# Patient Record
Sex: MALE | Race: WHITE | Employment: OTHER | URBAN - METROPOLITAN AREA
[De-identification: names, ages, dates, MRNs, and addresses within clinical notes are randomized per-mention and may not be internally consistent; named-entity substitution may affect disease eponyms.]

---

## 2017-07-14 ENCOUNTER — OP HISTORICAL/CONVERTED ENCOUNTER (OUTPATIENT)
Dept: OTHER | Age: 62
End: 2017-07-14

## 2017-09-22 ENCOUNTER — IP HISTORICAL/CONVERTED ENCOUNTER (OUTPATIENT)
Dept: OTHER | Age: 62
End: 2017-09-22

## 2018-01-10 ENCOUNTER — OP HISTORICAL/CONVERTED ENCOUNTER (OUTPATIENT)
Dept: OTHER | Age: 63
End: 2018-01-10

## 2018-04-10 ENCOUNTER — ED HISTORICAL/CONVERTED ENCOUNTER (OUTPATIENT)
Dept: OTHER | Age: 63
End: 2018-04-10

## 2018-04-20 ENCOUNTER — OP HISTORICAL/CONVERTED ENCOUNTER (OUTPATIENT)
Dept: OTHER | Age: 63
End: 2018-04-20

## 2018-10-05 ENCOUNTER — OP HISTORICAL/CONVERTED ENCOUNTER (OUTPATIENT)
Dept: OTHER | Age: 63
End: 2018-10-05

## 2018-12-13 ENCOUNTER — OP HISTORICAL/CONVERTED ENCOUNTER (OUTPATIENT)
Dept: OTHER | Age: 63
End: 2018-12-13

## 2019-06-22 ENCOUNTER — ED HISTORICAL/CONVERTED ENCOUNTER (OUTPATIENT)
Dept: OTHER | Age: 64
End: 2019-06-22

## 2019-07-05 ENCOUNTER — OP HISTORICAL/CONVERTED ENCOUNTER (OUTPATIENT)
Dept: OTHER | Age: 64
End: 2019-07-05

## 2019-07-06 ENCOUNTER — ED HISTORICAL/CONVERTED ENCOUNTER (OUTPATIENT)
Dept: OTHER | Age: 64
End: 2019-07-06

## 2019-07-18 ENCOUNTER — OP HISTORICAL/CONVERTED ENCOUNTER (OUTPATIENT)
Dept: OTHER | Age: 64
End: 2019-07-18

## 2019-09-10 ENCOUNTER — OP HISTORICAL/CONVERTED ENCOUNTER (OUTPATIENT)
Dept: OTHER | Age: 64
End: 2019-09-10

## 2019-10-06 ENCOUNTER — IP HISTORICAL/CONVERTED ENCOUNTER (OUTPATIENT)
Dept: OTHER | Age: 64
End: 2019-10-06

## 2019-10-22 ENCOUNTER — OP HISTORICAL/CONVERTED ENCOUNTER (OUTPATIENT)
Dept: OTHER | Age: 64
End: 2019-10-22

## 2020-01-27 ENCOUNTER — OP HISTORICAL/CONVERTED ENCOUNTER (OUTPATIENT)
Dept: OTHER | Age: 65
End: 2020-01-27

## 2020-03-31 ENCOUNTER — IP HISTORICAL/CONVERTED ENCOUNTER (OUTPATIENT)
Dept: OTHER | Age: 65
End: 2020-03-31

## 2020-10-30 ENCOUNTER — APPOINTMENT (OUTPATIENT)
Dept: CT IMAGING | Age: 65
End: 2020-10-30
Attending: EMERGENCY MEDICINE
Payer: COMMERCIAL

## 2020-10-30 ENCOUNTER — APPOINTMENT (OUTPATIENT)
Dept: GENERAL RADIOLOGY | Age: 65
End: 2020-10-30
Attending: EMERGENCY MEDICINE
Payer: COMMERCIAL

## 2020-10-30 ENCOUNTER — HOSPITAL ENCOUNTER (EMERGENCY)
Age: 65
Discharge: HOME | End: 2020-10-30
Attending: EMERGENCY MEDICINE
Payer: COMMERCIAL

## 2020-10-30 VITALS
RESPIRATION RATE: 16 BRPM | DIASTOLIC BLOOD PRESSURE: 82 MMHG | OXYGEN SATURATION: 98 % | WEIGHT: 225 LBS | HEIGHT: 71 IN | SYSTOLIC BLOOD PRESSURE: 146 MMHG | TEMPERATURE: 98.6 F | BODY MASS INDEX: 31.5 KG/M2 | HEART RATE: 70 BPM

## 2020-10-30 DIAGNOSIS — S61.210A LACERATION OF RIGHT INDEX FINGER WITHOUT FOREIGN BODY WITHOUT DAMAGE TO NAIL, INITIAL ENCOUNTER: Primary | ICD-10-CM

## 2020-10-30 DIAGNOSIS — S42.152A GLENOID FRACTURE OF SHOULDER, LEFT, CLOSED, INITIAL ENCOUNTER: ICD-10-CM

## 2020-10-30 DIAGNOSIS — S00.93XA CONTUSION OF HEAD, UNSPECIFIED PART OF HEAD, INITIAL ENCOUNTER: ICD-10-CM

## 2020-10-30 DIAGNOSIS — S42.142A GLENOID FRACTURE OF SHOULDER, LEFT, CLOSED, INITIAL ENCOUNTER: ICD-10-CM

## 2020-10-30 PROCEDURE — 73030 X-RAY EXAM OF SHOULDER: CPT

## 2020-10-30 PROCEDURE — 75810000293 HC SIMP/SUPERF WND  RPR

## 2020-10-30 PROCEDURE — 70450 CT HEAD/BRAIN W/O DYE: CPT

## 2020-10-30 PROCEDURE — 72125 CT NECK SPINE W/O DYE: CPT

## 2020-10-30 PROCEDURE — 99283 EMERGENCY DEPT VISIT LOW MDM: CPT

## 2020-10-30 RX ORDER — CEPHALEXIN 500 MG/1
500 CAPSULE ORAL 3 TIMES DAILY
Qty: 21 CAP | Refills: 0 | Status: SHIPPED | OUTPATIENT
Start: 2020-10-30 | End: 2020-11-06

## 2020-10-30 RX ORDER — LOSARTAN POTASSIUM 25 MG/1
25 TABLET ORAL DAILY
Status: ON HOLD | COMMUNITY
End: 2022-05-03

## 2020-10-30 RX ORDER — TIZANIDINE 2 MG/1
2 TABLET ORAL
COMMUNITY
End: 2021-05-16

## 2020-10-30 RX ORDER — METOPROLOL SUCCINATE 50 MG/1
100 TABLET, EXTENDED RELEASE ORAL DAILY
COMMUNITY
End: 2021-08-16 | Stop reason: ALTCHOICE

## 2020-10-30 RX ORDER — TRAMADOL HYDROCHLORIDE 50 MG/1
50 TABLET ORAL
Qty: 12 TAB | Refills: 0 | Status: SHIPPED | OUTPATIENT
Start: 2020-10-30 | End: 2020-11-02

## 2020-10-30 RX ORDER — GABAPENTIN 100 MG/1
800 CAPSULE ORAL 3 TIMES DAILY
COMMUNITY
End: 2021-08-16 | Stop reason: ALTCHOICE

## 2020-10-30 RX ORDER — TAMSULOSIN HYDROCHLORIDE 0.4 MG/1
0.4 CAPSULE ORAL DAILY
COMMUNITY

## 2020-10-30 RX ORDER — GLIPIZIDE 10 MG/1
10 TABLET, FILM COATED, EXTENDED RELEASE ORAL DAILY
COMMUNITY

## 2020-10-30 RX ORDER — INSULIN GLARGINE 100 [IU]/ML
60 INJECTION, SOLUTION SUBCUTANEOUS DAILY
COMMUNITY
End: 2021-08-16 | Stop reason: ALTCHOICE

## 2020-10-30 NOTE — ED TRIAGE NOTES
Patient reports went to sit on stool and it gave out cut right hand and hit head on blood thinners.  C/o ha almost syncope neck pain and left shoulder pain

## 2020-10-30 NOTE — DISCHARGE INSTRUCTIONS
Take medicines as prescribed and follow-up with your PCP in 2 days for wound recheck. Follow-up with orthopedic surgeon for suspected glenoid fracture. Maintain your left shoulder in a sling for now until further evaluation by the orthopedic surgeon. Return to the emergency room for any new or worsening symptoms.

## 2020-10-30 NOTE — ED PROVIDER NOTES
EMERGENCY DEPARTMENT HISTORY AND PHYSICAL EXAM      Date: 10/30/2020  Patient Name: Carlie Smiley. History of Presenting Illness     Chief Complaint   Patient presents with    Fall       History Provided By: Patient    HPI: Carlie Smiley., 72 y.o. male with a past medical history significant of BPH, diabetes, hypercholesterolemia, hypertension, restless leg syndrome, thromboembolus unknown site, and peripheral neuropathy who presents to the ED with cc of head injury and neck pain secondary to a fall while standing on a 3 step stool falling backwards. No loss of consciousness he did sustain minor lacerations to his right third and fourth fingers on the palmar aspect. No other injuries; he had some moderate bleeding at the site of injury at home prior to ED arrival.  Bleeding at this time is controlled  There are no other complaints, changes, or physical findings at this time. PCP: Gabrielle Sibley MD    No current facility-administered medications on file prior to encounter. Current Outpatient Medications on File Prior to Encounter   Medication Sig Dispense Refill    apixaban (ELIQUIS) 5 mg tablet Take 5 mg by mouth two (2) times a day. Indications: blood clot in a deep vein of the extremities      losartan (COZAAR) 25 mg tablet Take 25 mg by mouth daily.  glipiZIDE SR (GLUCOTROL XL) 10 mg CR tablet Take 10 mg by mouth daily.  gabapentin (NEURONTIN) 100 mg capsule Take 800 mg by mouth three (3) times daily.  metoprolol succinate (TOPROL-XL) 50 mg XL tablet Take 100 mg by mouth daily.  tamsulosin (FLOMAX) 0.4 mg capsule Take 0.4 mg by mouth daily.  insulin glargine (Lantus Solostar U-100 Insulin) 100 unit/mL (3 mL) inpn 60 Units by SubCUTAneous route daily.  tiZANidine (ZANAFLEX) 2 mg tablet Take 2 mg by mouth nightly.          Past History     Past Medical History:  Past Medical History:   Diagnosis Date    BPH (benign prostatic hyperplasia)     Diabetes ([facility] [address]     Hypercholesterolemia     Hypertension     Neuropathy     Restless leg syndrome     Thromboembolus (HCC)        Past Surgical History:  Past Surgical History:   Procedure Laterality Date    HX HIP REPLACEMENT Right     HX HIP REPLACEMENT Left     HX KNEE REPLACEMENT Left     HX SHOULDER ARTHROSCOPY Right     HX SHOULDER ARTHROSCOPY Left        Family History:  History reviewed. No pertinent family history. Social History:  Social History     Tobacco Use    Smoking status: Never Smoker    Smokeless tobacco: Never Used   Substance Use Topics    Alcohol use: Yes     Comment: rarely     Drug use: Never     Comment: medical marijuana        Allergies:  No Known Allergies      Review of Systems     Review of Systems   Constitutional: Negative. HENT:        As in HPI   Eyes: Negative. Respiratory: Negative. Cardiovascular: Negative. Gastrointestinal: Negative. Endocrine: Negative. Genitourinary: Negative. Musculoskeletal:        Left shoulder injury   Skin: Negative. Allergic/Immunologic: Negative. Neurological: Negative. Hematological: Negative. Psychiatric/Behavioral: Negative. All other systems reviewed and are negative. Physical Exam     Physical Exam  Vitals signs and nursing note reviewed. Constitutional:       General: He is not in acute distress. Appearance: He is well-developed. He is not toxic-appearing or diaphoretic. HENT:      Head: Normocephalic. Comments: Mild scalp contusion with no significant laceration     Nose: Nose normal.      Mouth/Throat:      Mouth: Mucous membranes are moist.      Pharynx: Oropharynx is clear. Eyes:      Extraocular Movements: Extraocular movements intact. Pupils: Pupils are equal, round, and reactive to light. Neck:      Musculoskeletal: Normal range of motion and neck supple. Cardiovascular:      Rate and Rhythm: Normal rate and regular rhythm. Heart sounds: Normal heart sounds.    Pulmonary: Effort: Pulmonary effort is normal. No respiratory distress. Breath sounds: Normal breath sounds. Chest:      Chest wall: No mass or tenderness. Abdominal:      General: Bowel sounds are normal. There is no abdominal bruit. Palpations: Abdomen is soft. There is no hepatomegaly. Tenderness: There is no abdominal tenderness. There is no rebound. Musculoskeletal: Normal range of motion. Right lower leg: He exhibits no tenderness. No edema. Left lower leg: He exhibits no tenderness. No edema. Comments: Focal tenderness left shoulder. Full range of motion   Skin:     General: Skin is warm and dry. Capillary Refill: Capillary refill takes less than 2 seconds. Comments: Superficial lacerations measuring 1.5 cm each to palmar aspects of right third and fourth fingers, bleeding controlled. Neurological:      General: No focal deficit present. Mental Status: He is alert and oriented to person, place, and time. Psychiatric:         Mood and Affect: Mood normal.         Behavior: Behavior normal.         Lab and Diagnostic Study Results     Labs -   No results found for this or any previous visit (from the past 12 hour(s)). Radiologic Studies -     CT Results  (Last 48 hours)               10/30/20 1615  CT HEAD WO CONT Final result    Impression:  Impression: Age-appropriate atrophy. No acute findings. Narrative:  CT dose reduction was achieved through use of a standardized protocol tailored   for this examination and automatic exposure control for dose modulation. CT Head       History:        The sulci are prominent but symmetric. Periventricular and deep white matter   hypodensity is not unexpected for age. No acute abnormality seen gray or white   matter. Ventricles are symmetric and appropriate for atrophy. There is no   midline shift or mass effect, or evidence of hemorrhage. Bone windows show no   fracture.            10/30/20 [place] [address] CONT Final result    Narrative:  CT dose reduction was achieved through use of a standardized protocol tailored   for this examination and automatic exposure control for dose modulation. Protocol study shows no fracture or prevertebral edema. 2 mm chronic   retrolisthesis of C3 on C4       At C3-4, there is advanced DDD. Moderate DDD at C5-6 and mild DDD at every other   level. Osteophytes and accompanying disc herniation causes moderate spinal stenosis at   C3-4. There is borderline spinal stenosis at C6-7. Mild multilevel facet DJD, and moderate left-sided facet DJD at C3-4, without   foraminal stenosis. Craniocervical junction is maintained           CXR Results  (Last 48 hours)    None        Left shoulder x-ray study Result     Three-view left shoulder     Moderate glenohumeral DJD and questionable subtle nondisplaced fracture of the  inferior glenoid. More likely, this represents coalescent degenerative cysts. No  dislocation         Medical Decision Making   I am the first provider for this patient. I reviewed the vital signs, available nursing notes, past medical history, past surgical history, family history and social history. Vital Signs-Reviewed the patient's vital signs. No data found. Records Reviewed: Nursing Notes    ED Course:   Initial assessment performed. The patients presenting problems have been discussed, and they are in agreement with the care plan formulated and outlined with them. I have encouraged them to ask questions as they arise throughout their visit. Provider Notes (Medical Decision Making): Uneventful ED course, clinical improvement with therapy, patient will be discharged to followup with PCP as directed    Procedures   ical Decision Makingedical Decision Making  Procedures   Right third and fourth finger superficial lacerations on the palmar aspect of the fingers. Dermabond used for wound closure without incident. No complications.   Patient tolerated procedure well. Disposition   Disposition: Condition stable and improved  Home     DischargedDISCHARGE PLAN:  1. Current Discharge Medication List      CONTINUE these medications which have NOT CHANGED    Details   apixaban (ELIQUIS) 5 mg tablet Take 5 mg by mouth two (2) times a day. Indications: blood clot in a deep vein of the extremities      losartan (COZAAR) 25 mg tablet Take 25 mg by mouth daily. glipiZIDE SR (GLUCOTROL XL) 10 mg CR tablet Take 10 mg by mouth daily. gabapentin (NEURONTIN) 100 mg capsule Take 800 mg by mouth three (3) times daily. metoprolol succinate (TOPROL-XL) 50 mg XL tablet Take 100 mg by mouth daily. tamsulosin (FLOMAX) 0.4 mg capsule Take 0.4 mg by mouth daily. insulin glargine (Lantus Solostar U-100 Insulin) 100 unit/mL (3 mL) inpn 60 Units by SubCUTAneous route daily. tiZANidine (ZANAFLEX) 2 mg tablet Take 2 mg by mouth nightly. 2.   Follow-up Information     Follow up With Specialties Details Why Contact Info    Follow-up with PCP of your choice  In 2 days For wound re-check     Susan Yuan MD Orthopedic Surgery In 1 day  [address] [facility]  [phone]          3. Return to ED if worse   4. Discharge Medication List as of 10/30/2020  6:34 PM            Diagnosis     Clinical Impression:   1. Laceration of right index finger without foreign body without damage to nail, initial encounter    2. Contusion of head, unspecified part of head, initial encounter    3. Glenoid fracture of shoulder, left, closed, initial encounter        Attestations:    Cuauhtemoc Recinos MD    Please note that this dictation was completed with [employer], the [employer] voice recognition software. Quite often unanticipated grammatical, syntax, homophones, and other interpretive errors are inadvertently transcribed by the computer software. Please disregard these errors.   Please excuse any errors that have escaped final proofreading. Thank you.

## 2021-03-06 ENCOUNTER — HOSPITAL ENCOUNTER (EMERGENCY)
Age: 66
Discharge: HOME | End: 2021-03-06
Attending: FAMILY MEDICINE
Payer: COMMERCIAL

## 2021-03-06 ENCOUNTER — APPOINTMENT (OUTPATIENT)
Dept: GENERAL RADIOLOGY | Age: 66
End: 2021-03-06
Attending: FAMILY MEDICINE
Payer: COMMERCIAL

## 2021-03-06 VITALS
BODY MASS INDEX: 32.2 KG/M2 | HEIGHT: 71 IN | TEMPERATURE: 98.3 F | SYSTOLIC BLOOD PRESSURE: 144 MMHG | WEIGHT: 230 LBS | HEART RATE: 63 BPM | RESPIRATION RATE: 20 BRPM | DIASTOLIC BLOOD PRESSURE: 79 MMHG | OXYGEN SATURATION: 99 %

## 2021-03-06 DIAGNOSIS — T14.8XXA MUSCLE STRAIN: ICD-10-CM

## 2021-03-06 DIAGNOSIS — M25.511 PAIN IN JOINT OF RIGHT SHOULDER: Primary | ICD-10-CM

## 2021-03-06 PROCEDURE — 96372 THER/PROPH/DIAG INJ SC/IM: CPT

## 2021-03-06 PROCEDURE — 99283 EMERGENCY DEPT VISIT LOW MDM: CPT

## 2021-03-06 PROCEDURE — 73030 X-RAY EXAM OF SHOULDER: CPT

## 2021-03-06 PROCEDURE — 74011250636 HC RX REV CODE- 250/636: Performed by: FAMILY MEDICINE

## 2021-03-06 PROCEDURE — 74011250637 HC RX REV CODE- 250/637: Performed by: FAMILY MEDICINE

## 2021-03-06 PROCEDURE — 73000 X-RAY EXAM OF COLLAR BONE: CPT

## 2021-03-06 RX ORDER — ORPHENADRINE CITRATE 30 MG/ML
30 INJECTION INTRAMUSCULAR; INTRAVENOUS ONCE
Status: COMPLETED | OUTPATIENT
Start: 2021-03-06 | End: 2021-03-06

## 2021-03-06 RX ORDER — ACETAMINOPHEN 500 MG
1000 TABLET ORAL ONCE
Status: COMPLETED | OUTPATIENT
Start: 2021-03-06 | End: 2021-03-06

## 2021-03-06 RX ORDER — TIZANIDINE HYDROCHLORIDE 2 MG/1
CAPSULE, GELATIN COATED ORAL
Qty: 10 CAP | Refills: 0 | Status: SHIPPED | OUTPATIENT
Start: 2021-03-06 | End: 2021-08-16 | Stop reason: ALTCHOICE

## 2021-03-06 RX ORDER — LIDOCAINE 4 G/100G
PATCH TOPICAL
Qty: 5 PATCH | Refills: 0 | Status: SHIPPED | OUTPATIENT
Start: 2021-03-06 | End: 2021-05-16

## 2021-03-06 RX ADMIN — ACETAMINOPHEN 1000 MG: 500 TABLET ORAL at 17:19

## 2021-03-06 RX ADMIN — ORPHENADRINE CITRATE 30 MG: 60 INJECTION INTRAMUSCULAR; INTRAVENOUS at 17:21

## 2021-03-06 NOTE — DISCHARGE INSTRUCTIONS
Thank you! Thank you for allowing me to care for you in the emergency department. I sincerely hope that you are satisfied with your visit today. It is my goal to provide you with excellent care. Below you will find a list of your labs and imaging from your visit today. Should you have any questions regarding these results please do not hesitate to call the emergency department. Labs -   No results found for this or any previous visit (from the past 12 hour(s)). Radiologic Studies -   XR CLAVICLE LT   Final Result   Findings/impression:      2 views of the left clavicle, 3 views of the left shoulder. No evidence of acute fracture. Alignment is anatomic. Mild acromioclavicular and   glenohumeral joint osteoarthritis. Indeterminate round lucent lesion in the proximal left humeral diaphysis   measuring 7 mm. Nonspecific. Metastasis not entirely excluded. Correlate for any   history of malignancy. XR SHOULDER LT AP/LAT MIN 2 V   Final Result   Findings/impression:      2 views of the left clavicle, 3 views of the left shoulder. No evidence of acute fracture. Alignment is anatomic. Mild acromioclavicular and   glenohumeral joint osteoarthritis. Indeterminate round lucent lesion in the proximal left humeral diaphysis   measuring 7 mm. Nonspecific. Metastasis not entirely excluded. Correlate for any   history of malignancy. CT Results  (Last 48 hours)      None          CXR Results  (Last 48 hours)      None               If you feel that you have not received excellent quality care or timely care, please ask to speak to the nurse manager. Please choose us in the future for your continued health care needs. ------------------------------------------------------------------------------------------------------------  The exam and treatment you received in the Emergency Department were for an urgent problem and are not intended as complete care.  It is important that you follow-up with a doctor, nurse practitioner, or physician assistant to:  (1) confirm your diagnosis,  (2) re-evaluation of changes in your illness and treatment, and  (3) for ongoing care. If your symptoms become worse or you do not improve as expected and you are unable to reach your usual health care provider, you should return to the Emergency Department. We are available 24 hours a day. Please take your discharge instructions with you when you go to your follow-up appointment. If you have any problem arranging a follow-up appointment, contact the Emergency Department immediately. If a prescription has been provided, please have it filled as soon as possible to prevent a delay in treatment. Read the entire medication instruction sheet provided to you by the pharmacy. If you have any questions or reservations about taking the medication due to side effects or interactions with other medications, please call your primary care physician or contact the ER to speak with the charge nurse. Make an appointment with your family doctor or the physician you were referred to for follow-up of this visit as instructed on your discharge paperwork, as this is a mandatory follow-up. Return to the ER if you are unable to be seen or if you are unable to be seen in a timely manner. If you have any problem arranging the follow-up visit, contact the Emergency Department immediately.

## 2021-03-06 NOTE — ED PROVIDER NOTES
EMERGENCY DEPARTMENT HISTORY AND PHYSICAL EXAM      Date: 3/6/2021  Patient Name: Meghan Yousif. History of Presenting Illness     Chief Complaint   Patient presents with    Shoulder Injury       History Provided By: Patient    HPI: Meghan Yousif., 77 y.o. male with a past medical history significant as documented below presents to the ED with cc of left shoulder pain and left neck pain secondary to fall. Patient states he was moving furniture where he tripped and fell hitting the left shoulder. He has full range of motion of the shoulder and sensation but there is pain with movement. No pain with movement of the neck. He has had prior injuries to the left shoulder. No head injury or LOC. No headache, dizziness, nausea, vomiting, chest pain, dyspnea, and all other symptoms are negative. There are no other complaints, changes, or physical findings at this time. PCP: Shae Fallon MD    No current facility-administered medications on file prior to encounter. Current Outpatient Medications on File Prior to Encounter   Medication Sig Dispense Refill    apixaban (ELIQUIS) 5 mg tablet Take 5 mg by mouth two (2) times a day. Indications: blood clot in a deep vein of the extremities      losartan (COZAAR) 25 mg tablet Take 25 mg by mouth daily.  glipiZIDE SR (GLUCOTROL XL) 10 mg CR tablet Take 10 mg by mouth daily.  gabapentin (NEURONTIN) 100 mg capsule Take 800 mg by mouth three (3) times daily.  metoprolol succinate (TOPROL-XL) 50 mg XL tablet Take 100 mg by mouth daily.  tamsulosin (FLOMAX) 0.4 mg capsule Take 0.4 mg by mouth daily.  insulin glargine (Lantus Solostar U-100 Insulin) 100 unit/mL (3 mL) inpn 60 Units by SubCUTAneous route daily.  tiZANidine (ZANAFLEX) 2 mg tablet Take 2 mg by mouth nightly.          Past History     Past Medical History:  Past Medical History:   Diagnosis Date    BPH (benign prostatic hyperplasia)     [facility]     Hypercholesterolemia     Hypertension     Neuropathy     Restless leg syndrome     Thromboembolus (HCC)        Past Surgical History:  Past Surgical History:   Procedure Laterality Date    HX HIP REPLACEMENT Right     HX HIP REPLACEMENT Left     HX KNEE REPLACEMENT Left     HX SHOULDER ARTHROSCOPY Right     HX SHOULDER ARTHROSCOPY Left        Family History:  History reviewed. No pertinent family history. Social History:  Social History     Tobacco Use    Smoking status: Never Smoker    Smokeless tobacco: Never Used   Substance Use Topics    Alcohol use: Yes     Comment: rarely     Drug use: Never     Comment: medical marijuana        Allergies:  No Known Allergies      Review of Systems     Review of Systems   Constitutional: Negative for chills and fever. HENT: Negative for congestion and sore throat. Eyes: Negative for photophobia and visual disturbance. Respiratory: Negative for cough and shortness of breath. Cardiovascular: Negative for chest pain and palpitations. Gastrointestinal: Negative for nausea and vomiting. Genitourinary: Negative for dysuria and flank pain. Musculoskeletal: Positive for arthralgias (left shoulder pain). Negative for back pain and myalgias. Skin: Negative for rash and wound. Allergic/Immunologic: Negative for environmental allergies and food allergies. Neurological: Negative for light-headedness and headaches. All other systems reviewed and are negative. Physical Exam     Physical Exam  Vitals signs and nursing note reviewed. Constitutional:       Appearance: Normal appearance. He is normal weight. HENT:      Head: Normocephalic and atraumatic. Eyes:      Extraocular Movements: Extraocular movements intact. Conjunctiva/sclera: Conjunctivae normal.   Neck:      Musculoskeletal: Full passive range of motion without pain and normal range of motion. Muscular tenderness present. No pain with movement or spinous process tenderness. Cardiovascular:      Rate and Rhythm: Normal rate and regular rhythm. Pulses: Normal pulses. Heart sounds: Normal heart sounds. Pulmonary:      Effort: Pulmonary effort is normal.      Breath sounds: Normal breath sounds. Abdominal:      General: Abdomen is flat. Bowel sounds are normal.      Palpations: Abdomen is soft. Musculoskeletal:         General: No swelling. Left shoulder: He exhibits decreased range of motion (2/2 pain), tenderness and pain. He exhibits no bony tenderness, no swelling, no effusion, no crepitus, no deformity, no laceration, no spasm, normal pulse and normal strength. Arms:    Skin:     General: Skin is warm. Capillary Refill: Capillary refill takes less than 2 seconds. Neurological:      General: No focal deficit present. Mental Status: He is alert and oriented to person, place, and time. Psychiatric:         Mood and Affect: Mood normal.         Behavior: Behavior normal.         Thought Content: Thought content normal.         Judgment: Judgment normal.         Lab and Diagnostic Study Results     Labs -   No results found for this or any previous visit (from the past 12 hour(s)). Radiologic Studies -   @lastxrresult@  CT Results  (Last 48 hours)    None        CXR Results  (Last 48 hours)    None            Medical Decision Making   - I am the first provider for this patient. - I reviewed the vital signs, available nursing notes, past medical history, past surgical history, family history and social history. - Initial assessment performed. The patients presenting problems have been discussed, and they are in agreement with the care plan formulated and outlined with them. I have encouraged them to ask questions as they arise throughout their visit. Vital Signs-Reviewed the patient's vital signs.   Patient Vitals for the past 12 hrs:   Temp Pulse Resp BP SpO2   03/06/21 1527     99 %   03/06/21 1521 98.3 °F (36.8 °C) 63 20 (!) 144/79 97 %       Records Reviewed: Nursing Notes and Old Medical Records      ED Course:          Provider Notes (Medical Decision Making):     MDM  Number of Diagnoses or Management Options  Muscle strain  Pain in joint of right shoulder  Diagnosis management comments: Patient is stable with no marked toxicity or distress. No significant findings on physical exam.  Left shoulder placed in sling. Treatment with Tylenol and Norflex IM provided relief. Results reviewed with the patient. All questions were answered and there are no apparent barriers to comprehension or communication. The patient verbalizes agreement with the diagnosis, treatment plan, and understanding of the follow-up instructions. The patient is appropriate for discharge; leaves the Emergency Department walking with a stable gait. Patient understands to return to the ED in 12-24 hours for any new or worsening symptoms or no  expected timely resolution of symptoms on mediations prescribed. Disposition   Disposition: Condition stable  DC- Adult Discharges: All of the diagnostic tests were reviewed and questions answered. Diagnosis, care plan and treatment options were discussed. The patient understands the instructions and will follow up as directed. The patients results have been reviewed with them. They have been counseled regarding their diagnosis. The patient verbally convey understanding and agreement of the signs, symptoms, diagnosis, treatment and prognosis and additionally agrees to follow up as recommended with their PCP in 24 - 48 hours. They also agree with the care-plan and convey that all of their questions have been answered.   I have also put together some discharge instructions for them that include: 1) educational information regarding their diagnosis, 2) how to care for their diagnosis at home, as well a 3) list of reasons why they would want to return to the ED prior to their follow-up appointment, should their condition change. Discharged    DISCHARGE PLAN:  1. Current Discharge Medication List      CONTINUE these medications which have NOT CHANGED    Details   apixaban (ELIQUIS) 5 mg tablet Take 5 mg by mouth two (2) times a day. Indications: blood clot in a deep vein of the extremities      losartan (COZAAR) 25 mg tablet Take 25 mg by mouth daily. glipiZIDE SR (GLUCOTROL XL) 10 mg CR tablet Take 10 mg by mouth daily. gabapentin (NEURONTIN) 100 mg capsule Take 800 mg by mouth three (3) times daily. metoprolol succinate (TOPROL-XL) 50 mg XL tablet Take 100 mg by mouth daily. tamsulosin (FLOMAX) 0.4 mg capsule Take 0.4 mg by mouth daily. insulin glargine (Lantus Solostar U-100 Insulin) 100 unit/mL (3 mL) inpn 60 Units by SubCUTAneous route daily. tiZANidine (ZANAFLEX) 2 mg tablet Take 2 mg by mouth nightly. 2.   Follow-up Information     Follow up With Specialties Details Why Contact Info    Brando Capellan MD Internal Medicine Schedule an appointment as soon as possible for a visit in 3 days  [address] [place] [address]  [phone]          3. Return to ED if worse   4. Discharge Medication List as of 3/6/2021  5:42 PM      START taking these medications    Details   lidocaine 4 % patch Apply to affect area, Normal, Disp-5 Patch, R-0      tiZANidine (Zanaflex) 2 mg capsule 1 tablet every 6 hours as needed, Normal, Disp-10 Cap, R-0         CONTINUE these medications which have NOT CHANGED    Details   apixaban (ELIQUIS) 5 mg tablet Take 5 mg by mouth two (2) times a day. Indications: blood clot in a deep vein of the extremities, Historical Med      losartan (COZAAR) 25 mg tablet Take 25 mg by mouth daily. , Historical Med      glipiZIDE SR (GLUCOTROL XL) 10 mg CR tablet Take 10 mg by mouth daily. , Historical Med      gabapentin (NEURONTIN) 100 mg capsule Take 800 mg by mouth three (3) times daily. , Historical Med      metoprolol succinate (TOPROL-XL) 50 mg XL tablet Take 100 mg by mouth daily. , Historical Med      tamsulosin (FLOMAX) 0.4 mg capsule Take 0.4 mg by mouth daily. , Historical Med      insulin glargine (Lantus Solostar U-100 Insulin) 100 unit/mL (3 mL) inpn 60 Units by SubCUTAneous route daily. , Historical Med      tiZANidine (ZANAFLEX) 2 mg tablet Take 2 mg by mouth nightly., Historical Med               Diagnosis     Clinical Impression:   1. Pain in joint of right shoulder    2. Muscle strain        Attestations:    Ana Luisa Das, DO    Please note that this dictation was completed with [employer], the [employer] voice recognition software. Quite often unanticipated grammatical, syntax, homophones, and other interpretive errors are inadvertently transcribed by the computer software. Please disregard these errors. Please excuse any errors that have escaped final proofreading. Thank you.

## 2021-05-16 ENCOUNTER — HOSPITAL ENCOUNTER (EMERGENCY)
Age: 66
Discharge: HOME | End: 2021-05-16
Attending: EMERGENCY MEDICINE
Payer: MEDICARE

## 2021-05-16 ENCOUNTER — APPOINTMENT (OUTPATIENT)
Dept: GENERAL RADIOLOGY | Age: 66
End: 2021-05-16
Attending: EMERGENCY MEDICINE
Payer: MEDICARE

## 2021-05-16 VITALS
RESPIRATION RATE: 18 BRPM | HEART RATE: 63 BPM | WEIGHT: 235 LBS | HEIGHT: 71 IN | BODY MASS INDEX: 32.9 KG/M2 | SYSTOLIC BLOOD PRESSURE: 134 MMHG | DIASTOLIC BLOOD PRESSURE: 80 MMHG | OXYGEN SATURATION: 98 % | TEMPERATURE: 98.2 F

## 2021-05-16 DIAGNOSIS — M54.50 ACUTE MIDLINE LOW BACK PAIN WITHOUT SCIATICA: Primary | ICD-10-CM

## 2021-05-16 LAB
ALBUMIN SERPL-MCNC: 3.8 G/DL (ref 3.5–5)
ALBUMIN/GLOB SERPL: 1 {RATIO} (ref 1.1–2.2)
ALP SERPL-CCNC: 87 U/L (ref 45–117)
ALT SERPL-CCNC: 36 U/L (ref 12–78)
ANION GAP SERPL CALC-SCNC: 11 MMOL/L (ref 5–15)
APPEARANCE UR: CLEAR
AST SERPL W P-5'-P-CCNC: 19 U/L (ref 15–37)
BACTERIA URNS QL MICRO: NEGATIVE /HPF
BASOPHILS # BLD: 0 K/UL (ref 0–0.1)
BASOPHILS NFR BLD: 1 % (ref 0–1)
BILIRUB SERPL-MCNC: 0.5 MG/DL (ref 0.2–1)
BILIRUB UR QL: NEGATIVE
BUN SERPL-MCNC: 28 MG/DL (ref 6–20)
BUN/CREAT SERPL: 16 (ref 12–20)
CA-I BLD-MCNC: 8.5 MG/DL (ref 8.5–10.1)
CHLORIDE SERPL-SCNC: 99 MMOL/L (ref 97–108)
CO2 SERPL-SCNC: 24 MMOL/L (ref 21–32)
COLOR UR: YELLOW
CREAT SERPL-MCNC: 1.76 MG/DL (ref 0.7–1.3)
DIFFERENTIAL METHOD BLD: NORMAL
EOSINOPHIL # BLD: 0.3 K/UL (ref 0–0.4)
EOSINOPHIL NFR BLD: 4 % (ref 0–7)
EPITH CASTS URNS QL MICRO: ABNORMAL /LPF
ERYTHROCYTE [DISTWIDTH] IN BLOOD BY AUTOMATED COUNT: 12.9 % (ref 11.5–14.5)
GLOBULIN SER CALC-MCNC: 3.8 G/DL (ref 2–4)
GLUCOSE SERPL-MCNC: 335 MG/DL (ref 65–100)
GLUCOSE UR STRIP.AUTO-MCNC: 250 MG/DL
HCT VFR BLD AUTO: 39.4 % (ref 36.6–50.3)
HGB BLD-MCNC: 14 G/DL (ref 12.1–17)
HGB UR QL STRIP: NEGATIVE
IMM GRANULOCYTES # BLD AUTO: 0 K/UL (ref 0–0.04)
IMM GRANULOCYTES NFR BLD AUTO: 0 % (ref 0–0.5)
KETONES UR QL STRIP.AUTO: NEGATIVE MG/DL
LEUKOCYTE ESTERASE UR QL STRIP.AUTO: NEGATIVE
LYMPHOCYTES # BLD: 2.2 K/UL (ref 0.8–3.5)
LYMPHOCYTES NFR BLD: 32 % (ref 12–49)
MCH RBC QN AUTO: 30.6 PG (ref 26–34)
MCHC RBC AUTO-ENTMCNC: 35.5 G/DL (ref 30–36.5)
MCV RBC AUTO: 86.2 FL (ref 80–99)
MONOCYTES # BLD: 0.6 K/UL (ref 0–1)
MONOCYTES NFR BLD: 8 % (ref 5–13)
NEUTS SEG # BLD: 3.6 K/UL (ref 1.8–8)
NEUTS SEG NFR BLD: 55 % (ref 32–75)
NITRITE UR QL STRIP.AUTO: NEGATIVE
PH UR STRIP: 5 [PH] (ref 5–8)
PLATELET # BLD AUTO: 189 K/UL (ref 150–400)
PMV BLD AUTO: 10.4 FL (ref 8.9–12.9)
POTASSIUM SERPL-SCNC: 4.7 MMOL/L (ref 3.5–5.1)
PROT SERPL-MCNC: 7.6 G/DL (ref 6.4–8.2)
PROT UR STRIP-MCNC: NEGATIVE MG/DL
RBC # BLD AUTO: 4.57 M/UL (ref 4.1–5.7)
RBC #/AREA URNS HPF: ABNORMAL /HPF (ref 0–5)
SODIUM SERPL-SCNC: 134 MMOL/L (ref 136–145)
SP GR UR REFRACTOMETRY: 1.02 (ref 1–1.03)
UA: UC IF INDICATED,UAUC: ABNORMAL
UROBILINOGEN UR QL STRIP.AUTO: 0.1 EU/DL (ref 0.2–1)
WBC # BLD AUTO: 6.7 K/UL (ref 4.1–11.1)
WBC URNS QL MICRO: ABNORMAL /HPF (ref 0–4)

## 2021-05-16 PROCEDURE — 85025 COMPLETE CBC W/AUTO DIFF WBC: CPT

## 2021-05-16 PROCEDURE — 81001 URINALYSIS AUTO W/SCOPE: CPT

## 2021-05-16 PROCEDURE — 99283 EMERGENCY DEPT VISIT LOW MDM: CPT

## 2021-05-16 PROCEDURE — 72100 X-RAY EXAM L-S SPINE 2/3 VWS: CPT

## 2021-05-16 PROCEDURE — 80053 COMPREHEN METABOLIC PANEL: CPT

## 2021-05-16 PROCEDURE — 74018 RADEX ABDOMEN 1 VIEW: CPT

## 2021-05-16 PROCEDURE — 74011250637 HC RX REV CODE- 250/637: Performed by: EMERGENCY MEDICINE

## 2021-05-16 RX ORDER — TRAMADOL HYDROCHLORIDE 50 MG/1
50 TABLET ORAL
Qty: 12 TAB | Refills: 0 | Status: SHIPPED | OUTPATIENT
Start: 2021-05-16 | End: 2021-05-19

## 2021-05-16 RX ORDER — BACLOFEN 10 MG/1
10 TABLET ORAL 3 TIMES DAILY
Qty: 15 TAB | Refills: 0 | Status: SHIPPED | OUTPATIENT
Start: 2021-05-16 | End: 2021-08-16 | Stop reason: ALTCHOICE

## 2021-05-16 RX ORDER — TRAMADOL HYDROCHLORIDE 50 MG/1
50 TABLET ORAL
Status: COMPLETED | OUTPATIENT
Start: 2021-05-16 | End: 2021-05-16

## 2021-05-16 RX ADMIN — TRAMADOL HYDROCHLORIDE 50 MG: 50 TABLET, FILM COATED ORAL at 21:26

## 2021-05-16 NOTE — ED TRIAGE NOTES
Pt c/o back pain/flank pain intermittent x1 month; hx low functioning kidneys and cyst on left kidney; denies N/V/D; ambulated to room without difficulty or assistance

## 2021-05-17 NOTE — DISCHARGE INSTRUCTIONS
Take medicines as prescribed and follow-up with your PCP in 2 to 3 days. Return to the emergency room for any new or worsening symptoms. Thank you! Thank you for allowing me to care for you in the emergency department. I sincerely hope that you are satisfied with your visit today. It is my goal to provide you with excellent care. Below you will find a list of your labs and imaging from your visit today. Should you have any questions regarding these results please do not hesitate to call the emergency department. Labs -     Recent Results (from the past 12 hour(s))   URINALYSIS W/ REFLEX CULTURE    Collection Time: 05/16/21  8:04 PM    Specimen: Urine   Result Value Ref Range    Color Yellow      Appearance Clear Clear      Specific gravity 1.020 1.003 - 1.030      pH (UA) 5.0 5.0 - 8.0      Protein Negative Negative mg/dL    Glucose 250 (A) Negative mg/dL    Ketone Negative Negative mg/dL    Bilirubin Negative Negative      Blood Negative Negative      Urobilinogen 0.1 (L) 0.2 - 1.0 EU/dL    Nitrites Negative Negative      Leukocyte Esterase Negative Negative      WBC 0-4 0 - 4 /hpf    RBC 0-5 0 - 5 /hpf    Epithelial cells Few Few /lpf    Bacteria Negative Negative /hpf    UA:UC IF INDICATED Culture not indicated by UA result Culture not indicated by UA result     METABOLIC PANEL, COMPREHENSIVE    Collection Time: 05/16/21  8:57 PM   Result Value Ref Range    Sodium 134 (L) 136 - 145 mmol/L    Potassium 4.7 3.5 - 5.1 mmol/L    Chloride 99 97 - 108 mmol/L    CO2 24 21 - 32 mmol/L    Anion gap 11 5 - 15 mmol/L    Glucose 335 (H) 65 - 100 mg/dL    BUN 28 (H) 6 - 20 mg/dL    Creatinine 1.76 (H) 0.70 - 1.30 mg/dL    BUN/Creatinine ratio 16 12 - 20      GFR est AA 47 (L) >60 ml/min/1.73m2    GFR est non-AA 39 (L) >60 ml/min/1.73m2    Calcium 8.5 8.5 - 10.1 mg/dL    Bilirubin, total 0.5 0.2 - 1.0 mg/dL    AST (SGOT) 19 15 - 37 U/L    ALT (SGPT) 36 12 - 78 U/L    Alk.  phosphatase 87 45 - 117 U/L    Protein, total 7.6 6.4 - 8.2 g/dL    Albumin 3.8 3.5 - 5.0 g/dL    Globulin 3.8 2.0 - 4.0 g/dL    A-G Ratio 1.0 (L) 1.1 - 2.2     CBC WITH AUTOMATED DIFF    Collection Time: 05/16/21  8:57 PM   Result Value Ref Range    WBC 6.7 4.1 - 11.1 K/uL    RBC 4.57 4.10 - 5.70 M/uL    HGB 14.0 12.1 - 17.0 g/dL    HCT 39.4 36.6 - 50.3 %    MCV 86.2 80.0 - 99.0 FL    MCH 30.6 26.0 - 34.0 PG    MCHC 35.5 30.0 - 36.5 g/dL    RDW 12.9 11.5 - 14.5 %    PLATELET 508 728 - 517 K/uL    MPV 10.4 8.9 - 12.9 FL    NEUTROPHILS 55 32 - 75 %    LYMPHOCYTES 32 12 - 49 %    MONOCYTES 8 5 - 13 %    EOSINOPHILS 4 0 - 7 %    BASOPHILS 1 0 - 1 %    IMMATURE GRANULOCYTES 0 0.0 - 0.5 %    ABS. NEUTROPHILS 3.6 1.8 - 8.0 K/UL    ABS. LYMPHOCYTES 2.2 0.8 - 3.5 K/UL    ABS. MONOCYTES 0.6 0.0 - 1.0 K/UL    ABS. EOSINOPHILS 0.3 0.0 - 0.4 K/UL    ABS. BASOPHILS 0.0 0.0 - 0.1 K/UL    ABS. IMM. GRANS. 0.0 0.00 - 0.04 K/UL    DF AUTOMATED         Radiologic Studies -   XR SPINE LUMB 2 OR 3 V   Final Result      XR ABD (KUB)    (Results Pending)     CT Results  (Last 48 hours)      None          CXR Results  (Last 48 hours)      None               If you feel that you have not received excellent quality care or timely care, please ask to speak to the nurse manager. Please choose us in the future for your continued health care needs. ------------------------------------------------------------------------------------------------------------  The exam and treatment you received in the Emergency Department were for an urgent problem and are not intended as complete care. It is important that you follow-up with a doctor, nurse practitioner, or physician assistant to:  (1) confirm your diagnosis,  (2) re-evaluation of changes in your illness and treatment, and  (3) for ongoing care. If your symptoms become worse or you do not improve as expected and you are unable to reach your usual health care provider, you should return to the Emergency Department.  We are available 24 hours a day. Please take your discharge instructions with you when you go to your follow-up appointment. If you have any problem arranging a follow-up appointment, contact the Emergency Department immediately. If a prescription has been provided, please have it filled as soon as possible to prevent a delay in treatment. Read the entire medication instruction sheet provided to you by the pharmacy. If you have any questions or reservations about taking the medication due to side effects or interactions with other medications, please call your primary care physician or contact the ER to speak with the charge nurse. Make an appointment with your family doctor or the physician you were referred to for follow-up of this visit as instructed on your discharge paperwork, as this is a mandatory follow-up. Return to the ER if you are unable to be seen or if you are unable to be seen in a timely manner. If you have any problem arranging the follow-up visit, contact the Emergency Department immediately.

## 2021-05-17 NOTE — ED PROVIDER NOTES
EMERGENCY DEPARTMENT HISTORY AND PHYSICAL EXAM      Date: 5/16/2021  Patient Name: Amy Blackman. History of Presenting Illness     Chief Complaint   Patient presents with    Back Pain    Flank Pain     left       History Provided By: Patient    HPI: Amy Jara, 77 y.o. male with a past medical history significant diabetes, hypertension, hyperlipidemia and Restless leg syndrome, BPH, neuropathy, h/o thromboembolus presents to the ED with cc of low back pain and left flank pain of hideous onset of 3 weeks duration progressively getting worse. Patient has been doing some strenuous activity at home. No history hematuria or prior history of kidney stones. Pain is aggravated by movement. No history suggestive of sciatica. No other constitutional symptoms. There are no other complaints, changes, or physical findings at this time. PCP: Je Sarabia MD    No current facility-administered medications on file prior to encounter. Current Outpatient Medications on File Prior to Encounter   Medication Sig Dispense Refill    tiZANidine (Zanaflex) 2 mg capsule 1 tablet every 6 hours as needed 10 Cap 0    [DISCONTINUED] lidocaine 4 % patch Apply to affect area 5 Patch 0    apixaban (ELIQUIS) 5 mg tablet Take 5 mg by mouth two (2) times a day. Indications: blood clot in a deep vein of the extremities      losartan (COZAAR) 25 mg tablet Take 25 mg by mouth daily.  glipiZIDE SR (GLUCOTROL XL) 10 mg CR tablet Take 10 mg by mouth daily.  gabapentin (NEURONTIN) 100 mg capsule Take 800 mg by mouth three (3) times daily.  metoprolol succinate (TOPROL-XL) 50 mg XL tablet Take 100 mg by mouth daily.  tamsulosin (FLOMAX) 0.4 mg capsule Take 0.4 mg by mouth daily.  insulin glargine (Lantus Solostar U-100 Insulin) 100 unit/mL (3 mL) inpn 60 Units by SubCUTAneous route daily.  [DISCONTINUED] tiZANidine (ZANAFLEX) 2 mg tablet Take 2 mg by mouth nightly.          Past History Past Medical History:  Past Medical History:   Diagnosis Date    BPH (benign prostatic hyperplasia)     Diabetes ([address]     Hypercholesterolemia     Hypertension     Neuropathy     Restless leg syndrome     Thromboembolus (HCC)        Past Surgical History:  Past Surgical History:   Procedure Laterality Date    HX HIP REPLACEMENT Right     HX HIP REPLACEMENT Left     HX KNEE REPLACEMENT Left     HX SHOULDER ARTHROSCOPY Right     HX SHOULDER ARTHROSCOPY Left        Family History:  History reviewed. No pertinent family history. Social History:  Social History     Tobacco Use    Smoking status: Never Smoker    Smokeless tobacco: Never Used   Substance Use Topics    Alcohol use: Yes     Comment: rarely     Drug use: Never     Comment: medical marijuana        Allergies:  No Known Allergies      Review of Systems     Review of Systems   Constitutional: Negative. Negative for chills, fatigue and fever. HENT: Negative. Negative for congestion, ear discharge, sinus pressure and sore throat. Eyes: Negative. Negative for photophobia. Respiratory: Negative. Negative for cough and shortness of breath. Cardiovascular: Negative. Negative for chest pain and palpitations. Gastrointestinal: Negative. Negative for diarrhea, nausea and vomiting. Endocrine: Negative. Genitourinary: Negative. Negative for dysuria and flank pain. Musculoskeletal: Positive for back pain and myalgias. Skin: Negative. Allergic/Immunologic: Negative. Neurological: Negative. Negative for seizures, syncope and headaches. Hematological: Negative. Psychiatric/Behavioral: Negative. All other systems reviewed and are negative. Physical Exam     Physical Exam  Vitals signs and nursing note reviewed. Constitutional:       General: He is not in acute distress. Appearance: He is well-developed. He is not toxic-appearing or diaphoretic. HENT:      Head: Normocephalic and atraumatic. Nose: Nose normal.      Mouth/Throat:      Mouth: Mucous membranes are moist.      Pharynx: Oropharynx is clear. Eyes:      Extraocular Movements: Extraocular movements intact. Pupils: Pupils are equal, round, and reactive to light. Neck:      Musculoskeletal: Normal range of motion and neck supple. Cardiovascular:      Rate and Rhythm: Normal rate and regular rhythm. Heart sounds: Normal heart sounds. Pulmonary:      Effort: Pulmonary effort is normal. No respiratory distress. Breath sounds: Normal breath sounds. Chest:      Chest wall: No mass or tenderness. Abdominal:      General: Bowel sounds are normal. There is no abdominal bruit. Palpations: Abdomen is soft. There is no hepatomegaly. Tenderness: There is no abdominal tenderness. There is no rebound. Musculoskeletal: Normal range of motion. Right lower leg: He exhibits no tenderness. No edema. Left lower leg: He exhibits no tenderness. No edema. Skin:     General: Skin is warm and dry. Capillary Refill: Capillary refill takes less than 2 seconds. Neurological:      General: No focal deficit present. Mental Status: He is alert and oriented to person, place, and time.    Psychiatric:         Mood and Affect: Mood normal.         Behavior: Behavior normal.         Lab and Diagnostic Study Results     Labs -     Recent Results (from the past 12 hour(s))   URINALYSIS W/ REFLEX CULTURE    Collection Time: 05/16/21  8:04 PM    Specimen: Urine   Result Value Ref Range    Color Yellow      Appearance Clear Clear      Specific gravity 1.020 1.003 - 1.030      pH (UA) 5.0 5.0 - 8.0      Protein Negative Negative mg/dL    Glucose 250 (A) Negative mg/dL    Ketone Negative Negative mg/dL    Bilirubin Negative Negative      Blood Negative Negative      Urobilinogen 0.1 (L) 0.2 - 1.0 EU/dL    Nitrites Negative Negative      Leukocyte Esterase Negative Negative      WBC 0-4 0 - 4 /hpf    RBC 0-5 0 - 5 /hpf Epithelial cells Few Few /lpf    Bacteria Negative Negative /hpf    UA:UC IF INDICATED Culture not indicated by UA result Culture not indicated by UA result     METABOLIC PANEL, COMPREHENSIVE    Collection Time: 05/16/21  8:57 PM   Result Value Ref Range    Sodium 134 (L) 136 - 145 mmol/L    Potassium 4.7 3.5 - 5.1 mmol/L    Chloride 99 97 - 108 mmol/L    CO2 24 21 - 32 mmol/L    Anion gap 11 5 - 15 mmol/L    Glucose 335 (H) 65 - 100 mg/dL    BUN 28 (H) 6 - 20 mg/dL    Creatinine 1.76 (H) 0.70 - 1.30 mg/dL    BUN/Creatinine ratio 16 12 - 20      GFR est AA 47 (L) >60 ml/min/1.73m2    GFR est non-AA 39 (L) >60 ml/min/1.73m2    Calcium 8.5 8.5 - 10.1 mg/dL    Bilirubin, total 0.5 0.2 - 1.0 mg/dL    AST (SGOT) 19 15 - 37 U/L    ALT (SGPT) 36 12 - 78 U/L    Alk. phosphatase 87 45 - 117 U/L    Protein, total 7.6 6.4 - 8.2 g/dL    Albumin 3.8 3.5 - 5.0 g/dL    Globulin 3.8 2.0 - 4.0 g/dL    A-G Ratio 1.0 (L) 1.1 - 2.2     CBC WITH AUTOMATED DIFF    Collection Time: 05/16/21  8:57 PM   Result Value Ref Range    WBC 6.7 4.1 - 11.1 K/uL    RBC 4.57 4.10 - 5.70 M/uL    HGB 14.0 12.1 - 17.0 g/dL    HCT 39.4 36.6 - 50.3 %    MCV 86.2 80.0 - 99.0 FL    MCH 30.6 26.0 - 34.0 PG    MCHC 35.5 30.0 - 36.5 g/dL    RDW 12.9 11.5 - 14.5 %    PLATELET 247 166 - 050 K/uL    MPV 10.4 8.9 - 12.9 FL    NEUTROPHILS 55 32 - 75 %    LYMPHOCYTES 32 12 - 49 %    MONOCYTES 8 5 - 13 %    EOSINOPHILS 4 0 - 7 %    BASOPHILS 1 0 - 1 %    IMMATURE GRANULOCYTES 0 0.0 - 0.5 %    ABS. NEUTROPHILS 3.6 1.8 - 8.0 K/UL    ABS. LYMPHOCYTES 2.2 0.8 - 3.5 K/UL    ABS. MONOCYTES 0.6 0.0 - 1.0 K/UL    ABS. EOSINOPHILS 0.3 0.0 - 0.4 K/UL    ABS. BASOPHILS 0.0 0.0 - 0.1 K/UL    ABS. IMM. GRANS. 0.0 0.00 - 0.04 K/UL    DF AUTOMATED         Radiologic Studies -     Study Result    Indication: Abdominal pain.     Supine abdominal radiograph 16 May 2021 2036 hours. No comparison.     Moderate stool in the colon. No appreciable small bowel distention.   Right hip prosthesis partly included.     IMPRESSION  No acute finding. Study Result    Indication: Back pain.     AP and lateral lumbar spine 5/16/2021. No comparison.     Grade 1, 7 mm spondylolisthesis L5-S1. Disc space narrowing and endplate  spurring Z5-V4. Bilateral L4-5 and L5-S1 facet arthrosis. Possible pars defects  at L5-S1. Consider additional imaging. Disc space narrowing without endplate spurring at D7-0. Minimal endplate lipping  at other levels with preservation of disc spaces. No fracture. Right hip prosthesis. Medical Decision Making     - I am the first provider for this patient. - I reviewed the vital signs, available nursing notes, past medical history, past surgical history, family history and social history. - Initial assessment performed. The patients presenting problems have been discussed, and they are in agreement with the care plan formulated and outlined with them. I have encouraged them to ask questions as they arise throughout their visit. Vital Signs-Reviewed the patient's vital signs. Patient Vitals for the past 12 hrs:   Temp Pulse Resp BP SpO2   05/16/21 1942 98.2 °F (36.8 °C) 69 18 (!) 146/93 98 %       Records Reviewed: Nursing Notes    ED Course/Provider Notes (Medical Decision Making): Uneventful ED course, clinical improvement with therapy, patient will be discharged to followup with PCP as directed    Disposition     Disposition: Condition stable and improved  DC- Adult Discharges: All of the diagnostic tests were reviewed and questions answered. Diagnosis, care plan and treatment options were discussed. The patient understands the instructions and will follow up as directed. The patients results have been reviewed with them. They have been counseled regarding their diagnosis.   The patient verbally convey understanding and agreement of the signs, symptoms, diagnosis, treatment and prognosis and additionally agrees to follow up as recommended with their PCP in 24 - 48 hours. They also agree with the care-plan and convey that all of their questions have been answered. I have also put together some discharge instructions for them that include: 1) educational information regarding their diagnosis, 2) how to care for their diagnosis at home, as well a 3) list of reasons why they would want to return to the ED prior to their follow-up appointment, should their condition change. DISCHARGE PLAN:  1. Current Discharge Medication List      CONTINUE these medications which have NOT CHANGED    Details   tiZANidine (Zanaflex) 2 mg capsule 1 tablet every 6 hours as needed  Qty: 10 Cap, Refills: 0      apixaban (ELIQUIS) 5 mg tablet Take 5 mg by mouth two (2) times a day. Indications: blood clot in a deep vein of the extremities      losartan (COZAAR) 25 mg tablet Take 25 mg by mouth daily. glipiZIDE SR (GLUCOTROL XL) 10 mg CR tablet Take 10 mg by mouth daily. gabapentin (NEURONTIN) 100 mg capsule Take 800 mg by mouth three (3) times daily. metoprolol succinate (TOPROL-XL) 50 mg XL tablet Take 100 mg by mouth daily. tamsulosin (FLOMAX) 0.4 mg capsule Take 0.4 mg by mouth daily. insulin glargine (Lantus Solostar U-100 Insulin) 100 unit/mL (3 mL) inpn 60 Units by SubCUTAneous route daily. 2.   Follow-up Information    None       3. Return to ED if worse   4. Current Discharge Medication List            Diagnosis     Clinical Impression:   1. Acute midline low back pain without sciatica        Attestations:    Laura Thomas MD    Please note that this dictation was completed with [employer], the [employer] voice recognition software. Quite often unanticipated grammatical, syntax, homophones, and other interpretive errors are inadvertently transcribed by the computer software. Please disregard these errors. Please excuse any errors that have escaped final proofreading. Thank you.

## 2021-08-16 ENCOUNTER — OFFICE VISIT (OUTPATIENT)
Dept: ENDOCRINOLOGY | Age: 66
End: 2021-08-16
Payer: MEDICARE

## 2021-08-16 VITALS
BODY MASS INDEX: 32.76 KG/M2 | HEIGHT: 71 IN | DIASTOLIC BLOOD PRESSURE: 94 MMHG | SYSTOLIC BLOOD PRESSURE: 131 MMHG | OXYGEN SATURATION: 98 % | TEMPERATURE: 98.2 F | HEART RATE: 93 BPM | WEIGHT: 234 LBS

## 2021-08-16 DIAGNOSIS — E03.9 ACQUIRED HYPOTHYROIDISM: Primary | ICD-10-CM

## 2021-08-16 DIAGNOSIS — E22.1 HYPERPROLACTINEMIA (HCC): ICD-10-CM

## 2021-08-16 PROBLEM — N40.0 BPH (BENIGN PROSTATIC HYPERPLASIA): Status: ACTIVE | Noted: 2021-08-16

## 2021-08-16 PROBLEM — I82.409 DVT (DEEP VENOUS THROMBOSIS) (HCC): Status: ACTIVE | Noted: 2021-08-16

## 2021-08-16 PROBLEM — E11.65 TYPE 2 DIABETES MELLITUS WITH HYPERGLYCEMIA, WITHOUT LONG-TERM CURRENT USE OF INSULIN (HCC): Status: ACTIVE | Noted: 2021-08-16

## 2021-08-16 PROCEDURE — G8417 CALC BMI ABV UP PARAM F/U: HCPCS | Performed by: INTERNAL MEDICINE

## 2021-08-16 PROCEDURE — 1101F PT FALLS ASSESS-DOCD LE1/YR: CPT | Performed by: INTERNAL MEDICINE

## 2021-08-16 PROCEDURE — G8427 DOCREV CUR MEDS BY ELIG CLIN: HCPCS | Performed by: INTERNAL MEDICINE

## 2021-08-16 PROCEDURE — G8536 NO DOC ELDER MAL SCRN: HCPCS | Performed by: INTERNAL MEDICINE

## 2021-08-16 PROCEDURE — G8510 SCR DEP NEG, NO PLAN REQD: HCPCS | Performed by: INTERNAL MEDICINE

## 2021-08-16 PROCEDURE — 3017F COLORECTAL CA SCREEN DOC REV: CPT | Performed by: INTERNAL MEDICINE

## 2021-08-16 PROCEDURE — 99204 OFFICE O/P NEW MOD 45 MIN: CPT | Performed by: INTERNAL MEDICINE

## 2021-08-16 RX ORDER — LANCETS
EACH MISCELLANEOUS
COMMUNITY
Start: 2021-07-23

## 2021-08-16 RX ORDER — GABAPENTIN 800 MG/1
TABLET ORAL
COMMUNITY
Start: 2021-07-21

## 2021-08-16 RX ORDER — METOPROLOL SUCCINATE 100 MG/1
TABLET, EXTENDED RELEASE ORAL
COMMUNITY
Start: 2021-07-23

## 2021-08-16 RX ORDER — INSULIN GLARGINE 100 [IU]/ML
INJECTION, SOLUTION SUBCUTANEOUS
COMMUNITY
Start: 2021-07-16

## 2021-08-16 RX ORDER — BLOOD SUGAR DIAGNOSTIC
STRIP MISCELLANEOUS
COMMUNITY
Start: 2021-05-11

## 2021-08-16 RX ORDER — TIZANIDINE 2 MG/1
TABLET ORAL
COMMUNITY
Start: 2021-07-19

## 2021-08-16 NOTE — LETTER
8/16/2021    Patient: Harlan Stone. YOB: 1955   Date of Visit: 8/16/2021     Blair Hair MD  [address] [address]  Via Fax: [phone]    Dear Blair Hair MD,      Thank you for referring Mr. Delgado Thornton to [address] [place] [address] for evaluation. My notes for this consultation are attached. If you have questions, please do not hesitate to call me. I look forward to following your patient along with you.       Sincerely,    Vita Bean MD

## 2021-08-16 NOTE — PROGRESS NOTES
History and Physical    Patient: Christen Joshi MRN: [id number]  SSN: [id number]    YOB: 1955  Age: 77 y.o. Sex: male      Subjective:      Christen Joshi is a 77 y.o. male with past medical history of type 2 diabetes mellitus, hypertension, hyperlipidemia, restless leg syndrome, DVT on chronic anticoagulation, BPH is sent to me by urologist Dr. Miles Florence for elevated prolactin. He is in primary care of Dr. Juno Butts. History was obtained from patient and his wife. Patient was referred to urologist by nephrologist for a cyst on his kidney. He also has BPH for which he takes Flomax. During this visit patient mentioned about erectile dysfunction to the urologist.  Labs were done which came back showing elevated prolactin. This was repeated fasting and prolactin was still elevated although it was better. Patient tells me that testosterone was checked as well but I do not have any records for my review. He tells me that testosterone was low as well. Libido is intact, erectile dysfunction for the past 3 years. Currently using triplex injection but it is not helping yet, there is still titrating up the dose. Patient has DVT, he is on anticoagulation with Eliquis. She has had DVT once in the past as well. Family history of prostate cancer in his father. No history of stroke or coronary artery disease. Does not smoke cigarettes. No sleep apnea. No headaches or problems with vision. He has always had sensitive nipples but he denies any worsening of that, no enlargement of breast region. Does not take any over-the-counter medications or supplements. He is not on any medications for psych issues. Abnormal TSH:  Patient does not know anything about this. He has never been on thyroid medication. He has fatigue, unintentional weight gain and chronic constipation.       Past Medical History:   Diagnosis Date    BPH (benign prostatic hyperplasia)     Diabetes ([facility]     Hypercholesterolemia     Hypertension     Neuropathy     Restless leg syndrome     Thromboembolus (HCC)      Past Surgical History:   Procedure Laterality Date    HX HIP REPLACEMENT Right     HX HIP REPLACEMENT Left     HX KNEE REPLACEMENT Left     HX SHOULDER ARTHROSCOPY Right     HX SHOULDER ARTHROSCOPY Left       Family History   Problem Relation Age of Onset    No Known Problems Mother     Cancer Father      Social History     Tobacco Use    Smoking status: Never Smoker    Smokeless tobacco: Never Used   Substance Use Topics    Alcohol use: Yes     Comment: rarely       Prior to Admission medications    Medication Sig Start Date End Date Taking? Authorizing Provider   pyrilamine/phenylephrine/DM (TRIPLEX DM PO) INJECT BY INTRACAVERNOSAL ROUTE AS DIRECTED 7/7/21   Provider, Historical   Accu-Chek Guide test strips strip USE TO CHECK BLOOD SUGAR EVERY DAY 5/11/21   Provider, Historical   Accu-Chek Fastclix Lancet Drum misc USE TO TEST EVERY DAY 7/23/21   Provider, Historical   gabapentin (NEURONTIN) 800 mg tablet TAKE 1 TABLET BY MOUTH THREE TIMES A DAY 7/21/21   Provider, Historical   Lantus U-100 Insulin 100 unit/mL injection INJECT 64 UNITS EVERY MORNING WITH BREAKFAST 7/16/21   Provider, Historical   metoprolol succinate (TOPROL-XL) 100 mg tablet TAKE 1 TABLET BY MOUTH EVERY DAY 7/23/21   Provider, Historical   tiZANidine (ZANAFLEX) 2 mg tablet TAKE 1 TABLET BY MOUTH AT BEDTIME AS NEEDED 7/19/21   Provider, Historical   apixaban (ELIQUIS) 5 mg tablet Take 5 mg by mouth two (2) times a day. Indications: blood clot in a deep vein of the extremities    Gabrielle Sibley MD   losartan (COZAAR) 25 mg tablet Take 25 mg by mouth daily. Gabrielle Sibley MD   glipiZIDE SR (GLUCOTROL XL) 10 mg CR tablet Take 10 mg by mouth daily. Gabrielle Sibley MD   tamsulosin (FLOMAX) 0.4 mg capsule Take 0.4 mg by mouth daily.     Gabrielle Sibley MD        No Known Allergies    Review of Systems:  ROS    A comprehensive review of systems was preformed and it is negative except mentioned in HPI    Objective:     Vitals:    08/16/21 1530 08/16/21 1545   BP: (!) 145/90 (!) 131/94   Pulse: 86 93   Temp: 98.2 °F (36.8 °C)    TempSrc: Temporal    SpO2: 98%    Weight: 234 lb (106.1 kg)    Height: 5' 11\" (1.803 m)         Physical Exam:    Physical Exam  Vitals and nursing note reviewed. Constitutional:       Appearance: Normal appearance. HENT:      Head: Normocephalic and atraumatic. Cardiovascular:      Rate and Rhythm: Normal rate and regular rhythm. Pulmonary:      Effort: Pulmonary effort is normal.      Breath sounds: Normal breath sounds. Abdominal:      General: Bowel sounds are normal.      Palpations: Abdomen is soft. Musculoskeletal:         General: No swelling. Normal range of motion. Cervical back: Neck supple. Skin:     General: Skin is warm. Neurological:      General: No focal deficit present. Mental Status: He is alert. Psychiatric:         Mood and Affect: Mood normal.         Behavior: Behavior normal.          Labs and Imaging:    Last 3 Recorded Weights in this Encounter    08/16/21 1530   Weight: 234 lb (106.1 kg)        No results found for: HBA1C, HDG4XNSS, RCU3JTIZ, [id number]     Assessment:     Patient Active Problem List   Diagnosis Code    Acquired hypothyroidism E03.9    Hyperprolactinemia ([address] E22.1    BPH (benign prostatic hyperplasia) N40.0    Type 2 diabetes mellitus with hyperglycemia, without long-term current use of insulin (HCC) E11.65    DVT (deep venous thrombosis) ([facility] I82.409           Plan:     Elevated prolactin/hypogonadism:  I reviewed labs from the past few months. 6-:  Prolactin elevated at 25.8 (415 0.2)  LH normal at 4.1  Normal hemoglobin and hematocrit  Normal liver enzymes  Normal estradiol  Per patient testosterone was low but I do not see any result    6-:  Prolactin elevated at 20.2    353717:  TSH elevated at 5.19 (0.454. 5)  Free T4 normal at 1.17  Plan:  Elevation of prolactin could be secondary to hypothyroidism. Evaluate and treat hypothyroidism as below and I will recheck prolactin prior to next visit. I will check testosterone level once prolactin level is normal.    Hypothyroidism:  3-8-2021:  TSH elevated at 5.19  Free T4 normal at 1.17  Plan:  Check TSH, free T4 and TPO antibodies. Based on the result I will start patient on thyroid hormone replacement if needed. I will see him back in 2 months with TSH prior to the visit.     Orders Placed This Encounter    TSH AND FREE T4    THYROID PEROXIDASE (TPO) AB    TSH RFX ON ABNORMAL TO FREE T4     Standing Status:   Future     Standing Expiration Date:   2/16/2022    PROLACTIN     Standing Status:   Future     Standing Expiration Date:   2/16/2022        Signed By: Olga Suarez MD     August 16, 2021      Return to clinic 2 months

## 2021-08-17 ENCOUNTER — TELEPHONE (OUTPATIENT)
Dept: ENDOCRINOLOGY | Age: 66
End: 2021-08-17

## 2021-08-17 LAB
T4 FREE SERPL-MCNC: 1.16 NG/DL (ref 0.82–1.77)
THYROPEROXIDASE AB SERPL-ACNC: <8 IU/ML (ref 0–34)
TSH SERPL DL<=0.005 MIU/L-ACNC: 2.72 UIU/ML (ref 0.45–4.5)

## 2021-08-17 NOTE — TELEPHONE ENCOUNTER
Patient was referred to me by her urologist Dr. Juliana Connell. But I did not receive any notes or labs. Please ask them to fax to as labs especially testosterone level as well as last progress note.

## 2021-08-19 DIAGNOSIS — E22.1 HYPERPROLACTINEMIA (HCC): Primary | ICD-10-CM

## 2021-08-19 NOTE — PROGRESS NOTES
Please inform patient/patient's wife about the following: Patient's thyroid function test is completely normal.  So prolactin elevation is not because of hypothyroidism. In that case we need to do MRI of pituitary to take a look at his pituitary gland. I am going to order this and they will be receiving a call for scheduling. Meanwhile I am trying to obtain testosterone lab results from urologist office.

## 2021-08-27 ENCOUNTER — TELEPHONE (OUTPATIENT)
Dept: ENDOCRINOLOGY | Age: 66
End: 2021-08-27

## 2021-08-27 NOTE — TELEPHONE ENCOUNTER
Left patient a  asking to give the office a call back in regards to results    ----- Message from Latisha Brantley MD sent at 8/19/2021  1:51 PM EDT -----  Please inform patient/patient's wife about the following: Patient's thyroid function test is completely normal.  So prolactin elevation is not because of hypothyroidism. In that case we need to do MRI of pituitary to take a look at his pituitary gland. I am going to order this and they will be receiving a call for scheduling. Meanwhile I am trying to obtain testosterone lab results from urologist office.

## 2021-08-30 NOTE — TELEPHONE ENCOUNTER
Spoke with pt's wife and informed pt about results and she also stated that testing will be done on 9/2/2021 for pituitary

## 2021-09-02 ENCOUNTER — HOSPITAL ENCOUNTER (OUTPATIENT)
Dept: MRI IMAGING | Age: 66
Discharge: HOME | End: 2021-09-02
Payer: MEDICARE

## 2021-09-02 DIAGNOSIS — E22.1 HYPERPROLACTINEMIA (HCC): ICD-10-CM

## 2021-09-02 PROCEDURE — 74011000258 HC RX REV CODE- 258

## 2021-09-02 PROCEDURE — 74011250636 HC RX REV CODE- 250/636

## 2021-09-02 PROCEDURE — A9575 INJ GADOTERATE MEGLUMI 0.1ML: HCPCS

## 2021-09-02 PROCEDURE — 77030021566 MRI BRAIN W WO CONT

## 2021-09-02 RX ORDER — GADOTERATE MEGLUMINE 376.9 MG/ML
INJECTION INTRAVENOUS
Status: COMPLETED
Start: 2021-09-02 | End: 2021-09-02

## 2021-09-02 RX ORDER — SODIUM CHLORIDE 0.9 % (FLUSH) 0.9 %
10 SYRINGE (ML) INJECTION
Status: COMPLETED | OUTPATIENT
Start: 2021-09-02 | End: 2021-09-02

## 2021-09-02 RX ADMIN — Medication 10 ML: at 15:25

## 2021-09-02 RX ADMIN — SODIUM CHLORIDE 100 ML: 900 INJECTION, SOLUTION INTRAVENOUS at 15:25

## 2021-09-02 RX ADMIN — GADOTERATE MEGLUMINE 20 ML: 376.9 INJECTION INTRAVENOUS at 15:24

## 2021-09-03 DIAGNOSIS — E22.1 HYPERPROLACTINEMIA (HCC): Primary | ICD-10-CM

## 2021-09-03 RX ORDER — CABERGOLINE 0.5 MG/1
0.25 TABLET ORAL 2 TIMES WEEKLY
Qty: 4 TABLET | Refills: 2 | Status: SHIPPED | OUTPATIENT
Start: 2021-09-03 | End: 2021-09-27

## 2021-09-03 NOTE — PROGRESS NOTES
Spoke to patient's wife. Informed that. MRI is looking okay. But his prolactin level was elevated and this could sometimes cause testosterone to get low. So we will start by treating hyperprolactinemia with cabergoline. Once prolactin level is normal I will repeat testosterone level to see if it has improved. Wife expressed understanding. Advised to get labs done prior to next visit.

## 2021-10-15 LAB
PROLACTIN SERPL-MCNC: 0.5 NG/ML (ref 4–15.2)
TSH SERPL DL<=0.005 MIU/L-ACNC: 4.3 UIU/ML (ref 0.45–4.5)

## 2021-10-16 DIAGNOSIS — E03.9 ACQUIRED HYPOTHYROIDISM: ICD-10-CM

## 2021-10-16 DIAGNOSIS — E22.1 HYPERPROLACTINEMIA (HCC): ICD-10-CM

## 2021-10-18 ENCOUNTER — OFFICE VISIT (OUTPATIENT)
Dept: ENDOCRINOLOGY | Age: 66
End: 2021-10-18
Payer: MEDICARE

## 2021-10-18 VITALS
DIASTOLIC BLOOD PRESSURE: 75 MMHG | WEIGHT: 239 LBS | HEIGHT: 71 IN | SYSTOLIC BLOOD PRESSURE: 132 MMHG | OXYGEN SATURATION: 97 % | BODY MASS INDEX: 33.46 KG/M2 | TEMPERATURE: 98.4 F | HEART RATE: 52 BPM

## 2021-10-18 DIAGNOSIS — E22.1 HYPERPROLACTINEMIA (HCC): Primary | ICD-10-CM

## 2021-10-18 PROCEDURE — 1101F PT FALLS ASSESS-DOCD LE1/YR: CPT | Performed by: INTERNAL MEDICINE

## 2021-10-18 PROCEDURE — G8536 NO DOC ELDER MAL SCRN: HCPCS | Performed by: INTERNAL MEDICINE

## 2021-10-18 PROCEDURE — G8427 DOCREV CUR MEDS BY ELIG CLIN: HCPCS | Performed by: INTERNAL MEDICINE

## 2021-10-18 PROCEDURE — 99214 OFFICE O/P EST MOD 30 MIN: CPT | Performed by: INTERNAL MEDICINE

## 2021-10-18 PROCEDURE — 3017F COLORECTAL CA SCREEN DOC REV: CPT | Performed by: INTERNAL MEDICINE

## 2021-10-18 PROCEDURE — G8510 SCR DEP NEG, NO PLAN REQD: HCPCS | Performed by: INTERNAL MEDICINE

## 2021-10-18 PROCEDURE — G8417 CALC BMI ABV UP PARAM F/U: HCPCS | Performed by: INTERNAL MEDICINE

## 2021-10-18 RX ORDER — CABERGOLINE 0.5 MG/1
0.25 TABLET ORAL 2 TIMES WEEKLY
Qty: 12 TABLET | Refills: 4 | Status: SHIPPED | OUTPATIENT
Start: 2021-10-18 | End: 2021-11-17

## 2021-10-18 RX ORDER — CLOMIPHENE CITRATE 50 MG/1
25 TABLET ORAL DAILY
COMMUNITY
Start: 2021-09-23

## 2021-10-18 NOTE — PROGRESS NOTES
History and Physical    Patient: Deneen Talley. MRN: [id number]  SSN: [id number]    YOB: 1955  Age: 77 y.o. Sex: male      Subjective:      Deneen Talley. is a 77 y.o. male with past medical history of type 2 diabetes mellitus, hypertension, hyperlipidemia, restless leg syndrome, DVT on chronic anticoagulation, BPH is here for follow-up of hyperprolactinemia. He was sent to me by urologist Dr. Sidra Mahoney. He is in primary care of Dr. Kasie Montiel. After the initial work-up patient was started on cabergoline 0.5 mg half tablet twice a week. He is taking it regularly. Today he tells me that he is feeling much better in terms of energy level and he feels like he is getting some of his muscles back. Libido continues to be good. Continues to have trouble with erection but he is getting treated by urology for this with injectable medication. He had labs done prior to this visit. Initial history:  History was obtained from patient and his wife. Patient was referred to urologist by nephrologist for a cyst on his kidney. He also has BPH for which he takes Flomax. During this visit patient mentioned about erectile dysfunction to the urologist.  Labs were done which came back showing elevated prolactin. This was repeated fasting and prolactin was still elevated although it was better. Patient tells me that testosterone was checked as well but I do not have any records for my review. He tells me that testosterone was low as well. Libido is intact, erectile dysfunction for the past 3 years. Currently using triplex injection but it is not helping yet, there is still titrating up the dose. Patient has DVT, he is on anticoagulation with Eliquis. She has had DVT once in the past as well. Family history of prostate cancer in his father. No history of stroke or coronary artery disease. Does not smoke cigarettes. No sleep apnea. No headaches or problems with vision.   He has always had sensitive nipples but he denies any worsening of that, no enlargement of breast region. Does not take any over-the-counter medications or supplements. He is not on any medications for psych issues. Abnormal TSH:  Patient does not know anything about this. He has never been on thyroid medication. He has fatigue, unintentional weight gain and chronic constipation. Past Medical History:   Diagnosis Date    BPH (benign prostatic hyperplasia)     Diabetes ([address]     Hypercholesterolemia     Hypertension     Neuropathy     Restless leg syndrome     Thromboembolus (HCC)      Past Surgical History:   Procedure Laterality Date    HX HIP REPLACEMENT Right     HX HIP REPLACEMENT Left     HX KNEE REPLACEMENT Left     HX SHOULDER ARTHROSCOPY Right     HX SHOULDER ARTHROSCOPY Left       Family History   Problem Relation Age of Onset    No Known Problems Mother     Cancer Father      Social History     Tobacco Use    Smoking status: Never Smoker    Smokeless tobacco: Never Used   Substance Use Topics    Alcohol use: Yes     Comment: rarely       Prior to Admission medications    Medication Sig Start Date End Date Taking? Authorizing Provider   clomiPHENE (CLOMID) 50 mg tablet  9/23/21  Yes Provider, Historical   cabergoline (DOSTINEX) 0.5 mg tablet Take 0.5 Tablets by mouth two (2) times a week for 30 days.  10/18/21 11/17/21 Yes Regina Ling MD   pyrilamine/phenylephrine/DM (TRIPLEX DM PO) INJECT BY INTRACAVERNOSAL ROUTE AS DIRECTED 7/7/21  Yes Provider, Historical   Accu-Chek Guide test strips strip USE TO CHECK BLOOD SUGAR EVERY DAY 5/11/21  Yes Provider, Historical   Accu-Chek Fastclix Lancet Drum misc USE TO TEST EVERY DAY 7/23/21  Yes Provider, Historical   gabapentin (NEURONTIN) 800 mg tablet TAKE 1 TABLET BY MOUTH THREE TIMES A DAY 7/21/21  Yes Provider, Historical   Lantus U-100 Insulin 100 unit/mL injection INJECT 64 UNITS EVERY MORNING WITH BREAKFAST 7/16/21  Yes Provider, Historical   metoprolol succinate (TOPROL-XL) 100 mg tablet TAKE 1 TABLET BY MOUTH EVERY DAY 7/23/21  Yes Provider, Historical   tiZANidine (ZANAFLEX) 2 mg tablet TAKE 1 TABLET BY MOUTH AT BEDTIME AS NEEDED 7/19/21  Yes Provider, Historical   apixaban (ELIQUIS) 5 mg tablet Take 5 mg by mouth two (2) times a day. Indications: blood clot in a deep vein of the extremities   Yes Other, MD Gabrielle   losartan (COZAAR) 25 mg tablet Take 25 mg by mouth daily. Yes Other, MD Gabrielle   glipiZIDE SR (GLUCOTROL XL) 10 mg CR tablet Take 10 mg by mouth daily. Yes Other, MD Gabrielle   tamsulosin (FLOMAX) 0.4 mg capsule Take 0.4 mg by mouth daily. Yes Other, MD Gabrielle        No Known Allergies    Review of Systems:  ROS    A comprehensive review of systems was preformed and it is negative except mentioned in HPI    Objective:     Vitals:    10/18/21 1017   BP: 132/75   Pulse: (!) 52   Temp: 98.4 °F (36.9 °C)   TempSrc: Temporal   SpO2: 97%   Weight: 239 lb (108.4 kg)   Height: 5' 11\" (1.803 m)        Physical Exam:    Physical Exam  Vitals and nursing note reviewed. Constitutional:       Appearance: Normal appearance. HENT:      Head: Normocephalic and atraumatic. Cardiovascular:      Rate and Rhythm: Normal rate and regular rhythm. Pulmonary:      Effort: Pulmonary effort is normal.      Breath sounds: Normal breath sounds. Neurological:      Mental Status: He is alert.           Labs and Imaging:    Last 3 Recorded Weights in this Encounter    10/18/21 1017   Weight: 239 lb (108.4 kg)        No results found for: HBA1C, OTX4LIXC, ZSV8FUOZ, OYM4FBBO     Assessment:     Patient Active Problem List   Diagnosis Code    Acquired hypothyroidism E03.9    Hyperprolactinemia ([facility] E22.1    BPH (benign prostatic hyperplasia) N40.0    Type 2 diabetes mellitus with hyperglycemia, without long-term current use of insulin (HCC) E11.65    DVT (deep venous thrombosis) ([facility] I82.409           Plan: Hyperprolactinemia:  6-:  Prolactin elevated at 25.8 (415.2)  LH normal at 4.1  Normal hemoglobin and hematocrit  Normal liver enzymes  Normal estradiol  Per patient testosterone was low but I do not see any result    6-:  Prolactin elevated at 20.2    808584:  TSH elevated at 5.19 (0.454. 5)  Free T4 normal at 1.17    8-:  TSH normal at 2.72  Free T4 normal at 1.16  TPO undetectable    MRI pituitary 9-2-2021:  Normal pituitary  Started cabergoline 0.5 mg half tablet twice a week    10-:  Prolactin low at 0.5 (415.2)  Plan:  Continue cabergoline 0.5 mg half tablet twice a week for 3 more months. I will check labs in 3 months along with testosterone panel, prior to next visit fasting before 10 AM.    Secondary hypogonadism:  6-: Total testosterone low at 222.8 (241784)  Normal PSA  Normal liver enzymes  LH normal at 4.1  Normal estradiol  Normal hemoglobin and hematocrit    6-: Total testosterone low at 228.9 (846959)  Plan:  Mild secondary hypothyroidism appears to be because of hyperprolactinemia. We will continue treating for hyperprolactinemia and repeat testosterone panel prior to next visit. Elevated TSH:  3-8-2021:  TSH elevated at 5.19  Free T4 normal at 1.17    8-:  TSH normal at 2.72  Free T4 normal at 1.16  TPO undetectable  Plan:  Patient does not appear to have hypothyroidism. I will check TSH annually. Orders Placed This Encounter    TESTOSTERONE, FREE & TOTAL     Standing Status:   Future     Standing Expiration Date:   4/18/2022    PROLACTIN     Standing Status:   Future     Standing Expiration Date:   4/18/2022    cabergoline (DOSTINEX) 0.5 mg tablet     Sig: Take 0.5 Tablets by mouth two (2) times a week for 30 days.      Dispense:  12 Tablet     Refill:  4        Signed By: Arjun Hillman MD     October 18, 2021      Return to clinic 3 months

## 2021-10-18 NOTE — LETTER
10/18/2021    Patient: Keila Haley. YOB: 1955   Date of Visit: 10/18/2021     Daniel Benitez MD  [address] [address]  Via Fax: [phone]    Dear Daniel Benitez MD,      Thank you for referring Mr. Pawan Padilla to [address] [place] [address] for evaluation. My notes for this consultation are attached. If you have questions, please do not hesitate to call me. I look forward to following your patient along with you.       Sincerely,    Esthela Dos Santos MD

## 2022-01-18 DIAGNOSIS — E22.1 HYPERPROLACTINEMIA (HCC): ICD-10-CM

## 2022-02-11 LAB
PROLACTIN SERPL-MCNC: 0.4 NG/ML (ref 4–15.2)
TESTOST FREE SERPL-MCNC: 9.9 PG/ML (ref 6.6–18.1)
TESTOST SERPL-MCNC: 478 NG/DL (ref 264–916)

## 2022-02-15 ENCOUNTER — OFFICE VISIT (OUTPATIENT)
Dept: ENDOCRINOLOGY | Age: 67
End: 2022-02-15
Payer: MEDICARE

## 2022-02-15 VITALS
BODY MASS INDEX: 32.62 KG/M2 | TEMPERATURE: 98.6 F | WEIGHT: 233 LBS | HEART RATE: 72 BPM | SYSTOLIC BLOOD PRESSURE: 147 MMHG | HEIGHT: 71 IN | DIASTOLIC BLOOD PRESSURE: 81 MMHG | OXYGEN SATURATION: 98 %

## 2022-02-15 DIAGNOSIS — E22.1 HYPERPROLACTINEMIA (HCC): Primary | ICD-10-CM

## 2022-02-15 PROCEDURE — 3017F COLORECTAL CA SCREEN DOC REV: CPT | Performed by: INTERNAL MEDICINE

## 2022-02-15 PROCEDURE — 99214 OFFICE O/P EST MOD 30 MIN: CPT | Performed by: INTERNAL MEDICINE

## 2022-02-15 PROCEDURE — G8427 DOCREV CUR MEDS BY ELIG CLIN: HCPCS | Performed by: INTERNAL MEDICINE

## 2022-02-15 PROCEDURE — G8417 CALC BMI ABV UP PARAM F/U: HCPCS | Performed by: INTERNAL MEDICINE

## 2022-02-15 PROCEDURE — 1101F PT FALLS ASSESS-DOCD LE1/YR: CPT | Performed by: INTERNAL MEDICINE

## 2022-02-15 PROCEDURE — G8536 NO DOC ELDER MAL SCRN: HCPCS | Performed by: INTERNAL MEDICINE

## 2022-02-15 PROCEDURE — G8432 DEP SCR NOT DOC, RNG: HCPCS | Performed by: INTERNAL MEDICINE

## 2022-02-15 RX ORDER — SYRING-NEEDL,DISP,INSUL,0.3 ML 31GX15/64"
SYRINGE, EMPTY DISPOSABLE MISCELLANEOUS
COMMUNITY
Start: 2022-01-05

## 2022-02-15 RX ORDER — CABERGOLINE 0.5 MG/1
TABLET ORAL
COMMUNITY
Start: 2022-01-26 | End: 2022-02-15 | Stop reason: SDUPTHER

## 2022-02-15 RX ORDER — DULAGLUTIDE 0.75 MG/.5ML
INJECTION, SOLUTION SUBCUTANEOUS
COMMUNITY
Start: 2022-01-05

## 2022-02-15 RX ORDER — CABERGOLINE 0.5 MG/1
0.25 TABLET ORAL 2 TIMES WEEKLY
Qty: 12 TABLET | Refills: 2 | Status: SHIPPED | OUTPATIENT
Start: 2022-02-18 | End: 2022-05-19

## 2022-02-15 NOTE — PROGRESS NOTES
History and Physical    Patient: Geneva Sellers. MRN: [id number]  SSN: [id number]    YOB: 1955  Age: 77 y.o. Sex: male      Subjective:      Geneva Sellers. is a 77 y.o. male with past medical history of type 2 diabetes mellitus, hypertension, hyperlipidemia, restless leg syndrome, DVT on chronic anticoagulation, BPH is here for follow-up of hyperprolactinemia. He was sent to me by urologist Dr. Jarad Owens. He is in primary care of Dr. Savage Garcia. After the initial work-up patient was started on cabergoline 0.5 mg half tablet twice a week. He is taking it regularly. Today he tells me that he is feeling much better in terms of energy level and he feels like he is getting some of his muscles back. Libido continues to be good. Continues to have trouble with erection but he is getting treated by urology for this with injectable medication. He had labs done prior to this visit. He tells me that the growth on his kidneys is getting enlarged. He is going to follow-up with urologist about this. Initial history:  History was obtained from patient and his wife. Patient was referred to urologist by nephrologist for a cyst on his kidney. He also has BPH for which he takes Flomax. During this visit patient mentioned about erectile dysfunction to the urologist.  Labs were done which came back showing elevated prolactin. This was repeated fasting and prolactin was still elevated although it was better. Patient tells me that testosterone was checked as well but I do not have any records for my review. He tells me that testosterone was low as well. Libido is intact, erectile dysfunction for the past 3 years. Currently using triplex injection but it is not helping yet, there is still titrating up the dose. Patient has DVT, he is on anticoagulation with Eliquis. She has had DVT once in the past as well. Family history of prostate cancer in his father.   No history of stroke or coronary artery disease. Does not smoke cigarettes. No sleep apnea. No headaches or problems with vision. He has always had sensitive nipples but he denies any worsening of that, no enlargement of breast region. Does not take any over-the-counter medications or supplements. He is not on any medications for psych issues. Abnormal TSH:  Patient does not know anything about this. He has never been on thyroid medication. He has fatigue, unintentional weight gain and chronic constipation. Past Medical History:   Diagnosis Date    BPH (benign prostatic hyperplasia)     Diabetes ([address]     Hypercholesterolemia     Hypertension     Neuropathy     Restless leg syndrome     Thromboembolus (HCC)      Past Surgical History:   Procedure Laterality Date    HX HIP REPLACEMENT Right     HX HIP REPLACEMENT Left     HX KNEE REPLACEMENT Left     HX SHOULDER ARTHROSCOPY Right     HX SHOULDER ARTHROSCOPY Left       Family History   Problem Relation Age of Onset    No Known Problems Mother     Cancer Father      Social History     Tobacco Use    Smoking status: Never Smoker    Smokeless tobacco: Never Used   Substance Use Topics    Alcohol use: Yes     Comment: rarely       Prior to Admission medications    Medication Sig Start Date End Date Taking? Authorizing Provider   Trulicity 0.07 RR/1.6 mL sub-q pen  1/5/22  Yes Provider, Historical   BD Veo Insulin Syringe UF 1 mL 31 gauge x 15/64\" syrg  1/5/22  Yes Provider, Historical   cabergoline (DOSTINEX) 0.5 mg tablet Take 0.5 Tablets by mouth two (2) times a week for 90 days.  2/18/22 5/19/22 Yes Bertha Martinez MD   clomiPHENE (CLOMID) 50 mg tablet  9/23/21  Yes Provider, Historical   pyrilamine/phenylephrine/DM (TRIPLEX DM PO) INJECT BY INTRACAVERNOSAL ROUTE AS DIRECTED 7/7/21  Yes Provider, Historical   Accu-Chek Guide test strips strip USE TO CHECK BLOOD SUGAR EVERY DAY 5/11/21  Yes Provider, Historical   Accu-Chek Fastclix Lancet Drum misc USE TO TEST EVERY DAY 7/23/21  Yes Provider, Historical   gabapentin (NEURONTIN) 800 mg tablet TAKE 1 TABLET BY MOUTH THREE TIMES A DAY 7/21/21  Yes Provider, Historical   Lantus U-100 Insulin 100 unit/mL injection INJECT 64 UNITS EVERY MORNING WITH BREAKFAST 7/16/21  Yes Provider, Historical   metoprolol succinate (TOPROL-XL) 100 mg tablet TAKE 1 TABLET BY MOUTH EVERY DAY 7/23/21  Yes Provider, Historical   tiZANidine (ZANAFLEX) 2 mg tablet TAKE 1 TABLET BY MOUTH AT BEDTIME AS NEEDED 7/19/21  Yes Provider, Historical   apixaban (ELIQUIS) 5 mg tablet Take 5 mg by mouth two (2) times a day. Indications: blood clot in a deep vein of the extremities   Yes Other, MD Gabrielle   losartan (COZAAR) 25 mg tablet Take 25 mg by mouth daily. Yes Other, MD Gabrielle   glipiZIDE SR (GLUCOTROL XL) 10 mg CR tablet Take 10 mg by mouth daily. Yes Other, MD Gabrielle   tamsulosin (FLOMAX) 0.4 mg capsule Take 0.4 mg by mouth daily. Yes Other, MD Gabrielle        No Known Allergies    Review of Systems:  ROS    A comprehensive review of systems was preformed and it is negative except mentioned in HPI    Objective:     Vitals:    02/15/22 1453 02/15/22 1503   BP: (!) 141/81 (!) 147/81   Pulse: 70 72   Temp: 98.6 °F (37 °C)    TempSrc: Temporal    SpO2: 98%    Weight: 233 lb (105.7 kg)    Height: 5' 11\" (1.803 m)         Physical Exam:    Physical Exam  Vitals and nursing note reviewed. Constitutional:       Appearance: Normal appearance. HENT:      Head: Normocephalic and atraumatic. Cardiovascular:      Rate and Rhythm: Normal rate and regular rhythm. Pulmonary:      Effort: Pulmonary effort is normal.      Breath sounds: Normal breath sounds. Neurological:      Mental Status: He is alert.           Labs and Imaging:    Last 3 Recorded Weights in this Encounter    02/15/22 1453   Weight: 233 lb (105.7 kg)        No results found for: HBA1C, KTY3LZOL, AZG0FKCP, PMF0JDOS     Assessment:     Patient Active Problem List   Diagnosis Code    Acquired hypothyroidism E03.9    Hyperprolactinemia (HCC) E22.1    BPH (benign prostatic hyperplasia) N40.0    Type 2 diabetes mellitus with hyperglycemia, without long-term current use of insulin (HCC) E11.65    DVT (deep venous thrombosis) ([facility] I82.409           Plan:     Hyperprolactinemia:  6-:  Prolactin elevated at 25.8 (415.2)  LH normal at 4.1  Normal hemoglobin and hematocrit  Normal liver enzymes  Normal estradiol  Per patient testosterone was low but I do not see any result    6-:  Prolactin elevated at 20.2    409595:  TSH elevated at 5.19 (0.454. 5)  Free T4 normal at 1.17    8-:  TSH normal at 2.72  Free T4 normal at 1.16  TPO undetectable    MRI pituitary 9-2-2021:  Normal pituitary  Started cabergoline 0.5 mg half tablet twice a week    10-:  Prolactin low at 0.5 (415.2)  Cabergoline 0.5 mg half tablet twice a week was continued    2-:  Prolactin low at 0.4  Plan:  Continue cabergoline 0.5 mg half tablet twice a week for 6 more months and I will see him back with CMP, prolactin and TSH prior to the visit. Secondary hypogonadism:  6-: Total testosterone low at 222.8 (800929)  Normal PSA  Normal liver enzymes  LH normal at 4.1  Normal estradiol  Normal hemoglobin and hematocrit    6-: Total testosterone low at 228.9 (346777)    2- labs drawn at noon: Total testosterone normal at 478  Free testosterone normal at 9.9  Prolactin low at 0.4  Plan:  It appears that mild secondary hypogonadism was because of hyperprolactinemia, which has now resolved. No indication for testosterone replacement. Check testosterone one more time, fasting and before 10 AM prior to next visit in 6 months. Elevated TSH:  3-8-2021:  TSH elevated at 5.19  Free T4 normal at 1.17    8-:  TSH normal at 2.72  Free T4 normal at 1.16  TPO undetectable  Patient does not appear to have hypothyroidism.    Plan:  Check TSH prior to next visit in 6 months. Orders Placed This Encounter    PROLACTIN     Standing Status:   Future     Standing Expiration Date:   3/88/0695    METABOLIC PANEL, COMPREHENSIVE     Standing Status:   Future     Standing Expiration Date:   8/15/2022    TESTOSTERONE, FREE & TOTAL     Standing Status:   Future     Standing Expiration Date:   8/15/2022    TSH AND FREE T4     Standing Status:   Future     Standing Expiration Date:   8/15/2022    cabergoline (DOSTINEX) 0.5 mg tablet     Sig: Take 0.5 Tablets by mouth two (2) times a week for 90 days.      Dispense:  12 Tablet     Refill:  2        Signed By: Rich Queen MD     February 15, 2022      Return to clinic 6 months

## 2022-02-15 NOTE — LETTER
2/15/2022    Patient: Bailee Sheth. YOB: 1955   Date of Visit: 2/15/2022     Luis Ramsay MD  [address] [address]  Via Fax: [phone]    Dear Luis Ramsay MD,      Thank you for referring Mr. Ana Hager to [address] [place] [address] for evaluation. My notes for this consultation are attached. If you have questions, please do not hesitate to call me. I look forward to following your patient along with you.       Sincerely,    Moustapha Batista MD

## 2022-03-19 PROBLEM — N40.0 BPH (BENIGN PROSTATIC HYPERPLASIA): Status: ACTIVE | Noted: 2021-08-16

## 2022-03-19 PROBLEM — I82.409 DVT (DEEP VENOUS THROMBOSIS) (HCC): Status: ACTIVE | Noted: 2021-08-16

## 2022-03-19 PROBLEM — E22.1 HYPERPROLACTINEMIA (HCC): Status: ACTIVE | Noted: 2021-08-16

## 2022-03-19 PROBLEM — E11.65 TYPE 2 DIABETES MELLITUS WITH HYPERGLYCEMIA, WITHOUT LONG-TERM CURRENT USE OF INSULIN (HCC): Status: ACTIVE | Noted: 2021-08-16

## 2022-03-19 PROBLEM — E03.9 ACQUIRED HYPOTHYROIDISM: Status: ACTIVE | Noted: 2021-08-16

## 2022-04-25 ENCOUNTER — HOSPITAL ENCOUNTER (OUTPATIENT)
Age: 67
Discharge: HOME | End: 2022-04-25
Attending: SURGERY | Admitting: SURGERY
Payer: MEDICARE

## 2022-04-25 ENCOUNTER — ANESTHESIA (OUTPATIENT)
Dept: SURGERY | Age: 67
End: 2022-04-25
Payer: MEDICARE

## 2022-04-25 ENCOUNTER — ANESTHESIA EVENT (OUTPATIENT)
Dept: SURGERY | Age: 67
End: 2022-04-25
Payer: MEDICARE

## 2022-04-25 VITALS
TEMPERATURE: 97.3 F | WEIGHT: 225 LBS | DIASTOLIC BLOOD PRESSURE: 85 MMHG | BODY MASS INDEX: 31.5 KG/M2 | HEART RATE: 53 BPM | SYSTOLIC BLOOD PRESSURE: 132 MMHG | HEIGHT: 71 IN | RESPIRATION RATE: 16 BRPM | OXYGEN SATURATION: 98 %

## 2022-04-25 PROBLEM — L02.91 ABSCESS: Status: ACTIVE | Noted: 2022-04-25

## 2022-04-25 LAB
GLUCOSE BLD STRIP.AUTO-MCNC: 173 MG/DL (ref 65–117)
PERFORMED BY, TECHID: ABNORMAL
POTASSIUM SERPL-SCNC: 4.7 MMOL/L (ref 3.5–5.1)

## 2022-04-25 PROCEDURE — 87070 CULTURE OTHR SPECIMN AEROBIC: CPT

## 2022-04-25 PROCEDURE — 77030019895 HC PCKNG STRP IODO -A: Performed by: SURGERY

## 2022-04-25 PROCEDURE — 74011000250 HC RX REV CODE- 250: Performed by: NURSE ANESTHETIST, CERTIFIED REGISTERED

## 2022-04-25 PROCEDURE — 87205 SMEAR GRAM STAIN: CPT

## 2022-04-25 PROCEDURE — 77030040361 HC SLV COMPR DVT MDII -B: Performed by: SURGERY

## 2022-04-25 PROCEDURE — 76060000032 HC ANESTHESIA 0.5 TO 1 HR: Performed by: SURGERY

## 2022-04-25 PROCEDURE — 84132 ASSAY OF SERUM POTASSIUM: CPT

## 2022-04-25 PROCEDURE — 76210000006 HC OR PH I REC 0.5 TO 1 HR: Performed by: SURGERY

## 2022-04-25 PROCEDURE — 74011250636 HC RX REV CODE- 250/636: Performed by: SURGERY

## 2022-04-25 PROCEDURE — 76210000020 HC REC RM PH II FIRST 0.5 HR: Performed by: SURGERY

## 2022-04-25 PROCEDURE — 74011000250 HC RX REV CODE- 250: Performed by: SURGERY

## 2022-04-25 PROCEDURE — 87077 CULTURE AEROBIC IDENTIFY: CPT

## 2022-04-25 PROCEDURE — 36415 COLL VENOUS BLD VENIPUNCTURE: CPT

## 2022-04-25 PROCEDURE — 74011250636 HC RX REV CODE- 250/636: Performed by: ANESTHESIOLOGY

## 2022-04-25 PROCEDURE — 74011250636 HC RX REV CODE- 250/636: Performed by: NURSE ANESTHETIST, CERTIFIED REGISTERED

## 2022-04-25 PROCEDURE — 76010000138 HC OR TIME 0.5 TO 1 HR: Performed by: SURGERY

## 2022-04-25 PROCEDURE — 82962 GLUCOSE BLOOD TEST: CPT

## 2022-04-25 PROCEDURE — 87186 SC STD MICRODIL/AGAR DIL: CPT

## 2022-04-25 PROCEDURE — 2709999900 HC NON-CHARGEABLE SUPPLY: Performed by: SURGERY

## 2022-04-25 RX ORDER — LIDOCAINE HYDROCHLORIDE 20 MG/ML
INJECTION, SOLUTION EPIDURAL; INFILTRATION; INTRACAUDAL; PERINEURAL AS NEEDED
Status: DISCONTINUED | OUTPATIENT
Start: 2022-04-25 | End: 2022-04-25 | Stop reason: HOSPADM

## 2022-04-25 RX ORDER — BUPIVACAINE HYDROCHLORIDE 2.5 MG/ML
INJECTION, SOLUTION EPIDURAL; INFILTRATION; INTRACAUDAL AS NEEDED
Status: DISCONTINUED | OUTPATIENT
Start: 2022-04-25 | End: 2022-04-25 | Stop reason: HOSPADM

## 2022-04-25 RX ORDER — ONDANSETRON 2 MG/ML
4 INJECTION INTRAMUSCULAR; INTRAVENOUS AS NEEDED
Status: DISCONTINUED | OUTPATIENT
Start: 2022-04-25 | End: 2022-04-25 | Stop reason: HOSPADM

## 2022-04-25 RX ORDER — FENTANYL CITRATE 50 UG/ML
INJECTION, SOLUTION INTRAMUSCULAR; INTRAVENOUS AS NEEDED
Status: DISCONTINUED | OUTPATIENT
Start: 2022-04-25 | End: 2022-04-25 | Stop reason: HOSPADM

## 2022-04-25 RX ORDER — PROPOFOL 10 MG/ML
INJECTION, EMULSION INTRAVENOUS
Status: DISCONTINUED | OUTPATIENT
Start: 2022-04-25 | End: 2022-04-25 | Stop reason: HOSPADM

## 2022-04-25 RX ORDER — DEXMEDETOMIDINE HYDROCHLORIDE 100 UG/ML
INJECTION, SOLUTION INTRAVENOUS AS NEEDED
Status: DISCONTINUED | OUTPATIENT
Start: 2022-04-25 | End: 2022-04-25 | Stop reason: HOSPADM

## 2022-04-25 RX ORDER — SODIUM CHLORIDE, SODIUM LACTATE, POTASSIUM CHLORIDE, CALCIUM CHLORIDE 600; 310; 30; 20 MG/100ML; MG/100ML; MG/100ML; MG/100ML
20 INJECTION, SOLUTION INTRAVENOUS CONTINUOUS
Status: DISCONTINUED | OUTPATIENT
Start: 2022-04-25 | End: 2022-04-25 | Stop reason: HOSPADM

## 2022-04-25 RX ORDER — SODIUM CHLORIDE 0.9 % (FLUSH) 0.9 %
5-40 SYRINGE (ML) INJECTION AS NEEDED
Status: DISCONTINUED | OUTPATIENT
Start: 2022-04-25 | End: 2022-04-25 | Stop reason: HOSPADM

## 2022-04-25 RX ORDER — VALSARTAN AND HYDROCHLOROTHIAZIDE 160; 25 MG/1; MG/1
1 TABLET ORAL DAILY
COMMUNITY

## 2022-04-25 RX ORDER — SODIUM CHLORIDE, SODIUM LACTATE, POTASSIUM CHLORIDE, CALCIUM CHLORIDE 600; 310; 30; 20 MG/100ML; MG/100ML; MG/100ML; MG/100ML
INJECTION, SOLUTION INTRAVENOUS
Status: DISCONTINUED | OUTPATIENT
Start: 2022-04-25 | End: 2022-04-25 | Stop reason: HOSPADM

## 2022-04-25 RX ORDER — DIPHENHYDRAMINE HYDROCHLORIDE 50 MG/ML
12.5 INJECTION, SOLUTION INTRAMUSCULAR; INTRAVENOUS AS NEEDED
Status: DISCONTINUED | OUTPATIENT
Start: 2022-04-25 | End: 2022-04-25 | Stop reason: HOSPADM

## 2022-04-25 RX ORDER — ALBUTEROL SULFATE 0.83 MG/ML
2.5 SOLUTION RESPIRATORY (INHALATION) AS NEEDED
Status: DISCONTINUED | OUTPATIENT
Start: 2022-04-25 | End: 2022-04-25 | Stop reason: HOSPADM

## 2022-04-25 RX ORDER — OXYCODONE AND ACETAMINOPHEN 5; 325 MG/1; MG/1
2 TABLET ORAL AS NEEDED
Status: DISCONTINUED | OUTPATIENT
Start: 2022-04-25 | End: 2022-04-25 | Stop reason: HOSPADM

## 2022-04-25 RX ORDER — HYDROMORPHONE HYDROCHLORIDE 1 MG/ML
0.5 INJECTION, SOLUTION INTRAMUSCULAR; INTRAVENOUS; SUBCUTANEOUS
Status: DISCONTINUED | OUTPATIENT
Start: 2022-04-25 | End: 2022-04-25 | Stop reason: HOSPADM

## 2022-04-25 RX ORDER — MIDAZOLAM HYDROCHLORIDE 1 MG/ML
INJECTION, SOLUTION INTRAMUSCULAR; INTRAVENOUS AS NEEDED
Status: DISCONTINUED | OUTPATIENT
Start: 2022-04-25 | End: 2022-04-25 | Stop reason: HOSPADM

## 2022-04-25 RX ORDER — FENTANYL CITRATE 50 UG/ML
50 INJECTION, SOLUTION INTRAMUSCULAR; INTRAVENOUS
Status: DISCONTINUED | OUTPATIENT
Start: 2022-04-25 | End: 2022-04-25 | Stop reason: HOSPADM

## 2022-04-25 RX ADMIN — FENTANYL CITRATE 50 MCG: 50 INJECTION, SOLUTION INTRAMUSCULAR; INTRAVENOUS at 16:44

## 2022-04-25 RX ADMIN — FENTANYL CITRATE 50 MCG: 0.05 INJECTION, SOLUTION INTRAMUSCULAR; INTRAVENOUS at 17:37

## 2022-04-25 RX ADMIN — PROPOFOL 150 MCG/KG/MIN: 10 INJECTION, EMULSION INTRAVENOUS at 16:51

## 2022-04-25 RX ADMIN — FENTANYL CITRATE 50 MCG: 50 INJECTION, SOLUTION INTRAMUSCULAR; INTRAVENOUS at 17:15

## 2022-04-25 RX ADMIN — MIDAZOLAM HYDROCHLORIDE 2 MG: 2 INJECTION, SOLUTION INTRAMUSCULAR; INTRAVENOUS at 16:44

## 2022-04-25 RX ADMIN — DEXMEDETOMIDINE HYDROCHLORIDE 2 MCG: 100 INJECTION, SOLUTION INTRAVENOUS at 17:07

## 2022-04-25 RX ADMIN — LIDOCAINE HYDROCHLORIDE 60 MG: 20 INJECTION, SOLUTION EPIDURAL; INFILTRATION; INTRACAUDAL; PERINEURAL at 16:50

## 2022-04-25 RX ADMIN — SODIUM CHLORIDE, POTASSIUM CHLORIDE, SODIUM LACTATE AND CALCIUM CHLORIDE: 600; 310; 30; 20 INJECTION, SOLUTION INTRAVENOUS at 16:35

## 2022-04-25 RX ADMIN — DEXMEDETOMIDINE HYDROCHLORIDE 2 MCG: 100 INJECTION, SOLUTION INTRAVENOUS at 17:00

## 2022-04-25 RX ADMIN — DEXMEDETOMIDINE HYDROCHLORIDE 6 MCG: 100 INJECTION, SOLUTION INTRAVENOUS at 16:55

## 2022-04-25 RX ADMIN — CEFAZOLIN SODIUM 2 G: 1 INJECTION, POWDER, FOR SOLUTION INTRAMUSCULAR; INTRAVENOUS at 16:53

## 2022-04-25 RX ADMIN — SODIUM CHLORIDE, POTASSIUM CHLORIDE, SODIUM LACTATE AND CALCIUM CHLORIDE 20 ML/HR: 600; 310; 30; 20 INJECTION, SOLUTION INTRAVENOUS at 15:03

## 2022-04-25 NOTE — ANESTHESIA POSTPROCEDURE EVALUATION
Procedure(s):  Incision And Drainage Of Left Gluteal Abscess.     total IV anesthesia, MAC, general - backup    Anesthesia Post Evaluation      Multimodal analgesia: multimodal analgesia used between 6 hours prior to anesthesia start to PACU discharge  Patient location during evaluation: PACU  Patient participation: complete - patient participated  Level of consciousness: awake  Pain score: 0  Pain management: adequate  Airway patency: patent  Anesthetic complications: no  Cardiovascular status: acceptable  Respiratory status: acceptable  Hydration status: acceptable  Post anesthesia nausea and vomiting:  controlled  Final Post Anesthesia Temperature Assessment:  Normothermia (36.0-37.5 degrees C)      INITIAL Post-op Vital signs:   Vitals Value Taken Time   /72 04/25/22 1750   Temp 36.3 °C (97.3 °F) 04/25/22 1732   Pulse 57 04/25/22 1750   Resp 17 04/25/22 1750   SpO2 97 % 04/25/22 1750

## 2022-04-25 NOTE — PROGRESS NOTES
Pt transported SDS room 22, VS stable dressing c/d/i. Brought spouse to room. Pt having mild pain has ice bag and moves appropriately.

## 2022-04-25 NOTE — ANESTHESIA PREPROCEDURE EVALUATION
Relevant Problems   ENDOCRINE   (+) Acquired hypothyroidism   (+) Type 2 diabetes mellitus with hyperglycemia, without long-term current use of insulin (HCC)       Anesthetic History   No history of anesthetic complications            Review of Systems / Medical History  Patient summary reviewed, nursing notes reviewed and pertinent labs reviewed    Pulmonary        Sleep apnea: CPAP           Neuro/Psych             Comments: Neuropathy Cardiovascular    Hypertension          Hyperlipidemia      Comments: Currently with left lower extremity DVT which is being treated with Eliquis. Patient last took his Eliquis on Friday 4/22/2022. GI/Hepatic/Renal         Renal disease (Creatinine 1.76): CRI       Endo/Other    Diabetes: type 2, using insulin  Hypothyroidism  Obesity     Other Findings   Comments:     Procedure Information    Case: [id number] Date/Time: 04/25/22 1500  Procedure:  Incision And Drainage Of Left Gluteal Abscess (Left )  Anesthesia type: General  Pre-op diagnosis: Abscess  Location: [facility]ON / [facility]  Surgeons: Jose Armando Knox MD      Medical History  Hypertension  Thromboembolus ([address]  Diabetes ([address]  Neuropathy  Hypercholesterolemia  Restless leg syndrome  BPH (benign prostatic hyperplasia)           Physical Exam    Airway  Mallampati: III  TM Distance: 4 - 6 cm  Neck ROM: normal range of motion   Mouth opening: Normal     Cardiovascular    Rhythm: regular  Rate: normal         Dental  No notable dental hx       Pulmonary  Breath sounds clear to auscultation               Abdominal  GI exam deferred       Other Findings            Anesthetic Plan    ASA: 3  Anesthesia type: total IV anesthesia, MAC and general - backup          Induction: Intravenous  Anesthetic plan and risks discussed with: Patient and Family

## 2022-04-25 NOTE — PROGRESS NOTES
Patient states having a DVT in upper left leg and that he was cleared by Dr Marley Leal to proceed with the procedure. Patient last took eliquis on Friday 4/22/2022. No clearance note in chart. Dr Abigail Hoskins notified.

## 2022-04-25 NOTE — OP NOTES
OPERATIVE NOTE    Patient: Domi Montano. YOB: 1955  MRN: [id number]    Date of Procedure: 4/25/2022     Pre-Op Diagnosis: Left  Gluteal Abscess    Post-Op Diagnosis: Same as preoperative diagnosis. Procedure(s):  Complex Incision And Drainage Of Left Gluteal Abscess    Surgeon(s):  Keo Storey MD    Surgical Assistant: MsRogelio Suero / Ms. Edytajulio césar Adamsmary    Anesthesia: MAC/local    Anesthesiologist: Dr. Gilberto Blank    CRNA: Mr. Holly Owens    Indication: Left gluteal abscess/cellulitis    Procedure:   Patient was taken to the operative room. Patient was laid on the right lateral decubitus position. After IV sedation was administered left buttock area was prepped and draped usual sterile fashion. Timeout was completed. Local anesthesia was infiltrated. A cruciate incision was placed over the fluctuant part of the swelling. Incision was deepened through subcutaneous  tissue. The cavity was opened up with a hemostat. There was a gush of pus that was sent for culture. Cavity was opened up widely. Wound was irrigated. Half inch iodoform was used to pack the wound. 4 x 4 dressing and tape was applied. Patient tolerated procedure very well. There were no complication. Estimated blood loss was minimal.  Patient was transferred to recovery room in stable condition. All counts are correct.     Estimated Blood Loss (mL): Minimal    Complications: None    Specimens:   ID Type Source Tests Collected by Time Destination   1 : Left Gluteal Wound Culture Wound Buttock CULTURE, WOUND W GRAM STAIN, CULTURE, ANAEROBIC Alee Rivero MD 4/25/2022 1704 Microbiology   2 : Left Gluteal Tissue Culture Tissue Buttock CULTURE, TISSUE W GRAM STAIN, CULTURE, ANAEROBIC Alee Rivero MD 4/25/2022 1708 Microbiology        Implants: None    Drains: None    Findings: as above    Electronically Signed by Agustin Kimble MD on 4/25/2022 at 5:27 PM

## 2022-04-30 ENCOUNTER — APPOINTMENT (OUTPATIENT)
Dept: CT IMAGING | Age: 67
DRG: 339 | End: 2022-04-30
Attending: EMERGENCY MEDICINE
Payer: MEDICARE

## 2022-04-30 ENCOUNTER — ANESTHESIA EVENT (OUTPATIENT)
Dept: SURGERY | Age: 67
DRG: 339 | End: 2022-04-30
Payer: MEDICARE

## 2022-04-30 ENCOUNTER — ANESTHESIA (OUTPATIENT)
Dept: SURGERY | Age: 67
DRG: 339 | End: 2022-04-30
Payer: MEDICARE

## 2022-04-30 ENCOUNTER — HOSPITAL ENCOUNTER (INPATIENT)
Age: 67
LOS: 7 days | Discharge: HOME | DRG: 339 | End: 2022-05-07
Attending: EMERGENCY MEDICINE | Admitting: SURGERY
Payer: MEDICARE

## 2022-04-30 DIAGNOSIS — K35.80 ACUTE APPENDICITIS, UNSPECIFIED ACUTE APPENDICITIS TYPE: Primary | ICD-10-CM

## 2022-04-30 LAB
ALBUMIN SERPL-MCNC: 4 G/DL (ref 3.5–5)
ALBUMIN/GLOB SERPL: 1 {RATIO} (ref 1.1–2.2)
ALP SERPL-CCNC: 65 U/L (ref 45–117)
ALT SERPL-CCNC: 77 U/L (ref 12–78)
ANION GAP SERPL CALC-SCNC: 11 MMOL/L (ref 5–15)
APPEARANCE UR: CLEAR
AST SERPL W P-5'-P-CCNC: 38 U/L (ref 15–37)
BACTERIA URNS QL MICRO: NEGATIVE /HPF
BASOPHILS # BLD: 0 K/UL (ref 0–0.1)
BASOPHILS NFR BLD: 0 % (ref 0–1)
BILIRUB SERPL-MCNC: 1 MG/DL (ref 0.2–1)
BILIRUB UR QL: NEGATIVE
BUN SERPL-MCNC: 33 MG/DL (ref 6–20)
BUN/CREAT SERPL: 17 (ref 12–20)
CA-I BLD-MCNC: 9.8 MG/DL (ref 8.5–10.1)
CHLORIDE SERPL-SCNC: 99 MMOL/L (ref 97–108)
CO2 SERPL-SCNC: 25 MMOL/L (ref 21–32)
COLOR UR: YELLOW
CREAT SERPL-MCNC: 1.91 MG/DL (ref 0.7–1.3)
DIFFERENTIAL METHOD BLD: ABNORMAL
EOSINOPHIL # BLD: 0.2 K/UL (ref 0–0.4)
EOSINOPHIL NFR BLD: 1 % (ref 0–7)
EPITH CASTS URNS QL MICRO: ABNORMAL /LPF
ERYTHROCYTE [DISTWIDTH] IN BLOOD BY AUTOMATED COUNT: 13.1 % (ref 11.5–14.5)
GLOBULIN SER CALC-MCNC: 4.1 G/DL (ref 2–4)
GLUCOSE BLD STRIP.AUTO-MCNC: 202 MG/DL (ref 65–117)
GLUCOSE SERPL-MCNC: 260 MG/DL (ref 65–100)
GLUCOSE UR STRIP.AUTO-MCNC: 250 MG/DL
HCT VFR BLD AUTO: 42.4 % (ref 36.6–50.3)
HGB BLD-MCNC: 14.8 G/DL (ref 12.1–17)
HGB UR QL STRIP: NEGATIVE
IMM GRANULOCYTES # BLD AUTO: 0 K/UL (ref 0–0.04)
IMM GRANULOCYTES NFR BLD AUTO: 0 % (ref 0–0.5)
KETONES UR QL STRIP.AUTO: NEGATIVE MG/DL
LACTATE SERPL-SCNC: 0.8 MMOL/L (ref 0.4–2)
LEUKOCYTE ESTERASE UR QL STRIP.AUTO: NEGATIVE
LYMPHOCYTES # BLD: 1.6 K/UL (ref 0.8–3.5)
LYMPHOCYTES NFR BLD: 13 % (ref 12–49)
MCH RBC QN AUTO: 30.7 PG (ref 26–34)
MCHC RBC AUTO-ENTMCNC: 34.9 G/DL (ref 30–36.5)
MCV RBC AUTO: 88 FL (ref 80–99)
MONOCYTES # BLD: 0.9 K/UL (ref 0–1)
MONOCYTES NFR BLD: 8 % (ref 5–13)
NEUTS SEG # BLD: 9.1 K/UL (ref 1.8–8)
NEUTS SEG NFR BLD: 78 % (ref 32–75)
NITRITE UR QL STRIP.AUTO: NEGATIVE
PERFORMED BY, TECHID: ABNORMAL
PH UR STRIP: 5 [PH] (ref 5–8)
PLATELET # BLD AUTO: 179 K/UL (ref 150–400)
PMV BLD AUTO: 9.6 FL (ref 8.9–12.9)
POTASSIUM SERPL-SCNC: 4.2 MMOL/L (ref 3.5–5.1)
PROT SERPL-MCNC: 8.1 G/DL (ref 6.4–8.2)
PROT UR STRIP-MCNC: ABNORMAL MG/DL
RBC # BLD AUTO: 4.82 M/UL (ref 4.1–5.7)
RBC #/AREA URNS HPF: ABNORMAL /HPF (ref 0–5)
SODIUM SERPL-SCNC: 135 MMOL/L (ref 136–145)
SP GR UR REFRACTOMETRY: 1.02 (ref 1–1.03)
UA: UC IF INDICATED,UAUC: ABNORMAL
UROBILINOGEN UR QL STRIP.AUTO: 0.1 EU/DL (ref 0.2–1)
WBC # BLD AUTO: 11.7 K/UL (ref 4.1–11.1)
WBC URNS QL MICRO: ABNORMAL /HPF (ref 0–4)

## 2022-04-30 PROCEDURE — 77030016151 HC PROTCTR LNS DFOG COVD -B: Performed by: SURGERY

## 2022-04-30 PROCEDURE — 83605 ASSAY OF LACTIC ACID: CPT

## 2022-04-30 PROCEDURE — 76060000035 HC ANESTHESIA 2 TO 2.5 HR: Performed by: SURGERY

## 2022-04-30 PROCEDURE — 36415 COLL VENOUS BLD VENIPUNCTURE: CPT

## 2022-04-30 PROCEDURE — 77030022703 HC LIGASURE  BLNT LAPSCP SEAL COVD -E: Performed by: SURGERY

## 2022-04-30 PROCEDURE — 77030008771 HC TU NG SALEM SUMP -A: Performed by: ANESTHESIOLOGY

## 2022-04-30 PROCEDURE — 81001 URINALYSIS AUTO W/SCOPE: CPT

## 2022-04-30 PROCEDURE — 77030009968 HC RELD STPLR ENDOSC J&J -D: Performed by: SURGERY

## 2022-04-30 PROCEDURE — 77030019908 HC STETH ESOPH SIMS -A: Performed by: ANESTHESIOLOGY

## 2022-04-30 PROCEDURE — 77030018706 HC CORD MPLR COVD -A: Performed by: SURGERY

## 2022-04-30 PROCEDURE — 77030010507 HC ADH SKN DERMBND J&J -B: Performed by: SURGERY

## 2022-04-30 PROCEDURE — 74011250636 HC RX REV CODE- 250/636: Performed by: NURSE ANESTHETIST, CERTIFIED REGISTERED

## 2022-04-30 PROCEDURE — 82962 GLUCOSE BLOOD TEST: CPT

## 2022-04-30 PROCEDURE — 77030040361 HC SLV COMPR DVT MDII -B: Performed by: SURGERY

## 2022-04-30 PROCEDURE — 77030008684 HC TU ET CUF COVD -B: Performed by: ANESTHESIOLOGY

## 2022-04-30 PROCEDURE — 77030018813 HC SCIS LAPSCP EPIX DISP AMR -B: Performed by: SURGERY

## 2022-04-30 PROCEDURE — 74011000250 HC RX REV CODE- 250: Performed by: NURSE ANESTHETIST, CERTIFIED REGISTERED

## 2022-04-30 PROCEDURE — 99285 EMERGENCY DEPT VISIT HI MDM: CPT

## 2022-04-30 PROCEDURE — 74176 CT ABD & PELVIS W/O CONTRAST: CPT

## 2022-04-30 PROCEDURE — 77030002933 HC SUT MCRYL J&J -A: Performed by: SURGERY

## 2022-04-30 PROCEDURE — 96374 THER/PROPH/DIAG INJ IV PUSH: CPT

## 2022-04-30 PROCEDURE — 76210000063 HC OR PH I REC FIRST 0.5 HR: Performed by: SURGERY

## 2022-04-30 PROCEDURE — 74011250636 HC RX REV CODE- 250/636: Performed by: ANESTHESIOLOGY

## 2022-04-30 PROCEDURE — 76010000131 HC OR TIME 2 TO 2.5 HR: Performed by: SURGERY

## 2022-04-30 PROCEDURE — 77030011992 HC AIRWY NASOPHGL TELE -A: Performed by: ANESTHESIOLOGY

## 2022-04-30 PROCEDURE — 77030038079 HC RELD STPLR ENDOSC J&J -G: Performed by: SURGERY

## 2022-04-30 PROCEDURE — 77030009851 HC PCH RTVR ENDOSC AMR -B: Performed by: SURGERY

## 2022-04-30 PROCEDURE — 74011250636 HC RX REV CODE- 250/636: Performed by: EMERGENCY MEDICINE

## 2022-04-30 PROCEDURE — 85025 COMPLETE CBC W/AUTO DIFF WBC: CPT

## 2022-04-30 PROCEDURE — 88304 TISSUE EXAM BY PATHOLOGIST: CPT

## 2022-04-30 PROCEDURE — 77030013079 HC BLNKT BAIR HGGR 3M -A: Performed by: ANESTHESIOLOGY

## 2022-04-30 PROCEDURE — 77030002916 HC SUT ETHLN J&J -A: Performed by: SURGERY

## 2022-04-30 PROCEDURE — 65270000029 HC RM PRIVATE

## 2022-04-30 PROCEDURE — 77030026438 HC STYL ET INTUB CARD -A: Performed by: ANESTHESIOLOGY

## 2022-04-30 PROCEDURE — 77030040506 HC DRN WND MDII -A: Performed by: SURGERY

## 2022-04-30 PROCEDURE — 77030008606 HC TRCR ENDOSC KII AMR -B: Performed by: SURGERY

## 2022-04-30 PROCEDURE — 80053 COMPREHEN METABOLIC PANEL: CPT

## 2022-04-30 PROCEDURE — 77030031139 HC SUT VCRL2 J&J -A: Performed by: SURGERY

## 2022-04-30 PROCEDURE — 77030018684: Performed by: SURGERY

## 2022-04-30 PROCEDURE — 2709999900 HC NON-CHARGEABLE SUPPLY: Performed by: SURGERY

## 2022-04-30 PROCEDURE — 77030012411 HC DRN WND CARD -A: Performed by: SURGERY

## 2022-04-30 PROCEDURE — 74011000250 HC RX REV CODE- 250: Performed by: SURGERY

## 2022-04-30 RX ORDER — MIDAZOLAM HYDROCHLORIDE 1 MG/ML
1 INJECTION, SOLUTION INTRAMUSCULAR; INTRAVENOUS AS NEEDED
Status: DISCONTINUED | OUTPATIENT
Start: 2022-04-30 | End: 2022-04-30 | Stop reason: HOSPADM

## 2022-04-30 RX ORDER — SODIUM CHLORIDE 0.9 % (FLUSH) 0.9 %
5-40 SYRINGE (ML) INJECTION AS NEEDED
Status: DISCONTINUED | OUTPATIENT
Start: 2022-04-30 | End: 2022-04-30 | Stop reason: HOSPADM

## 2022-04-30 RX ORDER — FENTANYL CITRATE 50 UG/ML
50 INJECTION, SOLUTION INTRAMUSCULAR; INTRAVENOUS
Status: DISCONTINUED | OUTPATIENT
Start: 2022-04-30 | End: 2022-04-30 | Stop reason: HOSPADM

## 2022-04-30 RX ORDER — DIPHENHYDRAMINE HYDROCHLORIDE 50 MG/ML
12.5 INJECTION, SOLUTION INTRAMUSCULAR; INTRAVENOUS AS NEEDED
Status: DISCONTINUED | OUTPATIENT
Start: 2022-04-30 | End: 2022-04-30 | Stop reason: HOSPADM

## 2022-04-30 RX ORDER — SUCCINYLCHOLINE CHLORIDE 20 MG/ML
INJECTION INTRAMUSCULAR; INTRAVENOUS AS NEEDED
Status: DISCONTINUED | OUTPATIENT
Start: 2022-04-30 | End: 2022-04-30 | Stop reason: HOSPADM

## 2022-04-30 RX ORDER — FENTANYL CITRATE 50 UG/ML
INJECTION, SOLUTION INTRAMUSCULAR; INTRAVENOUS AS NEEDED
Status: DISCONTINUED | OUTPATIENT
Start: 2022-04-30 | End: 2022-04-30 | Stop reason: HOSPADM

## 2022-04-30 RX ORDER — ROCURONIUM BROMIDE 10 MG/ML
INJECTION, SOLUTION INTRAVENOUS AS NEEDED
Status: DISCONTINUED | OUTPATIENT
Start: 2022-04-30 | End: 2022-04-30 | Stop reason: HOSPADM

## 2022-04-30 RX ORDER — LIDOCAINE HYDROCHLORIDE 10 MG/ML
0.1 INJECTION, SOLUTION EPIDURAL; INFILTRATION; INTRACAUDAL; PERINEURAL AS NEEDED
Status: DISCONTINUED | OUTPATIENT
Start: 2022-04-30 | End: 2022-04-30 | Stop reason: HOSPADM

## 2022-04-30 RX ORDER — FENTANYL CITRATE 50 UG/ML
50 INJECTION, SOLUTION INTRAMUSCULAR; INTRAVENOUS AS NEEDED
Status: DISCONTINUED | OUTPATIENT
Start: 2022-04-30 | End: 2022-04-30 | Stop reason: HOSPADM

## 2022-04-30 RX ORDER — MORPHINE SULFATE 2 MG/ML
2 INJECTION, SOLUTION INTRAMUSCULAR; INTRAVENOUS
Status: COMPLETED | OUTPATIENT
Start: 2022-04-30 | End: 2022-04-30

## 2022-04-30 RX ORDER — HYDRALAZINE HYDROCHLORIDE 20 MG/ML
10 INJECTION INTRAMUSCULAR; INTRAVENOUS
Status: DISCONTINUED | OUTPATIENT
Start: 2022-04-30 | End: 2022-04-30 | Stop reason: HOSPADM

## 2022-04-30 RX ORDER — PROPOFOL 10 MG/ML
INJECTION, EMULSION INTRAVENOUS AS NEEDED
Status: DISCONTINUED | OUTPATIENT
Start: 2022-04-30 | End: 2022-04-30 | Stop reason: HOSPADM

## 2022-04-30 RX ORDER — LABETALOL HCL 20 MG/4 ML
5 SYRINGE (ML) INTRAVENOUS
Status: DISCONTINUED | OUTPATIENT
Start: 2022-04-30 | End: 2022-04-30 | Stop reason: HOSPADM

## 2022-04-30 RX ORDER — HYDROCODONE BITARTRATE AND ACETAMINOPHEN 5; 325 MG/1; MG/1
1 TABLET ORAL AS NEEDED
Status: DISCONTINUED | OUTPATIENT
Start: 2022-04-30 | End: 2022-04-30 | Stop reason: HOSPADM

## 2022-04-30 RX ORDER — METOPROLOL TARTRATE 5 MG/5ML
2.5 INJECTION INTRAVENOUS
Status: DISCONTINUED | OUTPATIENT
Start: 2022-04-30 | End: 2022-04-30 | Stop reason: HOSPADM

## 2022-04-30 RX ORDER — GLYCOPYRROLATE 0.2 MG/ML
INJECTION INTRAMUSCULAR; INTRAVENOUS AS NEEDED
Status: DISCONTINUED | OUTPATIENT
Start: 2022-04-30 | End: 2022-04-30 | Stop reason: HOSPADM

## 2022-04-30 RX ORDER — ONDANSETRON 2 MG/ML
4 INJECTION INTRAMUSCULAR; INTRAVENOUS
Status: COMPLETED | OUTPATIENT
Start: 2022-04-30 | End: 2022-04-30

## 2022-04-30 RX ORDER — SODIUM CHLORIDE 0.9 % (FLUSH) 0.9 %
5-40 SYRINGE (ML) INJECTION EVERY 8 HOURS
Status: DISCONTINUED | OUTPATIENT
Start: 2022-05-01 | End: 2022-04-30 | Stop reason: HOSPADM

## 2022-04-30 RX ORDER — LIDOCAINE HYDROCHLORIDE 20 MG/ML
INJECTION, SOLUTION EPIDURAL; INFILTRATION; INTRACAUDAL; PERINEURAL AS NEEDED
Status: DISCONTINUED | OUTPATIENT
Start: 2022-04-30 | End: 2022-04-30 | Stop reason: HOSPADM

## 2022-04-30 RX ORDER — HYDROMORPHONE HYDROCHLORIDE 1 MG/ML
INJECTION, SOLUTION INTRAMUSCULAR; INTRAVENOUS; SUBCUTANEOUS AS NEEDED
Status: DISCONTINUED | OUTPATIENT
Start: 2022-04-30 | End: 2022-04-30 | Stop reason: HOSPADM

## 2022-04-30 RX ORDER — MIDAZOLAM HYDROCHLORIDE 1 MG/ML
0.5 INJECTION, SOLUTION INTRAMUSCULAR; INTRAVENOUS
Status: DISCONTINUED | OUTPATIENT
Start: 2022-04-30 | End: 2022-04-30 | Stop reason: HOSPADM

## 2022-04-30 RX ORDER — CEPHALEXIN 500 MG/1
500 CAPSULE ORAL 3 TIMES DAILY
Status: ON HOLD | COMMUNITY
End: 2022-05-03

## 2022-04-30 RX ORDER — SODIUM CHLORIDE, SODIUM LACTATE, POTASSIUM CHLORIDE, CALCIUM CHLORIDE 600; 310; 30; 20 MG/100ML; MG/100ML; MG/100ML; MG/100ML
INJECTION, SOLUTION INTRAVENOUS
Status: DISCONTINUED | OUTPATIENT
Start: 2022-04-30 | End: 2022-04-30 | Stop reason: HOSPADM

## 2022-04-30 RX ORDER — KETOROLAC TROMETHAMINE 30 MG/ML
INJECTION, SOLUTION INTRAMUSCULAR; INTRAVENOUS AS NEEDED
Status: DISCONTINUED | OUTPATIENT
Start: 2022-04-30 | End: 2022-04-30 | Stop reason: HOSPADM

## 2022-04-30 RX ORDER — HYDROMORPHONE HYDROCHLORIDE 1 MG/ML
0.4 INJECTION, SOLUTION INTRAMUSCULAR; INTRAVENOUS; SUBCUTANEOUS
Status: DISCONTINUED | OUTPATIENT
Start: 2022-04-30 | End: 2022-04-30 | Stop reason: HOSPADM

## 2022-04-30 RX ORDER — NEOSTIGMINE METHYLSULFATE 5 MG/5 ML
SYRINGE (ML) INTRAVENOUS AS NEEDED
Status: DISCONTINUED | OUTPATIENT
Start: 2022-04-30 | End: 2022-04-30 | Stop reason: HOSPADM

## 2022-04-30 RX ORDER — BUPIVACAINE HYDROCHLORIDE AND EPINEPHRINE 2.5; 5 MG/ML; UG/ML
INJECTION, SOLUTION INFILTRATION; PERINEURAL AS NEEDED
Status: DISCONTINUED | OUTPATIENT
Start: 2022-04-30 | End: 2022-04-30 | Stop reason: HOSPADM

## 2022-04-30 RX ORDER — EPHEDRINE SULFATE/0.9% NACL/PF 50 MG/5 ML
5 SYRINGE (ML) INTRAVENOUS AS NEEDED
Status: DISCONTINUED | OUTPATIENT
Start: 2022-04-30 | End: 2022-04-30 | Stop reason: HOSPADM

## 2022-04-30 RX ORDER — ONDANSETRON 2 MG/ML
INJECTION INTRAMUSCULAR; INTRAVENOUS AS NEEDED
Status: DISCONTINUED | OUTPATIENT
Start: 2022-04-30 | End: 2022-04-30 | Stop reason: HOSPADM

## 2022-04-30 RX ORDER — ONDANSETRON 2 MG/ML
4 INJECTION INTRAMUSCULAR; INTRAVENOUS AS NEEDED
Status: DISCONTINUED | OUTPATIENT
Start: 2022-04-30 | End: 2022-04-30 | Stop reason: HOSPADM

## 2022-04-30 RX ORDER — CEFOXITIN 2 G/1
INJECTION, POWDER, FOR SOLUTION INTRAVENOUS AS NEEDED
Status: DISCONTINUED | OUTPATIENT
Start: 2022-04-30 | End: 2022-04-30 | Stop reason: HOSPADM

## 2022-04-30 RX ORDER — SODIUM CHLORIDE 0.9 % (FLUSH) 0.9 %
5-40 SYRINGE (ML) INJECTION EVERY 8 HOURS
Status: DISCONTINUED | OUTPATIENT
Start: 2022-04-30 | End: 2022-04-30 | Stop reason: HOSPADM

## 2022-04-30 RX ORDER — ESMOLOL HYDROCHLORIDE 10 MG/ML
INJECTION INTRAVENOUS AS NEEDED
Status: DISCONTINUED | OUTPATIENT
Start: 2022-04-30 | End: 2022-04-30 | Stop reason: HOSPADM

## 2022-04-30 RX ORDER — SULFAMETHOXAZOLE AND TRIMETHOPRIM 800; 160 MG/1; MG/1
1 TABLET ORAL 2 TIMES DAILY
Status: ON HOLD | COMMUNITY
End: 2022-05-03

## 2022-04-30 RX ORDER — METOPROLOL TARTRATE 5 MG/5ML
INJECTION INTRAVENOUS AS NEEDED
Status: DISCONTINUED | OUTPATIENT
Start: 2022-04-30 | End: 2022-04-30 | Stop reason: HOSPADM

## 2022-04-30 RX ADMIN — CEFOXITIN 2 G: 2 INJECTION, POWDER, FOR SOLUTION INTRAVENOUS at 21:14

## 2022-04-30 RX ADMIN — FENTANYL CITRATE 100 MCG: 50 INJECTION, SOLUTION INTRAMUSCULAR; INTRAVENOUS at 21:02

## 2022-04-30 RX ADMIN — ESMOLOL HYDROCHLORIDE 40 MG: 10 INJECTION, SOLUTION INTRAVENOUS at 21:41

## 2022-04-30 RX ADMIN — METOPROLOL TARTRATE 5 MG: 5 INJECTION, SOLUTION INTRAVENOUS at 22:41

## 2022-04-30 RX ADMIN — Medication 5 MG: at 22:48

## 2022-04-30 RX ADMIN — FENTANYL CITRATE 100 MCG: 50 INJECTION, SOLUTION INTRAMUSCULAR; INTRAVENOUS at 21:34

## 2022-04-30 RX ADMIN — ROCURONIUM BROMIDE 20 MG: 50 INJECTION, SOLUTION INTRAVENOUS at 22:07

## 2022-04-30 RX ADMIN — HYDROMORPHONE HYDROCHLORIDE 1 MG: 1 INJECTION, SOLUTION INTRAMUSCULAR; INTRAVENOUS; SUBCUTANEOUS at 21:58

## 2022-04-30 RX ADMIN — ROCURONIUM BROMIDE 30 MG: 50 INJECTION, SOLUTION INTRAVENOUS at 21:19

## 2022-04-30 RX ADMIN — ONDANSETRON 4 MG: 2 INJECTION INTRAMUSCULAR; INTRAVENOUS at 11:18

## 2022-04-30 RX ADMIN — ONDANSETRON 4 MG: 2 INJECTION INTRAMUSCULAR; INTRAVENOUS at 21:02

## 2022-04-30 RX ADMIN — LIDOCAINE HYDROCHLORIDE 100 MG: 20 INJECTION, SOLUTION EPIDURAL; INFILTRATION; INTRACAUDAL; PERINEURAL at 21:02

## 2022-04-30 RX ADMIN — ESMOLOL HYDROCHLORIDE 40 MG: 10 INJECTION, SOLUTION INTRAVENOUS at 22:12

## 2022-04-30 RX ADMIN — GLYCOPYRROLATE 0.6 MG: 0.2 INJECTION INTRAMUSCULAR; INTRAVENOUS at 22:48

## 2022-04-30 RX ADMIN — SODIUM CHLORIDE, POTASSIUM CHLORIDE, SODIUM LACTATE AND CALCIUM CHLORIDE: 600; 310; 30; 20 INJECTION, SOLUTION INTRAVENOUS at 20:52

## 2022-04-30 RX ADMIN — FENTANYL CITRATE 50 MCG: 50 INJECTION, SOLUTION INTRAMUSCULAR; INTRAVENOUS at 19:36

## 2022-04-30 RX ADMIN — MORPHINE SULFATE 2 MG: 2 INJECTION, SOLUTION INTRAMUSCULAR; INTRAVENOUS at 13:54

## 2022-04-30 RX ADMIN — PROPOFOL 200 MG: 10 INJECTION, EMULSION INTRAVENOUS at 21:02

## 2022-04-30 RX ADMIN — SUCCINYLCHOLINE CHLORIDE 180 MG: 20 INJECTION, SOLUTION INTRAMUSCULAR; INTRAVENOUS at 21:02

## 2022-04-30 RX ADMIN — KETOROLAC TROMETHAMINE 30 MG: 30 INJECTION, SOLUTION INTRAMUSCULAR at 22:59

## 2022-04-30 NOTE — PROGRESS NOTES
Problem: Falls - Risk of  Goal: *Absence of Falls  Description: Document Gonzalez Howard Fall Risk and appropriate interventions in the flowsheet. Outcome: Progressing Towards Goal  Note: Fall Risk Interventions:  Mobility Interventions: Strengthening exercises (ROM-active/passive)                   History of Falls Interventions: Bed/chair exit alarm         Problem: Patient Education: Go to Patient Education Activity  Goal: Patient/Family Education  Outcome: Progressing Towards Goal     Problem: Pressure Injury - Risk of  Goal: *Prevention of pressure injury  Description: Document Jeramy Scale and appropriate interventions in the flowsheet.   Outcome: Progressing Towards Goal  Note: Pressure Injury Interventions:  Sensory Interventions: Minimize linen layers         Activity Interventions: Increase time out of bed    Mobility Interventions: HOB 30 degrees or less    Nutrition Interventions: Document food/fluid/supplement intake,Offer support with meals,snacks and hydration                     Problem: Patient Education: Go to Patient Education Activity  Goal: Patient/Family Education  Outcome: Progressing Towards Goal

## 2022-04-30 NOTE — ED PROVIDER NOTES
EMERGENCY DEPARTMENT HISTORY AND PHYSICAL EXAM      Date: 4/30/2022  Patient Name: Jin Palacios. History of Presenting Illness     Chief Complaint   Patient presents with    Abdominal Pain       History Provided By: Patient    HPI: Jin Palacios., 79 y.o. male with a past medical history significant diabetes and hypertension who had I & D one week ago on a gluteal abscess by Dr Adriana Merritt presents to the ED with cc of abd pain that worsened in past 24 hours. He has some nausea but the pain intensified this morning. He denies fevers, chills, Vomiting or diarrhea. There are no other complaints, changes, or physical findings at this time. PCP: Elan Moran MD        Past History     Past Medical History:  Past Medical History:   Diagnosis Date    BPH (benign prostatic hyperplasia)     Diabetes ([address]     Hypercholesterolemia     Hypertension     Neuropathy     Restless leg syndrome     Thromboembolus (HCC)        Past Surgical History:  Past Surgical History:   Procedure Laterality Date    HX HIP REPLACEMENT Right     HX HIP REPLACEMENT Left     HX KNEE REPLACEMENT Left     HX SHOULDER ARTHROSCOPY Right     HX SHOULDER ARTHROSCOPY Left        Family History:  Family History   Problem Relation Age of Onset    Diabetes Mother     Cancer Father        Social History:  Social History     Tobacco Use    Smoking status: Never Smoker    Smokeless tobacco: Never Used   Vaping Use    Vaping Use: Never used   Substance Use Topics    Alcohol use: Yes     Comment: rarely     Drug use: Never     Comment: medical marijuana        Allergies:  No Known Allergies      Review of Systems   Review of Systems   Constitutional: Negative. HENT: Negative. Respiratory: Negative. Cardiovascular: Negative. Gastrointestinal: Positive for abdominal pain and nausea. Genitourinary: Negative. Musculoskeletal: Negative. Neurological: Negative. Psychiatric/Behavioral: Negative.     All other systems reviewed and are negative. Physical Exam   Physical Exam  Vitals and nursing note reviewed. HENT:      Head: Atraumatic. Cardiovascular:      Rate and Rhythm: Normal rate and regular rhythm. Heart sounds: Normal heart sounds. No murmur heard. No friction rub. No gallop. Pulmonary:      Effort: Pulmonary effort is normal. No respiratory distress. Breath sounds: Normal breath sounds. No wheezing or rales. Chest:      Chest wall: No tenderness. Abdominal:      General: Bowel sounds are normal. There is no distension. Palpations: Abdomen is soft. There is no mass. Tenderness: There is abdominal tenderness in the right lower quadrant. There is guarding. There is no rebound. Positive signs include McBurney's sign. Musculoskeletal:         General: No tenderness. Normal range of motion. Neurological:      General: No focal deficit present. Mental Status: He is alert and oriented to person, place, and time.    Psychiatric:         Mood and Affect: Mood normal.         Diagnostic Study Results     Labs -     Recent Results (from the past 12 hour(s))   URINALYSIS W/ REFLEX CULTURE    Collection Time: 04/30/22 11:00 AM    Specimen: Urine   Result Value Ref Range    Color Yellow      Appearance Clear Clear      Specific gravity 1.020 1.003 - 1.030      pH (UA) 5.0 5.0 - 8.0      Protein Trace (A) Negative mg/dL    Glucose 250 (A) Negative mg/dL    Ketone Negative Negative mg/dL    Bilirubin Negative Negative      Blood Negative Negative      Urobilinogen 0.1 (L) 0.2 - 1.0 EU/dL    Nitrites Negative Negative      Leukocyte Esterase Negative Negative      WBC 0-4 0 - 4 /hpf    RBC 0-5 0 - 5 /hpf    Epithelial cells Few Few /lpf    Bacteria Negative Negative /hpf    UA:UC IF INDICATED Culture not indicated by UA result Culture not indicated by UA result     CBC WITH AUTOMATED DIFF    Collection Time: 04/30/22 11:19 AM   Result Value Ref Range    WBC 11.7 (H) 4.1 - 11.1 K/uL RBC 4.82 4.10 - 5.70 M/uL    HGB 14.8 12.1 - 17.0 g/dL    HCT 42.4 36.6 - 50.3 %    MCV 88.0 80.0 - 99.0 FL    MCH 30.7 26.0 - 34.0 PG    MCHC 34.9 30.0 - 36.5 g/dL    RDW 13.1 11.5 - 14.5 %    PLATELET 213 716 - 058 K/uL    MPV 9.6 8.9 - 12.9 FL    NEUTROPHILS 78 (H) 32 - 75 %    LYMPHOCYTES 13 12 - 49 %    MONOCYTES 8 5 - 13 %    EOSINOPHILS 1 0 - 7 %    BASOPHILS 0 0 - 1 %    IMMATURE GRANULOCYTES 0 0.0 - 0.5 %    ABS. NEUTROPHILS 9.1 (H) 1.8 - 8.0 K/UL    ABS. LYMPHOCYTES 1.6 0.8 - 3.5 K/UL    ABS. MONOCYTES 0.9 0.0 - 1.0 K/UL    ABS. EOSINOPHILS 0.2 0.0 - 0.4 K/UL    ABS. BASOPHILS 0.0 0.0 - 0.1 K/UL    ABS. IMM. GRANS. 0.0 0.00 - 0.04 K/UL    DF AUTOMATED     METABOLIC PANEL, COMPREHENSIVE    Collection Time: 04/30/22 11:19 AM   Result Value Ref Range    Sodium 135 (L) 136 - 145 mmol/L    Potassium 4.2 3.5 - 5.1 mmol/L    Chloride 99 97 - 108 mmol/L    CO2 25 21 - 32 mmol/L    Anion gap 11 5 - 15 mmol/L    Glucose 260 (H) 65 - 100 mg/dL    BUN 33 (H) 6 - 20 mg/dL    Creatinine 1.91 (H) 0.70 - 1.30 mg/dL    BUN/Creatinine ratio 17 12 - 20      GFR est AA 43 (L) >60 ml/min/1.73m2    GFR est non-AA 35 (L) >60 ml/min/1.73m2    Calcium 9.8 8.5 - 10.1 mg/dL    Bilirubin, total 1.0 0.2 - 1.0 mg/dL    AST (SGOT) 38 (H) 15 - 37 U/L    ALT (SGPT) 77 12 - 78 U/L    Alk. phosphatase 65 45 - 117 U/L    Protein, total 8.1 6.4 - 8.2 g/dL    Albumin 4.0 3.5 - 5.0 g/dL    Globulin 4.1 (H) 2.0 - 4.0 g/dL    A-G Ratio 1.0 (L) 1.1 - 2.2     LACTIC ACID    Collection Time: 04/30/22 11:19 AM   Result Value Ref Range    Lactic acid 0.8 0.4 - 2.0 mmol/L       Radiologic Studies -   CT ABD PELV WO CONT   Final Result   1. Findings consistent with appendicitis. No perforation or abscess formation. Other findings as described. Dr. Marlys Claudio  was notified t 1:15 p.m.        CT Results  (Last 48 hours)               04/30/22 1216  CT ABD PELV WO CONT Final result    Impression:  1. Findings consistent with appendicitis.  No perforation or abscess formation. Other findings as described. Dr. Angie Vicente  was notified t 1:15 p.m. Narrative:  Axial images from lung bases to the pubic symphysis were obtained without   contrast. Sagittal and coronal reformatted images were reviewed. All CT scans at   this facility are performed using dose reduction optimization techniques as   appropriate to a performed exam including the following:   automated exposure   control, adjustments of the mA and/or kV according to patient size, or use of   iterative reconstruction technique. Next       INDICATION: Abdominal pain and nausea. Lung bases are clear. No mass lesions in the liver, spleen, pancreas or adrenal   glands. There is mild fatty infiltration of the liver. No calcified gallstones. The abdominal aorta is atherosclerotic with no focal aneurysm. No   retroperitoneal adenopathy. There is a 6.8 x 7.6 in meters cyst projecting off   the posterior aspect of the left kidney measuring 4.5 Hounsfield units. No renal   calcifications. No hydronephrosis or hydroureter. GI tract shows no evidence of   obstruction. There is an 8 mm appendicolith at the base of the appendix. The   appendix is dilated in its midportion to 13 mm in maximal diameter. There is a 6   mm appendicolith more distally in the appendix. There are adjacent inflammatory   changes but no perforation or abscess formation. There is diverticulosis without   evidence of diverticulitis. Right hip prosthesis creates artifact in the lower   pelvis. Visualized portions of the urinary bladder, prostate gland and seminal   vesicles are unremarkable. Degenerative changes of the spine and left hip. There   is a benign-appearing sclerotic rimmed lucency in the left femoral neck. Oral L5   pars defects with 4 mm of anterior spondylolisthesis of L5 on S1.                CXR Results  (Last 48 hours)    None        [unfilled]    Medical Decision Making and ED Course   I am the first provider for this patient. I reviewed the vital signs, available nursing notes, past medical history, past surgical history, family history and social history. Vital Signs-Reviewed the patient's vital signs. Patient Vitals for the past 12 hrs:   Temp Pulse Resp BP SpO2   04/30/22 1555 98.1 °F (36.7 °C) 81 18 (!) 135/91 98 %   04/30/22 1318 98.7 °F (37.1 °C) 76 20 (!) 146/87 98 %   04/30/22 1105 -- -- -- 122/72 --   04/30/22 1048 97.8 °F (36.6 °C) 76 16 -- 99 %     Records Reviewed: Nursing Notes  Old medical records, Personally reviewed record from 4/25. Provider Notes (Medical Decision Making): The patient presents with abdominal pain with a differential diagnosis of abdominal pain, appendicitis, diverticulitis, gastroenteritis, pancreatitis and UTI    ED Course:   Initial assessment performed. The patients presenting problems have been discussed, and they are in agreement with the care plan formulated and outlined with them. I have encouraged them to ask questions as they arise throughout their visit. Procedures           Disposition       Admitted       Diagnosis     Clinical Impression:     ICD-10-CM ICD-9-CM    1. Acute appendicitis, unspecified acute appendicitis type  K35.80 540.9        Attestations:    Lencho Cochran MD    Please note that this dictation was completed with [employer], the computer voice recognition software. Quite often unanticipated grammatical, syntax, homophones, and other interpretive errors are inadvertently transcribed by the computer software. Please disregard these errors. Please excuse any errors that have escaped final proofreading. Thank you.

## 2022-04-30 NOTE — ED TRIAGE NOTES
PT. TO ED WITH C/O PAIN RIGHT ABDOMEN AND NAUSEA FOR ABOUT 3 WEEKS, PAIN WORSE YESTERDAY. PT. HAD RECTAL ABSCESS DONE ONE WEEK AGO AT HOSPITAL.

## 2022-04-30 NOTE — Clinical Note
Status[de-identified] INPATIENT [101]   Type of Bed: Surgical [18]   Inpatient Hospitalization Certified Necessary for the Following Reasons: 3.  Patient receiving treatment that can only be provided in an inpatient setting (further clarification in H&P documentation)   Admitting Diagnosis: Acute appendicitis [583146]   Admitting Physician: Dale Lobo [[id number]]   Attending Physician: Dale Lobo [[id number]]   Estimated Length of Stay: 2 Midnights   Discharge Plan[de-identified] Home with Office Follow-up

## 2022-05-01 LAB
BACTERIA SPEC CULT: NORMAL
BACTERIA SPEC CULT: NORMAL
GLUCOSE BLD STRIP.AUTO-MCNC: 203 MG/DL (ref 65–117)
GLUCOSE BLD STRIP.AUTO-MCNC: 241 MG/DL (ref 65–117)
GRAM STN SPEC: NORMAL
PERFORMED BY, TECHID: ABNORMAL
PERFORMED BY, TECHID: ABNORMAL
SPECIAL REQUESTS,SREQ: NORMAL
SPECIAL REQUESTS,SREQ: NORMAL

## 2022-05-01 PROCEDURE — 0DTJ4ZZ RESECTION OF APPENDIX, PERCUTANEOUS ENDOSCOPIC APPROACH: ICD-10-PCS | Performed by: SURGERY

## 2022-05-01 PROCEDURE — 82962 GLUCOSE BLOOD TEST: CPT

## 2022-05-01 PROCEDURE — 74011250636 HC RX REV CODE- 250/636: Performed by: SURGERY

## 2022-05-01 PROCEDURE — 94762 N-INVAS EAR/PLS OXIMTRY CONT: CPT

## 2022-05-01 PROCEDURE — 74011000258 HC RX REV CODE- 258: Performed by: SURGERY

## 2022-05-01 PROCEDURE — 65270000029 HC RM PRIVATE

## 2022-05-01 RX ORDER — SODIUM CHLORIDE, SODIUM LACTATE, POTASSIUM CHLORIDE, CALCIUM CHLORIDE 600; 310; 30; 20 MG/100ML; MG/100ML; MG/100ML; MG/100ML
100 INJECTION, SOLUTION INTRAVENOUS CONTINUOUS
Status: DISCONTINUED | OUTPATIENT
Start: 2022-05-01 | End: 2022-05-07 | Stop reason: HOSPADM

## 2022-05-01 RX ORDER — HYDROMORPHONE HYDROCHLORIDE 1 MG/ML
1 INJECTION, SOLUTION INTRAMUSCULAR; INTRAVENOUS; SUBCUTANEOUS
Status: DISCONTINUED | OUTPATIENT
Start: 2022-05-01 | End: 2022-05-03

## 2022-05-01 RX ORDER — KETOROLAC TROMETHAMINE 15 MG/ML
15 INJECTION, SOLUTION INTRAMUSCULAR; INTRAVENOUS
Status: DISCONTINUED | OUTPATIENT
Start: 2022-05-01 | End: 2022-05-06

## 2022-05-01 RX ORDER — ONDANSETRON 2 MG/ML
4 INJECTION INTRAMUSCULAR; INTRAVENOUS
Status: DISCONTINUED | OUTPATIENT
Start: 2022-05-01 | End: 2022-05-07 | Stop reason: HOSPADM

## 2022-05-01 RX ORDER — KETOROLAC TROMETHAMINE 10 MG/1
15 TABLET, FILM COATED ORAL
Status: DISCONTINUED | OUTPATIENT
Start: 2022-05-01 | End: 2022-05-01

## 2022-05-01 RX ORDER — TAMSULOSIN HYDROCHLORIDE 0.4 MG/1
0.4 CAPSULE ORAL DAILY
Status: DISCONTINUED | OUTPATIENT
Start: 2022-05-02 | End: 2022-05-07 | Stop reason: HOSPADM

## 2022-05-01 RX ADMIN — PIPERACILLIN AND TAZOBACTAM 3.38 G: 3; .375 INJECTION, POWDER, LYOPHILIZED, FOR SOLUTION INTRAVENOUS at 04:47

## 2022-05-01 RX ADMIN — KETOROLAC TROMETHAMINE 15 MG: 15 INJECTION, SOLUTION INTRAMUSCULAR; INTRAVENOUS at 21:20

## 2022-05-01 RX ADMIN — PIPERACILLIN AND TAZOBACTAM 3.38 G: 3; .375 INJECTION, POWDER, LYOPHILIZED, FOR SOLUTION INTRAVENOUS at 21:20

## 2022-05-01 RX ADMIN — SODIUM CHLORIDE, POTASSIUM CHLORIDE, SODIUM LACTATE AND CALCIUM CHLORIDE 125 ML/HR: 600; 310; 30; 20 INJECTION, SOLUTION INTRAVENOUS at 04:47

## 2022-05-01 RX ADMIN — PIPERACILLIN AND TAZOBACTAM 3.38 G: 3; .375 INJECTION, POWDER, LYOPHILIZED, FOR SOLUTION INTRAVENOUS at 12:39

## 2022-05-01 RX ADMIN — HYDROMORPHONE HYDROCHLORIDE 1 MG: 1 INJECTION, SOLUTION INTRAMUSCULAR; INTRAVENOUS; SUBCUTANEOUS at 09:19

## 2022-05-01 RX ADMIN — HYDROMORPHONE HYDROCHLORIDE 1 MG: 1 INJECTION, SOLUTION INTRAMUSCULAR; INTRAVENOUS; SUBCUTANEOUS at 13:36

## 2022-05-01 RX ADMIN — HYDROMORPHONE HYDROCHLORIDE 1 MG: 1 INJECTION, SOLUTION INTRAMUSCULAR; INTRAVENOUS; SUBCUTANEOUS at 04:56

## 2022-05-01 RX ADMIN — HYDROMORPHONE HYDROCHLORIDE 1 MG: 1 INJECTION, SOLUTION INTRAMUSCULAR; INTRAVENOUS; SUBCUTANEOUS at 17:31

## 2022-05-01 RX ADMIN — ONDANSETRON 4 MG: 2 INJECTION INTRAMUSCULAR; INTRAVENOUS at 23:50

## 2022-05-01 RX ADMIN — HYDROMORPHONE HYDROCHLORIDE 1 MG: 1 INJECTION, SOLUTION INTRAMUSCULAR; INTRAVENOUS; SUBCUTANEOUS at 23:50

## 2022-05-01 RX ADMIN — ONDANSETRON 4 MG: 2 INJECTION INTRAMUSCULAR; INTRAVENOUS at 04:55

## 2022-05-01 NOTE — PROGRESS NOTES
Reason for Admission: abd pain                      RUR Score:   5%                  Plan for utilizing home health:          PCP: First and Last name:  Nae White MD     Name of Practice:    Are you a current patient: Yes/No: yes   Approximate date of last visit:2 weeks   Can you participate in a virtual visit with your PCP:                   yes  Current Advanced Directive/Advance Care Plan: Full Code      Healthcare Decision Maker:   Click here to complete [employer] including selection of the Healthcare Decision Maker Relationship (ie \"Primary\")  Kyra Walden                                   Transition of Care Plan: Pt lives with his wife Christy Sapp in a single family home. May utilize a walker or cane for ambulation. Denies all other DME. He is reported to be independent with ADLs and IADLs. Mrs Lidia Mercedes [place] transport [place].

## 2022-05-01 NOTE — PROGRESS NOTES
POD # 1    Chief Complaint: None      Subjective:  Mr. David Castaneda is a 79 y.o.  male S/P laparoscopic appendectomy with drain placement post acute perforated appendicitis 4/30/22. Patient states he slept well and woke up with minimal abdominal pain; however, he quickly became nauseated and began vomiting. Patient treated with nausea medication. Discussed with the patient and his wife the possible need for an NG tube should he continue vomiting. Patient made NPO. Denies fever or chills. Review of Systems:   Constitutional:  no fever,  no chills,  no sweats, No weakness, No fatigue, No decreased activity. Respiratory: No shortness of breath, No cough, No sputum production, No hemoptysis, No wheezing, No cyanosis. Cardiovascular: No chest pain, No palpitations, No bradycardia, No tachycardia, No peripheral edema, No syncope. Gastrointestinal: nausea,  vomiting, No diarrhea, No constipation, No heartburn, abdominal pain. Genitourinary: No dysuria, No hematuria, No change in urine stream, No urethral discharge, No lesions. Musculoskeletal: No back pain, No neck pain, No joint pain, No muscle pain, No claudication, No decreased range of motion, No trauma. Integumentary: No rash, No pruritus, No abrasions. Neurologic: Alert and oriented X4, No abnormal balance, No headache, No confusion, No numbness, No tingling. Psychiatric: No anxiety, No depression, No renée. Physical Exam:     Vitals & Measurements:     Wt Readings from Last 3 Encounters:   04/30/22 98 kg (216 lb)   04/25/22 102.1 kg (225 lb)   02/15/22 105.7 kg (233 lb)     Temp Readings from Last 3 Encounters:   05/01/22 97.6 °F (36.4 °C)   04/25/22 97.3 °F (36.3 °C)   02/15/22 98.6 °F (37 °C) (Temporal)     BP Readings from Last 3 Encounters:   05/01/22 120/70   04/25/22 132/85   02/15/22 (!) 147/81     Pulse Readings from Last 3 Encounters:   05/01/22 64   04/25/22 (!) 53   02/15/22 72      Ht Readings from Last 3 Encounters: 04/30/22 5' 11\" (1.803 m)   04/25/22 5' 11\" (1.803 m)   02/15/22 5' 11\" (1.803 m)      Date 04/30/22 0700 - 05/01/22 0659 05/01/22 0700 - 05/02/22 0659   Shift 5244-2016 3259-3610 24 Hour Total 0700-1859 1900-0659 24 Hour Total   INTAKE   P.O. 0  0        P. O. 0  0      I. V.(mL/kg/hr)  1400(1.2) 1400(0.6)        Volume (lactated Ringers infusion)  1400 1400      Shift Total(mL/kg) 0(0) 1400(14.3) 1400(14.3)      OUTPUT   Urine(mL/kg/hr) 200(0.2) 200(0.2) 400(0.2) 300  300     Urine Voided 200 200 400 300  300     Urine Occurrence(s) 1 x  1 x      Emesis/NG output           Emesis Occurrence(s) 0 x  0 x      Drains  115 115 20  20     Output (ml) (Gianfranco-Chavez Drain 04/30/22 Lower Abdomen)  115 115 20  20   Stool           Stool Occurrence(s) 0 x  0 x      Blood  8 8        Estimated Blood Loss  8 8      Shift Total(mL/kg) 200(2) 323(3.3) 523(5.3) 320(3.3)  320(3.3)   NET -200 1077 877 -320  -320   Weight (kg) 98 98 98 98 98 98       General: ill appearing, no acute distress  Respiratory: normal chest wall expansion, CTA B, no r/r/w, no rubs  Cardiovascular: RRR, no m/r/g, Normal S1 and S2  Abdomen: Soft, tender, non-distended, normal bowel sounds in all quadrants, no hepatosplenomegaly, no tympany. Incision scar: incision sites are non-erythematous with no purulent drainage. Wound dressing in place. Drain has serous fluid.   Genitourinary: No inguinal hernia, normal external gentalia, Testis & scrotum normal, no renal angle tenderness  Musculoskeletal: normal ROM in upper and lower extremities  Integumentary: warm, dry, and pink, with no rash, purpura, or petechia  Neurological:No sensory or motor deficit  Psychiatric: Cooperative with normal mood, affect, and cognition    Laboratory Values:   Recent Results (from the past 24 hour(s))   GLUCOSE, POC    Collection Time: 04/30/22  7:20 PM   Result Value Ref Range    Glucose (POC) 202 (H) 65 - 117 mg/dL    Performed by [id number], POC    Collection Time: 05/01/22  1:01 AM   Result Value Ref Range    Glucose (POC) 241 (H) 65 - 117 mg/dL    Performed by Agusto Bruce (PCT)          Radiology:   CT ABD PELV WO CONT   Final Result   1. Findings consistent with appendicitis. No perforation or abscess formation. Other findings as described. Dr. Chapis Arshad  was notified t 1:15 p.m. Assessment:  Problem List Items Addressed This Visit        Digestive    Acute appendicitis - Primary       Perforated Appendicitis   S/P Laparoscopic appendectomy with drain placement, POD #1    Plan:    1. Admission  2. Diet - NPO  3. IV fluids  4. SCD  5. IS  6. Pain medications  7. Antibiotics - Zosyn  8. Nausea medication  9. Labs & Radiology: in am  10. Plan discussed with patient and family and answered all their questions. Thank you for allowing me to participate in the care of this patient.

## 2022-05-01 NOTE — H&P
[address] SURGERY H&P        Chief Complaint: abdominal pain    History of Present Illness:    Mr. Gino Nails. is a 79y.o. year old * male presents to free standing ER with diffuse abdominal pain that started yesterday and localised to right lower quadrant. No fever or chills, nausea or vomiting. Presented to ER today and CT scan of abdomen and pelvis showed Acute appendicitis. Past Medical History:   Past Medical History:   Diagnosis Date    BPH (benign prostatic hyperplasia)     Diabetes ([address]     Hypercholesterolemia     Hypertension     Neuropathy     Restless leg syndrome     Thromboembolus (HCC)        Past Surgical History:   Past Surgical History:   Procedure Laterality Date    HX HIP REPLACEMENT Right     HX HIP REPLACEMENT Left     HX KNEE REPLACEMENT Left     HX SHOULDER ARTHROSCOPY Right     HX SHOULDER ARTHROSCOPY Left         Allergy:No Known Allergies    Social History:  reports that he has never smoked. He has never used smokeless tobacco. He reports current alcohol use. He reports that he does not use drugs.      Family History:  Family History   Problem Relation Age of Onset    Diabetes Mother     Cancer Father         Current Medications:  Current Facility-Administered Medications:     sodium chloride (NS) flush 5-40 mL, 5-40 mL, IntraVENous, Q8H, Sachin Mcdonald MD    sodium chloride (NS) flush 5-40 mL, 5-40 mL, IntraVENous, PRN, Alisson Beebe MD    lidocaine (PF) (XYLOCAINE) 10 mg/mL (1 %) injection 0.1 mL, 0.1 mL, SubCUTAneous, PRN, Alisson Beebe MD    fentaNYL citrate (PF) injection 50 mcg, 50 mcg, IntraVENous, PRN, Alisson Beebe MD, 50 mcg at 04/30/22 1936    midazolam (VERSED) injection 1 mg, 1 mg, IntraVENous, PRN, Alisson Beebe MD     Immunization History:   Most Recent Immunizations   Administered Date(s) Administered    COVID-19, Moderna, Primary or Immunocompromised Series, MRNA, PF, 100mcg/0.5mL 04/11/2021      Complete    Review of Systems: Constitutional:  no fever,  no chills,  no sweats, No weakness, No fatigue, No decreased activity. Eye: No recent visual problem, No icterus, No discharge, No double vision. Ear/Nose/Mouth/Throat: No decreased hearing, No ear pain, No nasal congestion, No sore throat. Respiratory: No shortness of breath, No cough, No sputum production, No hemoptysis, No wheezing, No cyanosis. Cardiovascular: No chest pain, No palpitations, No bradycardia, No tachycardia, No peripheral edema, No syncope. Gastrointestinal: No nausea,  No vomiting, No diarrhea, No constipation, No heartburn,  abdominal pain. Genitourinary: No dysuria, No hematuria, No change in urine stream, No urethral discharge, No lesions. Hematology/Lymphatics: No bruising tendency, No bleeding tendency, No petechiae, No swollen lymph glands. Endocrine: No excessive thirst, No polyuria, No cold intolerance, No heat intolerance, No excessive hunger. Immunologic: Not immunocompromised, No recurrent fevers, No recurrent infections. Musculoskeletal: No back pain, No neck pain, No joint pain, No muscle pain, No claudication, No decreased range of motion, No trauma. Integumentary: No rash, No pruritus, No abrasions. Neurologic: Alert and oriented X4, No abnormal balance, No headache, No confusion, No numbness, No tingling. Psychiatric: No anxiety, No depression, No renée. Physical Exam:     Vitals & Measurements:     Wt Readings from Last 3 Encounters:   04/30/22 98 kg (216 lb)   04/25/22 102.1 kg (225 lb)   02/15/22 105.7 kg (233 lb)     Temp Readings from Last 3 Encounters:   04/30/22 98.4 °F (36.9 °C)   04/25/22 97.3 °F (36.3 °C)   02/15/22 98.6 °F (37 °C) (Temporal)     BP Readings from Last 3 Encounters:   04/30/22 (!) 146/83   04/25/22 132/85   02/15/22 (!) 147/81     Pulse Readings from Last 3 Encounters:   04/30/22 85   04/25/22 (!) 53   02/15/22 72      Ht Readings from Last 3 Encounters:   04/30/22 5' 11\" (1.803 m)   04/25/22 5' 11\" (1.803 m) 02/15/22 5' 11\" (1.803 m)          General: ill appearing, in acute distress  Head: Normal  Face: Nornal  HEENT: atraumatic, PERRLA, moist mucosa, normal pharynx, normal tonsils and adenoids, normal tongue, no fluid in sinuses  Neck: Trachea midline, no carotid bruit, no masses  Chest: Normal.  Respiratory: Normal chest wall expansion, CTA B, no r/r/w, no rubs  Cardiovascular: RRR, no m/r/g, Normal S1 and S2  Abdomen: Soft, right lower quadrant tenderness with guarding & rebound, non-distended, normal bowel sounds in all quadrants, no hepatosplenomegaly, no tympany. Incision scar: none  Genitourinary: No inguinal hernia, normal external gentalia, Testis & scrotum normal, no renal angle tenderness  Rectal: deferred  Musculoskeletal: normal ROM in upper and lower extremities, No joint swelling.   Integumentary: Warm, dry, and pink, with no rash, purpura, or petechia  Heme/Lymph: No lymphadenopathy, no bruises  Neurological:Cranial Nerves II-XII grossly intact, no gross motor or sensory deficit  Psychiatric: Cooperative with normal mood, affect, and cognition      Laboratory Values:   Recent Results (from the past 24 hour(s))   URINALYSIS W/ REFLEX CULTURE    Collection Time: 04/30/22 11:00 AM    Specimen: Urine   Result Value Ref Range    Color Yellow      Appearance Clear Clear      Specific gravity 1.020 1.003 - 1.030      pH (UA) 5.0 5.0 - 8.0      Protein Trace (A) Negative mg/dL    Glucose 250 (A) Negative mg/dL    Ketone Negative Negative mg/dL    Bilirubin Negative Negative      Blood Negative Negative      Urobilinogen 0.1 (L) 0.2 - 1.0 EU/dL    Nitrites Negative Negative      Leukocyte Esterase Negative Negative      WBC 0-4 0 - 4 /hpf    RBC 0-5 0 - 5 /hpf    Epithelial cells Few Few /lpf    Bacteria Negative Negative /hpf    UA:UC IF INDICATED Culture not indicated by UA result Culture not indicated by UA result     CBC WITH AUTOMATED DIFF    Collection Time: 04/30/22 11:19 AM   Result Value Ref Range WBC 11.7 (H) 4.1 - 11.1 K/uL    RBC 4.82 4.10 - 5.70 M/uL    HGB 14.8 12.1 - 17.0 g/dL    HCT 42.4 36.6 - 50.3 %    MCV 88.0 80.0 - 99.0 FL    MCH 30.7 26.0 - 34.0 PG    MCHC 34.9 30.0 - 36.5 g/dL    RDW 13.1 11.5 - 14.5 %    PLATELET 703 518 - 745 K/uL    MPV 9.6 8.9 - 12.9 FL    NEUTROPHILS 78 (H) 32 - 75 %    LYMPHOCYTES 13 12 - 49 %    MONOCYTES 8 5 - 13 %    EOSINOPHILS 1 0 - 7 %    BASOPHILS 0 0 - 1 %    IMMATURE GRANULOCYTES 0 0.0 - 0.5 %    ABS. NEUTROPHILS 9.1 (H) 1.8 - 8.0 K/UL    ABS. LYMPHOCYTES 1.6 0.8 - 3.5 K/UL    ABS. MONOCYTES 0.9 0.0 - 1.0 K/UL    ABS. EOSINOPHILS 0.2 0.0 - 0.4 K/UL    ABS. BASOPHILS 0.0 0.0 - 0.1 K/UL    ABS. IMM. GRANS. 0.0 0.00 - 0.04 K/UL    DF AUTOMATED     METABOLIC PANEL, COMPREHENSIVE    Collection Time: 04/30/22 11:19 AM   Result Value Ref Range    Sodium 135 (L) 136 - 145 mmol/L    Potassium 4.2 3.5 - 5.1 mmol/L    Chloride 99 97 - 108 mmol/L    CO2 25 21 - 32 mmol/L    Anion gap 11 5 - 15 mmol/L    Glucose 260 (H) 65 - 100 mg/dL    BUN 33 (H) 6 - 20 mg/dL    Creatinine 1.91 (H) 0.70 - 1.30 mg/dL    BUN/Creatinine ratio 17 12 - 20      GFR est AA 43 (L) >60 ml/min/1.73m2    GFR est non-AA 35 (L) >60 ml/min/1.73m2    Calcium 9.8 8.5 - 10.1 mg/dL    Bilirubin, total 1.0 0.2 - 1.0 mg/dL    AST (SGOT) 38 (H) 15 - 37 U/L    ALT (SGPT) 77 12 - 78 U/L    Alk. phosphatase 65 45 - 117 U/L    Protein, total 8.1 6.4 - 8.2 g/dL    Albumin 4.0 3.5 - 5.0 g/dL    Globulin 4.1 (H) 2.0 - 4.0 g/dL    A-G Ratio 1.0 (L) 1.1 - 2.2     LACTIC ACID    Collection Time: 04/30/22 11:19 AM   Result Value Ref Range    Lactic acid 0.8 0.4 - 2.0 mmol/L   GLUCOSE, POC    Collection Time: 04/30/22  7:20 PM   Result Value Ref Range    Glucose (POC) 202 (H) 65 - 117 mg/dL    Performed by [facility]          CT ABD PELV WO CONT   Final Result   1. Findings consistent with appendicitis. No perforation or abscess formation. Other findings as described. Dr. Hasmukh Burris  was notified t 1:15 p.m. Assessment:  Problem List Items Addressed This Visit        Digestive    Acute appendicitis - Primary           Plan:    1. Admission  2. Diet: NPO   3. IV fluids  4. SCD  5. IS  6. Pain medications  7. Antibiotics: Zosyn  8. Nausea medication  9. Labs in am  10. OR  11. Procedure/Surgery: Laparoscopic Appendectomy possible open. 12. Risk, benefits and complications including bleeding, infection, dvt, pe, mi, stroke, sepsis, injury to bowel, bladder, ureter, bowel obstruction, evisceration, dehiscence, discussed, questions answered, patient & family clearly understands and agrees with plan. All their questions were answered to their satisfaction. RN was present during entire conversation. Thank you for the consultation & allowing me to participate in the care of this patient.

## 2022-05-01 NOTE — ANESTHESIA POSTPROCEDURE EVALUATION
Procedure(s):  APPENDECTOMY LAPAROSCOPIC. No value filed.     Anesthesia Post Evaluation      Multimodal analgesia: multimodal analgesia used between 6 hours prior to anesthesia start to PACU discharge  Patient location during evaluation: PACU  Patient participation: complete - patient participated  Level of consciousness: awake  Pain score: 0  Pain management: adequate  Airway patency: patent  Anesthetic complications: no  Cardiovascular status: acceptable  Respiratory status: acceptable  Hydration status: acceptable  Post anesthesia nausea and vomiting:  controlled  Final Post Anesthesia Temperature Assessment:  Normothermia (36.0-37.5 degrees C)      INITIAL Post-op Vital signs:   Vitals Value Taken Time   /73 04/30/22 2327   Temp 37 °C (98.6 °F) 04/30/22 2327   Pulse 70 04/30/22 2327   Resp 25 04/30/22 2327   SpO2 99 % 04/30/22 2327

## 2022-05-01 NOTE — PROGRESS NOTES
Contacted MD Rivero regarding Hydromorphone policy. Title: Safe Use of Opioids in the Hospital Page: 6 Responsible Party: Fabiano Griffiths Original Start Date: 7/1/2013   Appendix A.    Facility Guidelines on Specific Opioids   IV Hydromorphone (Dilaudid®)    For PRN use only (i.e., no scheduled or \"around-the-clock\" doses)    Starting doses should range from 0.2 to 0.5 mg for most patients    Starting doses should be no more frequent than every 4 hours for non-ED patients    Pharmacist will contact prescriber for new orders above 1 mg IV and/or more frequent than every 4 hours    Patients receiving IV hydromorphone must be monitored via pulse-oximetry or end-tidal CO2 (preferred)       Dr Virgie Nye wishes to continue Hydromorphone dose as he prescribed (1mg q3h prn severe pain)

## 2022-05-01 NOTE — PERIOP NOTES
TRANSFER - OUT REPORT:    Verbal report given to FAMILIA Donaldson(name) on [facility].  being transferred to [facility](unit) for routine post - op       Report consisted of patients Situation, Background, Assessment and   Recommendations(SBAR). Information from the following report(s) SBAR, Procedure Summary, Intake/Output and MAR was reviewed with the receiving nurse. Opportunity for questions and clarification was provided.       Patient transported with:   Registered Nurse

## 2022-05-01 NOTE — OP NOTES
OPERATIVE NOTE  Patient: Bartolome Raymundo. YOB: 1955  MRN: [id number]    Date of Procedure: 4/30/2022     Pre-Op Diagnosis: Acute appendicitis, unspecified acute appendicitis type [K35.80]    Post-Op Diagnosis: Same as preoperative diagnosis. Perforated    Procedure(s):  APPENDECTOMY LAPAROSCOPIC WITH DRAIN PLACEMENT    Surgeon(s):  Bina Laureano MD    Surgical Assistant: Surg Asst-1: Telma Novoa/Ms. Jordon Huitron    Anesthesia: General/local     Anesthesiologist:     CRNA: Mr. Julianna Samuel    Indication:  Appendicitis    Procedure:  Patient was taken to the operative room. Patient was laid supine. Patient received prophylactic dose of antibiotic. Patient had teds and SCD applied to both lower extremity. After intubation the abdomen was shaved prepped and draped usual sterile fashion. Timeout of the completed. Local anesthesia was infiltrated. Infraumbilical curvilinear incision was placed. A 5 mm Optiview port was placed into pleural care without any complication. Carbon dioxide pneumoperitoneum was insufflated to pressure of 15 mmHg. Under direct vision a 5 mm suprapubic port was placed. At 12 mm left lower quadrant ports were placed. The cecum and appendix was identified. There was a lot of inflammation and exudate covering the appendix secondary to adhesions from the small bowel. The small bowel adhesions were lysed and taken down. The appendix was exposed. The mesoappendix was taken down using laparoscopic LigaSure device. Once the base was reached we went ahead and placed a Endo DOUG 35 mm white load across the base of the appendix and fired. The appendix was found to be having a small perforation in the mid level of the appendix. The appendix was then placed in Endo Catch bag and retrieved out of the peritoneal cavity. The right lower quadrant was irrigated and aspirated till return was clear and hemostasis was good.   Then we went and placed a 19 Icelandic drain in the right paracolic gutter. This was brought through the suprapubic 5 [place] port site. This was secured to the skin using a 2-0 nylon stitch. Carbon dioxide pneumoperitoneum was evacuated. All the ports were removed. The fascia and umbilical port site and left lower quadrant port sites were closed using 0 Vicryl follow simple figure-of-eight stitches. Subcutaneous tissues were approximated using 3-0 Vicryl simple stitches. Skin incisions were closed using 4-0 Monocryl as continuous subcuticular stitches. Dermabond applied. Additional local anesthesia was infiltrated. Patient tolerated procedure very well. There were no complication. Estimated blood loss was minimal.  Patient was extubated transferred recovery stable condition. All counts were correct.     Estimated Blood Loss (mL): Minimal    Complications: None    Specimens:   ID Type Source Tests Collected by Time Destination   1 : appendix Preservative Appendix  Chaitanya Guevara MD 4/30/2022 2228 Pathology        Implants: None    Drains:   Gianfranco-Chavez Drain 04/30/22 Lower Abdomen (Active)   Site Assessment Clean, dry, & intact 05/01/22 0000   Dressing Status Clean, dry, & intact 05/01/22 0000   Status Charged 05/01/22 0000   Drainage Color Serosanguinous 04/30/22 2333   Output (ml) 85 ml 04/30/22 2333       Findings: as above    Electronically Signed by Darrion Grant MD on 5/1/2022 at 3:20 AM

## 2022-05-02 LAB
GLUCOSE BLD STRIP.AUTO-MCNC: 192 MG/DL (ref 65–117)
GLUCOSE BLD STRIP.AUTO-MCNC: 261 MG/DL (ref 65–117)
PERFORMED BY, TECHID: ABNORMAL
PERFORMED BY, TECHID: ABNORMAL

## 2022-05-02 PROCEDURE — 74011250636 HC RX REV CODE- 250/636: Performed by: SURGERY

## 2022-05-02 PROCEDURE — 74011000258 HC RX REV CODE- 258: Performed by: SURGERY

## 2022-05-02 PROCEDURE — 74011250637 HC RX REV CODE- 250/637: Performed by: SURGERY

## 2022-05-02 PROCEDURE — 65270000029 HC RM PRIVATE

## 2022-05-02 PROCEDURE — 82962 GLUCOSE BLOOD TEST: CPT

## 2022-05-02 RX ADMIN — SODIUM CHLORIDE, POTASSIUM CHLORIDE, SODIUM LACTATE AND CALCIUM CHLORIDE 125 ML/HR: 600; 310; 30; 20 INJECTION, SOLUTION INTRAVENOUS at 18:11

## 2022-05-02 RX ADMIN — PIPERACILLIN AND TAZOBACTAM 3.38 G: 3; .375 INJECTION, POWDER, LYOPHILIZED, FOR SOLUTION INTRAVENOUS at 04:05

## 2022-05-02 RX ADMIN — HYDROMORPHONE HYDROCHLORIDE 1 MG: 1 INJECTION, SOLUTION INTRAMUSCULAR; INTRAVENOUS; SUBCUTANEOUS at 04:08

## 2022-05-02 RX ADMIN — PIPERACILLIN AND TAZOBACTAM 3.38 G: 3; .375 INJECTION, POWDER, LYOPHILIZED, FOR SOLUTION INTRAVENOUS at 11:42

## 2022-05-02 RX ADMIN — PIPERACILLIN AND TAZOBACTAM 3.38 G: 3; .375 INJECTION, POWDER, LYOPHILIZED, FOR SOLUTION INTRAVENOUS at 21:28

## 2022-05-02 RX ADMIN — HYDROMORPHONE HYDROCHLORIDE 1 MG: 1 INJECTION, SOLUTION INTRAMUSCULAR; INTRAVENOUS; SUBCUTANEOUS at 22:49

## 2022-05-02 RX ADMIN — HYDROMORPHONE HYDROCHLORIDE 1 MG: 1 INJECTION, SOLUTION INTRAMUSCULAR; INTRAVENOUS; SUBCUTANEOUS at 08:28

## 2022-05-02 RX ADMIN — ONDANSETRON 4 MG: 2 INJECTION INTRAMUSCULAR; INTRAVENOUS at 08:28

## 2022-05-02 RX ADMIN — HYDROMORPHONE HYDROCHLORIDE 1 MG: 1 INJECTION, SOLUTION INTRAMUSCULAR; INTRAVENOUS; SUBCUTANEOUS at 18:11

## 2022-05-02 RX ADMIN — HYDROMORPHONE HYDROCHLORIDE 1 MG: 1 INJECTION, SOLUTION INTRAMUSCULAR; INTRAVENOUS; SUBCUTANEOUS at 11:42

## 2022-05-02 RX ADMIN — TAMSULOSIN HYDROCHLORIDE 0.4 MG: 0.4 CAPSULE ORAL at 08:28

## 2022-05-02 NOTE — PROGRESS NOTES
Patient Post op day 1, still NPO, receiving IV antibiotics. awaiting for diet and pain control. No needs currently identified by case management .     DC plan home self care currently

## 2022-05-02 NOTE — PROGRESS NOTES
Problem: Falls - Risk of  Goal: *Absence of Falls  Description: Document Alyciati Patiño Fall Risk and appropriate interventions in the flowsheet. Outcome: Progressing Towards Goal  Note: Fall Risk Interventions:  Mobility Interventions: Assess mobility with egress test         Medication Interventions: Teach patient to arise slowly         History of Falls Interventions: Room close to nurse's station         Problem: Patient Education: Go to Patient Education Activity  Goal: Patient/Family Education  Outcome: Progressing Towards Goal     Problem: Pressure Injury - Risk of  Goal: *Prevention of pressure injury  Description: Document Jeramy Scale and appropriate interventions in the flowsheet.   Outcome: Progressing Towards Goal  Note: Pressure Injury Interventions:  Sensory Interventions: Keep linens dry and wrinkle-free         Activity Interventions: Chair cushion,Increase time out of bed    Mobility Interventions: Assess need for specialty bed    Nutrition Interventions: Document food/fluid/supplement intake    Friction and Shear Interventions: Apply protective barrier, creams and emollients                Problem: Patient Education: Go to Patient Education Activity  Goal: Patient/Family Education  Outcome: Progressing Towards Goal

## 2022-05-03 ENCOUNTER — APPOINTMENT (OUTPATIENT)
Dept: GENERAL RADIOLOGY | Age: 67
DRG: 339 | End: 2022-05-03
Attending: SURGERY
Payer: MEDICARE

## 2022-05-03 LAB
ANION GAP SERPL CALC-SCNC: 4 MMOL/L (ref 5–15)
BASOPHILS # BLD: 0 K/UL (ref 0–0.1)
BASOPHILS NFR BLD: 0 % (ref 0–1)
BUN SERPL-MCNC: 23 MG/DL (ref 6–20)
BUN/CREAT SERPL: 13 (ref 12–20)
CA-I BLD-MCNC: 9.1 MG/DL (ref 8.5–10.1)
CHLORIDE SERPL-SCNC: 105 MMOL/L (ref 97–108)
CO2 SERPL-SCNC: 26 MMOL/L (ref 21–32)
CREAT SERPL-MCNC: 1.8 MG/DL (ref 0.7–1.3)
DIFFERENTIAL METHOD BLD: ABNORMAL
EOSINOPHIL # BLD: 0.4 K/UL (ref 0–0.4)
EOSINOPHIL NFR BLD: 5 % (ref 0–7)
ERYTHROCYTE [DISTWIDTH] IN BLOOD BY AUTOMATED COUNT: 13.5 % (ref 11.5–14.5)
GLUCOSE BLD STRIP.AUTO-MCNC: 237 MG/DL (ref 65–117)
GLUCOSE BLD STRIP.AUTO-MCNC: 251 MG/DL (ref 65–117)
GLUCOSE BLD STRIP.AUTO-MCNC: 255 MG/DL (ref 65–117)
GLUCOSE BLD STRIP.AUTO-MCNC: 257 MG/DL (ref 65–117)
GLUCOSE BLD STRIP.AUTO-MCNC: 316 MG/DL (ref 65–117)
GLUCOSE SERPL-MCNC: 272 MG/DL (ref 65–100)
HCT VFR BLD AUTO: 37.4 % (ref 36.6–50.3)
HGB BLD-MCNC: 12.4 G/DL (ref 12.1–17)
IMM GRANULOCYTES # BLD AUTO: 0 K/UL (ref 0–0.04)
IMM GRANULOCYTES NFR BLD AUTO: 1 % (ref 0–0.5)
LYMPHOCYTES # BLD: 1.7 K/UL (ref 0.8–3.5)
LYMPHOCYTES NFR BLD: 21 % (ref 12–49)
MCH RBC QN AUTO: 30.2 PG (ref 26–34)
MCHC RBC AUTO-ENTMCNC: 33.2 G/DL (ref 30–36.5)
MCV RBC AUTO: 91 FL (ref 80–99)
MONOCYTES # BLD: 0.6 K/UL (ref 0–1)
MONOCYTES NFR BLD: 8 % (ref 5–13)
NEUTS SEG # BLD: 5.5 K/UL (ref 1.8–8)
NEUTS SEG NFR BLD: 65 % (ref 32–75)
NRBC # BLD: 0 K/UL (ref 0–0.01)
NRBC BLD-RTO: 0 PER 100 WBC
PERFORMED BY, TECHID: ABNORMAL
PLATELET # BLD AUTO: 163 K/UL (ref 150–400)
PMV BLD AUTO: 10.8 FL (ref 8.9–12.9)
POTASSIUM SERPL-SCNC: 4.1 MMOL/L (ref 3.5–5.1)
RBC # BLD AUTO: 4.11 M/UL (ref 4.1–5.7)
SODIUM SERPL-SCNC: 135 MMOL/L (ref 136–145)
WBC # BLD AUTO: 8.3 K/UL (ref 4.1–11.1)

## 2022-05-03 PROCEDURE — 74011636637 HC RX REV CODE- 636/637: Performed by: PHYSICIAN ASSISTANT

## 2022-05-03 PROCEDURE — 82962 GLUCOSE BLOOD TEST: CPT

## 2022-05-03 PROCEDURE — 85025 COMPLETE CBC W/AUTO DIFF WBC: CPT

## 2022-05-03 PROCEDURE — 36415 COLL VENOUS BLD VENIPUNCTURE: CPT

## 2022-05-03 PROCEDURE — 74011250637 HC RX REV CODE- 250/637: Performed by: PHYSICIAN ASSISTANT

## 2022-05-03 PROCEDURE — 74011000258 HC RX REV CODE- 258: Performed by: SURGERY

## 2022-05-03 PROCEDURE — 74011250637 HC RX REV CODE- 250/637: Performed by: SURGERY

## 2022-05-03 PROCEDURE — 65270000029 HC RM PRIVATE

## 2022-05-03 PROCEDURE — 74022 RADEX COMPL AQT ABD SERIES: CPT

## 2022-05-03 PROCEDURE — 80048 BASIC METABOLIC PNL TOTAL CA: CPT

## 2022-05-03 PROCEDURE — 74011250636 HC RX REV CODE- 250/636: Performed by: SURGERY

## 2022-05-03 RX ORDER — CLOMIPHENE CITRATE 50 MG/1
25 TABLET ORAL DAILY
Status: DISCONTINUED | OUTPATIENT
Start: 2022-05-04 | End: 2022-05-07 | Stop reason: HOSPADM

## 2022-05-03 RX ORDER — MAGNESIUM SULFATE 100 %
4 CRYSTALS MISCELLANEOUS AS NEEDED
Status: DISCONTINUED | OUTPATIENT
Start: 2022-05-03 | End: 2022-05-07 | Stop reason: HOSPADM

## 2022-05-03 RX ORDER — GLIPIZIDE 5 MG/1
10 TABLET ORAL
Status: DISCONTINUED | OUTPATIENT
Start: 2022-05-04 | End: 2022-05-07 | Stop reason: HOSPADM

## 2022-05-03 RX ORDER — GABAPENTIN 400 MG/1
800 CAPSULE ORAL 3 TIMES DAILY
Status: DISCONTINUED | OUTPATIENT
Start: 2022-05-03 | End: 2022-05-07 | Stop reason: HOSPADM

## 2022-05-03 RX ORDER — LOSARTAN POTASSIUM AND HYDROCHLOROTHIAZIDE 12.5; 5 MG/1; MG/1
2 TABLET ORAL DAILY
Status: DISCONTINUED | OUTPATIENT
Start: 2022-05-04 | End: 2022-05-03

## 2022-05-03 RX ORDER — CABERGOLINE 0.5 MG/1
0.25 TABLET ORAL ONCE
Status: DISCONTINUED | OUTPATIENT
Start: 2022-05-04 | End: 2022-05-04 | Stop reason: SDUPTHER

## 2022-05-03 RX ORDER — HYDROCHLOROTHIAZIDE 25 MG/1
12.5 TABLET ORAL DAILY
Status: DISCONTINUED | OUTPATIENT
Start: 2022-05-04 | End: 2022-05-07 | Stop reason: HOSPADM

## 2022-05-03 RX ORDER — HYDROMORPHONE HYDROCHLORIDE 1 MG/ML
1 INJECTION, SOLUTION INTRAMUSCULAR; INTRAVENOUS; SUBCUTANEOUS
Status: DISPENSED | OUTPATIENT
Start: 2022-05-03 | End: 2022-05-04

## 2022-05-03 RX ORDER — INSULIN GLARGINE 100 [IU]/ML
64 INJECTION, SOLUTION SUBCUTANEOUS DAILY
Status: DISCONTINUED | OUTPATIENT
Start: 2022-05-04 | End: 2022-05-07 | Stop reason: HOSPADM

## 2022-05-03 RX ORDER — LOSARTAN POTASSIUM 50 MG/1
50 TABLET ORAL DAILY
Status: DISCONTINUED | OUTPATIENT
Start: 2022-05-04 | End: 2022-05-07 | Stop reason: HOSPADM

## 2022-05-03 RX ORDER — DEXTROSE MONOHYDRATE 100 MG/ML
0-250 INJECTION, SOLUTION INTRAVENOUS AS NEEDED
Status: DISCONTINUED | OUTPATIENT
Start: 2022-05-03 | End: 2022-05-07 | Stop reason: HOSPADM

## 2022-05-03 RX ORDER — PANTOPRAZOLE SODIUM 40 MG/1
40 TABLET, DELAYED RELEASE ORAL
Status: DISCONTINUED | OUTPATIENT
Start: 2022-05-04 | End: 2022-05-07 | Stop reason: HOSPADM

## 2022-05-03 RX ORDER — METOPROLOL SUCCINATE 50 MG/1
100 TABLET, EXTENDED RELEASE ORAL DAILY
Status: DISCONTINUED | OUTPATIENT
Start: 2022-05-04 | End: 2022-05-07 | Stop reason: HOSPADM

## 2022-05-03 RX ORDER — INSULIN LISPRO 100 [IU]/ML
INJECTION, SOLUTION INTRAVENOUS; SUBCUTANEOUS
Status: DISCONTINUED | OUTPATIENT
Start: 2022-05-03 | End: 2022-05-07 | Stop reason: HOSPADM

## 2022-05-03 RX ORDER — TAMSULOSIN HYDROCHLORIDE 0.4 MG/1
0.4 CAPSULE ORAL DAILY
Status: DISCONTINUED | OUTPATIENT
Start: 2022-05-04 | End: 2022-05-03

## 2022-05-03 RX ADMIN — HYDROMORPHONE HYDROCHLORIDE 1 MG: 1 INJECTION, SOLUTION INTRAMUSCULAR; INTRAVENOUS; SUBCUTANEOUS at 03:23

## 2022-05-03 RX ADMIN — PIPERACILLIN AND TAZOBACTAM 3.38 G: 3; .375 INJECTION, POWDER, LYOPHILIZED, FOR SOLUTION INTRAVENOUS at 21:02

## 2022-05-03 RX ADMIN — GABAPENTIN 800 MG: 400 CAPSULE ORAL at 17:00

## 2022-05-03 RX ADMIN — TAMSULOSIN HYDROCHLORIDE 0.4 MG: 0.4 CAPSULE ORAL at 10:12

## 2022-05-03 RX ADMIN — INSULIN LISPRO 3 UNITS: 100 INJECTION, SOLUTION INTRAVENOUS; SUBCUTANEOUS at 22:00

## 2022-05-03 RX ADMIN — INSULIN LISPRO 7 UNITS: 100 INJECTION, SOLUTION INTRAVENOUS; SUBCUTANEOUS at 16:55

## 2022-05-03 RX ADMIN — HYDROMORPHONE HYDROCHLORIDE 1 MG: 1 INJECTION, SOLUTION INTRAMUSCULAR; INTRAVENOUS; SUBCUTANEOUS at 10:12

## 2022-05-03 RX ADMIN — SODIUM CHLORIDE, POTASSIUM CHLORIDE, SODIUM LACTATE AND CALCIUM CHLORIDE 125 ML/HR: 600; 310; 30; 20 INJECTION, SOLUTION INTRAVENOUS at 06:31

## 2022-05-03 RX ADMIN — PIPERACILLIN AND TAZOBACTAM 3.38 G: 3; .375 INJECTION, POWDER, LYOPHILIZED, FOR SOLUTION INTRAVENOUS at 11:59

## 2022-05-03 RX ADMIN — PIPERACILLIN AND TAZOBACTAM 3.38 G: 3; .375 INJECTION, POWDER, LYOPHILIZED, FOR SOLUTION INTRAVENOUS at 03:23

## 2022-05-03 RX ADMIN — ONDANSETRON 4 MG: 2 INJECTION INTRAMUSCULAR; INTRAVENOUS at 10:12

## 2022-05-03 RX ADMIN — HYDROMORPHONE HYDROCHLORIDE 1 MG: 1 INJECTION, SOLUTION INTRAMUSCULAR; INTRAVENOUS; SUBCUTANEOUS at 21:02

## 2022-05-03 RX ADMIN — HYDROMORPHONE HYDROCHLORIDE 1 MG: 1 INJECTION, SOLUTION INTRAMUSCULAR; INTRAVENOUS; SUBCUTANEOUS at 15:00

## 2022-05-03 RX ADMIN — SODIUM CHLORIDE, POTASSIUM CHLORIDE, SODIUM LACTATE AND CALCIUM CHLORIDE 125 ML/HR: 600; 310; 30; 20 INJECTION, SOLUTION INTRAVENOUS at 18:53

## 2022-05-03 RX ADMIN — APIXABAN 5 MG: 5 TABLET, FILM COATED ORAL at 21:03

## 2022-05-03 RX ADMIN — GABAPENTIN 800 MG: 400 CAPSULE ORAL at 21:03

## 2022-05-03 NOTE — PROGRESS NOTES
Patient reports tolerating clear liquid diet until this AM when he reports nausea and small amount of emesis/dry heaving with 8/10 pain at surgical site. Patient passing flatus and bowel sounds active. Zofran and dilaudid administered. MD notified and ABD series ordered. MD also notified of increased FSBG and order placed for IP consult to hospitalist for diabetes management.

## 2022-05-03 NOTE — CONSULTS
Consult Hospitalist History and Physical    Patient: Ching Blankenship MRN: [id number]  SSN: [id number]    YOB: 1955  Age: 79 y.o. Sex: male      Subjective:      Ching Blankenship is a 79 y.o. male with a past medical history of insulin-dependent type 2 diabetes, hypertension, DVT, BPH without obstructive symptoms that presented to the emergency room on 4/30/2022 for diffuse abdominal pain that started 1 day prior. He denied any fevers, chills, nausea, vomiting. In the ED CT of the abdomen pelvis showed acute appendicitis. Laboratory data was significant for an elevated WBC of 11.7, glucose level 260, BUN 33, serum creatinine 1.91. He was admitted under general surgery and started on IV Zosyn. Patient went to the OR that day for lap appendectomy. Patient's hospital course has been complicated as a postop ileus. He was kept n.p.o. and then gradually started on a clear liquid diet. Medical team was consulted on 5/3/2022 for diabetes management. Patient says he takes glipizide, Lantus 64 units in the morning, and Trulicity. Patient also takes metoprolol, losartan/HCTZ for blood pressure control. Patient says that he has abdominal pain however is slowly improving. He has not had any episodes of vomiting today but did feel nauseous this morning. He has not had a bowel movement but is passing flatulence.   Wife is at bedside    Past Medical History:   Diagnosis Date    BPH (benign prostatic hyperplasia)     Diabetes ([address]     Hypercholesterolemia     Hypertension     Neuropathy     Restless leg syndrome     Thromboembolus (HCC)      Past Surgical History:   Procedure Laterality Date    HX HIP REPLACEMENT Right     HX HIP REPLACEMENT Left     HX KNEE REPLACEMENT Left     HX SHOULDER ARTHROSCOPY Right     HX SHOULDER ARTHROSCOPY Left       Family History   Problem Relation Age of Onset    Diabetes Mother     Cancer Father      Social History     Tobacco Use    Smoking status: Never Smoker    Smokeless tobacco: Never Used   Substance Use Topics    Alcohol use: Yes     Comment: rarely       Prior to Admission medications    Medication Sig Start Date End Date Taking? Authorizing Provider   valsartan-hydroCHLOROthiazide (DIOVAN-HCT) 160-25 mg per tablet Take 1 Tablet by mouth daily. Yes Provider, Historical   Trulicity 0.99 HQ/4.1 mL sub-q pen  1/5/22  Yes Provider, Historical   BD Veo Insulin Syringe UF 1 mL 31 gauge x 15/64\" syrg  1/5/22  Yes Provider, Historical   cabergoline (DOSTINEX) 0.5 mg tablet Take 0.5 Tablets by mouth two (2) times a week for 90 days. Patient taking differently: Take 0.25 mg by mouth two (2) times a week. Takes Sunday and Wednesday 2/18/22 5/19/22 Yes Felix Izquierdo MD   clomiPHENE (CLOMID) 50 mg tablet Take 25 mg by mouth daily. 9/23/21  Yes Provider, Historical   Accu-Chek Guide test strips strip USE TO CHECK BLOOD SUGAR EVERY DAY 5/11/21  Yes Provider, Historical   Accu-Chek Fastclix Lancet Drum misc USE TO TEST EVERY DAY 7/23/21  Yes Provider, Historical   gabapentin (NEURONTIN) 800 mg tablet TAKE 1 TABLET BY MOUTH THREE TIMES A DAY 7/21/21  Yes Provider, Historical   Lantus U-100 Insulin 100 unit/mL injection INJECT 64 UNITS EVERY MORNING WITH BREAKFAST 7/16/21  Yes Provider, Historical   metoprolol succinate (TOPROL-XL) 100 mg tablet TAKE 1 TABLET BY MOUTH EVERY DAY 7/23/21  Yes Provider, Historical   tiZANidine (ZANAFLEX) 2 mg tablet TAKE 1 TABLET BY MOUTH AT BEDTIME AS NEEDED 7/19/21  Yes Provider, Historical   apixaban (ELIQUIS) 5 mg tablet Take 5 mg by mouth two (2) times a day. Indications: blood clot in a deep vein of the extremities   Yes Other, MD Gabrielle   glipiZIDE SR (GLUCOTROL XL) 10 mg CR tablet Take 10 mg by mouth daily. Yes Other, MD Gabrielle   tamsulosin (FLOMAX) 0.4 mg capsule Take 0.4 mg by mouth daily.    Yes Other, MD Gabrielle        No Known Allergies    Review of Systems:  Constitutional: No fevers, No chills, No fatigue, No weakness  Eyes: No visual disturbance  Ears, Nose, Mouth, Throat, and Face: No nasal congestion, No sore throat  Respiratory: No cough, No sputum, No wheezing, No SOB  Cardiovascular: No chest pain, No lower extremity edema, No Palpitations   Gastrointestinal: ++ nausea, No vomiting, No diarrhea, No constipation, ++ abdominal pain  Genitourinary: No frequency, No dysuria, No hematuria  Integument/Breast: No rash, No skin lesion(s), No dryness  Musculoskeletal: No arthralgias, No neck pain, No back pain  Neurological: No headaches, No dizziness, No confusion,  No seizures  Behavioral/Psychiatric: No anxiety, No depression      Objective:     Vitals:    05/02/22 2038 05/03/22 0323 05/03/22 0757 05/03/22 1007   BP: 139/76 (!) 117/59 (!) 144/79    Pulse: 76 69 83    Resp: 16 16 16    Temp: 98.1 °F (36.7 °C) 98.9 °F (37.2 °C) 99.1 °F (37.3 °C) 98.5 °F (36.9 °C)   SpO2: 97% 95% 96%    Weight:       Height:            Physical Exam:  General: alert, cooperative, no distress  Eye: conjunctivae/corneas clear. PERRL, EOM's intact. Throat and Neck: normal and no erythema or exudates noted. No mass   Lung: clear to auscultation bilaterally  Heart: regular rate and rhythm,   Abdomen: soft, + tender. Bowel sounds normal. No masses,  Extremities:  able to move all extremities normal, atraumatic  Skin: Normal.  Neurologic: AOx3. Cranial nerves 2-12 and sensation grossly intact.   Psychiatric: non focal    CBC:   Lab Results   Component Value Date/Time    WBC 8.3 05/03/2022 02:50 AM    RBC 4.11 05/03/2022 02:50 AM    HGB 12.4 05/03/2022 02:50 AM    HCT 37.4 05/03/2022 02:50 AM    PLATELET 421 63/72/6156 02:50 AM     BMP:   Lab Results   Component Value Date/Time    Glucose 272 (H) 05/03/2022 02:50 AM    Sodium 135 (L) 05/03/2022 02:50 AM    Potassium 4.1 05/03/2022 02:50 AM    Chloride 105 05/03/2022 02:50 AM    CO2 26 05/03/2022 02:50 AM    BUN 23 (H) 05/03/2022 02:50 AM    Creatinine 1.80 (H) 05/03/2022 02:50 AM    Calcium 9.1 05/03/2022 02:50 AM     CMP:   Lab Results   Component Value Date/Time    Glucose 272 (H) 05/03/2022 02:50 AM    Sodium 135 (L) 05/03/2022 02:50 AM    Potassium 4.1 05/03/2022 02:50 AM    Chloride 105 05/03/2022 02:50 AM    CO2 26 05/03/2022 02:50 AM    BUN 23 (H) 05/03/2022 02:50 AM    Creatinine 1.80 (H) 05/03/2022 02:50 AM    Calcium 9.1 05/03/2022 02:50 AM    Anion gap 4 (L) 05/03/2022 02:50 AM    BUN/Creatinine ratio 13 05/03/2022 02:50 AM    Alk. phosphatase 65 04/30/2022 11:19 AM    Protein, total 8.1 04/30/2022 11:19 AM    Albumin 4.0 04/30/2022 11:19 AM    Globulin 4.1 (H) 04/30/2022 11:19 AM    A-G Ratio 1.0 (L) 04/30/2022 11:19 AM     Radiology review: CT of abdomen and pelvis     Assessment:   1. Perforated appendicitis status post appendectomy with drain placement day #3  2. Type 2 diabetes insulin-dependent  3. Hypertension  4. BPH without obstructive symptoms  5. Neuropathy  6. History of Left DVT  7. Hyperprolactienmia    Plan:   1. Will defer pain management and postop management to primary team.  Currently on IV Zosyn for perforated appendicitis. He has been afebrile. WBC within normal limits. Wound culture and tissue cultures are pending. Add on Protonix  2. Patient takes glipizide 10 mg daily, Lantus 64 units daily, and Trulicity. Have added these on and holding Trulicity. We will add on insulin sliding scale. Check HGB A1C  3. Patient is slightly elevated. Could be due to acute situation. Patient takes metoprolol 100 mg daily, losartan/HCTZ. Continue to monitor per unit protocol. Have placed holding parameters  4. On Flomax. Voiding without difficulty  5. Gabapentin 800 mg 3 times daily  6. Patient takes Eliquis 5 mg twice daily for history of DVT. I do not see the patient was any anticoagulation since admission. Hemoglobin is stable. We will discuss with general surgery if we are able to restart  7. On cabergoline. Takes on Wednesday and Sundays  8.   CBC and BMP in the AM       Full Code    DVT prophylaxis Eliquis  GI prophylaxis Protonix    Completed Med Rec with patient and wife at bedside    Total Time: 62 min    Signed By: Zak Aceves PA-C     May 3, 2022

## 2022-05-03 NOTE — PROGRESS NOTES
Spoke with patient's wife regarding SANDRA drain care at home and she states that she is able to provide care herself without the need for home health.

## 2022-05-03 NOTE — PROGRESS NOTES
Problem: Falls - Risk of  Goal: *Absence of Falls  Description: Document Aniyah Álvarez Fall Risk and appropriate interventions in the flowsheet. Outcome: Progressing Towards Goal  Note: Fall Risk Interventions:  Mobility Interventions: Patient to call before getting OOB,Bed/chair exit alarm         Medication Interventions: Assess postural VS orthostatic hypotension,Teach patient to arise slowly,Patient to call before getting OOB         History of Falls Interventions: Room close to nurse's station,Door open when patient unattended,Bed/chair exit alarm         Problem: Patient Education: Go to Patient Education Activity  Goal: Patient/Family Education  Outcome: Progressing Towards Goal     Problem: Pressure Injury - Risk of  Goal: *Prevention of pressure injury  Description: Document Jeramy Scale and appropriate interventions in the flowsheet.   Outcome: Progressing Towards Goal  Note: Pressure Injury Interventions:  Sensory Interventions: Keep linens dry and wrinkle-free,Maintain/enhance activity level,Minimize linen layers         Activity Interventions: Increase time out of bed    Mobility Interventions: Assess need for specialty bed,HOB 30 degrees or less    Nutrition Interventions: Document food/fluid/supplement intake    Friction and Shear Interventions: Apply protective barrier, creams and emollients,Minimize layers                Problem: Patient Education: Go to Patient Education Activity  Goal: Patient/Family Education  Outcome: Progressing Towards Goal     Problem: Pain  Goal: *Control of Pain  Outcome: Progressing Towards Goal     Problem: Patient Education: Go to Patient Education Activity  Goal: Patient/Family Education  Outcome: Progressing Towards Goal

## 2022-05-03 NOTE — PROGRESS NOTES
POD #2    Chief Complaint: None      Subjective:  Mr. Gino Baker is a 79 y.o. male S/P laparoscopic appendectomy with drain placement post acute perforated appendicitis 4/30/22. Patient states he has some abdominal pain but it is bearable. Denies nausea or vomiting today. Has been pass some flatus.     Denies fever or chills. Review of Systems:   Constitutional:  no fever,  no chills,  no sweats, No weakness, No fatigue, No decreased activity. Respiratory: No shortness of breath, No cough, No sputum production, No hemoptysis, No wheezing, No cyanosis. Cardiovascular: No chest pain, No palpitations, No bradycardia, No tachycardia, No peripheral edema, No syncope. Gastrointestinal: nausea,  No vomiting, No diarrhea, No constipation, No heartburn, abdominal pain. Genitourinary: No dysuria, No hematuria, No change in urine stream, No urethral discharge, No lesions. Musculoskeletal: No back pain, No neck pain, No joint pain, No muscle pain, No claudication, No decreased range of motion, No trauma. Integumentary: No rash, No pruritus, No abrasions. Neurologic: Alert and oriented X4, No abnormal balance, No headache, No confusion, No numbness, No tingling. Psychiatric: No anxiety, No depression, No renée. Physical Exam:     Vitals & Measurements:     Wt Readings from Last 3 Encounters:   04/30/22 98 kg (216 lb)   04/25/22 102.1 kg (225 lb)   02/15/22 105.7 kg (233 lb)     Temp Readings from Last 3 Encounters:   05/02/22 98.1 °F (36.7 °C)   04/25/22 97.3 °F (36.3 °C)   02/15/22 98.6 °F (37 °C) (Temporal)     BP Readings from Last 3 Encounters:   05/02/22 139/76   04/25/22 132/85   02/15/22 (!) 147/81     Pulse Readings from Last 3 Encounters:   05/02/22 76   04/25/22 (!) 53   02/15/22 72      Ht Readings from Last 3 Encounters:   04/30/22 5' 11\" (1.803 m)   04/25/22 5' 11\" (1.803 m)   02/15/22 5' 11\" (1.803 m)      Date 05/01/22 1900 - 05/02/22 0659 05/02/22 0700 - 05/03/22 0659   Shift 1900-0659 24 Hour Total 8094-5470 2501-6480 24 Hour Total   INTAKE   I.V.(mL/kg/hr) 1325 1325        Volume (lactated Ringers infusion) 1325 1325      Shift Total(mL/kg) 1325(13.5) 1325(13.5)      OUTPUT   Urine(mL/kg/hr) 400 900 350(0.3)  350     Urine Voided 400 900 350  350   Drains 10 30 15  15     Output (ml) (Gianfranco-Chavez Drain 04/30/22 Lower Abdomen) 10 30 15  15   Shift Total(mL/kg) 410(4.2) 930(9.5) 365(3.7)  365(3.7)    395 365  -365   Weight (kg) 98 98 98 98 98         General: uncomfortable appearing but resting in bed, no acute distress  Respiratory: normal chest wall expansion, CTA B, no r/r/w, no rubs  Cardiovascular: RRR, no m/r/g, Normal S1 and S2  Abdomen: Soft, tender, non-distended, normal bowel sounds in all quadrants, no hepatosplenomegaly, no tympany. Incision scar: incision sites are non-erythematous with no purulent drainage. Wound dressing in place. Drain has serous fluid. Genitourinary: No inguinal hernia, normal external gentalia, Testis & scrotum normal, no renal angle tenderness  Musculoskeletal: normal ROM in upper and lower extremities  Integumentary: warm, dry, and pink, with no rash, purpura, or petechia  Neurological:No sensory or motor deficit  Psychiatric: Cooperative with normal mood, affect, and cognition    Laboratory Values:   Recent Results (from the past 24 hour(s))   GLUCOSE, POC    Collection Time: 05/01/22  8:50 PM   Result Value Ref Range    Glucose (POC) 203 (H) 65 - 117 mg/dL    Performed by [facility]    GLUCOSE, POC    Collection Time: 05/02/22  4:22 PM   Result Value Ref Range    Glucose (POC) 192 (H) 65 - 117 mg/dL    Performed by Glenys Brittle    GLUCOSE, POC    Collection Time: 05/02/22  8:37 PM   Result Value Ref Range    Glucose (POC) 261 (H) 65 - 117 mg/dL    Performed by Marcy Spivey          Radiology:   CT ABD PELV WO CONT   Final Result   1. Findings consistent with appendicitis. No perforation or abscess formation. Other findings as described. Dr. Colette Hampton  was notified t 1:15 p.m. Assessment:  Problem List Items Addressed This Visit        Digestive    Acute appendicitis - Primary         Perforated Appendicitis   S/P Laparoscopic appendectomy with drain placement, POD #2     Plan:     1. Admission  2. Diet - Sips of clear fluids  3. IV fluids  4. SCD  5. IS  6. Pain medications  7. Antibiotics - Zosyn  8. Nausea medication  9. Clean and apply dressing over left buttock incision site  10. Labs in am  11. Plan discussed with patient and family and answered all their questions.      Thank you for allowing me to participate in the care of this patient.

## 2022-05-03 NOTE — PROGRESS NOTES
POD #3    Chief Complaint:None      Subjective:  Mr. Rankin Adjutant. is a 79 y.o. male S/P laparoscopic appendectomy with drain placement post acute perforated appendicitis 4/30/22. Patient had one episode of nausea early this am but states he has had no nausea or vomiting since. Notes improvement of his abdominal pain. Continues to pass some gas.      Denies fever or chills.      Review of Systems:   Constitutional:  no fever,  no chills,  no sweats, No weakness, No fatigue, No decreased activity. Respiratory: No shortness of breath, No cough, No sputum production, No hemoptysis, No wheezing, No cyanosis. Cardiovascular: No chest pain, No palpitations, No bradycardia, No tachycardia, No peripheral edema, No syncope. Gastrointestinal: no nausea,  No vomiting, No diarrhea, No constipation, No heartburn, abdominal pain. Genitourinary: No dysuria, No hematuria, No change in urine stream, No urethral discharge, No lesions. Musculoskeletal: No back pain, No neck pain, No joint pain, No muscle pain, No claudication, No decreased range of motion, No trauma. Integumentary: No rash, No pruritus, No abrasions. Neurologic: Alert and oriented X4, No abnormal balance, No headache, No confusion, No numbness, No tingling. Psychiatric: No anxiety, No depression, No renée. Physical Exam:     Vitals & Measurements:     Wt Readings from Last 3 Encounters:   04/30/22 98 kg (216 lb)   04/25/22 102.1 kg (225 lb)   02/15/22 105.7 kg (233 lb)     Temp Readings from Last 3 Encounters:   05/03/22 98.2 °F (36.8 °C)   04/25/22 97.3 °F (36.3 °C)   02/15/22 98.6 °F (37 °C) (Temporal)     BP Readings from Last 3 Encounters:   05/03/22 (!) 141/77   04/25/22 132/85   02/15/22 (!) 147/81     Pulse Readings from Last 3 Encounters:   05/03/22 67   04/25/22 (!) 53   02/15/22 72      Ht Readings from Last 3 Encounters:   04/30/22 5' 11\" (1.803 m)   04/25/22 5' 11\" (1.803 m)   02/15/22 5' 11\" (1.803 m)      Date 05/02/22 1900 - 05/03/22 6258 05/03/22 0700 - 05/04/22 0659   Shift 2777-5197 24 Hour Total 8317-8649 5780-7831 24 Hour Total   INTAKE   P.O. 850 850        P. O. 850 850      I. V.(mL/kg/hr) 1700 1700        Volume (lactated Ringers infusion) 1500 1500        Volume (piperacillin-tazobactam (ZOSYN) 3.375 g in 0.9% sodium chloride (MBP/ADV) 100 mL MBP) 200 200      Shift Total(mL/kg) 0570(60) 8465(43)      OUTPUT   Urine(mL/kg/hr) 1100 1450 650(0.6)  650     Urine Voided 1100 1450 650  650     Urine Occurrence(s) 5 x 5 x      Drains  15        Output (ml) (Gianfranco-Chavez Drain 04/30/22 Lower Abdomen)  15      Shift Total(mL/kg) 1100(11.2) 1465(15) 650(6.6)  650(6.6)   NET 1450 1085 -650  -650   Weight (kg) 98 98 98 98 98       General: comfortable appearing resting in bed, no acute distress  Respiratory: normal chest wall expansion, CTA B, no r/r/w, no rubs  Cardiovascular: RRR, no m/r/g, Normal S1 and S2  Abdomen: Soft, tender, non-distended, normal bowel sounds in all quadrants, no hepatosplenomegaly, no tympany. Incision scar: incision sites are non-erythematous with no purulent drainage. Wound dressing in place. Drain has serous fluid.   Genitourinary: No inguinal hernia, normal external gentalia, Testis & scrotum normal, no renal angle tenderness  Musculoskeletal: normal ROM in upper and lower extremities  Integumentary: warm, dry, and pink, with no rash, purpura, or petechia  Neurological:No sensory or motor deficit  Psychiatric: Cooperative with normal mood, affect, and cognition    Laboratory Values:   Recent Results (from the past 24 hour(s))   GLUCOSE, POC    Collection Time: 05/02/22  8:37 PM   Result Value Ref Range    Glucose (POC) 261 (H) 65 - 117 mg/dL    Performed by Ilya Barclay    CBC WITH AUTOMATED DIFF    Collection Time: 05/03/22  2:50 AM   Result Value Ref Range    WBC 8.3 4.1 - 11.1 K/uL    RBC 4.11 4.10 - 5.70 M/uL    HGB 12.4 12.1 - 17.0 g/dL    HCT 37.4 36.6 - 50.3 %    MCV 91.0 80.0 - 99.0 FL    MCH 30.2 26.0 - 34.0 PG MCHC 33.2 30.0 - 36.5 g/dL    RDW 13.5 11.5 - 14.5 %    PLATELET 576 415 - 154 K/uL    MPV 10.8 8.9 - 12.9 FL    NRBC 0.0 0.0  WBC    ABSOLUTE NRBC 0.00 0.00 - 0.01 K/uL    NEUTROPHILS 65 32 - 75 %    LYMPHOCYTES 21 12 - 49 %    MONOCYTES 8 5 - 13 %    EOSINOPHILS 5 0 - 7 %    BASOPHILS 0 0 - 1 %    IMMATURE GRANULOCYTES 1 (H) 0 - 0.5 %    ABS. NEUTROPHILS 5.5 1.8 - 8.0 K/UL    ABS. LYMPHOCYTES 1.7 0.8 - 3.5 K/UL    ABS. MONOCYTES 0.6 0.0 - 1.0 K/UL    ABS. EOSINOPHILS 0.4 0.0 - 0.4 K/UL    ABS. BASOPHILS 0.0 0.0 - 0.1 K/UL    ABS. IMM.  GRANS. 0.0 0.00 - 0.04 K/UL    DF AUTOMATED     METABOLIC PANEL, BASIC    Collection Time: 05/03/22  2:50 AM   Result Value Ref Range    Sodium 135 (L) 136 - 145 mmol/L    Potassium 4.1 3.5 - 5.1 mmol/L    Chloride 105 97 - 108 mmol/L    CO2 26 21 - 32 mmol/L    Anion gap 4 (L) 5 - 15 mmol/L    Glucose 272 (H) 65 - 100 mg/dL    BUN 23 (H) 6 - 20 mg/dL    Creatinine 1.80 (H) 0.70 - 1.30 mg/dL    BUN/Creatinine ratio 13 12 - 20      GFR est AA 46 (L) >60 ml/min/1.73m2    GFR est non-AA 38 (L) >60 ml/min/1.73m2    Calcium 9.1 8.5 - 10.1 mg/dL   GLUCOSE, POC    Collection Time: 05/03/22  7:27 AM   Result Value Ref Range    Glucose (POC) 255 (H) 65 - 117 mg/dL    Performed by [facility]    GLUCOSE, POC    Collection Time: 05/03/22 11:48 AM   Result Value Ref Range    Glucose (POC) 237 (H) 65 - 117 mg/dL    Performed by [facility]    GLUCOSE, POC    Collection Time: 05/03/22  2:48 PM   Result Value Ref Range    Glucose (POC) 316 (H) 65 - 117 mg/dL    Performed by Josefa Dimas    GLUCOSE, POC    Collection Time: 05/03/22  7:33 PM   Result Value Ref Range    Glucose (POC) 251 (H) 65 - 117 mg/dL    Performed by Surinder Maki ([place])    GLUCOSE, POC    Collection Time: 05/03/22  7:51 PM   Result Value Ref Range    Glucose (POC) 257 (H) 65 - 117 mg/dL    Performed by Alejandra Weller          Radiology:   XR ABD ACUTE W 1 V CHEST   Final Result      CT ABD PELV WO CONT   Final Result   1. Findings consistent with appendicitis. No perforation or abscess formation. Other findings as described. Dr. Sheryl Allan  was notified t 1:15 p.m. Assessment:  Problem List Items Addressed This Visit        Digestive    Acute appendicitis - Primary           Perforated Appendicitis   S/P Laparoscopic appendectomy with drain placement, POD #3     Plan:     1. Admission  2. Diet - Thick fluids as tolerated  3. IV fluids  4. SCD  5. IS  6. Pain medications  7. Antibiotics - Zosyn  8. Nausea medication  9. Labs in am  10. Continue to ambulate and sit in chair as tolerated  11.  Plan discussed with patient and family and answered all their questions.      Thank you for allowing me to participate in the care of this patient.

## 2022-05-04 LAB
ANION GAP SERPL CALC-SCNC: 4 MMOL/L (ref 5–15)
BASOPHILS # BLD: 0 K/UL (ref 0–0.1)
BASOPHILS NFR BLD: 1 % (ref 0–1)
BUN SERPL-MCNC: 18 MG/DL (ref 6–20)
BUN/CREAT SERPL: 11 (ref 12–20)
CA-I BLD-MCNC: 8.6 MG/DL (ref 8.5–10.1)
CHLORIDE SERPL-SCNC: 107 MMOL/L (ref 97–108)
CO2 SERPL-SCNC: 26 MMOL/L (ref 21–32)
CREAT SERPL-MCNC: 1.61 MG/DL (ref 0.7–1.3)
DIFFERENTIAL METHOD BLD: ABNORMAL
EOSINOPHIL # BLD: 0.5 K/UL (ref 0–0.4)
EOSINOPHIL NFR BLD: 9 % (ref 0–7)
ERYTHROCYTE [DISTWIDTH] IN BLOOD BY AUTOMATED COUNT: 13.2 % (ref 11.5–14.5)
EST. AVERAGE GLUCOSE BLD GHB EST-MCNC: 197 MG/DL
GLUCOSE BLD STRIP.AUTO-MCNC: 214 MG/DL (ref 65–117)
GLUCOSE BLD STRIP.AUTO-MCNC: 225 MG/DL (ref 65–117)
GLUCOSE BLD STRIP.AUTO-MCNC: 262 MG/DL (ref 65–117)
GLUCOSE BLD STRIP.AUTO-MCNC: 300 MG/DL (ref 65–117)
GLUCOSE SERPL-MCNC: 232 MG/DL (ref 65–100)
HBA1C MFR BLD: 8.5 % (ref 4–5.6)
HCT VFR BLD AUTO: 35.2 % (ref 36.6–50.3)
HGB BLD-MCNC: 11.7 G/DL (ref 12.1–17)
IMM GRANULOCYTES # BLD AUTO: 0 K/UL (ref 0–0.04)
IMM GRANULOCYTES NFR BLD AUTO: 0 % (ref 0–0.5)
LYMPHOCYTES # BLD: 1.6 K/UL (ref 0.8–3.5)
LYMPHOCYTES NFR BLD: 26 % (ref 12–49)
MCH RBC QN AUTO: 30 PG (ref 26–34)
MCHC RBC AUTO-ENTMCNC: 33.2 G/DL (ref 30–36.5)
MCV RBC AUTO: 90.3 FL (ref 80–99)
MONOCYTES # BLD: 0.6 K/UL (ref 0–1)
MONOCYTES NFR BLD: 9 % (ref 5–13)
NEUTS SEG # BLD: 3.5 K/UL (ref 1.8–8)
NEUTS SEG NFR BLD: 55 % (ref 32–75)
NRBC # BLD: 0 K/UL (ref 0–0.01)
NRBC BLD-RTO: 0 PER 100 WBC
PERFORMED BY, TECHID: ABNORMAL
PLATELET # BLD AUTO: 171 K/UL (ref 150–400)
PMV BLD AUTO: 10 FL (ref 8.9–12.9)
POTASSIUM SERPL-SCNC: 3.8 MMOL/L (ref 3.5–5.1)
RBC # BLD AUTO: 3.9 M/UL (ref 4.1–5.7)
SODIUM SERPL-SCNC: 137 MMOL/L (ref 136–145)
WBC # BLD AUTO: 6.3 K/UL (ref 4.1–11.1)

## 2022-05-04 PROCEDURE — 74011636637 HC RX REV CODE- 636/637: Performed by: PHYSICIAN ASSISTANT

## 2022-05-04 PROCEDURE — 74011250637 HC RX REV CODE- 250/637: Performed by: PHYSICIAN ASSISTANT

## 2022-05-04 PROCEDURE — 74011250637 HC RX REV CODE- 250/637: Performed by: SURGERY

## 2022-05-04 PROCEDURE — 74011636637 HC RX REV CODE- 636/637: Performed by: SURGERY

## 2022-05-04 PROCEDURE — 94762 N-INVAS EAR/PLS OXIMTRY CONT: CPT

## 2022-05-04 PROCEDURE — 80048 BASIC METABOLIC PNL TOTAL CA: CPT

## 2022-05-04 PROCEDURE — 83036 HEMOGLOBIN GLYCOSYLATED A1C: CPT

## 2022-05-04 PROCEDURE — 65270000029 HC RM PRIVATE

## 2022-05-04 PROCEDURE — 74011000258 HC RX REV CODE- 258: Performed by: SURGERY

## 2022-05-04 PROCEDURE — 85025 COMPLETE CBC W/AUTO DIFF WBC: CPT

## 2022-05-04 PROCEDURE — 36415 COLL VENOUS BLD VENIPUNCTURE: CPT

## 2022-05-04 PROCEDURE — 82962 GLUCOSE BLOOD TEST: CPT

## 2022-05-04 PROCEDURE — 74011250636 HC RX REV CODE- 250/636: Performed by: SURGERY

## 2022-05-04 RX ORDER — CABERGOLINE 0.5 MG/1
0.25 TABLET ORAL ONCE
Status: COMPLETED | OUTPATIENT
Start: 2022-05-04 | End: 2022-05-04

## 2022-05-04 RX ADMIN — PIPERACILLIN AND TAZOBACTAM 3.38 G: 3; .375 INJECTION, POWDER, LYOPHILIZED, FOR SOLUTION INTRAVENOUS at 13:09

## 2022-05-04 RX ADMIN — INSULIN GLARGINE 64 UNITS: 100 INJECTION, SOLUTION SUBCUTANEOUS at 09:35

## 2022-05-04 RX ADMIN — METOPROLOL SUCCINATE 100 MG: 50 TABLET, EXTENDED RELEASE ORAL at 09:30

## 2022-05-04 RX ADMIN — INSULIN LISPRO 7 UNITS: 100 INJECTION, SOLUTION INTRAVENOUS; SUBCUTANEOUS at 13:10

## 2022-05-04 RX ADMIN — TAMSULOSIN HYDROCHLORIDE 0.4 MG: 0.4 CAPSULE ORAL at 09:30

## 2022-05-04 RX ADMIN — APIXABAN 5 MG: 5 TABLET, FILM COATED ORAL at 22:31

## 2022-05-04 RX ADMIN — GABAPENTIN 800 MG: 400 CAPSULE ORAL at 22:31

## 2022-05-04 RX ADMIN — LOSARTAN POTASSIUM 50 MG: 50 TABLET, FILM COATED ORAL at 09:30

## 2022-05-04 RX ADMIN — HYDROCHLOROTHIAZIDE 12.5 MG: 25 TABLET ORAL at 09:30

## 2022-05-04 RX ADMIN — PIPERACILLIN AND TAZOBACTAM 3.38 G: 3; .375 INJECTION, POWDER, LYOPHILIZED, FOR SOLUTION INTRAVENOUS at 22:31

## 2022-05-04 RX ADMIN — PANTOPRAZOLE SODIUM 40 MG: 40 TABLET, DELAYED RELEASE ORAL at 09:30

## 2022-05-04 RX ADMIN — SODIUM CHLORIDE, POTASSIUM CHLORIDE, SODIUM LACTATE AND CALCIUM CHLORIDE 125 ML/HR: 600; 310; 30; 20 INJECTION, SOLUTION INTRAVENOUS at 22:42

## 2022-05-04 RX ADMIN — CABERGOLINE 0.25 MG: 0.5 TABLET ORAL at 13:00

## 2022-05-04 RX ADMIN — GABAPENTIN 800 MG: 400 CAPSULE ORAL at 09:30

## 2022-05-04 RX ADMIN — INSULIN LISPRO 5 UNITS: 100 INJECTION, SOLUTION INTRAVENOUS; SUBCUTANEOUS at 09:34

## 2022-05-04 RX ADMIN — INSULIN LISPRO 5 UNITS: 100 INJECTION, SOLUTION INTRAVENOUS; SUBCUTANEOUS at 17:11

## 2022-05-04 RX ADMIN — KETOROLAC TROMETHAMINE 15 MG: 15 INJECTION, SOLUTION INTRAMUSCULAR; INTRAVENOUS at 17:09

## 2022-05-04 RX ADMIN — HYDROMORPHONE HYDROCHLORIDE 1 MG: 1 INJECTION, SOLUTION INTRAMUSCULAR; INTRAVENOUS; SUBCUTANEOUS at 09:30

## 2022-05-04 RX ADMIN — APIXABAN 5 MG: 5 TABLET, FILM COATED ORAL at 09:30

## 2022-05-04 RX ADMIN — PIPERACILLIN AND TAZOBACTAM 3.38 G: 3; .375 INJECTION, POWDER, LYOPHILIZED, FOR SOLUTION INTRAVENOUS at 04:42

## 2022-05-04 RX ADMIN — INSULIN LISPRO 2 UNITS: 100 INJECTION, SOLUTION INTRAVENOUS; SUBCUTANEOUS at 22:32

## 2022-05-04 RX ADMIN — GLIPIZIDE 10 MG: 5 TABLET ORAL at 09:30

## 2022-05-04 RX ADMIN — GABAPENTIN 800 MG: 400 CAPSULE ORAL at 17:10

## 2022-05-04 NOTE — PROGRESS NOTES
Spoke to Dr. Johan Nash using [facility] about order for Cabergoline once ordered for midnight which is currently not available through pharmacy. Patient states wife usually handles his medications, patient is aware of some of his medications, but this medication he is unsure about. Dr. Johan Nash advise to hold off on medication until medication can be reconciled with wife and if available brought from home.

## 2022-05-04 NOTE — PROGRESS NOTES
CM reviewed chart. Patient still having issues with keeping down food. Plans today for Gi series to evaluate any issues. Current DC plans remain home self care.

## 2022-05-04 NOTE — PROGRESS NOTES
Problem: Falls - Risk of  Goal: *Absence of Falls  Description: Document Yuliet Pruitt Fall Risk and appropriate interventions in the flowsheet. Outcome: Progressing Towards Goal  Note: Fall Risk Interventions:  Mobility Interventions: Bed/chair exit alarm,Patient to call before getting OOB         Medication Interventions: Patient to call before getting OOB         History of Falls Interventions: Room close to nurse's station         Problem: Pressure Injury - Risk of  Goal: *Prevention of pressure injury  Description: Document Jeramy Scale and appropriate interventions in the flowsheet.   Outcome: Progressing Towards Goal  Note: Pressure Injury Interventions:  Sensory Interventions: Keep linens dry and wrinkle-free,Maintain/enhance activity level,Minimize linen layers         Activity Interventions: Increase time out of bed    Mobility Interventions: Pressure redistribution bed/mattress (bed type)    Nutrition Interventions: Document food/fluid/supplement intake    Friction and Shear Interventions: Apply protective barrier, creams and emollients,Minimize layers                Problem: Pain  Goal: *Control of Pain  Outcome: Progressing Towards Goal     Problem: Diabetes Maintenance:Ongoing  Goal: Activity/Safety  Outcome: Progressing Towards Goal

## 2022-05-04 NOTE — PROGRESS NOTES
POD # 4    Chief Complaint:None      Subjective:  Mr. Josue Fournier is a 79 y.o.  male S/P laparoscopic appendectomy with drain placement post acute perforated appendicitis 4/30/22. Patient states he has continued improvement of his abdominal pain and has been passing gas. Ambulating and sitting in chair. Tolerating thick fluid diet. Increased output from abdominal drain.      Denies nausea, vomiting, diarrhea, fever or chills    Review of Systems:   Constitutional:  no fever,  no chills,  no sweats, No weakness, No fatigue, No decreased activity. Respiratory: No shortness of breath, No cough, No sputum production, No hemoptysis, No wheezing, No cyanosis. Cardiovascular: No chest pain, No palpitations, No bradycardia, No tachycardia, No peripheral edema, No syncope. Gastrointestinal: no nausea,  No vomiting, No diarrhea, No constipation, No heartburn, abdominal pain. Genitourinary: No dysuria, No hematuria, No change in urine stream, No urethral discharge, No lesions. Musculoskeletal: No back pain, No neck pain, No joint pain, No muscle pain, No claudication, No decreased range of motion, No trauma. Integumentary: No rash, No pruritus, No abrasions. Neurologic: Alert and oriented X4, No abnormal balance, No headache, No confusion, No numbness, No tingling. Psychiatric: No anxiety, No depression, No renée. Physical Exam:     Vitals & Measurements:     Wt Readings from Last 3 Encounters:   04/30/22 98 kg (216 lb)   04/25/22 102.1 kg (225 lb)   02/15/22 105.7 kg (233 lb)     Temp Readings from Last 3 Encounters:   05/04/22 98.4 °F (36.9 °C)   04/25/22 97.3 °F (36.3 °C)   02/15/22 98.6 °F (37 °C) (Temporal)     BP Readings from Last 3 Encounters:   05/04/22 137/70   04/25/22 132/85   02/15/22 (!) 147/81     Pulse Readings from Last 3 Encounters:   05/04/22 61   04/25/22 (!) 53   02/15/22 72      Ht Readings from Last 3 Encounters:   04/30/22 5' 11\" (1.803 m)   04/25/22 5' 11\" (1.803 m)   02/15/22 5' 11\" (1.803 m)      Date 05/03/22 0700 - 05/04/22 0659 05/04/22 0700 - 05/05/22 0659   Shift [id number] 1900-0659 24 Hour Total 5136-5026 9684-7327 24 Hour Total   INTAKE   P.O.  1020 1020 522  522     P.O.  1020 1020 522  522   I.V.(mL/kg/hr)  1533.3(1.3) 1533.3(0.7)        Volume (lactated Ringers infusion)  1333.3 1333.3        Volume (piperacillin-tazobactam (ZOSYN) 3.375 g in 0.9% sodium chloride (MBP/ADV) 100 mL MBP)  200 200      Shift Total(mL/kg)  2553. 3(26.1) 2553. 3(26.1) 522(5.3)  522(5.3)   OUTPUT   Urine(mL/kg/hr) 650(0.6)  650(0.3) 950  950     Urine Voided 650  650 950  950     Urine Occurrence(s)  4 x 4 x      Drains    100  100     Output (ml) (Gianfranco-Chavez Drain 04/30/22 Lower Abdomen)    100  100   Shift Total(mL/kg) 650(6.6)  650(6.6) 1050(10.7)  1050(10.7)   NET -650 2553. 3 1903.3 -528  -528   Weight (kg) 98 98 98 98 98 98       General: comfortable appearing resting in bed, no acute distress  Respiratory: normal chest wall expansion, CTA B, no r/r/w, no rubs  Cardiovascular: RRR, no m/r/g, Normal S1 and S2  Abdomen: Soft, incision site tender, non-distended, normal bowel sounds in all quadrants, no hepatosplenomegaly, no tympany. Incision scar: incision sites are non-erythematous with no drainage. Wound dressing in place. Drain has serous fluid.   Genitourinary: No inguinal hernia, normal external gentalia, Testis & scrotum normal, no renal angle tenderness  Musculoskeletal: normal ROM in upper and lower extremities  Integumentary: warm, dry, and pink, with no rash, purpura, or petechia  Neurological:No sensory or motor deficit  Psychiatric: Cooperative with normal mood, affect, and cognition      Laboratory Values:   Recent Results (from the past 24 hour(s))   GLUCOSE, POC    Collection Time: 05/03/22  2:48 PM   Result Value Ref Range    Glucose (POC) 316 (H) 65 - 117 mg/dL    Performed by Shwetha Perez, POC    Collection Time: 05/03/22  7:33 PM   Result Value Ref Range    Glucose (POC) 251 (H) 65 - 117 mg/dL    Performed by Beatriz Posada ([employer] [facility])    GLUCOSE, POC    Collection Time: 05/03/22  7:51 PM   Result Value Ref Range    Glucose (POC) 257 (H) 65 - 117 mg/dL    Performed by [address] [place] [address], [facility]    Collection Time: 05/04/22  2:37 AM   Result Value Ref Range    Sodium 137 136 - 145 mmol/L    Potassium 3.8 3.5 - 5.1 mmol/L    Chloride 107 97 - 108 mmol/L    CO2 26 21 - 32 mmol/L    Anion gap 4 (L) 5 - 15 mmol/L    Glucose 232 (H) 65 - 100 mg/dL    BUN 18 6 - 20 mg/dL    Creatinine 1.61 (H) 0.70 - 1.30 mg/dL    BUN/Creatinine ratio 11 (L) 12 - 20      GFR est AA 52 (L) >60 ml/min/1.73m2    GFR est non-AA 43 (L) >60 ml/min/1.73m2    Calcium 8.6 8.5 - 10.1 mg/dL   CBC WITH AUTOMATED DIFF    Collection Time: 05/04/22  2:37 AM   Result Value Ref Range    WBC 6.3 4.1 - 11.1 K/uL    RBC 3.90 (L) 4.10 - 5.70 M/uL    HGB 11.7 (L) 12.1 - 17.0 g/dL    HCT 35.2 (L) 36.6 - 50.3 %    MCV 90.3 80.0 - 99.0 FL    MCH 30.0 26.0 - 34.0 PG    MCHC 33.2 30.0 - 36.5 g/dL    RDW 13.2 11.5 - 14.5 %    PLATELET 906 745 - 787 K/uL    MPV 10.0 8.9 - 12.9 FL    NRBC 0.0 0.0  WBC    ABSOLUTE NRBC 0.00 0.00 - 0.01 K/uL    NEUTROPHILS 55 32 - 75 %    LYMPHOCYTES 26 12 - 49 %    MONOCYTES 9 5 - 13 %    EOSINOPHILS 9 (H) 0 - 7 %    BASOPHILS 1 0 - 1 %    IMMATURE GRANULOCYTES 0 0 - 0.5 %    ABS. NEUTROPHILS 3.5 1.8 - 8.0 K/UL    ABS. LYMPHOCYTES 1.6 0.8 - 3.5 K/UL    ABS. MONOCYTES 0.6 0.0 - 1.0 K/UL    ABS. EOSINOPHILS 0.5 (H) 0.0 - 0.4 K/UL    ABS. BASOPHILS 0.0 0.0 - 0.1 K/UL    ABS. IMM.  GRANS. 0.0 0.00 - 0.04 K/UL    DF AUTOMATED     GLUCOSE, POC    Collection Time: 05/04/22  7:29 AM   Result Value Ref Range    Glucose (POC) 262 (H) 65 - 117 mg/dL    Performed by Arlette Abreu    GLUCOSE, POC    Collection Time: 05/04/22 11:17 AM   Result Value Ref Range    Glucose (POC) 300 (H) 65 - 117 mg/dL    Performed by Diego Norton          Radiology:   XR ABD ACUTE W 1 V CHEST   Final Result CT ABD PELV WO CONT   Final Result   1. Findings consistent with appendicitis. No perforation or abscess formation. Other findings as described. Dr. Deeann Gilford  was notified t 1:15 p.m. Assessment:  Problem List Items Addressed This Visit        Digestive    Acute appendicitis - Primary         Perforated Appendicitis   S/P Laparoscopic appendectomy with drain placement, POD #4. Resolving post op ileus     Plan:     1. Admission  2. Diet - Advance diet  3. IV fluids  4. SCD- ensure patient wears   5. IS  6. Pain medications  7. Antibiotics - Zosyn  8. Nausea medication  9. Labs in am  10. Continue to ambulate and sit in chair as tolerated  11. D/C planning in am if tolerates diet.   12. Plan discussed with patient and family and answered all their questions.      Thank you for allowing me to participate in the care of this patient.

## 2022-05-04 NOTE — PROGRESS NOTES
Progress Note    Patient: Gabriella Roberto. MRN: [id number]  SSN: [id number]    YOB: 1955  Age: 79 y.o. Sex: male      Admit Date: 4/30/2022    LOS: 4 days     Subjective:     79 y. o. male S/P laparoscopic appendectomy with drain placement post acute perforated appendicitis 4/30/22. Subjectively- pain controlled on dilaudid, tolerating clear liquid diet, drain out put 100ml    Review of Systems:  Review of Systems:   Constitutional:  no fever,  no chills,  no sweats, No weakness, No fatigue, No decreased activity. Respiratory: No shortness of breath, No cough, No sputum production, No hemoptysis, No wheezing, No cyanosis. Cardiovascular: No chest pain, No palpitations, No bradycardia, No tachycardia, No peripheral edema, No syncope. Gastrointestinal: no nausea,  No vomiting, No diarrhea, No constipation, No heartburn, abdominal pain. Genitourinary: No dysuria, No hematuria, No change in urine stream, No urethral discharge, No lesions. Musculoskeletal: No back pain, No neck pain, No joint pain, No muscle pain, No claudication, No decreased range of motion, No trauma. Integumentary: No rash, No pruritus, No abrasions. Neurologic: Alert and oriented X4, No abnormal balance, No headache, No confusion, No numbness, No tingling. Psychiatric: No anxiety, No depression, No renée. Physical Exam:       Objective:     Vitals:    05/03/22 1946 05/03/22 2114 05/04/22 0441 05/04/22 0749   BP: (!) 141/77 (!) 153/89 134/82 137/70   Pulse: 67 69 (!) 58 61   Resp: 16 16 16 16   Temp: 98.2 °F (36.8 °C)  98 °F (36.7 °C) 98.4 °F (36.9 °C)   SpO2: 96% 100% 95% 99%   Weight:       Height:            Intake and Output:  Current Shift: 05/04 0701 - 05/04 1900  In: 422 [P.O.:422]  Out: 750 [Urine:750]  Last three shifts: 05/02 1901 - 05/04 0700  In: 5103.3 [P.O.:1870;  I.V.:3233.3]  Out: 1750 [Urine:1750]      Physical Exam:   General: comfortable appearing resting in bed, no acute distress  Respiratory: normal chest wall expansion, CTA B, no r/r/w, no rubs  Cardiovascular: RRR, no m/r/g, Normal S1 and S2  Abdomen: Soft, tender, non-distended, normal bowel sounds in all quadrants, no hepatosplenomegaly, no tympany. Incision scar: incision sites are non-erythematous with no purulent drainage. Wound dressing in place. Drain has serous fluid.   Genitourinary: No inguinal hernia, normal external gentalia, Testis & scrotum normal, no renal angle tenderness  Musculoskeletal: normal ROM in upper and lower extremities  Integumentary: warm, dry, and pink, with no rash, purpura, or petechia  Neurological:No sensory or motor deficit  Psychiatric: Cooperative with normal mood, affect, and cognition      Lab/Data Review:  Recent Results (from the past 24 hour(s))   GLUCOSE, POC    Collection Time: 05/03/22 11:48 AM   Result Value Ref Range    Glucose (POC) 237 (H) 65 - 117 mg/dL    Performed by [employer]    GLUCOSE, POC    Collection Time: 05/03/22  2:48 PM   Result Value Ref Range    Glucose (POC) 316 (H) 65 - 117 mg/dL    Performed by [employer]    GLUCOSE, POC    Collection Time: 05/03/22  7:33 PM   Result Value Ref Range    Glucose (POC) 251 (H) 65 - 117 mg/dL    Performed by Cande Montelongo ([place])    GLUCOSE, POC    Collection Time: 05/03/22  7:51 PM   Result Value Ref Range    Glucose (POC) 257 (H) 65 - 117 mg/dL    Performed by [address] [place] [address], [facility]    Collection Time: 05/04/22  2:37 AM   Result Value Ref Range    Sodium 137 136 - 145 mmol/L    Potassium 3.8 3.5 - 5.1 mmol/L    Chloride 107 97 - 108 mmol/L    CO2 26 21 - 32 mmol/L    Anion gap 4 (L) 5 - 15 mmol/L    Glucose 232 (H) 65 - 100 mg/dL    BUN 18 6 - 20 mg/dL    Creatinine 1.61 (H) 0.70 - 1.30 mg/dL    BUN/Creatinine ratio 11 (L) 12 - 20      GFR est AA 52 (L) >60 ml/min/1.73m2    GFR est non-AA 43 (L) >60 ml/min/1.73m2    Calcium 8.6 8.5 - 10.1 mg/dL   CBC WITH AUTOMATED DIFF    Collection Time: 05/04/22  2:37 AM   Result Value Ref Range WBC 6.3 4.1 - 11.1 K/uL    RBC 3.90 (L) 4.10 - 5.70 M/uL    HGB 11.7 (L) 12.1 - 17.0 g/dL    HCT 35.2 (L) 36.6 - 50.3 %    MCV 90.3 80.0 - 99.0 FL    MCH 30.0 26.0 - 34.0 PG    MCHC 33.2 30.0 - 36.5 g/dL    RDW 13.2 11.5 - 14.5 %    PLATELET 784 529 - 857 K/uL    MPV 10.0 8.9 - 12.9 FL    NRBC 0.0 0.0  WBC    ABSOLUTE NRBC 0.00 0.00 - 0.01 K/uL    NEUTROPHILS 55 32 - 75 %    LYMPHOCYTES 26 12 - 49 %    MONOCYTES 9 5 - 13 %    EOSINOPHILS 9 (H) 0 - 7 %    BASOPHILS 1 0 - 1 %    IMMATURE GRANULOCYTES 0 0 - 0.5 %    ABS. NEUTROPHILS 3.5 1.8 - 8.0 K/UL    ABS. LYMPHOCYTES 1.6 0.8 - 3.5 K/UL    ABS. MONOCYTES 0.6 0.0 - 1.0 K/UL    ABS. EOSINOPHILS 0.5 (H) 0.0 - 0.4 K/UL    ABS. BASOPHILS 0.0 0.0 - 0.1 K/UL    ABS. IMM. GRANS. 0.0 0.00 - 0.04 K/UL    DF AUTOMATED     GLUCOSE, POC    Collection Time: 05/04/22  7:29 AM   Result Value Ref Range    Glucose (POC) 262 (H) 65 - 117 mg/dL    Performed by Kay Rasheed and plan:      (1) perforated appendicitis s/p appendectomy POD 4, drain output 100ml, on zosyn,     (2) HTN: continue     (3) DMII on insulin complicated by peripheral neuropathy : lantus 64 units, SSI, along with PO meds and gabapentin, A1c pending    (4) BPH: tamsulosin, takes clomiphine and cabergoline    (5) DVT:  on eliquis    Allowing full liquid, still has drain output 100ml which is significant. Thankyou for allowing us to take care of Mr. Lidia Mercedes. . please call us for any questions    Signed By: Arjun Ramirez MD     May 4, 2022

## 2022-05-05 LAB
GLUCOSE BLD STRIP.AUTO-MCNC: 188 MG/DL (ref 65–117)
GLUCOSE BLD STRIP.AUTO-MCNC: 213 MG/DL (ref 65–117)
GLUCOSE BLD STRIP.AUTO-MCNC: 293 MG/DL (ref 65–117)
GLUCOSE BLD STRIP.AUTO-MCNC: 330 MG/DL (ref 65–117)
PERFORMED BY, TECHID: ABNORMAL

## 2022-05-05 PROCEDURE — 65270000029 HC RM PRIVATE

## 2022-05-05 PROCEDURE — 74011250636 HC RX REV CODE- 250/636: Performed by: SURGERY

## 2022-05-05 PROCEDURE — 74011250637 HC RX REV CODE- 250/637: Performed by: SURGERY

## 2022-05-05 PROCEDURE — 74011250637 HC RX REV CODE- 250/637: Performed by: PHYSICIAN ASSISTANT

## 2022-05-05 PROCEDURE — 82962 GLUCOSE BLOOD TEST: CPT

## 2022-05-05 PROCEDURE — 74011000258 HC RX REV CODE- 258: Performed by: SURGERY

## 2022-05-05 PROCEDURE — 74011636637 HC RX REV CODE- 636/637: Performed by: PHYSICIAN ASSISTANT

## 2022-05-05 RX ADMIN — PIPERACILLIN AND TAZOBACTAM 3.38 G: 3; .375 INJECTION, POWDER, LYOPHILIZED, FOR SOLUTION INTRAVENOUS at 13:34

## 2022-05-05 RX ADMIN — KETOROLAC TROMETHAMINE 15 MG: 15 INJECTION, SOLUTION INTRAMUSCULAR; INTRAVENOUS at 22:03

## 2022-05-05 RX ADMIN — CLOMIPHENE CITRATE 25 MG: 50 TABLET ORAL at 09:47

## 2022-05-05 RX ADMIN — TAMSULOSIN HYDROCHLORIDE 0.4 MG: 0.4 CAPSULE ORAL at 09:47

## 2022-05-05 RX ADMIN — PIPERACILLIN AND TAZOBACTAM 3.38 G: 3; .375 INJECTION, POWDER, LYOPHILIZED, FOR SOLUTION INTRAVENOUS at 20:54

## 2022-05-05 RX ADMIN — GABAPENTIN 800 MG: 400 CAPSULE ORAL at 22:03

## 2022-05-05 RX ADMIN — METOPROLOL SUCCINATE 100 MG: 50 TABLET, EXTENDED RELEASE ORAL at 09:47

## 2022-05-05 RX ADMIN — INSULIN LISPRO 7 UNITS: 100 INJECTION, SOLUTION INTRAVENOUS; SUBCUTANEOUS at 12:00

## 2022-05-05 RX ADMIN — HYDROCHLOROTHIAZIDE 12.5 MG: 25 TABLET ORAL at 09:47

## 2022-05-05 RX ADMIN — PANTOPRAZOLE SODIUM 40 MG: 40 TABLET, DELAYED RELEASE ORAL at 09:47

## 2022-05-05 RX ADMIN — LOSARTAN POTASSIUM 50 MG: 50 TABLET, FILM COATED ORAL at 09:47

## 2022-05-05 RX ADMIN — GABAPENTIN 800 MG: 400 CAPSULE ORAL at 09:47

## 2022-05-05 RX ADMIN — INSULIN LISPRO 5 UNITS: 100 INJECTION, SOLUTION INTRAVENOUS; SUBCUTANEOUS at 17:00

## 2022-05-05 RX ADMIN — GLIPIZIDE 10 MG: 5 TABLET ORAL at 09:47

## 2022-05-05 RX ADMIN — APIXABAN 5 MG: 5 TABLET, FILM COATED ORAL at 09:47

## 2022-05-05 RX ADMIN — SODIUM CHLORIDE, POTASSIUM CHLORIDE, SODIUM LACTATE AND CALCIUM CHLORIDE 125 ML/HR: 600; 310; 30; 20 INJECTION, SOLUTION INTRAVENOUS at 21:00

## 2022-05-05 RX ADMIN — PIPERACILLIN AND TAZOBACTAM 3.38 G: 3; .375 INJECTION, POWDER, LYOPHILIZED, FOR SOLUTION INTRAVENOUS at 05:24

## 2022-05-05 RX ADMIN — INSULIN LISPRO 5 UNITS: 100 INJECTION, SOLUTION INTRAVENOUS; SUBCUTANEOUS at 08:00

## 2022-05-05 RX ADMIN — GABAPENTIN 800 MG: 400 CAPSULE ORAL at 18:27

## 2022-05-05 RX ADMIN — APIXABAN 5 MG: 5 TABLET, FILM COATED ORAL at 22:03

## 2022-05-05 RX ADMIN — INSULIN GLARGINE 64 UNITS: 100 INJECTION, SOLUTION SUBCUTANEOUS at 09:47

## 2022-05-05 NOTE — PROGRESS NOTES
Problem: Falls - Risk of  Goal: *Absence of Falls  Description: Document Syed Ramirez Fall Risk and appropriate interventions in the flowsheet.   Outcome: Progressing Towards Goal  Note: Fall Risk Interventions:  Mobility Interventions: OT consult for ADLs,Patient to call before getting OOB,PT Consult for mobility concerns         Medication Interventions: Patient to call before getting OOB,Teach patient to arise slowly         History of Falls Interventions: Door open when patient unattended,Room close to nurse's station         Problem: Pain  Goal: *Control of Pain  Outcome: Progressing Towards Goal

## 2022-05-05 NOTE — PROGRESS NOTES
Progress Note    Patient: Ruth Sauceda MRN: [id number]  SSN: [id number]    YOB: 1955  Age: 79 y.o. Sex: male      Admit Date: 4/30/2022    LOS: 5 days     Subjective:     79 y. o. male S/P laparoscopic appendectomy with drain placement post acute perforated appendicitis 4/30/22. Subjectively- pain controlled on dilaudid, tolerating clear liquid diet, drain out put 100ml    Review of Systems:  Review of Systems:   Constitutional:  no fever,  no chills,  no sweats, No weakness, No fatigue, No decreased activity. Respiratory: No shortness of breath, No cough, No sputum production, No hemoptysis, No wheezing, No cyanosis. Cardiovascular: No chest pain, No palpitations, No bradycardia, No tachycardia, No peripheral edema, No syncope. Gastrointestinal: no nausea,  No vomiting, No diarrhea, No constipation, No heartburn, abdominal pain. Genitourinary: No dysuria, No hematuria, No change in urine stream, No urethral discharge, No lesions. Musculoskeletal: No back pain, No neck pain, No joint pain, No muscle pain, No claudication, No decreased range of motion, No trauma. Integumentary: No rash, No pruritus, No abrasions. Neurologic: Alert and oriented X4, No abnormal balance, No headache, No confusion, No numbness, No tingling. Psychiatric: No anxiety, No depression, No renée. Physical Exam:       Objective:     Vitals:    05/04/22 1512 05/04/22 2028 05/05/22 0142 05/05/22 0719   BP: 127/76 127/69 139/77 (!) 145/69   Pulse: 68 62 61 (!) 50   Resp: 16 18 20 18   Temp: 98.2 °F (36.8 °C) 98.5 °F (36.9 °C) 98.6 °F (37 °C) 97.7 °F (36.5 °C)   SpO2: 96% 96% 97% 99%   Weight:       Height:            Intake and Output:  Current Shift: No intake/output data recorded. Last three shifts: 05/03 1901 - 05/05 0700  In: 4700.3 [P.O.:1842;  I.V.:2858.3]  Out: 1150 [Urine:1050; Drains:100]      Physical Exam:   General: comfortable appearing resting in bed, no acute distress  Respiratory: normal chest wall expansion, CTA B, no r/r/w, no rubs  Cardiovascular: RRR, no m/r/g, Normal S1 and S2  Abdomen: Soft, tender, non-distended, normal bowel sounds in all quadrants, no hepatosplenomegaly, no tympany. Incision scar: incision sites are non-erythematous with no purulent drainage. Wound dressing in place. Drain has serous fluid. Genitourinary: No inguinal hernia, normal external gentalia, Testis & scrotum normal, no renal angle tenderness  Musculoskeletal: normal ROM in upper and lower extremities  Integumentary: warm, dry, and pink, with no rash, purpura, or petechia  Neurological:No sensory or motor deficit  Psychiatric: Cooperative with normal mood, affect, and cognition      Lab/Data Review:  Recent Results (from the past 24 hour(s))   GLUCOSE, POC    Collection Time: 05/04/22 11:17 AM   Result Value Ref Range    Glucose (POC) 300 (H) 65 - 117 mg/dL    Performed by Jeannette Lopez    GLUCOSE, POC    Collection Time: 05/04/22  4:01 PM   Result Value Ref Range    Glucose (POC) 225 (H) 65 - 117 mg/dL    Performed by Jeannette Lopez    GLUCOSE, POC    Collection Time: 05/04/22  8:33 PM   Result Value Ref Range    Glucose (POC) 214 (H) 65 - 117 mg/dL    Performed by Rea Ferguson    GLUCOSE, POC    Collection Time: 05/05/22  7:23 AM   Result Value Ref Range    Glucose (POC) 293 (H) 65 - 117 mg/dL    Performed by Jennifer ROLLE          Assessment and plan:      (1) perforated appendicitis s/p appendectomy POD 4, drain output 100ml, on zosyn,     (2) HTN: continue     (3) DMII on insulin complicated by peripheral neuropathy : lantus 64 units, SSI, along with PO meds and gabapentin, A1c pending    (4) BPH: tamsulosin, takes clomiphine and cabergoline    (5) DVT:  on eliquis    Allowing full liquid, still has drain output 100ml which is significant. Thankyou for allowing us to take care of Mr. Nilsno Mckeon. . please call us for any questions    Signed By: Lina Chin MD     May 5, 2022

## 2022-05-06 LAB
ANION GAP SERPL CALC-SCNC: 9 MMOL/L (ref 5–15)
BUN SERPL-MCNC: 21 MG/DL (ref 6–20)
BUN/CREAT SERPL: 11 (ref 12–20)
CA-I BLD-MCNC: 8.9 MG/DL (ref 8.5–10.1)
CHLORIDE SERPL-SCNC: 104 MMOL/L (ref 97–108)
CO2 SERPL-SCNC: 25 MMOL/L (ref 21–32)
CREAT SERPL-MCNC: 1.92 MG/DL (ref 0.7–1.3)
GLUCOSE BLD STRIP.AUTO-MCNC: 242 MG/DL (ref 65–117)
GLUCOSE BLD STRIP.AUTO-MCNC: 295 MG/DL (ref 65–117)
GLUCOSE BLD STRIP.AUTO-MCNC: 324 MG/DL (ref 65–117)
GLUCOSE BLD STRIP.AUTO-MCNC: 382 MG/DL (ref 65–117)
GLUCOSE SERPL-MCNC: 302 MG/DL (ref 65–100)
PERFORMED BY, TECHID: ABNORMAL
POTASSIUM SERPL-SCNC: 3.9 MMOL/L (ref 3.5–5.1)
SODIUM SERPL-SCNC: 138 MMOL/L (ref 136–145)

## 2022-05-06 PROCEDURE — 36415 COLL VENOUS BLD VENIPUNCTURE: CPT

## 2022-05-06 PROCEDURE — 82962 GLUCOSE BLOOD TEST: CPT

## 2022-05-06 PROCEDURE — 74011636637 HC RX REV CODE- 636/637: Performed by: SURGERY

## 2022-05-06 PROCEDURE — 74011250637 HC RX REV CODE- 250/637: Performed by: SURGERY

## 2022-05-06 PROCEDURE — 74011250636 HC RX REV CODE- 250/636: Performed by: SURGERY

## 2022-05-06 PROCEDURE — 74011250637 HC RX REV CODE- 250/637: Performed by: PHYSICIAN ASSISTANT

## 2022-05-06 PROCEDURE — 65270000029 HC RM PRIVATE

## 2022-05-06 PROCEDURE — 74011000258 HC RX REV CODE- 258: Performed by: SURGERY

## 2022-05-06 PROCEDURE — 74011636637 HC RX REV CODE- 636/637: Performed by: PHYSICIAN ASSISTANT

## 2022-05-06 PROCEDURE — 80048 BASIC METABOLIC PNL TOTAL CA: CPT

## 2022-05-06 RX ORDER — INSULIN LISPRO 100 [IU]/ML
20 INJECTION, SOLUTION INTRAVENOUS; SUBCUTANEOUS ONCE
Status: COMPLETED | OUTPATIENT
Start: 2022-05-06 | End: 2022-05-06

## 2022-05-06 RX ADMIN — LOSARTAN POTASSIUM 50 MG: 50 TABLET, FILM COATED ORAL at 09:50

## 2022-05-06 RX ADMIN — INSULIN LISPRO 3 UNITS: 100 INJECTION, SOLUTION INTRAVENOUS; SUBCUTANEOUS at 22:00

## 2022-05-06 RX ADMIN — PIPERACILLIN AND TAZOBACTAM 3.38 G: 3; .375 INJECTION, POWDER, LYOPHILIZED, FOR SOLUTION INTRAVENOUS at 20:23

## 2022-05-06 RX ADMIN — GABAPENTIN 800 MG: 400 CAPSULE ORAL at 16:37

## 2022-05-06 RX ADMIN — PIPERACILLIN AND TAZOBACTAM 3.38 G: 3; .375 INJECTION, POWDER, LYOPHILIZED, FOR SOLUTION INTRAVENOUS at 04:48

## 2022-05-06 RX ADMIN — INSULIN LISPRO 20 UNITS: 100 INJECTION, SOLUTION INTRAVENOUS; SUBCUTANEOUS at 11:00

## 2022-05-06 RX ADMIN — INSULIN LISPRO 3 UNITS: 100 INJECTION, SOLUTION INTRAVENOUS; SUBCUTANEOUS at 16:37

## 2022-05-06 RX ADMIN — TAMSULOSIN HYDROCHLORIDE 0.4 MG: 0.4 CAPSULE ORAL at 09:50

## 2022-05-06 RX ADMIN — GABAPENTIN 800 MG: 400 CAPSULE ORAL at 21:29

## 2022-05-06 RX ADMIN — PANTOPRAZOLE SODIUM 40 MG: 40 TABLET, DELAYED RELEASE ORAL at 09:50

## 2022-05-06 RX ADMIN — HYDROCHLOROTHIAZIDE 12.5 MG: 25 TABLET ORAL at 09:50

## 2022-05-06 RX ADMIN — GLIPIZIDE 10 MG: 5 TABLET ORAL at 09:50

## 2022-05-06 RX ADMIN — INSULIN GLARGINE 64 UNITS: 100 INJECTION, SOLUTION SUBCUTANEOUS at 09:00

## 2022-05-06 RX ADMIN — GABAPENTIN 800 MG: 400 CAPSULE ORAL at 09:50

## 2022-05-06 RX ADMIN — PIPERACILLIN AND TAZOBACTAM 3.38 G: 3; .375 INJECTION, POWDER, LYOPHILIZED, FOR SOLUTION INTRAVENOUS at 11:30

## 2022-05-06 RX ADMIN — CLOMIPHENE CITRATE 25 MG: 50 TABLET ORAL at 09:50

## 2022-05-06 RX ADMIN — APIXABAN 5 MG: 5 TABLET, FILM COATED ORAL at 09:50

## 2022-05-06 RX ADMIN — METOPROLOL SUCCINATE 100 MG: 50 TABLET, EXTENDED RELEASE ORAL at 09:50

## 2022-05-06 RX ADMIN — APIXABAN 5 MG: 5 TABLET, FILM COATED ORAL at 21:29

## 2022-05-06 NOTE — PROGRESS NOTES
CM reviewed chart. Possible discharge today if tolerate diet and monitoring lab function. Current plan for discharge is home self care. Will continue to monitor for any needs at time of discharge.     DC plan currently is home self care

## 2022-05-06 NOTE — PROGRESS NOTES
Nutrition Assessment     Type and Reason for Visit: RD nutrition re-screen/LOS    Nutrition Recommendations/Plan:   1. Continue w/ current diet   2. Monitor and Record Wts, po intakes & BMs in /OS     Nutrition Assessment:  Admitted w/ Acute appendicitis, S/P laparoscopic appendectomy. Upgraded from full liquid diet to regular diet S7/PNG, no noted complications, appetite noted to be fair, noted appetite improving 2/2 feeling better. Reviewed labs and meds. No further nutritional concern. Malnutrition Assessment:  Malnutrition Status: No malnutrition       Nutrition Related Findings:  Appears to be nourish. No reported n/v/c/d. Last BM (5/5). Current Nutrition Therapies:  ADULT DIET Regular;  No Concentrated Sweets    Anthropometric Measures:  Height:  5' 11\" (180.3 cm)  Current Body Wt:  98 kg (216 lb)  BMI: 30.1    Nutrition Diagnosis:   No nutrition diagnosis at this time     Nutrition Interventions:   Food and/or Nutrient Delivery: Continue current diet  Nutrition Education/Counseling: Education not indicated  Coordination of Nutrition Care: Continue to monitor while inpatient    Discharge Planning:    Continue current diet    Arsenio Fontenot  Contact: [phone]

## 2022-05-06 NOTE — PROGRESS NOTES
POD # 5    Chief Complaint:None      Subjective:  Mr. Anna Blakely is a 79 y.o.  male S/P laparoscopic appendectomy with drain placement post acute perforated appendicitis 4/30/22. Patient states he has continued improvement of his abdominal pain and has been passing gas. Ambulating and sitting in chair. Tolerating soft diet. .     Passing flatus & had watery BM.     Denies nausea, vomiting, diarrhea, fever or chills    Review of Systems:   Constitutional:  no fever,  no chills,  no sweats, No weakness, No fatigue, No decreased activity. Respiratory: No shortness of breath, No cough, No sputum production, No hemoptysis, No wheezing, No cyanosis. Cardiovascular: No chest pain, No palpitations, No bradycardia, No tachycardia, No peripheral edema, No syncope. Gastrointestinal: no nausea,  No vomiting, No diarrhea, No constipation, No heartburn, abdominal pain. Genitourinary: No dysuria, No hematuria, No change in urine stream, No urethral discharge, No lesions. Musculoskeletal: No back pain, No neck pain, No joint pain, No muscle pain, No claudication, No decreased range of motion, No trauma. Integumentary: No rash, No pruritus, No abrasions. Neurologic: Alert and oriented X4, No abnormal balance, No headache, No confusion, No numbness, No tingling. Psychiatric: No anxiety, No depression, No renée. Physical Exam:     Vitals & Measurements:     Wt Readings from Last 3 Encounters:   04/30/22 98 kg (216 lb)   04/25/22 102.1 kg (225 lb)   02/15/22 105.7 kg (233 lb)     Temp Readings from Last 3 Encounters:   05/05/22 98.1 °F (36.7 °C)   04/25/22 97.3 °F (36.3 °C)   02/15/22 98.6 °F (37 °C) (Temporal)     BP Readings from Last 3 Encounters:   05/05/22 (!) 150/79   04/25/22 132/85   02/15/22 (!) 147/81     Pulse Readings from Last 3 Encounters:   05/05/22 (!) 57   04/25/22 (!) 53   02/15/22 72      Ht Readings from Last 3 Encounters:   04/30/22 5' 11\" (1.803 m)   04/25/22 5' 11\" (1.803 m)   02/15/22 5' 11\" (1.803 m)      Date 05/04/22 1900 - 05/05/22 0659 05/05/22 0700 - 05/06/22 0659   Shift 3314-1729 24 Hour Total 6592-4768 1160-9732 24 Hour Total   INTAKE   P.O. 300 822        P.O. 300 822      I.V.(mL/kg/hr) 1325 1325        Volume (lactated Ringers infusion) 1125 1125        Volume (piperacillin-tazobactam (ZOSYN) 3.375 g in 0.9% sodium chloride (MBP/ADV) 100 mL MBP) 200 200      Shift Total(mL/kg) 1625(16.6) 3036(72.0)      OUTPUT   Urine(mL/kg/hr) 100 1050 750(0.6)  750     Urine Voided 100 1050 750  750     Urine Occurrence(s) 3 x 3 x      Emesis/NG output   0  0     Emesis   0  0     Emesis Occurrence(s)   0 x  0 x   Drains  100 50  50     Output (ml) (Gianfranco-Chavez Drain 04/30/22 Lower Abdomen)  100 50  50   Stool          Stool Occurrence(s) 2 x 4 x 1 x  1 x   Shift Total(mL/kg) 100(1) 1150(11.7) 800(8.2)  800(8.2)   NET 9097 994 -800  800   Weight (kg) 98 98 98 98 98       General: comfortable appearing resting in bed, no acute distress  Respiratory: normal chest wall expansion, CTA B, no r/r/w, no rubs  Cardiovascular: RRR, no m/r/g, Normal S1 and S2  Abdomen: Soft, incision site tender, non-distended, normal bowel sounds in all quadrants, no hepatosplenomegaly, no tympany. Incision scar: incision sites are non-erythematous with no drainage. Wound dressing in place. Drain has serous fluid.   Genitourinary: No inguinal hernia, normal external gentalia, Testis & scrotum normal, no renal angle tenderness  Musculoskeletal: normal ROM in upper and lower extremities  Integumentary: warm, dry, and pink, with no rash, purpura, or petechia  Neurological:No sensory or motor deficit  Psychiatric: Cooperative with normal mood, affect, and cognition      Laboratory Values:   Recent Results (from the past 24 hour(s))   GLUCOSE, POC    Collection Time: 05/04/22  8:33 PM   Result Value Ref Range    Glucose (POC) 214 (H) 65 - 117 mg/dL    Performed by [address] [place] [address], POC    Collection Time: 05/05/22  7:23 AM Result Value Ref Range    Glucose (POC) 293 (H) 65 - 117 mg/dL    Performed by Jaycee Reich, POC    Collection Time: 05/05/22 11:39 AM   Result Value Ref Range    Glucose (POC) 330 (H) 65 - 117 mg/dL    Performed by Yvonne Victoria    GLUCOSE, POC    Collection Time: 05/05/22  7:48 PM   Result Value Ref Range    Glucose (POC) 213 (H) 65 - 117 mg/dL    Performed by Chelo Holm          Radiology:   XR ABD ACUTE W 1 V CHEST   Final Result      CT ABD PELV WO CONT   Final Result   1. Findings consistent with appendicitis. No perforation or abscess formation. Other findings as described. Dr. Daja Martinez  was notified t 1:15 p.m. Assessment:  Problem List Items Addressed This Visit        Digestive    Acute appendicitis - Primary         Perforated Appendicitis   S/P Laparoscopic appendectomy with drain placement, POD #5  Resolving post op ileus     Plan:     1. Admission  2. Diet - Regular diet  3. IV fluids  4. SCD- ensure patient wears   5. IS  6. Pain medications  7. Antibiotics - Zosyn  8. Nausea medication  9. BMP in am  10. Continue to ambulate and sit in chair as tolerated  11. D/C planning in am if tolerates diet.   12. Plan discussed with patient and family and answered all their questions.      Thank you for allowing me to participate in the care of this patient.

## 2022-05-06 NOTE — CONSULTS
Nephrology Consult    Patient: Lupe Gustafson. MRN: [id number]  SSN: [id number]    YOB: 1955  Age: 79 y.o. Sex: male      Subjective:   Reason for the consultation. Acute kidney injury on CKD stage IIIb  History of present illness. The patient is 26-year-old man with underlying history of hypertension, diabetes mellitus type 2, BPH who was admitted on 4/30 with abdominal pain, CT abdomen showed acute appendicitis, status post laparoscopic appendectomy with drain placement. On admission his creatinine was already elevated at 1.99 did improve down to 1.6 and bumped again to 1.92 which prompted a nephrology consultation. No noticed low blood pressure episodes. No evidence of infection or sepsis. He is on HCTZ and losartan. No noticed lower extremity swelling. He is noticed to be on IV fluids.     Past Medical History:   Diagnosis Date    BPH (benign prostatic hyperplasia)     Diabetes ([address]     Hypercholesterolemia     Hypertension     Neuropathy     Restless leg syndrome     Thromboembolus (HCC)      Past Surgical History:   Procedure Laterality Date    HX HIP REPLACEMENT Right     HX HIP REPLACEMENT Left     HX KNEE REPLACEMENT Left     HX SHOULDER ARTHROSCOPY Right     HX SHOULDER ARTHROSCOPY Left       Family History   Problem Relation Age of Onset    Diabetes Mother     Cancer Father      Social History     Tobacco Use    Smoking status: Never Smoker    Smokeless tobacco: Never Used   Substance Use Topics    Alcohol use: Yes     Comment: rarely       Current Facility-Administered Medications   Medication Dose Route Frequency Provider Last Rate Last Admin    *Please  patient's home medications from pharmacy prior to discharge  1 Each Other PRIOR TO DISCHARGE Stacy Rivero MD        apixaban (ELIQUIS) tablet 5 mg  5 mg Oral BID Norma Dotson PA-C   5 mg at 05/06/22 0950    clomiPHENE (CLOMID) tablet 25 mg (Patient Supplied)  25 mg Oral DAILY Alma Gan PA-C   25 mg at 05/06/22 1781    gabapentin (NEURONTIN) capsule 800 mg  800 mg Oral TID Alma Gan PA-C   800 mg at 05/06/22 0950    glipiZIDE (GLUCOTROL) tablet 10 mg  10 mg Oral ACB Norma Dotson PA-C   10 mg at 05/06/22 0950    insulin glargine (LANTUS) injection 64 Units  64 Units SubCUTAneous DAILY Rosalba Shields PA-C   64 Units at 05/06/22 0900    metoprolol succinate (TOPROL-XL) XL tablet 100 mg  100 mg Oral DAILY Norma Dotson PA-C   100 mg at 05/06/22 0950    pantoprazole (PROTONIX) tablet 40 mg  40 mg Oral ACB Norma Dotson PA-C   40 mg at 05/06/22 0950    losartan (COZAAR) tablet 50 mg  50 mg Oral DAILY Norma Dotson PA-C   50 mg at 05/06/22 7009    And    hydroCHLOROthiazide (HYDRODIURIL) tablet 12.5 mg  12.5 mg Oral DAILY Norma Dotson PA-C   12.5 mg at 05/06/22 0950    glucose chewable tablet 16 g  4 Tablet Oral PRN Alma Gan PA-C        dextrose 10% infusion 0-250 mL  0-250 mL IntraVENous PRN Norma Dotson PA-C        glucagon (GLUCAGEN) injection 1 mg  1 mg IntraMUSCular PRN Norma Dotson PA-C        insulin lispro (HUMALOG) injection   SubCUTAneous AC&HS Norma Dotson PA-C   5 Units at 05/05/22 1700    lactated Ringers infusion  125 mL/hr IntraVENous CONTINUOUS Reilly Rivero  mL/hr at 05/05/22 2100 125 mL/hr at 05/05/22 2100    piperacillin-tazobactam (ZOSYN) 3.375 g in 0.9% sodium chloride (MBP/ADV) 100 mL MBP  3.375 g IntraVENous Q8H Reilly Rivero MD 25 mL/hr at 05/06/22 1130 3.375 g at 05/06/22 1130    ondansetron (ZOFRAN) injection 4 mg  4 mg IntraVENous Q6H PRN Reilly Rivero MD   4 mg at 05/03/22 1012    tamsulosin (FLOMAX) capsule 0.4 mg  0.4 mg Oral DAILY Reilly Rivero MD   0.4 mg at 05/06/22 0950        No Known Allergies    Review of Systems:  A comprehensive review of systems was negative except for that written in the History of Present Illness.     Objective:     Vitals: 05/05/22 2049 05/06/22 0207 05/06/22 0758 05/06/22 1008   BP: (!) 153/84 (!) 150/70 (!) 151/84    Pulse: 61 (!) 52 (!) 55    Resp: 20 18 18    Temp: 98.5 °F (36.9 °C) 98.7 °F (37.1 °C) 97.9 °F (36.6 °C)    SpO2: 95% 96% 98%    Weight:       Height:    5' 11\" (1.803 m)        Physical Exam:  General: NAD  Eyes: sclera anicteric  Oral Cavity: No thrush or ulcers  Neck: no JVD  Chest: Fair bilateral air entry  Heart: normal sounds  Abdomen: soft and non tender   : no conrad  Lower Extremities: no edema  Skin: no rash  Neuro: intact  Psychiatric: non-depressed          And  Assessment:     Hospital Problems  Date Reviewed: 4/25/2022          Codes Class Noted POA    Acute appendicitis ICD-10-CM: K35.80  ICD-9-CM: 540.9  4/30/2022 Unknown              Plan:   1 acute kidney injury on chronic kidney disease stage IIIb. -Etiology can be prerenal plus in the setting of on losartan and HCTZ. -On admission creatinine was 1.9, did improve down to 1.6 and bumped again to 1.92.    -His baseline creatinine was 1.7 in May 2021.    -Clinically no evidence of lower extremity edema.    -His blood pressures were been okay. -He was put on IV fluids.    -I will  consider to hold HCTZ/losartan only in the setting of worsening renal functions. 2.  Hypertension.    -Blood pressure is slightly elevated.    -He is currently on metoprolol  mg daily/HCTZ 12.5 mg and losartan 50 mg daily. 3.  Perforated appendicitis. -Status post appendectomy. On IV Zosyn. 4 BPH. On Flomax. No voiding issues. 5 hx of DVT: On eliquis 5 mg bid.     Thank you for the consultation    Signed By: Kat Franz MD     May 6, 2022

## 2022-05-07 VITALS
SYSTOLIC BLOOD PRESSURE: 138 MMHG | HEART RATE: 59 BPM | DIASTOLIC BLOOD PRESSURE: 77 MMHG | HEIGHT: 71 IN | TEMPERATURE: 98.1 F | OXYGEN SATURATION: 97 % | BODY MASS INDEX: 30.24 KG/M2 | WEIGHT: 216 LBS | RESPIRATION RATE: 20 BRPM

## 2022-05-07 LAB
ALBUMIN SERPL-MCNC: 3.1 G/DL (ref 3.5–5)
ANION GAP SERPL CALC-SCNC: 7 MMOL/L (ref 5–15)
BUN SERPL-MCNC: 20 MG/DL (ref 6–20)
BUN/CREAT SERPL: 11 (ref 12–20)
CA-I BLD-MCNC: 9.1 MG/DL (ref 8.5–10.1)
CHLORIDE SERPL-SCNC: 107 MMOL/L (ref 97–108)
CO2 SERPL-SCNC: 25 MMOL/L (ref 21–32)
CREAT SERPL-MCNC: 1.81 MG/DL (ref 0.7–1.3)
GLUCOSE BLD STRIP.AUTO-MCNC: 272 MG/DL (ref 65–117)
GLUCOSE BLD STRIP.AUTO-MCNC: 278 MG/DL (ref 65–117)
GLUCOSE SERPL-MCNC: 252 MG/DL (ref 65–100)
PERFORMED BY, TECHID: ABNORMAL
PERFORMED BY, TECHID: ABNORMAL
PHOSPHATE SERPL-MCNC: 2.6 MG/DL (ref 2.6–4.7)
POTASSIUM SERPL-SCNC: 3.8 MMOL/L (ref 3.5–5.1)
SODIUM SERPL-SCNC: 139 MMOL/L (ref 136–145)

## 2022-05-07 PROCEDURE — 74011250637 HC RX REV CODE- 250/637: Performed by: PHYSICIAN ASSISTANT

## 2022-05-07 PROCEDURE — 80069 RENAL FUNCTION PANEL: CPT

## 2022-05-07 PROCEDURE — 74011250636 HC RX REV CODE- 250/636: Performed by: SURGERY

## 2022-05-07 PROCEDURE — 74011636637 HC RX REV CODE- 636/637: Performed by: PHYSICIAN ASSISTANT

## 2022-05-07 PROCEDURE — 74011250637 HC RX REV CODE- 250/637: Performed by: INTERNAL MEDICINE

## 2022-05-07 PROCEDURE — 82962 GLUCOSE BLOOD TEST: CPT

## 2022-05-07 PROCEDURE — 74011000258 HC RX REV CODE- 258: Performed by: SURGERY

## 2022-05-07 PROCEDURE — 74011250637 HC RX REV CODE- 250/637: Performed by: SURGERY

## 2022-05-07 PROCEDURE — 36415 COLL VENOUS BLD VENIPUNCTURE: CPT

## 2022-05-07 RX ORDER — AMLODIPINE BESYLATE 5 MG/1
10 TABLET ORAL DAILY
Status: DISCONTINUED | OUTPATIENT
Start: 2022-05-07 | End: 2022-05-07 | Stop reason: HOSPADM

## 2022-05-07 RX ADMIN — INSULIN LISPRO 5 UNITS: 100 INJECTION, SOLUTION INTRAVENOUS; SUBCUTANEOUS at 08:39

## 2022-05-07 RX ADMIN — TAMSULOSIN HYDROCHLORIDE 0.4 MG: 0.4 CAPSULE ORAL at 08:42

## 2022-05-07 RX ADMIN — PIPERACILLIN AND TAZOBACTAM 3.38 G: 3; .375 INJECTION, POWDER, LYOPHILIZED, FOR SOLUTION INTRAVENOUS at 11:52

## 2022-05-07 RX ADMIN — CLOMIPHENE CITRATE 25 MG: 50 TABLET ORAL at 08:42

## 2022-05-07 RX ADMIN — INSULIN GLARGINE 64 UNITS: 100 INJECTION, SOLUTION SUBCUTANEOUS at 08:40

## 2022-05-07 RX ADMIN — AMLODIPINE BESYLATE 10 MG: 5 TABLET ORAL at 08:41

## 2022-05-07 RX ADMIN — APIXABAN 5 MG: 5 TABLET, FILM COATED ORAL at 08:42

## 2022-05-07 RX ADMIN — PIPERACILLIN AND TAZOBACTAM 3.38 G: 3; .375 INJECTION, POWDER, LYOPHILIZED, FOR SOLUTION INTRAVENOUS at 03:41

## 2022-05-07 RX ADMIN — LOSARTAN POTASSIUM 50 MG: 50 TABLET, FILM COATED ORAL at 08:43

## 2022-05-07 RX ADMIN — PANTOPRAZOLE SODIUM 40 MG: 40 TABLET, DELAYED RELEASE ORAL at 08:43

## 2022-05-07 RX ADMIN — INSULIN LISPRO 5 UNITS: 100 INJECTION, SOLUTION INTRAVENOUS; SUBCUTANEOUS at 11:52

## 2022-05-07 RX ADMIN — GABAPENTIN 800 MG: 400 CAPSULE ORAL at 08:41

## 2022-05-07 RX ADMIN — METOPROLOL SUCCINATE 100 MG: 50 TABLET, EXTENDED RELEASE ORAL at 08:42

## 2022-05-07 RX ADMIN — GLIPIZIDE 10 MG: 5 TABLET ORAL at 08:41

## 2022-05-07 NOTE — PROGRESS NOTES
Nephrology Progress Note         Patient: Ruth Sauceda MRN: [id number]  SSN: [id number]    YOB: 1955  Age: 79 y.o. Sex: male          Subjective:     Patient seen at bedside, alert awake oriented, has no active complaints. Anticipating discharge later today. Objective:     Vitals:    05/06/22 1008 05/06/22 1500 05/06/22 1948 05/07/22 0233   BP:  131/79 (!) 172/78 118/67   Pulse:  (!) 56 (!) 55 (!) 53   Resp:  17 18 18   Temp:  98.1 °F (36.7 °C) 98.3 °F (36.8 °C) 98.2 °F (36.8 °C)   SpO2:  99% 97% 97%   Weight:       Height: 5' 11\" (1.803 m)           Intake and Output:  Yesterday's Intake and Output:  05/06 0701 - 05/07 0700  In: 222 [P.O.:222]  Out: 7590 [Urine:1650]     Physical Exam:   GENERAL: alert, cooperative, no distress, appears stated age  EYE: negative  Respiratory - in no distress   ABDOMEN: soft, non-tender.  Bowel sounds normal. No masses,  no organomegaly  EXTREMITIES:  extremities normal, atraumatic, no cyanosis or edema, no ulcers,SKIN: Normal.   Skin -  no rash or abnormalities  NEUROLOGIC: negative    Data Review    Meds    Current Facility-Administered Medications   Medication Dose Route Frequency    *Please  patient's home medications from pharmacy prior to discharge  1 Each Other PRIOR TO DISCHARGE    apixaban (ELIQUIS) tablet 5 mg  5 mg Oral BID    clomiPHENE (CLOMID) tablet 25 mg (Patient Supplied)  25 mg Oral DAILY    gabapentin (NEURONTIN) capsule 800 mg  800 mg Oral TID    glipiZIDE (GLUCOTROL) tablet 10 mg  10 mg Oral ACB    insulin glargine (LANTUS) injection 64 Units  64 Units SubCUTAneous DAILY    metoprolol succinate (TOPROL-XL) XL tablet 100 mg  100 mg Oral DAILY    pantoprazole (PROTONIX) tablet 40 mg  40 mg Oral ACB    losartan (COZAAR) tablet 50 mg  50 mg Oral DAILY    And    hydroCHLOROthiazide (HYDRODIURIL) tablet 12.5 mg  12.5 mg Oral DAILY    glucose chewable tablet 16 g  4 Tablet Oral PRN    dextrose 10% infusion 0-250 mL  0-250 mL IntraVENous PRN    glucagon (GLUCAGEN) injection 1 mg  1 mg IntraMUSCular PRN    insulin lispro (HUMALOG) injection   SubCUTAneous AC&HS    lactated Ringers infusion  100 mL/hr IntraVENous CONTINUOUS    piperacillin-tazobactam (ZOSYN) 3.375 g in 0.9% sodium chloride (MBP/ADV) 100 mL MBP  3.375 g IntraVENous Q8H    ondansetron (ZOFRAN) injection 4 mg  4 mg IntraVENous Q6H PRN    tamsulosin (FLOMAX) capsule 0.4 mg  0.4 mg Oral DAILY       Lab      Recent Results (from the past 24 hour(s))   GLUCOSE, POC    Collection Time: 05/06/22  7:20 AM   Result Value Ref Range    Glucose (POC) 382 (H) 65 - 117 mg/dL    Performed by Jony Reed    GLUCOSE, POC    Collection Time: 05/06/22 11:24 AM   Result Value Ref Range    Glucose (POC) 324 (H) 65 - 117 mg/dL    Performed by Jony Reed    GLUCOSE, POC    Collection Time: 05/06/22  4:33 PM   Result Value Ref Range    Glucose (POC) 242 (H) 65 - 117 mg/dL    Performed by Emmanuel YOUSSEF [facility] POC    Collection Time: 05/06/22  9:20 PM   Result Value Ref Range    Glucose (POC) 295 (H) 65 - 117 mg/dL    Performed by [employer]    RENAL FUNCTION PANEL    Collection Time: 05/07/22  1:52 AM   Result Value Ref Range    Sodium 139 136 - 145 mmol/L    Potassium 3.8 3.5 - 5.1 mmol/L    Chloride 107 97 - 108 mmol/L    CO2 25 21 - 32 mmol/L    Anion gap 7 5 - 15 mmol/L    Glucose 252 (H) 65 - 100 mg/dL    BUN 20 6 - 20 mg/dL    Creatinine 1.81 (H) 0.70 - 1.30 mg/dL    BUN/Creatinine ratio 11 (L) 12 - 20      GFR est AA 46 (L) >60 ml/min/1.73m2    GFR est non-AA 38 (L) >60 ml/min/1.73m2    Calcium 9.1 8.5 - 10.1 mg/dL    Phosphorus 2.6 2.6 - 4.7 mg/dL    Albumin 3.1 (L) 3.5 - 5.0 g/dL        Lab Results   Component Value Date/Time    Color Yellow 04/30/2022 11:00 AM    Appearance Clear 04/30/2022 11:00 AM    Specific gravity 1.020 04/30/2022 11:00 AM    pH (UA) 5.0 04/30/2022 11:00 AM    Protein Trace (A) 04/30/2022 11:00 AM    Glucose 250 (A) 04/30/2022 11:00 AM    Ketone Negative 04/30/2022 11:00 AM    Bilirubin Negative 04/30/2022 11:00 AM    Urobilinogen 0.1 (L) 04/30/2022 11:00 AM    Nitrites Negative 04/30/2022 11:00 AM    Leukocyte Esterase Negative 04/30/2022 11:00 AM    Epithelial cells Few 04/30/2022 11:00 AM    Bacteria Negative 04/30/2022 11:00 AM    WBC 0-4 04/30/2022 11:00 AM    RBC 0-5 04/30/2022 11:00 AM       Imaging         Assessment & Plan: Active Problems:    Acute appendicitis (4/30/2022)    1 acute kidney injury on chronic kidney disease stage IIIb. (CKD likely due to  HTN and Diabetic Nephrosclerosis )  -Etiology can be prerenal plus in the setting of on losartan and HCTZ. -On admission creatinine was 1.9, did improve down to 1.6 and bumped again to 1.92>>1.8.    -His baseline creatinine was 1.7 in May 2021.    -Clinically no evidence of lower extremity edema.    -His blood pressures were been okay. -He was put on IV fluids.    -I will  consider to hold HCTZ only in the setting of worsening renal functions. Recent UA (04/22) - trace proteinuria only , will repeat UA and UPC     2. Hypertension.    -Blood pressure is slightly elevated.    -He is currently on metoprolol  mg daily and losartan 50 mg daily.     3.  Perforated appendicitis. -Status post appendectomy. On IV Zosyn.   clinically stable     4 BPH. On Flomax. No voiding issues.     5 hx of DVT: On eliquis 5 mg bid.     Thank you for the consultation    -His renal function appears to be at baseline, he can follow-up with Dr. Tonita Brunner in office after discharge. Signed By: Cristina Mckenna MD     May 7, 2022      This note was prepared using voice recognition system and is likely to have sound alike errors that may have been overlooked even during proofreading.   Please contact the author for any clarifications

## 2022-05-07 NOTE — PROGRESS NOTES
Hospitalist Progress Note         PETE Baez, FNP-C    Daily Progress Note: 5/7/2022  Mr. Bettie Hoskins. is a 79y.o. year old male presents to free standing ER with diffuse abdominal pain that started yesterday and localised to right lower quadrant. No fever or chills, nausea or vomiting. Presented to ER today and CT scan of abdomen and pelvis showed Acute appendicitis. S/P laparoscopic appendectomy with drain placement post acute perforated appendicitis 4/30/22. Patient is now tolerating GI soft diet without complications. Subjective:   Subjective   Patient examined alert and oriented sitting in bed  Reports abdominal pain that is improved  No acute distress noted on examination  No overnight events reported    Review of Systems:   Review of Systems   Constitutional: Negative for chills and fever. Respiratory: Negative for cough and shortness of breath. Cardiovascular: Negative for chest pain and leg swelling. Gastrointestinal: Positive for abdominal pain. Genitourinary: Negative for dysuria. Musculoskeletal: Negative for myalgias. Objective:   Objective      Vitals:  Patient Vitals for the past 12 hrs:   Temp Pulse Resp BP SpO2   05/07/22 0753 97.8 °F (36.6 °C) 61 18 (!) 158/85 98 %   05/07/22 0233 98.2 °F (36.8 °C) (!) 53 18 118/67 97 %        Physical Exam:  Physical Exam  Vitals and nursing note reviewed. Constitutional:       Appearance: Normal appearance. Eyes:      Extraocular Movements: Extraocular movements intact. Cardiovascular:      Rate and Rhythm: Normal rate. Heart sounds: Normal heart sounds. Pulmonary:      Effort: Pulmonary effort is normal.      Breath sounds: Normal breath sounds. Abdominal:      General: Bowel sounds are normal.      Palpations: Abdomen is soft. Tenderness: There is abdominal tenderness. Comments: X 3 lap sites, marisa drain present   Musculoskeletal:         General: Normal range of motion.    Skin:     General: Skin is warm and dry. Neurological:      Mental Status: He is alert and oriented to person, place, and time.           Lab Results:  Recent Results (from the past 24 hour(s))   GLUCOSE, POC    Collection Time: 05/06/22 11:24 AM   Result Value Ref Range    Glucose (POC) 324 (H) 65 - 117 mg/dL    Performed by Arturo Schulz    GLUCOSE, POC    Collection Time: 05/06/22  4:33 PM   Result Value Ref Range    Glucose (POC) 242 (H) 65 - 117 mg/dL    Performed by Emmanuel YOUSSEF [facility] POC    Collection Time: 05/06/22  9:20 PM   Result Value Ref Range    Glucose (POC) 295 (H) 65 - 117 mg/dL    Performed by Nazario Yoo    RENAL FUNCTION PANEL    Collection Time: 05/07/22  1:52 AM   Result Value Ref Range    Sodium 139 136 - 145 mmol/L    Potassium 3.8 3.5 - 5.1 mmol/L    Chloride 107 97 - 108 mmol/L    CO2 25 21 - 32 mmol/L    Anion gap 7 5 - 15 mmol/L    Glucose 252 (H) 65 - 100 mg/dL    BUN 20 6 - 20 mg/dL    Creatinine 1.81 (H) 0.70 - 1.30 mg/dL    BUN/Creatinine ratio 11 (L) 12 - 20      GFR est AA 46 (L) >60 ml/min/1.73m2    GFR est non-AA 38 (L) >60 ml/min/1.73m2    Calcium 9.1 8.5 - 10.1 mg/dL    Phosphorus 2.6 2.6 - 4.7 mg/dL    Albumin 3.1 (L) 3.5 - 5.0 g/dL   GLUCOSE, POC    Collection Time: 05/07/22  7:49 AM   Result Value Ref Range    Glucose (POC) 278 (H) 65 - 117 mg/dL    Performed by Miles Bermeo (LPN)           Diagnostic Images:  CT Results  (Last 48 hours)    None          Current Medications:    Current Facility-Administered Medications:     amLODIPine (NORVASC) tablet 10 mg, 10 mg, Oral, DAILY, Caren Mejia MD, 10 mg at 05/07/22 0841    *Please  patient's home medications from pharmacy prior to discharge, 1 Each, Other, PRIOR TO DISCHARGE, Aysha Rivero MD    apixaban (ELIQUIS) tablet 5 mg, 5 mg, Oral, BID, Norma Dotson PA-C, 5 mg at 05/07/22 0001    clomiPHENE (CLOMID) tablet 25 mg (Patient Supplied), 25 mg, Oral, DAILY, Norma Dotson PA-C, 25 mg at 05/07/22 0842   gabapentin (NEURONTIN) capsule 800 mg, 800 mg, Oral, TID, Norma Dotson PA-C, 800 mg at 05/07/22 0841    glipiZIDE (GLUCOTROL) tablet 10 mg, 10 mg, Oral, ACB, Norma Dotson PA-C, 10 mg at 05/07/22 0841    insulin glargine (LANTUS) injection 64 Units, 64 Units, SubCUTAneous, DAILY, Norma Dotson PA-C, 64 Units at 05/07/22 0840    metoprolol succinate (TOPROL-XL) XL tablet 100 mg, 100 mg, Oral, DAILY, Norma Dotson PA-C, 100 mg at 05/07/22 0842    pantoprazole (PROTONIX) tablet 40 mg, 40 mg, Oral, ACB, Norma Dotson PA-C, 40 mg at 05/07/22 0843    losartan (COZAAR) tablet 50 mg, 50 mg, Oral, DAILY, 50 mg at 05/07/22 0843 **AND** [Held by provider] hydroCHLOROthiazide (HYDRODIURIL) tablet 12.5 mg, 12.5 mg, Oral, DAILY, Norma Dotson PA-C, 12.5 mg at 05/06/22 0950    glucose chewable tablet 16 g, 4 Tablet, Oral, PRN, Norma Dotson PA-C    dextrose 10% infusion 0-250 mL, 0-250 mL, IntraVENous, PRN, Norma Dotson PA-C    glucagon (GLUCAGEN) injection 1 mg, 1 mg, IntraMUSCular, PRN, Norma Dotson PA-JOSE ALBERTO    insulin lispro (HUMALOG) injection, , SubCUTAneous, AC&HS, Norma Dotson PA-C, 5 Units at 05/07/22 0839    lactated Ringers infusion, 100 mL/hr, IntraVENous, CONTINUOUS, Annetta Redd MD, Last Rate: 100 mL/hr at 05/06/22 1500, 100 mL/hr at 05/06/22 1500    piperacillin-tazobactam (ZOSYN) 3.375 g in 0.9% sodium chloride (MBP/ADV) 100 mL MBP, 3.375 g, IntraVENous, Q8H, Reilly Rivero MD, Last Rate: 25 mL/hr at 05/07/22 0341, 3.375 g at 05/07/22 0341    ondansetron (ZOFRAN) injection 4 mg, 4 mg, IntraVENous, Q6H PRN, Reilly Rivero MD, 4 mg at 05/03/22 1012    tamsulosin (FLOMAX) capsule 0.4 mg, 0.4 mg, Oral, DAILY, Reilly Rivero MD, 0.4 mg at 05/07/22 1835       ASSESSMENT:    1) perforated appendicitis   -s/p appendectomy POD 7  -drain output 175ml  -Continue IV zosyn     2) HTN:   -bp currently at goal  -continue norvasc 10mg, losartan 50mg     3) DMII on insulin complicated by peripheral neuropathy  -lantus 64 units, SSI, along with PO meds and gabapentin  -ac/hs accu check  -A1c 8.5     4) BPH:   -tamsulosin, takes clomiphine and cabergoline    5) CKD Stage  -Cr appears at baseline  -Renally dose all medications to current gfr        Full Code  Dvt Prophylaxis eliquis  GI Prophylaxis protonix         Above treatment plan reviewed and discussed with patient in detail at bedside, all questions answered. Hospitalist team will continue to follow along. Thank you for allowing us to participate in the care of this gentleman. Care Plan discussed with: Interdisciplinary team    Total time spent with patient: 35 minutes.     Dat West NP

## 2022-05-07 NOTE — PROGRESS NOTES
POD # 6    Chief Complaint:None      Subjective:  Mr. Lawrence Dickey is a 79 y.o.  male S/P laparoscopic appendectomy with drain placement post acute perforated appendicitis 4/30/22. Patient states he has continued improvement of his abdominal pain and has been passing gas. Ambulating and sitting in chair. Tolerating soft diet. .     Passing flatus & had  BM.     Denies nausea, vomiting, diarrhea, fever or chills    Creatinine 1.9. Nephrologist consult placed. Review of Systems:   Constitutional:  no fever,  no chills,  no sweats, No weakness, No fatigue, No decreased activity. Respiratory: No shortness of breath, No cough, No sputum production, No hemoptysis, No wheezing, No cyanosis. Cardiovascular: No chest pain, No palpitations, No bradycardia, No tachycardia, No peripheral edema, No syncope. Gastrointestinal: no nausea,  No vomiting, No diarrhea, No constipation, No heartburn, abdominal pain. Genitourinary: No dysuria, No hematuria, No change in urine stream, No urethral discharge, No lesions. Musculoskeletal: No back pain, No neck pain, No joint pain, No muscle pain, No claudication, No decreased range of motion, No trauma. Integumentary: No rash, No pruritus, No abrasions. Neurologic: Alert and oriented X4, No abnormal balance, No headache, No confusion, No numbness, No tingling. Psychiatric: No anxiety, No depression, No renée. Physical Exam:     Vitals & Measurements:     Wt Readings from Last 3 Encounters:   04/30/22 98 kg (216 lb)   04/25/22 102.1 kg (225 lb)   02/15/22 105.7 kg (233 lb)     Temp Readings from Last 3 Encounters:   05/06/22 98.3 °F (36.8 °C)   04/25/22 97.3 °F (36.3 °C)   02/15/22 98.6 °F (37 °C) (Temporal)     BP Readings from Last 3 Encounters:   05/06/22 (!) 172/78   04/25/22 132/85   02/15/22 (!) 147/81     Pulse Readings from Last 3 Encounters:   05/06/22 (!) 55   04/25/22 (!) 53   02/15/22 72      Ht Readings from Last 3 Encounters:   05/06/22 5' 11\" (1.803 m) 04/25/22 5' 11\" (1.803 m)   02/15/22 5' 11\" (1.803 m)      Date 05/05/22 1900 - 05/06/22 0659 05/06/22 0700 - 05/07/22 0659   Shift 4140-2337 24 Hour Total 5422-1854 4300-0306 24 Hour Total   INTAKE   P.O. 480 480        P. O. 480 480      I. V.(mL/kg/hr) 1100 1100        Volume (lactated Ringers infusion) 1000 1000        Volume (piperacillin-tazobactam (ZOSYN) 3.375 g in 0.9% sodium chloride (MBP/ADV) 100 mL MBP) 100 100      Shift Total(mL/kg) 1580(16.1) 1580(16.1)      OUTPUT   Urine(mL/kg/hr) 300 1050 650(0.6)  650     Urine Voided 300 1050 650  650   Emesis/NG output  0        Emesis  0        Emesis Occurrence(s)  0 x      Drains  50        Output (ml) (Gianfranco-Chavez Drain 04/30/22 Lower Abdomen)  50      Stool          Stool Occurrence(s)  1 x 1 x  1 x   Shift Total(mL/kg) 300(3.1) 1100(11.2) 650(6.6)  650(6.6)   NET 1280 480 -650  -650   Weight (kg) 98 98 98 98 98       General: comfortable appearing resting in bed, no acute distress  Respiratory: normal chest wall expansion, CTA B, no r/r/w, no rubs  Cardiovascular: RRR, no m/r/g, Normal S1 and S2  Abdomen: Soft, incision site tender, non-distended, normal bowel sounds in all quadrants, no hepatosplenomegaly, no tympany. Incision scar: incision sites are non-erythematous with no drainage. Wound dressing in place. Drain has serous fluid.   Genitourinary: No inguinal hernia, normal external gentalia, Testis & scrotum normal, no renal angle tenderness  Musculoskeletal: normal ROM in upper and lower extremities  Integumentary: warm, dry, and pink, with no rash, purpura, or petechia  Neurological:No sensory or motor deficit  Psychiatric: Cooperative with normal mood, affect, and cognition      Laboratory Values:   Recent Results (from the past 24 hour(s))   GLUCOSE, POC    Collection Time: 05/05/22  9:59 PM   Result Value Ref Range    Glucose (POC) 188 (H) 65 - 117 mg/dL    Performed by [address] [place] [address], [facility]    Collection Time: 05/06/22  2:33 AM   Result Value Ref Range    Sodium 138 136 - 145 mmol/L    Potassium 3.9 3.5 - 5.1 mmol/L    Chloride 104 97 - 108 mmol/L    CO2 25 21 - 32 mmol/L    Anion gap 9 5 - 15 mmol/L    Glucose 302 (H) 65 - 100 mg/dL    BUN 21 (H) 6 - 20 mg/dL    Creatinine 1.92 (H) 0.70 - 1.30 mg/dL    BUN/Creatinine ratio 11 (L) 12 - 20      GFR est AA 43 (L) >60 ml/min/1.73m2    GFR est non-AA 35 (L) >60 ml/min/1.73m2    Calcium 8.9 8.5 - 10.1 mg/dL   GLUCOSE, POC    Collection Time: 05/06/22  7:20 AM   Result Value Ref Range    Glucose (POC) 382 (H) 65 - 117 mg/dL    Performed by Cuauhtemoc HOLLAND, POC    Collection Time: 05/06/22 11:24 AM   Result Value Ref Range    Glucose (POC) 324 (H) 65 - 117 mg/dL    Performed by Cuauhtemoc HOLLAND, POC    Collection Time: 05/06/22  4:33 PM   Result Value Ref Range    Glucose (POC) 242 (H) 65 - 117 mg/dL    Performed by Brenda Ferrer          Radiology:   XR ABD ACUTE W 1 V CHEST   Final Result      CT ABD PELV WO CONT   Final Result   1. Findings consistent with appendicitis. No perforation or abscess formation. Other findings as described. Dr. Merrill Perez  was notified t 1:15 p.m. Assessment:  Problem List Items Addressed This Visit        Digestive    Acute appendicitis - Primary         Perforated Appendicitis   S/P Laparoscopic appendectomy with drain placement, POD #6  Resolved post op ileus    Renal insufficiency.     Plan:     1. Admission  2. Diet - Regular diet  3. IV fluids  4. SCD- ensure patient wears   5. IS  6. Pain medications  7. Antibiotics - Zosyn  8. Nausea medication  9. BMP in am  10. Continue to ambulate and sit in chair as tolerated  11. D/C planning in am if tolerates diet- once cleared by Nephrologist, Dr. Tin Bautista.   12. Plan discussed with patient and family and answered all their questions.      Thank you for allowing me to participate in the care of this patient.

## 2022-05-07 NOTE — PROGRESS NOTES
Discharge plan of care/case management plan validated with provider discharge order.   Patient being discharged home/self, removed IV, Patient given discharge paperwork, Called pharmacy to receive home medications, patient assisted outside via wheelchair

## 2022-05-07 NOTE — PROGRESS NOTES
Problem: Falls - Risk of  Goal: *Absence of Falls  Description: Document Gary Canales Fall Risk and appropriate interventions in the flowsheet. Outcome: Progressing Towards Goal  Note: Fall Risk Interventions:  Mobility Interventions: Patient to call before getting OOB         Medication Interventions: Teach patient to arise slowly         History of Falls Interventions: Door open when patient unattended         Problem: Patient Education: Go to Patient Education Activity  Goal: Patient/Family Education  Outcome: Progressing Towards Goal     Problem: Pressure Injury - Risk of  Goal: *Prevention of pressure injury  Description: Document Jeramy Scale and appropriate interventions in the flowsheet.   Outcome: Progressing Towards Goal  Note: Pressure Injury Interventions:  Sensory Interventions: Minimize linen layers,Keep linens dry and wrinkle-free         Activity Interventions: Increase time out of bed    Mobility Interventions: HOB 30 degrees or less    Nutrition Interventions: Document food/fluid/supplement intake    Friction and Shear Interventions: HOB 30 degrees or less                Problem: Patient Education: Go to Patient Education Activity  Goal: Patient/Family Education  Outcome: Progressing Towards Goal     Problem: Pain  Goal: *Control of Pain  Outcome: Progressing Towards Goal     Problem: Patient Education: Go to Patient Education Activity  Goal: Patient/Family Education  Outcome: Progressing Towards Goal     Problem: Diabetes Maintenance:Ongoing  Goal: Activity/Safety  Outcome: Progressing Towards Goal  Goal: Nutrition  Outcome: Progressing Towards Goal  Goal: Medications  Outcome: Progressing Towards Goal  Goal: Treatments/Interventsions/Procedures  Outcome: Progressing Towards Goal  Goal: *Blood Glucose 80 to 180 md/dl  Outcome: Progressing Towards Goal     Problem: Diabetes Self-Management  Goal: *Disease process and treatment process  Description: Define diabetes and identify own type of diabetes; list 3 options for treating diabetes. Outcome: Progressing Towards Goal  Goal: *Incorporating nutritional management into lifestyle  Description: Describe effect of type, amount and timing of food on blood glucose; list 3 methods for planning meals. Outcome: Progressing Towards Goal  Goal: *Incorporating physical activity into lifestyle  Description: State effect of exercise on blood glucose levels. Outcome: Progressing Towards Goal  Goal: *Developing strategies to promote health/change behavior  Description: Define the ABC's of diabetes; identify appropriate screenings, schedule and personal plan for screenings. Outcome: Progressing Towards Goal  Goal: *Using medications safely  Description: State effect of diabetes medications on diabetes; name diabetes medication taking, action and side effects. Outcome: Progressing Towards Goal  Goal: *Monitoring blood glucose, interpreting and using results  Description: Identify recommended blood glucose targets  and personal targets. Outcome: Progressing Towards Goal  Goal: *Prevention, detection, treatment of acute complications  Description: List symptoms of hyper- and hypoglycemia; describe how to treat low blood sugar and actions for lowering  high blood glucose level. Outcome: Progressing Towards Goal  Goal: *Prevention, detection and treatment of chronic complications  Description: Define the natural course of diabetes and describe the relationship of blood glucose levels to long term complications of diabetes.   Outcome: Progressing Towards Goal  Goal: *Developing strategies to address psychosocial issues  Description: Describe feelings about living with diabetes; identify support needed and support network  Outcome: Progressing Towards Goal  Goal: *Insulin pump training  Outcome: Progressing Towards Goal  Goal: *Sick day guidelines  Outcome: Progressing Towards Goal  Goal: *Patient Specific Goal (EDIT GOAL, INSERT TEXT)  Outcome: Progressing Towards Goal Problem: Patient Education: Go to Patient Education Activity  Goal: Patient/Family Education  Outcome: Progressing Towards Goal

## 2022-05-07 NOTE — PROGRESS NOTES
POD # 7    Chief Complaint:None      Subjective:  Mr. Clemencia Palomares. is a 79 y.o.  male S/P laparoscopic appendectomy with drain placement post acute perforated appendicitis 4/30/22. Patient states he has continued improvement of his abdominal pain and has been passing gas. Ambulating and sitting in chair. Tolerating soft diet. .     Passing flatus & had  BM.     Denies nausea, vomiting, diarrhea, fever or chills    Nephrologist consult placed. Review of Systems:   Constitutional:  no fever,  no chills,  no sweats, No weakness, No fatigue, No decreased activity. Respiratory: No shortness of breath, No cough, No sputum production, No hemoptysis, No wheezing, No cyanosis. Cardiovascular: No chest pain, No palpitations, No bradycardia, No tachycardia, No peripheral edema, No syncope. Gastrointestinal: no nausea,  No vomiting, No diarrhea, No constipation, No heartburn, abdominal pain. Genitourinary: No dysuria, No hematuria, No change in urine stream, No urethral discharge, No lesions. Musculoskeletal: No back pain, No neck pain, No joint pain, No muscle pain, No claudication, No decreased range of motion, No trauma. Integumentary: No rash, No pruritus, No abrasions. Neurologic: Alert and oriented X4, No abnormal balance, No headache, No confusion, No numbness, No tingling. Psychiatric: No anxiety, No depression, No renée. Physical Exam:     Vitals & Measurements:     Wt Readings from Last 3 Encounters:   04/30/22 98 kg (216 lb)   04/25/22 102.1 kg (225 lb)   02/15/22 105.7 kg (233 lb)     Temp Readings from Last 3 Encounters:   05/07/22 98.1 °F (36.7 °C)   04/25/22 97.3 °F (36.3 °C)   02/15/22 98.6 °F (37 °C) (Temporal)     BP Readings from Last 3 Encounters:   05/07/22 138/77   04/25/22 132/85   02/15/22 (!) 147/81     Pulse Readings from Last 3 Encounters:   05/07/22 (!) 59   04/25/22 (!) 53   02/15/22 72      Ht Readings from Last 3 Encounters:   05/06/22 5' 11\" (1.803 m)   04/25/22 5' 11\" (1.803 m)   02/15/22 5' 11\" (1.803 m)      Date 05/06/22 0700 - 05/07/22 0659 05/07/22 0700 - 05/08/22 0659   Shift 2842-0147 1509-0739 24 Hour Total 7191-4083 9155-6706 24 Hour Total   INTAKE   P.O.  222 222 360  360     P. O.  222 222 360  360   Shift Total(mL/kg)  222(2.3) 222(2.3) 360(3.7)  360(3.7)   OUTPUT   Urine(mL/kg/hr) 650(0.6) 1000(0.9) 1650(0.7) 550  550     Urine Voided 650 1000 1650 550  550   Drains    110  110     Output (ml) (Gianfranco-Chavez Drain 04/30/22 Lower Abdomen)    110  110   Stool           Stool Occurrence(s) 1 x  1 x      Shift Total(mL/kg) 650(6.6) 1000(10.2) 1650(16.8) 660(6.7)  660(6.7)   NET -650 -778 -1428 -300  -300   Weight (kg) 98 98 98 98 98 98       General: comfortable appearing resting in bed, no acute distress  Respiratory: normal chest wall expansion, CTA B, no r/r/w, no rubs  Cardiovascular: RRR, no m/r/g, Normal S1 and S2  Abdomen: Soft, incision site tender, non-distended, normal bowel sounds in all quadrants, no hepatosplenomegaly, no tympany. Incision scar: incision sites are non-erythematous with no drainage. Wound dressing in place. Drain has serous fluid.   Genitourinary: No inguinal hernia, normal external gentalia, Testis & scrotum normal, no renal angle tenderness  Musculoskeletal: normal ROM in upper and lower extremities  Integumentary: warm, dry, and pink, with no rash, purpura, or petechia  Neurological:No sensory or motor deficit  Psychiatric: Cooperative with normal mood, affect, and cognition      Laboratory Values:   Recent Results (from the past 24 hour(s))   GLUCOSE, POC    Collection Time: 05/06/22  4:33 PM   Result Value Ref Range    Glucose (POC) 242 (H) 65 - 117 mg/dL    Performed by Maria Elena Sr, POC    Collection Time: 05/06/22  9:20 PM   Result Value Ref Range    Glucose (POC) 295 (H) 65 - 117 mg/dL    Performed by Brigid Low    RENAL FUNCTION PANEL    Collection Time: 05/07/22  1:52 AM   Result Value Ref Range    Sodium 139 136 - 145 mmol/L    Potassium 3.8 3.5 - 5.1 mmol/L    Chloride 107 97 - 108 mmol/L    CO2 25 21 - 32 mmol/L    Anion gap 7 5 - 15 mmol/L    Glucose 252 (H) 65 - 100 mg/dL    BUN 20 6 - 20 mg/dL    Creatinine 1.81 (H) 0.70 - 1.30 mg/dL    BUN/Creatinine ratio 11 (L) 12 - 20      GFR est AA 46 (L) >60 ml/min/1.73m2    GFR est non-AA 38 (L) >60 ml/min/1.73m2    Calcium 9.1 8.5 - 10.1 mg/dL    Phosphorus 2.6 2.6 - 4.7 mg/dL    Albumin 3.1 (L) 3.5 - 5.0 g/dL   GLUCOSE, POC    Collection Time: 05/07/22  7:49 AM   Result Value Ref Range    Glucose (POC) 278 (H) 65 - 117 mg/dL    Performed by Jony Ewing (LPN)    GLUCOSE, POC    Collection Time: 05/07/22 11:03 AM   Result Value Ref Range    Glucose (POC) 272 (H) 65 - 117 mg/dL    Performed by Denilson Cabrera          Radiology:   XR ABD ACUTE W 1 V CHEST   Final Result      CT ABD PELV WO CONT   Final Result   1. Findings consistent with appendicitis. No perforation or abscess formation. Other findings as described. Dr. Minna Avilez  was notified t 1:15 p.m. Assessment:  Problem List Items Addressed This Visit        Digestive    * (Principal) Acute appendicitis - Primary         Perforated Appendicitis   S/P Laparoscopic appendectomy with drain placement, POD #7  Resolved post op ileus    Renal insufficiency.     Plan:     1. Admission  2. Diet - Regular diet  3. IV fluids  4. SCD- ensure patient wears   5. IS  6. Pain medications  7. Antibiotics - Zosyn  8. Nausea medication  9. BMP in am  10. Continue to ambulate and sit in chair as tolerated  11. D/C planning today - once cleared by Nephrologist & follow up with Dr. Tonita Brunner.   12. Plan discussed with patient and family and answered all their questions.      Thank you for allowing me to participate in the care of this patient.

## 2022-05-07 NOTE — DISCHARGE SUMMARY
[id number] [facility] SURGERY DISCHARGE SUMMARY        Chief Complaint: abdominal pain    History of Present Illness:    Mr. Jin Palacios. is a 79y.o. year old * male presents to free standing ER with diffuse abdominal pain that started yesterday and localised to right lower quadrant. No fever or chills, nausea or vomiting. Presented to ER today and CT scan of abdomen and pelvis showed Acute appendicitis. Past Medical History:   Past Medical History:   Diagnosis Date    BPH (benign prostatic hyperplasia)     Diabetes ([address]     Hypercholesterolemia     Hypertension     Neuropathy     Restless leg syndrome     Thromboembolus (HCC)        Past Surgical History:   Past Surgical History:   Procedure Laterality Date    HX HIP REPLACEMENT Right     HX HIP REPLACEMENT Left     HX KNEE REPLACEMENT Left     HX SHOULDER ARTHROSCOPY Right     HX SHOULDER ARTHROSCOPY Left         Allergy:No Known Allergies    Social History:  reports that he has never smoked. He has never used smokeless tobacco. He reports current alcohol use. He reports that he does not use drugs.      Family History:  Family History   Problem Relation Age of Onset    Diabetes Mother     Cancer Father         Current Medications:  Current Facility-Administered Medications:     amLODIPine (NORVASC) tablet 10 mg, 10 mg, Oral, DAILY, Pattie Pham MD, 10 mg at 05/07/22 0841    *Please  patient's home medications from pharmacy prior to discharge, 1 Each, Other, PRIOR TO DISCHARGE, Chucho Rivero MD    apixaban (ELIQUIS) tablet 5 mg, 5 mg, Oral, BID, Norma Dotson PA-C, 5 mg at 05/07/22 8073    clomiPHENE (CLOMID) tablet 25 mg (Patient Supplied), 25 mg, Oral, DAILY, Norma Dotson PA-C, 25 mg at 05/07/22 0842    gabapentin (NEURONTIN) capsule 800 mg, 800 mg, Oral, TID, Norma Dotson PA-C, 800 mg at 05/07/22 0841    glipiZIDE (GLUCOTROL) tablet 10 mg, 10 mg, Oral, ACB, Norma Dotson PA-C, 10 mg at 05/07/22 0841    insulin glargine (LANTUS) injection 64 Units, 64 Units, SubCUTAneous, DAILY, Norma Dotson PA-C, 64 Units at 05/07/22 0840    metoprolol succinate (TOPROL-XL) XL tablet 100 mg, 100 mg, Oral, DAILY, Norma Dotson PA-C, 100 mg at 05/07/22 0842    pantoprazole (PROTONIX) tablet 40 mg, 40 mg, Oral, ACB, Norma Dotson PA-C, 40 mg at 05/07/22 0843    losartan (COZAAR) tablet 50 mg, 50 mg, Oral, DAILY, 50 mg at 05/07/22 0843 **AND** [Held by provider] hydroCHLOROthiazide (HYDRODIURIL) tablet 12.5 mg, 12.5 mg, Oral, DAILY, Norma Dotson PA-C, 12.5 mg at 05/06/22 0950    glucose chewable tablet 16 g, 4 Tablet, Oral, PRN, Norma Dotson PA-C    dextrose 10% infusion 0-250 mL, 0-250 mL, IntraVENous, PRN, Norma Dotson PA-C    glucagon (GLUCAGEN) injection 1 mg, 1 mg, IntraMUSCular, PRN, Norma Dotson PA-C    insulin lispro (HUMALOG) injection, , SubCUTAneous, AC&HS, Norma Dotson PA-C, 5 Units at 05/07/22 1152    lactated Ringers infusion, 100 mL/hr, IntraVENous, CONTINUOUS, Annetta Redd MD, Last Rate: 100 mL/hr at 05/06/22 1500, 100 mL/hr at 05/06/22 1500    piperacillin-tazobactam (ZOSYN) 3.375 g in 0.9% sodium chloride (MBP/ADV) 100 mL MBP, 3.375 g, IntraVENous, Q8H, Reilly Rivero MD, Last Rate: 25 mL/hr at 05/07/22 1152, 3.375 g at 05/07/22 1152    ondansetron (ZOFRAN) injection 4 mg, 4 mg, IntraVENous, Q6H PRN, Reilly Rivero MD, 4 mg at 05/03/22 1012    tamsulosin (FLOMAX) capsule 0.4 mg, 0.4 mg, Oral, DAILY, Reilly Rivero MD, 0.4 mg at 05/07/22 6954     Immunization History:   Most Recent Immunizations   Administered Date(s) Administered    COVID-19, Moderna, Primary or Immunocompromised Series, MRNA, PF, 100mcg/0.5mL 04/11/2021      Complete    Review of Systems:     Constitutional:  no fever,  no chills,  no sweats, No weakness, No fatigue, No decreased activity. Eye: No recent visual problem, No icterus, No discharge, No double vision.   Ear/Nose/Mouth/Throat: No decreased hearing, No ear pain, No nasal congestion, No sore throat. Respiratory: No shortness of breath, No cough, No sputum production, No hemoptysis, No wheezing, No cyanosis. Cardiovascular: No chest pain, No palpitations, No bradycardia, No tachycardia, No peripheral edema, No syncope. Gastrointestinal: No nausea,  No vomiting, No diarrhea, No constipation, No heartburn,  abdominal pain. Genitourinary: No dysuria, No hematuria, No change in urine stream, No urethral discharge, No lesions. Hematology/Lymphatics: No bruising tendency, No bleeding tendency, No petechiae, No swollen lymph glands. Endocrine: No excessive thirst, No polyuria, No cold intolerance, No heat intolerance, No excessive hunger. Immunologic: Not immunocompromised, No recurrent fevers, No recurrent infections. Musculoskeletal: No back pain, No neck pain, No joint pain, No muscle pain, No claudication, No decreased range of motion, No trauma. Integumentary: No rash, No pruritus, No abrasions. Neurologic: Alert and oriented X4, No abnormal balance, No headache, No confusion, No numbness, No tingling. Psychiatric: No anxiety, No depression, No renée. Physical Exam:     Vitals & Measurements:     Wt Readings from Last 3 Encounters:   04/30/22 98 kg (216 lb)   04/25/22 102.1 kg (225 lb)   02/15/22 105.7 kg (233 lb)     Temp Readings from Last 3 Encounters:   05/07/22 98.1 °F (36.7 °C)   04/25/22 97.3 °F (36.3 °C)   02/15/22 98.6 °F (37 °C) (Temporal)     BP Readings from Last 3 Encounters:   05/07/22 138/77   04/25/22 132/85   02/15/22 (!) 147/81     Pulse Readings from Last 3 Encounters:   05/07/22 (!) 59   04/25/22 (!) 53   02/15/22 72      Ht Readings from Last 3 Encounters:   05/06/22 5' 11\" (1.803 m)   04/25/22 5' 11\" (1.803 m)   02/15/22 5' 11\" (1.803 m)          General: ill appearing, in acute distress  Head: Normal  Face: Nornal  HEENT: atraumatic, PERRLA, moist mucosa, normal pharynx, normal tonsils and adenoids, normal tongue, no fluid in sinuses  Neck: Trachea midline, no carotid bruit, no masses  Chest: Normal.  Respiratory: Normal chest wall expansion, CTA B, no r/r/w, no rubs  Cardiovascular: RRR, no m/r/g, Normal S1 and S2  Abdomen: Soft, right lower quadrant tenderness with guarding & rebound, non-distended, normal bowel sounds in all quadrants, no hepatosplenomegaly, no tympany. Incision scar: none  Genitourinary: No inguinal hernia, normal external gentalia, Testis & scrotum normal, no renal angle tenderness  Rectal: deferred  Musculoskeletal: normal ROM in upper and lower extremities, No joint swelling.   Integumentary: Warm, dry, and pink, with no rash, purpura, or petechia  Heme/Lymph: No lymphadenopathy, no bruises  Neurological:Cranial Nerves II-XII grossly intact, no gross motor or sensory deficit  Psychiatric: Cooperative with normal mood, affect, and cognition      Laboratory Values:   Recent Results (from the past 24 hour(s))   GLUCOSE, POC    Collection Time: 05/06/22  4:33 PM   Result Value Ref Range    Glucose (POC) 242 (H) 65 - 117 mg/dL    Performed by Emmanuel [facility] POC    Collection Time: 05/06/22  9:20 PM   Result Value Ref Range    Glucose (POC) 295 (H) 65 - 117 mg/dL    Performed by Yaz Mansfield    RENAL FUNCTION PANEL    Collection Time: 05/07/22  1:52 AM   Result Value Ref Range    Sodium 139 136 - 145 mmol/L    Potassium 3.8 3.5 - 5.1 mmol/L    Chloride 107 97 - 108 mmol/L    CO2 25 21 - 32 mmol/L    Anion gap 7 5 - 15 mmol/L    Glucose 252 (H) 65 - 100 mg/dL    BUN 20 6 - 20 mg/dL    Creatinine 1.81 (H) 0.70 - 1.30 mg/dL    BUN/Creatinine ratio 11 (L) 12 - 20      GFR est AA 46 (L) >60 ml/min/1.73m2    GFR est non-AA 38 (L) >60 ml/min/1.73m2    Calcium 9.1 8.5 - 10.1 mg/dL    Phosphorus 2.6 2.6 - 4.7 mg/dL    Albumin 3.1 (L) 3.5 - 5.0 g/dL   GLUCOSE, POC    Collection Time: 05/07/22  7:49 AM   Result Value Ref Range    Glucose (POC) 278 (H) 65 - 117 mg/dL    Performed by Gilmar Khan (LPN)    GLUCOSE, POC    Collection Time: 05/07/22 11:03 AM   Result Value Ref Range    Glucose (POC) 272 (H) 65 - 117 mg/dL    Performed by Savita Krueger          XR ABD ACUTE W 1 V CHEST   Final Result      CT ABD PELV WO CONT   Final Result   1. Findings consistent with appendicitis. No perforation or abscess formation. Other findings as described. Dr. Colette Hampton  was notified t 1:15 p.m. Assessment:  Problem List Items Addressed This Visit        Digestive    * (Principal) Acute appendicitis - Primary           Plan:    1. Admission  2. Diet: NPO   3. IV fluids  4. SCD  5. IS  6. Pain medications  7. Antibiotics: Zosyn  8. Nausea medication  9. Labs in am  10. OR  11. Procedure/Surgery: Laparoscopic Appendectomy possible open. 12. Risk, benefits and complications including bleeding, infection, dvt, pe, mi, stroke, sepsis, injury to bowel, bladder, ureter, bowel obstruction, evisceration, dehiscence, discussed, questions answered, patient & family clearly understands and agrees with plan. All their questions were answered to their satisfaction. RN was present during entire conversation. Hospital Course:  Underwent Laparoscopic appendectomy with drain placement for perforated appendix on 5/1/22 early am.    Postop course: Had some ileus. Diet slowly advanced as tolerated. Had a little bump in creatinine due to underlying CKD. Hospitalist consulted for management of hypertension & diabetes. Nephrologist consulted appropriately and patient hydrated. \  Once tolerated diet and cleared by Nephrologist discharged on 5/7/22. Recommendation:  No strenuous activity or lifting weight greater than 20lbs for 4 weeks. Shower in am.  Continue SANDRA drain monitoring  Follow up in 1 week. Thank you for the consultation & allowing me to participate in the care of this patient.

## 2022-05-29 NOTE — PROGRESS NOTES
Physician Progress Note      Neal Jeff  [facility] #:                  [id number]  :                       1955  ADMIT DATE:       2022 10:43 AM  DISCH DATE:        2022 4:28 PM  RESPONDING  PROVIDER #:        Lara Obando MD          QUERY TEXT:    Pt admitted with and underwent Appendectomy laprascopic with drain placement with documented lysis of adhesions per  OR note. To accurately reflect the procedure performed please addend the OR note to include: The medical record reflects the following:  Risk Factors: 79year old male, diffuse abdominal pain  Clinical Indicators:  OP note - The cecum and appendix was identified. There was a lot of inflammation and exudate covering the appendix secondary to adhesions from the small bowel. The small bowel adhesions were lysed and taken down. The appendix was exposed. Treatment: Lysis of adhesions      Please email [email or web address] with any questions  Options provided:  -- Extensive/significant lysis of adhesions extending the length and difficulty of the procedure  -- Routine/not clinically significant lysis of adhesions  -- Adhesiolysis of the following tissue/organs during procedure on , Please specify tissue/organs such as intestines, omentum, peritoneum, etc.). -- Other - I will add my own diagnosis  -- Disagree - Not applicable / Not valid  -- Disagree - Clinically unable to determine / Unknown  -- Refer to Clinical Documentation Reviewer    PROVIDER RESPONSE TEXT:    Lysis of adhesions was routine and not clinically significant to patient procedure on . Query created by:  Cliff Kraus on 2022 9:24 AM      Electronically signed by:  Lara Obando MD 2022 7:16 PM

## 2022-06-14 ENCOUNTER — HOSPITAL ENCOUNTER (OUTPATIENT)
Age: 67
Discharge: HOME | End: 2022-06-14
Attending: SURGERY | Admitting: SURGERY

## 2022-06-14 VITALS
OXYGEN SATURATION: 100 % | DIASTOLIC BLOOD PRESSURE: 80 MMHG | RESPIRATION RATE: 18 BRPM | TEMPERATURE: 97.8 F | HEART RATE: 64 BPM | SYSTOLIC BLOOD PRESSURE: 148 MMHG

## 2022-06-14 RX ORDER — SODIUM CHLORIDE, SODIUM LACTATE, POTASSIUM CHLORIDE, CALCIUM CHLORIDE 600; 310; 30; 20 MG/100ML; MG/100ML; MG/100ML; MG/100ML
20 INJECTION, SOLUTION INTRAVENOUS CONTINUOUS
Status: DISCONTINUED | OUTPATIENT
Start: 2022-06-14 | End: 2022-06-14 | Stop reason: HOSPADM

## 2022-06-14 NOTE — ROUTINE PROCESS
Pt reports taking eliquis today. MD notified stated to cx procedure and reschedule. Pt notified and verbalized understanding. Pt to be rescheduled for tomorrow. Pt ambulatory off unit.

## 2022-06-15 ENCOUNTER — HOSPITAL ENCOUNTER (OUTPATIENT)
Age: 67
Discharge: HOME | End: 2022-06-15
Attending: SURGERY | Admitting: SURGERY
Payer: MEDICARE

## 2022-06-15 ENCOUNTER — ANESTHESIA (OUTPATIENT)
Dept: SURGERY | Age: 67
End: 2022-06-15
Payer: MEDICARE

## 2022-06-15 ENCOUNTER — ANESTHESIA EVENT (OUTPATIENT)
Dept: SURGERY | Age: 67
End: 2022-06-15
Payer: MEDICARE

## 2022-06-15 VITALS
BODY MASS INDEX: 31.5 KG/M2 | WEIGHT: 225 LBS | RESPIRATION RATE: 16 BRPM | HEART RATE: 59 BPM | TEMPERATURE: 97.5 F | SYSTOLIC BLOOD PRESSURE: 139 MMHG | OXYGEN SATURATION: 98 % | HEIGHT: 71 IN | DIASTOLIC BLOOD PRESSURE: 78 MMHG

## 2022-06-15 PROBLEM — L02.31 ABSCESS, GLUTEAL, LEFT: Status: ACTIVE | Noted: 2022-06-15

## 2022-06-15 LAB
GLUCOSE BLD STRIP.AUTO-MCNC: 119 MG/DL (ref 65–117)
GLUCOSE BLD STRIP.AUTO-MCNC: 72 MG/DL (ref 65–117)
PERFORMED BY, TECHID: ABNORMAL
PERFORMED BY, TECHID: NORMAL
POTASSIUM SERPL-SCNC: 5 MMOL/L (ref 3.5–5.1)

## 2022-06-15 PROCEDURE — 36415 COLL VENOUS BLD VENIPUNCTURE: CPT

## 2022-06-15 PROCEDURE — 74011000250 HC RX REV CODE- 250: Performed by: SURGERY

## 2022-06-15 PROCEDURE — 87077 CULTURE AEROBIC IDENTIFY: CPT

## 2022-06-15 PROCEDURE — 74011250636 HC RX REV CODE- 250/636: Performed by: SURGERY

## 2022-06-15 PROCEDURE — 74011250636 HC RX REV CODE- 250/636: Performed by: ANESTHESIOLOGY

## 2022-06-15 PROCEDURE — 2709999900 HC NON-CHARGEABLE SUPPLY: Performed by: SURGERY

## 2022-06-15 PROCEDURE — 76010000138 HC OR TIME 0.5 TO 1 HR: Performed by: SURGERY

## 2022-06-15 PROCEDURE — 76210000063 HC OR PH I REC FIRST 0.5 HR: Performed by: SURGERY

## 2022-06-15 PROCEDURE — 82962 GLUCOSE BLOOD TEST: CPT

## 2022-06-15 PROCEDURE — 87205 SMEAR GRAM STAIN: CPT

## 2022-06-15 PROCEDURE — 77030040361 HC SLV COMPR DVT MDII -B: Performed by: SURGERY

## 2022-06-15 PROCEDURE — 77030010509 HC AIRWY LMA MSK TELE -A: Performed by: ANESTHESIOLOGY

## 2022-06-15 PROCEDURE — 87186 SC STD MICRODIL/AGAR DIL: CPT

## 2022-06-15 PROCEDURE — 74011000250 HC RX REV CODE- 250: Performed by: ANESTHESIOLOGY

## 2022-06-15 PROCEDURE — 77030019895 HC PCKNG STRP IODO -A: Performed by: SURGERY

## 2022-06-15 PROCEDURE — 76060000032 HC ANESTHESIA 0.5 TO 1 HR: Performed by: SURGERY

## 2022-06-15 PROCEDURE — 84132 ASSAY OF SERUM POTASSIUM: CPT

## 2022-06-15 PROCEDURE — 87076 CULTURE ANAEROBE IDENT EACH: CPT

## 2022-06-15 PROCEDURE — 76210000020 HC REC RM PH II FIRST 0.5 HR: Performed by: SURGERY

## 2022-06-15 RX ORDER — SODIUM CHLORIDE 9 MG/ML
20 INJECTION, SOLUTION INTRAVENOUS CONTINUOUS
Status: DISCONTINUED | OUTPATIENT
Start: 2022-06-15 | End: 2022-06-15 | Stop reason: HOSPADM

## 2022-06-15 RX ORDER — MIDAZOLAM HYDROCHLORIDE 1 MG/ML
1 INJECTION, SOLUTION INTRAMUSCULAR; INTRAVENOUS AS NEEDED
Status: CANCELLED | OUTPATIENT
Start: 2022-06-15

## 2022-06-15 RX ORDER — SODIUM CHLORIDE 0.9 % (FLUSH) 0.9 %
5-40 SYRINGE (ML) INJECTION EVERY 8 HOURS
Status: DISCONTINUED | OUTPATIENT
Start: 2022-06-15 | End: 2022-06-15 | Stop reason: HOSPADM

## 2022-06-15 RX ORDER — LIDOCAINE HYDROCHLORIDE 20 MG/ML
INJECTION, SOLUTION EPIDURAL; INFILTRATION; INTRACAUDAL; PERINEURAL AS NEEDED
Status: DISCONTINUED | OUTPATIENT
Start: 2022-06-15 | End: 2022-06-15 | Stop reason: HOSPADM

## 2022-06-15 RX ORDER — OXYCODONE AND ACETAMINOPHEN 5; 325 MG/1; MG/1
1 TABLET ORAL AS NEEDED
Status: DISCONTINUED | OUTPATIENT
Start: 2022-06-15 | End: 2022-06-15 | Stop reason: HOSPADM

## 2022-06-15 RX ORDER — PROPOFOL 10 MG/ML
INJECTION, EMULSION INTRAVENOUS AS NEEDED
Status: DISCONTINUED | OUTPATIENT
Start: 2022-06-15 | End: 2022-06-15 | Stop reason: HOSPADM

## 2022-06-15 RX ORDER — SODIUM CHLORIDE 0.9 % (FLUSH) 0.9 %
5-40 SYRINGE (ML) INJECTION EVERY 8 HOURS
Status: CANCELLED | OUTPATIENT
Start: 2022-06-15

## 2022-06-15 RX ORDER — HYDRALAZINE HYDROCHLORIDE 20 MG/ML
10 INJECTION INTRAMUSCULAR; INTRAVENOUS
Status: DISCONTINUED | OUTPATIENT
Start: 2022-06-15 | End: 2022-06-15 | Stop reason: HOSPADM

## 2022-06-15 RX ORDER — BUPIVACAINE HYDROCHLORIDE 5 MG/ML
INJECTION, SOLUTION EPIDURAL; INTRACAUDAL AS NEEDED
Status: DISCONTINUED | OUTPATIENT
Start: 2022-06-15 | End: 2022-06-15 | Stop reason: HOSPADM

## 2022-06-15 RX ORDER — MIDAZOLAM HYDROCHLORIDE 1 MG/ML
0.5 INJECTION, SOLUTION INTRAMUSCULAR; INTRAVENOUS
Status: DISCONTINUED | OUTPATIENT
Start: 2022-06-15 | End: 2022-06-15 | Stop reason: HOSPADM

## 2022-06-15 RX ORDER — FENTANYL CITRATE 50 UG/ML
50 INJECTION, SOLUTION INTRAMUSCULAR; INTRAVENOUS
Status: DISCONTINUED | OUTPATIENT
Start: 2022-06-15 | End: 2022-06-15 | Stop reason: HOSPADM

## 2022-06-15 RX ORDER — MIDAZOLAM HYDROCHLORIDE 1 MG/ML
INJECTION, SOLUTION INTRAMUSCULAR; INTRAVENOUS AS NEEDED
Status: DISCONTINUED | OUTPATIENT
Start: 2022-06-15 | End: 2022-06-15 | Stop reason: HOSPADM

## 2022-06-15 RX ORDER — LABETALOL HCL 20 MG/4 ML
5 SYRINGE (ML) INTRAVENOUS
Status: DISCONTINUED | OUTPATIENT
Start: 2022-06-15 | End: 2022-06-15 | Stop reason: HOSPADM

## 2022-06-15 RX ORDER — FENTANYL CITRATE 50 UG/ML
50 INJECTION, SOLUTION INTRAMUSCULAR; INTRAVENOUS AS NEEDED
Status: CANCELLED | OUTPATIENT
Start: 2022-06-15

## 2022-06-15 RX ORDER — DIPHENHYDRAMINE HYDROCHLORIDE 50 MG/ML
12.5 INJECTION, SOLUTION INTRAMUSCULAR; INTRAVENOUS AS NEEDED
Status: DISCONTINUED | OUTPATIENT
Start: 2022-06-15 | End: 2022-06-15 | Stop reason: HOSPADM

## 2022-06-15 RX ORDER — SODIUM CHLORIDE 0.9 % (FLUSH) 0.9 %
5-40 SYRINGE (ML) INJECTION AS NEEDED
Status: DISCONTINUED | OUTPATIENT
Start: 2022-06-15 | End: 2022-06-15 | Stop reason: HOSPADM

## 2022-06-15 RX ORDER — FENTANYL CITRATE 50 UG/ML
INJECTION, SOLUTION INTRAMUSCULAR; INTRAVENOUS AS NEEDED
Status: DISCONTINUED | OUTPATIENT
Start: 2022-06-15 | End: 2022-06-15 | Stop reason: HOSPADM

## 2022-06-15 RX ORDER — SODIUM CHLORIDE 0.9 % (FLUSH) 0.9 %
5-40 SYRINGE (ML) INJECTION AS NEEDED
Status: CANCELLED | OUTPATIENT
Start: 2022-06-15

## 2022-06-15 RX ORDER — NORETHINDRONE AND ETHINYL ESTRADIOL 0.5-0.035
5 KIT ORAL AS NEEDED
Status: DISCONTINUED | OUTPATIENT
Start: 2022-06-15 | End: 2022-06-15 | Stop reason: HOSPADM

## 2022-06-15 RX ORDER — SODIUM CHLORIDE, SODIUM LACTATE, POTASSIUM CHLORIDE, CALCIUM CHLORIDE 600; 310; 30; 20 MG/100ML; MG/100ML; MG/100ML; MG/100ML
INJECTION, SOLUTION INTRAVENOUS
Status: DISCONTINUED | OUTPATIENT
Start: 2022-06-15 | End: 2022-06-15 | Stop reason: HOSPADM

## 2022-06-15 RX ORDER — METOPROLOL TARTRATE 5 MG/5ML
2.5 INJECTION INTRAVENOUS
Status: DISCONTINUED | OUTPATIENT
Start: 2022-06-15 | End: 2022-06-15 | Stop reason: HOSPADM

## 2022-06-15 RX ORDER — MORPHINE SULFATE 2 MG/ML
2 INJECTION, SOLUTION INTRAMUSCULAR; INTRAVENOUS
Status: DISCONTINUED | OUTPATIENT
Start: 2022-06-15 | End: 2022-06-15 | Stop reason: HOSPADM

## 2022-06-15 RX ORDER — HYDROMORPHONE HYDROCHLORIDE 1 MG/ML
0.5 INJECTION, SOLUTION INTRAMUSCULAR; INTRAVENOUS; SUBCUTANEOUS
Status: DISCONTINUED | OUTPATIENT
Start: 2022-06-15 | End: 2022-06-15 | Stop reason: HOSPADM

## 2022-06-15 RX ORDER — ONDANSETRON 2 MG/ML
4 INJECTION INTRAMUSCULAR; INTRAVENOUS AS NEEDED
Status: DISCONTINUED | OUTPATIENT
Start: 2022-06-15 | End: 2022-06-15 | Stop reason: HOSPADM

## 2022-06-15 RX ORDER — ONDANSETRON 2 MG/ML
INJECTION INTRAMUSCULAR; INTRAVENOUS AS NEEDED
Status: DISCONTINUED | OUTPATIENT
Start: 2022-06-15 | End: 2022-06-15 | Stop reason: HOSPADM

## 2022-06-15 RX ORDER — LIDOCAINE HYDROCHLORIDE 10 MG/ML
0.1 INJECTION, SOLUTION EPIDURAL; INFILTRATION; INTRACAUDAL; PERINEURAL AS NEEDED
Status: CANCELLED | OUTPATIENT
Start: 2022-06-15

## 2022-06-15 RX ORDER — DEXAMETHASONE SODIUM PHOSPHATE 4 MG/ML
INJECTION, SOLUTION INTRA-ARTICULAR; INTRALESIONAL; INTRAMUSCULAR; INTRAVENOUS; SOFT TISSUE AS NEEDED
Status: DISCONTINUED | OUTPATIENT
Start: 2022-06-15 | End: 2022-06-15 | Stop reason: HOSPADM

## 2022-06-15 RX ADMIN — FENTANYL CITRATE 50 MCG: 50 INJECTION INTRAMUSCULAR; INTRAVENOUS at 19:35

## 2022-06-15 RX ADMIN — LIDOCAINE HYDROCHLORIDE 100 MG: 20 INJECTION, SOLUTION EPIDURAL; INFILTRATION; INTRACAUDAL; PERINEURAL at 18:38

## 2022-06-15 RX ADMIN — ONDANSETRON 4 MG: 2 INJECTION INTRAMUSCULAR; INTRAVENOUS at 18:38

## 2022-06-15 RX ADMIN — SODIUM CHLORIDE 20 ML/HR: 9 INJECTION, SOLUTION INTRAVENOUS at 15:50

## 2022-06-15 RX ADMIN — SODIUM CHLORIDE, POTASSIUM CHLORIDE, SODIUM LACTATE AND CALCIUM CHLORIDE: 600; 310; 30; 20 INJECTION, SOLUTION INTRAVENOUS at 18:38

## 2022-06-15 RX ADMIN — FENTANYL CITRATE 100 MCG: 50 INJECTION, SOLUTION INTRAMUSCULAR; INTRAVENOUS at 18:38

## 2022-06-15 RX ADMIN — PROPOFOL 150 MG: 10 INJECTION, EMULSION INTRAVENOUS at 18:38

## 2022-06-15 RX ADMIN — CEFAZOLIN SODIUM 2 G: 1 INJECTION, POWDER, FOR SOLUTION INTRAMUSCULAR; INTRAVENOUS at 18:44

## 2022-06-15 RX ADMIN — DEXAMETHASONE SODIUM PHOSPHATE 4 MG: 4 INJECTION, SOLUTION INTRA-ARTICULAR; INTRALESIONAL; INTRAMUSCULAR; INTRAVENOUS; SOFT TISSUE at 18:38

## 2022-06-15 RX ADMIN — MIDAZOLAM HYDROCHLORIDE 2 MG: 2 INJECTION, SOLUTION INTRAMUSCULAR; INTRAVENOUS at 18:38

## 2022-06-15 NOTE — ANESTHESIA POSTPROCEDURE EVALUATION
Procedure(s):  Incision And Drainage Of Recurrent Left Gluteal Abscess. No value filed.     Anesthesia Post Evaluation      Multimodal analgesia: multimodal analgesia used between 6 hours prior to anesthesia start to PACU discharge  Patient location during evaluation: PACU  Patient participation: complete - patient participated  Level of consciousness: awake  Pain score: 0  Pain management: adequate  Airway patency: patent  Anesthetic complications: no  Cardiovascular status: acceptable  Respiratory status: acceptable  Hydration status: acceptable  Post anesthesia nausea and vomiting:  controlled  Final Post Anesthesia Temperature Assessment:  Normothermia (36.0-37.5 degrees C)      INITIAL Post-op Vital signs:   Vitals Value Taken Time   /86 06/15/22 1929   Temp 36.4 °C (97.6 °F) 06/15/22 1929   Pulse 82 06/15/22 1929   Resp 16 06/15/22 1929   SpO2 99 % 06/15/22 1929

## 2022-06-15 NOTE — ANESTHESIA PREPROCEDURE EVALUATION
Relevant Problems   ENDOCRINE   (+) Acquired hypothyroidism   (+) Type 2 diabetes mellitus with hyperglycemia, without long-term current use of insulin (HCC)       Anesthetic History   No history of anesthetic complications            Review of Systems / Medical History  Patient summary reviewed, nursing notes reviewed and pertinent labs reviewed    Pulmonary        Sleep apnea: CPAP           Neuro/Psych             Comments: Neuropathy Cardiovascular    Hypertension          Hyperlipidemia      Comments: Currently with left lower extremity DVT which is being treated with Eliquis. Patient last took his Eliquis on Friday 4/22/2022. GI/Hepatic/Renal         Renal disease (Creatinine 1.76): CRI       Endo/Other    Diabetes: type 2, using insulin  Hypothyroidism  Obesity     Other Findings   Comments:     Procedure Information    Case: [id number] Date/Time: 04/25/22 1500  Procedure:  Incision And Drainage Of Left Gluteal Abscess (Left )  Anesthesia type: General  Pre-op diagnosis: Abscess  Location: [facility] / [facility]  Surgeons: Sai Darden MD      Medical History  Hypertension  Thromboembolus ([address]  Diabetes ([address]  Neuropathy  Hypercholesterolemia  Restless leg syndrome  BPH (benign prostatic hyperplasia)         Past Medical History:   Diagnosis Date    BPH (benign prostatic hyperplasia)     Diabetes ([address]     Hypercholesterolemia     Hypertension     Neuropathy     Restless leg syndrome     Thromboembolus (HCC)        Past Surgical History:   Procedure Laterality Date    HX HIP REPLACEMENT Right     HX HIP REPLACEMENT Left     HX KNEE REPLACEMENT Left     HX SHOULDER ARTHROSCOPY Right     HX SHOULDER ARTHROSCOPY Left        Current Outpatient Medications   Medication Instructions    Accu-Chek Fastclix Lancet Drum misc USE TO TEST EVERY DAY    Accu-Chek Guide test strips strip USE TO CHECK BLOOD SUGAR EVERY DAY    apixaban (ELIQUIS) 5 mg, Oral, 2 TIMES DAILY    BD Veo Insulin Syringe UF 1 mL 31 gauge x 15/64\" syrg No dose, route, or frequency recorded.  clomiPHENE (CLOMID) 25 mg, Oral, DAILY    gabapentin (NEURONTIN) 800 mg tablet TAKE 1 TABLET BY MOUTH THREE TIMES A DAY    glipiZIDE SR (GLUCOTROL XL) 10 mg, Oral, DAILY    Lantus U-100 Insulin 100 unit/mL injection INJECT 64 UNITS EVERY MORNING WITH BREAKFAST    metoprolol succinate (TOPROL-XL) 100 mg tablet TAKE 1 TABLET BY MOUTH EVERY DAY    tamsulosin (FLOMAX) 0.4 mg, Oral, DAILY    tiZANidine (ZANAFLEX) 2 mg tablet TAKE 1 TABLET BY MOUTH AT BEDTIME AS NEEDED    Trulicity 8.97 LM/9.6 mL sub-q pen No dose, route, or frequency recorded.     valsartan-hydroCHLOROthiazide (DIOVAN-HCT) 160-25 mg per tablet 1 Tablet, Oral, DAILY       Current Facility-Administered Medications   Medication Dose Route Frequency    0.9% sodium chloride infusion  20 mL/hr IntraVENous CONTINUOUS       Patient Vitals for the past 24 hrs:   Temp Pulse Resp BP SpO2   06/15/22 1931 -- 61 16 -- 98 %   06/15/22 1929 36.4 °C (97.6 °F) 82 16 138/86 99 %   06/15/22 1928 36.6 °C (97.8 °F) 63 16 139/78 --   06/15/22 1520 36.4 °C (97.5 °F) 65 18 (!) 149/81 98 %       Lab Results   Component Value Date/Time    WBC 6.3 05/04/2022 02:37 AM    HGB 11.7 (L) 05/04/2022 02:37 AM    HCT 35.2 (L) 05/04/2022 02:37 AM    PLATELET 265 90/20/5481 02:37 AM    MCV 90.3 05/04/2022 02:37 AM     Lab Results   Component Value Date/Time    Sodium 139 05/07/2022 01:52 AM    Potassium 5.0 06/15/2022 03:30 PM    Chloride 107 05/07/2022 01:52 AM    CO2 25 05/07/2022 01:52 AM    Anion gap 7 05/07/2022 01:52 AM    Glucose 252 (H) 05/07/2022 01:52 AM    BUN 20 05/07/2022 01:52 AM    Creatinine 1.81 (H) 05/07/2022 01:52 AM    BUN/Creatinine ratio 11 (L) 05/07/2022 01:52 AM    GFR est AA 46 (L) 05/07/2022 01:52 AM    GFR est non-AA 38 (L) 05/07/2022 01:52 AM    Calcium 9.1 05/07/2022 01:52 AM     No results found for: APTT, PTP, INR, INREXT  Lab Results   Component Value Date/Time    Glucose 252 (H) 05/07/2022 01:52 AM    Glucose (POC) 72 06/15/2022 07:29 PM     Physical Exam    Airway  Mallampati: III  TM Distance: 4 - 6 cm  Neck ROM: normal range of motion   Mouth opening: Normal     Cardiovascular    Rhythm: regular  Rate: normal         Dental  No notable dental hx       Pulmonary  Breath sounds clear to auscultation               Abdominal  GI exam deferred       Other Findings            Anesthetic Plan    ASA: 3  Anesthesia type: general          Induction: Intravenous  Anesthetic plan and risks discussed with: Patient and Family

## 2022-06-15 NOTE — OP NOTES
OPERATIVE NOTE    Patient: Bita Huitron. YOB: 1955  MRN: [id number]    Date of Procedure: 6/15/2022     Pre-Op Diagnosis: Left Buttock Recurrent Abscess    Post-Op Diagnosis: Same as preoperative diagnosis. Procedure(s):  Complex Incision And Drainage Of Recurrent Left Gluteal Abscess    Surgeon(s):  Kateryna Rodriguez MD    Surgical Assistant: Ms. Stephanie Major    Anesthesia: General/local     Anesthesiologist: Dr. [facility]    CRNA: Ms. Sara Rodney    Indication:  Left buttock recurrent abscess with pain and tenderness. Procedure:  Patient was taken to the operative room. Patient was placed in right lateral decubitus position. After an LMA intubation left buttock area was prepped and draped usual sterile fashion. Timeout was completed. Local anesthesia was infiltrated. An incision was placed along the most fluctuant part of swelling incision was deepened through subtenons tissue. There was bloody collection of fluid with pus that was drained and suctioned out. This was sent for culture. There was a deeper pocket of pus that was extending in the subcutaneous plane as well as deeper through the muscular plane. I used my finger to probe and opened up all the pockets and drained out completely. I was not sure if there was any tract that was going deeper than the muscle to the bone. Then I went removed a piece of the muscle for culture. Thoroughly irrigated. I excised some of the extra skin and the wound was approximately 3 cm x 2 cm x 1.5cm. I irrigated the wound again. More local anesthesia was infiltrated. I went ahead and packed the open wound with half-inch iodoform. 4 x 4 dressing and tape applied. Patient tolerated procedure very well. There were no complication. Estimated blood loss was minimal.  Patient was extubated and transferred to recovery in stable condition. All counts were correct.     Estimated Blood Loss (mL): Minimal    Complications: None    Specimens:   ID Type Source Tests Collected by Time Destination   1 : left buttock drainage Wound Buttock CULTURE, WOUND W GRAM STAIN Robson Snyder MD 6/15/2022 1856 Microbiology   2 : Left Buttock Tissue Buttock CULTURE, TISSUE W GRAM STAIN Beryl Rivero MD 6/15/2022 1900 Microbiology        Implants: None    Drains: None    Findings: as above    Electronically Signed by Blanca Avina MD on 6/15/2022 at 7:50 PM

## 2022-06-15 NOTE — PERIOP NOTES
Patient alert and oriented x4, VS stable, 5/10 complaints of pain at L buttocks. Hard of hearing, bilaterally. Wife at bedside. Bed in low position, call bell within reach.

## 2022-06-15 NOTE — ROUTINE PROCESS
TRANSFER - OUT REPORT:    Verbal/bedside  report given to [facility] AT [facility] on Tracy Reyes.  being transferred to [facility] Emergency bed 24  for Report consisted of patients Situation, Background, Assessment and   Recommendations(SBAR). Information from the following report(s)  was reviewed with the receiving nurse. Opportunity for questions and clarification was provided.

## 2022-06-16 NOTE — DISCHARGE INSTRUCTIONS
No strenuous activity for 1 week. Hold Eliquis for 3 days. Shower after 48 hours. Start packing after 24 hours with 1/2 inch iodoform and cover with 4x4 gauze and tape.

## 2022-06-16 NOTE — ROUTINE PROCESS
Pt recovered on unit. Assessed dressing and remained clean, dry, and intact. AVS reviewed with pt. Pt able to teach back signs and symptoms of infection. Materials given to pt for wound care at home. IV removed without complications. Pt discharged home safely with spouse.

## 2022-06-19 LAB
BACTERIA SPEC CULT: NORMAL
GRAM STN SPEC: NORMAL
GRAM STN SPEC: NORMAL
SPECIAL REQUESTS,SREQ: NORMAL

## 2022-08-15 DIAGNOSIS — E22.1 HYPERPROLACTINEMIA (HCC): ICD-10-CM

## 2022-09-06 ENCOUNTER — HOSPITAL ENCOUNTER (EMERGENCY)
Age: 67
Discharge: HOME | End: 2022-09-06
Attending: EMERGENCY MEDICINE
Payer: MEDICARE

## 2022-09-06 ENCOUNTER — APPOINTMENT (OUTPATIENT)
Dept: CT IMAGING | Age: 67
End: 2022-09-06
Attending: EMERGENCY MEDICINE
Payer: MEDICARE

## 2022-09-06 VITALS
TEMPERATURE: 98 F | DIASTOLIC BLOOD PRESSURE: 69 MMHG | BODY MASS INDEX: 31.5 KG/M2 | HEART RATE: 76 BPM | WEIGHT: 225 LBS | OXYGEN SATURATION: 99 % | SYSTOLIC BLOOD PRESSURE: 128 MMHG | HEIGHT: 71 IN | RESPIRATION RATE: 18 BRPM

## 2022-09-06 DIAGNOSIS — R10.84 ABDOMINAL PAIN, GENERALIZED: Primary | ICD-10-CM

## 2022-09-06 LAB
ALBUMIN SERPL-MCNC: 4.3 G/DL (ref 3.5–5)
ALBUMIN/GLOB SERPL: 1.1 {RATIO} (ref 1.1–2.2)
ALP SERPL-CCNC: 79 U/L (ref 45–117)
ALT SERPL-CCNC: 118 U/L (ref 12–78)
ANION GAP SERPL CALC-SCNC: 14 MMOL/L (ref 5–15)
AST SERPL W P-5'-P-CCNC: 67 U/L (ref 15–37)
BASOPHILS # BLD: 0 K/UL (ref 0–0.1)
BASOPHILS NFR BLD: 1 % (ref 0–1)
BILIRUB SERPL-MCNC: 0.9 MG/DL (ref 0.2–1)
BUN SERPL-MCNC: 33 MG/DL (ref 6–20)
BUN/CREAT SERPL: 16 (ref 12–20)
CA-I BLD-MCNC: 10.2 MG/DL (ref 8.5–10.1)
CHLORIDE SERPL-SCNC: 103 MMOL/L (ref 97–108)
CO2 SERPL-SCNC: 21 MMOL/L (ref 21–32)
CREAT SERPL-MCNC: 2.08 MG/DL (ref 0.7–1.3)
DIFFERENTIAL METHOD BLD: NORMAL
EOSINOPHIL # BLD: 0.1 K/UL (ref 0–0.4)
EOSINOPHIL NFR BLD: 1 % (ref 0–7)
ERYTHROCYTE [DISTWIDTH] IN BLOOD BY AUTOMATED COUNT: 12.2 % (ref 11.5–14.5)
GLOBULIN SER CALC-MCNC: 3.9 G/DL (ref 2–4)
GLUCOSE SERPL-MCNC: 207 MG/DL (ref 65–100)
HCT VFR BLD AUTO: 42.4 % (ref 36.6–50.3)
HGB BLD-MCNC: 15 G/DL (ref 12.1–17)
IMM GRANULOCYTES # BLD AUTO: 0 K/UL (ref 0–0.04)
IMM GRANULOCYTES NFR BLD AUTO: 0 % (ref 0–0.5)
LIPASE SERPL-CCNC: 169 U/L (ref 73–393)
LYMPHOCYTES # BLD: 1.9 K/UL (ref 0.8–3.5)
LYMPHOCYTES NFR BLD: 25 % (ref 12–49)
MCH RBC QN AUTO: 32 PG (ref 26–34)
MCHC RBC AUTO-ENTMCNC: 35.4 G/DL (ref 30–36.5)
MCV RBC AUTO: 90.4 FL (ref 80–99)
MONOCYTES # BLD: 0.5 K/UL (ref 0–1)
MONOCYTES NFR BLD: 6 % (ref 5–13)
NEUTS SEG # BLD: 4.9 K/UL (ref 1.8–8)
NEUTS SEG NFR BLD: 67 % (ref 32–75)
PLATELET # BLD AUTO: 183 K/UL (ref 150–400)
PMV BLD AUTO: 10 FL (ref 8.9–12.9)
POTASSIUM SERPL-SCNC: 4.4 MMOL/L (ref 3.5–5.1)
PROT SERPL-MCNC: 8.2 G/DL (ref 6.4–8.2)
RBC # BLD AUTO: 4.69 M/UL (ref 4.1–5.7)
SODIUM SERPL-SCNC: 138 MMOL/L (ref 136–145)
WBC # BLD AUTO: 7.4 K/UL (ref 4.1–11.1)

## 2022-09-06 PROCEDURE — 93005 ELECTROCARDIOGRAM TRACING: CPT

## 2022-09-06 PROCEDURE — 74011250636 HC RX REV CODE- 250/636: Performed by: EMERGENCY MEDICINE

## 2022-09-06 PROCEDURE — 83690 ASSAY OF LIPASE: CPT

## 2022-09-06 PROCEDURE — 96375 TX/PRO/DX INJ NEW DRUG ADDON: CPT

## 2022-09-06 PROCEDURE — 80053 COMPREHEN METABOLIC PANEL: CPT

## 2022-09-06 PROCEDURE — 74177 CT ABD & PELVIS W/CONTRAST: CPT

## 2022-09-06 PROCEDURE — 74011000636 HC RX REV CODE- 636: Performed by: EMERGENCY MEDICINE

## 2022-09-06 PROCEDURE — 36415 COLL VENOUS BLD VENIPUNCTURE: CPT

## 2022-09-06 PROCEDURE — 85025 COMPLETE CBC W/AUTO DIFF WBC: CPT

## 2022-09-06 PROCEDURE — 96361 HYDRATE IV INFUSION ADD-ON: CPT

## 2022-09-06 PROCEDURE — 96374 THER/PROPH/DIAG INJ IV PUSH: CPT

## 2022-09-06 PROCEDURE — 99285 EMERGENCY DEPT VISIT HI MDM: CPT

## 2022-09-06 RX ORDER — HYDROCODONE BITARTRATE AND ACETAMINOPHEN 5; 325 MG/1; MG/1
1 TABLET ORAL
Qty: 12 TABLET | Refills: 0 | Status: SHIPPED | OUTPATIENT
Start: 2022-09-06 | End: 2022-09-09

## 2022-09-06 RX ORDER — ONDANSETRON 2 MG/ML
4 INJECTION INTRAMUSCULAR; INTRAVENOUS
Status: COMPLETED | OUTPATIENT
Start: 2022-09-06 | End: 2022-09-06

## 2022-09-06 RX ORDER — METHOCARBAMOL 750 MG/1
750 TABLET, FILM COATED ORAL
Qty: 20 TABLET | Refills: 0 | Status: SHIPPED | OUTPATIENT
Start: 2022-09-06

## 2022-09-06 RX ORDER — ONDANSETRON 4 MG/1
4 TABLET, FILM COATED ORAL
Qty: 20 TABLET | Refills: 0 | Status: SHIPPED | OUTPATIENT
Start: 2022-09-06

## 2022-09-06 RX ORDER — ACETAMINOPHEN 325 MG/1
650 TABLET ORAL
Qty: 20 TABLET | Refills: 0 | Status: SHIPPED | OUTPATIENT
Start: 2022-09-06

## 2022-09-06 RX ORDER — MORPHINE SULFATE 4 MG/ML
4 INJECTION INTRAVENOUS ONCE
Status: COMPLETED | OUTPATIENT
Start: 2022-09-06 | End: 2022-09-06

## 2022-09-06 RX ADMIN — SODIUM CHLORIDE 1000 ML: 9 INJECTION, SOLUTION INTRAVENOUS at 16:15

## 2022-09-06 RX ADMIN — MORPHINE SULFATE 4 MG: 4 INJECTION, SOLUTION INTRAMUSCULAR; INTRAVENOUS at 18:41

## 2022-09-06 RX ADMIN — IOPAMIDOL 70 ML: 755 INJECTION, SOLUTION INTRAVENOUS at 18:20

## 2022-09-06 RX ADMIN — ONDANSETRON 4 MG: 2 INJECTION INTRAMUSCULAR; INTRAVENOUS at 16:16

## 2022-09-06 NOTE — ED NOTES
Abdominal CT scan did not show any evidence of acute process. The patient presents with abdominal pain of uncertain etiology. Based on the patient's clinical picture at this time and the evaluation as performed above, I see no evidence for more malignant underlying processes that contribute to the patient's presenting complaint or that require admission. There is no significant evidence for significant dehydration requiring admission either. Vital signs have been reassuring. The possibility of more malignant occult pathology was discussed. It was discussed that the patient needs to return promptly with any worsening symptoms or changes, and that no emergency department workup, no matter how extensive, can definitely exclude some more serious intra-abdominal and pelvic processes early in the disease course, and that the emergency department workups can be falsely reassuring. Routine discharge counseling was given including the fact that any worsening, changing or persistent symptoms should prompt an immediate call or follow up with their primary physician or the emergency department. The importance of appropriate follow up was also discussed. More extensive discharge instructions were given in the patient's discharge paperwork. After completion of evaluation and discussion of results and diagnoses, all the questions were answered. If required, all follow up appointments and treatments were discussed and explained.

## 2022-09-06 NOTE — ED TRIAGE NOTES
Patient reports intermittent nausea and vomiting since he had his appendix removed a few months ago has seen surgeon since with no luck, reports increase abdominal pain, unable to hold down fluids or food, and is unable to take his daily medications.

## 2022-09-06 NOTE — ED PROVIDER NOTES
EMERGENCY DEPARTMENT HISTORY AND PHYSICAL EXAM      Date: 9/6/2022  Patient Name: Arjun Freeman. History of Presenting Illness     Chief Complaint   Patient presents with    Abdominal Pain    Vomiting    Dizziness       History Provided By: Patient    HPI: Prem Casanova Jr.,79year-old male with history of hypertension, hyperlipidemia, diabetes presenting with diffuse abdominal pain. Patient had a perforated appendix in May and had a laparoscopic appendectomy with drain placement at that time. He states he has been having frequent abdominal pain since then which is worse with strenuous activity. He states he has been seen by Dr. Segura for this in the past and was reassured. Patient's pain has worsened over the past 3 days with nausea, vomiting. He is unable to keep down food or his medications. He states he feels like his abdomen is swollen. No fevers. There are no other complaints, changes, or physical findings at this time. PCP: Marcia Jonas MD    No current facility-administered medications on file prior to encounter. Current Outpatient Medications on File Prior to Encounter   Medication Sig Dispense Refill    valsartan-hydroCHLOROthiazide (DIOVAN-HCT) 160-25 mg per tablet Take 1 Tablet by mouth daily. Trulicity 2.16 PU/4.9 mL sub-q pen       BD Veo Insulin Syringe UF 1 mL 31 gauge x 15/64\" syrg       clomiPHENE (CLOMID) 50 mg tablet Take 25 mg by mouth daily.       Accu-Chek Guide test strips strip USE TO CHECK BLOOD SUGAR EVERY DAY      Accu-Chek Fastclix Lancet Drum misc USE TO TEST EVERY DAY      gabapentin (NEURONTIN) 800 mg tablet TAKE 1 TABLET BY MOUTH THREE TIMES A DAY      Lantus U-100 Insulin 100 unit/mL injection INJECT 64 UNITS EVERY MORNING WITH BREAKFAST      metoprolol succinate (TOPROL-XL) 100 mg tablet TAKE 1 TABLET BY MOUTH EVERY DAY      tiZANidine (ZANAFLEX) 2 mg tablet TAKE 1 TABLET BY MOUTH AT BEDTIME AS NEEDED      glipiZIDE SR (GLUCOTROL XL) 10 mg CR tablet Take 10 mg by mouth daily. tamsulosin (FLOMAX) 0.4 mg capsule Take 0.4 mg by mouth daily. Past History     Past Medical History:  Past Medical History:   Diagnosis Date    BPH (benign prostatic hyperplasia)     Diabetes ([facility]     Hypercholesterolemia     Hypertension     Neuropathy     Restless leg syndrome     Thromboembolus ([facility] [address]        Past Surgical History:  Past Surgical History:   Procedure Laterality Date    HX HIP REPLACEMENT Right     HX HIP REPLACEMENT Left     HX KNEE REPLACEMENT Left     HX SHOULDER ARTHROSCOPY Right     HX SHOULDER ARTHROSCOPY Left        Family History:  Family History   Problem Relation Age of Onset    Diabetes Mother     Cancer Father        Social History:  Social History     Tobacco Use    Smoking status: Never    Smokeless tobacco: Never   Vaping Use    Vaping Use: Never used   Substance Use Topics    Alcohol use: Yes     Comment: rarely     Drug use: Never     Comment: medical marijuana        Allergies:  No Known Allergies    Review of Systems   Review of Systems   Constitutional:  Negative for chills and fever. HENT:  Negative for sore throat. Eyes:  Negative for redness. Respiratory:  Negative for shortness of breath. Cardiovascular:  Negative for chest pain. Gastrointestinal:  Positive for abdominal pain. Negative for nausea and vomiting. Genitourinary:  Negative for flank pain. Musculoskeletal:  Negative for myalgias. Skin:  Negative for rash. Neurological:  Negative for headaches. Physical Exam   Physical Exam  Vitals and nursing note reviewed. Constitutional:       General: He is not in acute distress. Appearance: Normal appearance. HENT:      Head: Normocephalic and atraumatic. Mouth/Throat:      Mouth: Mucous membranes are moist.   Eyes:      Extraocular Movements: Extraocular movements intact. Conjunctiva/sclera: Conjunctivae normal.   Cardiovascular:      Rate and Rhythm: Normal rate and regular rhythm. Pulmonary:      Effort: Pulmonary effort is normal. No respiratory distress. Breath sounds: Normal breath sounds. No wheezing, rhonchi or rales. Abdominal:      General: There is distension. Palpations: Abdomen is soft. There is no mass. Tenderness: There is abdominal tenderness (mild, diffuse). There is no guarding. Musculoskeletal:         General: Normal range of motion. Cervical back: Normal range of motion. Skin:     General: Skin is warm and dry. Neurological:      General: No focal deficit present. Mental Status: He is alert and oriented to person, place, and time. Mental status is at baseline. Lab and Diagnostic Study Results   Labs -     Recent Results (from the past 12 hour(s))   CBC WITH AUTOMATED DIFF    Collection Time: 09/06/22  4:15 PM   Result Value Ref Range    WBC 7.4 4.1 - 11.1 K/uL    RBC 4.69 4.10 - 5.70 M/uL    HGB 15.0 12.1 - 17.0 g/dL    HCT 42.4 36.6 - 50.3 %    MCV 90.4 80.0 - 99.0 FL    MCH 32.0 26.0 - 34.0 PG    MCHC 35.4 30.0 - 36.5 g/dL    RDW 12.2 11.5 - 14.5 %    PLATELET 110 033 - 723 K/uL    MPV 10.0 8.9 - 12.9 FL    NEUTROPHILS 67 32 - 75 %    LYMPHOCYTES 25 12 - 49 %    MONOCYTES 6 5 - 13 %    EOSINOPHILS 1 0 - 7 %    BASOPHILS 1 0 - 1 %    IMMATURE GRANULOCYTES 0 0.0 - 0.5 %    ABS. NEUTROPHILS 4.9 1.8 - 8.0 K/UL    ABS. LYMPHOCYTES 1.9 0.8 - 3.5 K/UL    ABS. MONOCYTES 0.5 0.0 - 1.0 K/UL    ABS. EOSINOPHILS 0.1 0.0 - 0.4 K/UL    ABS. BASOPHILS 0.0 0.0 - 0.1 K/UL    ABS. IMM.  GRANS. 0.0 0.00 - 0.04 K/UL    DF AUTOMATED     METABOLIC PANEL, COMPREHENSIVE    Collection Time: 09/06/22  4:15 PM   Result Value Ref Range    Sodium 138 136 - 145 mmol/L    Potassium 4.4 3.5 - 5.1 mmol/L    Chloride 103 97 - 108 mmol/L    CO2 21 21 - 32 mmol/L    Anion gap 14 5 - 15 mmol/L    Glucose 207 (H) 65 - 100 mg/dL    BUN 33 (H) 6 - 20 mg/dL    Creatinine 2.08 (H) 0.70 - 1.30 mg/dL    BUN/Creatinine ratio 16 12 - 20      GFR est AA 39 (L) >60 ml/min/1.73m2 GFR est non-AA 32 (L) >60 ml/min/1.73m2    Calcium 10.2 (H) 8.5 - 10.1 mg/dL    Bilirubin, total 0.9 0.2 - 1.0 mg/dL    AST (SGOT) 67 (H) 15 - 37 U/L    ALT (SGPT) 118 (H) 12 - 78 U/L    Alk. phosphatase 79 45 - 117 U/L    Protein, total 8.2 6.4 - 8.2 g/dL    Albumin 4.3 3.5 - 5.0 g/dL    Globulin 3.9 2.0 - 4.0 g/dL    A-G Ratio 1.1 1.1 - 2.2     LIPASE    Collection Time: 09/06/22  4:15 PM   Result Value Ref Range    Lipase 169 73 - 393 U/L       Radiologic Studies -   @[email or web address]@  CT Results  (Last 48 hours)      None          CXR Results  (Last 48 hours)      None            Medical Decision Making and ED Course   Differential Diagnosis & Medical Decision Making Provider Note:     - I am the first provider for this patient. I reviewed the vital signs, available nursing notes, past medical history, past surgical history, family history and social history. The patients presenting problems have been discussed, and they are in agreement with the care plan formulated and outlined with them. I have encouraged them to ask questions as they arise throughout their visit. Vital Signs-Reviewed the patient's vital signs. Patient Vitals for the past 12 hrs:   Temp Pulse Resp BP SpO2   09/06/22 1707 98 °F (36.7 °C) 82 16 126/74 100 %   09/06/22 1602 98.2 °F (36.8 °C) 90 18 133/77 99 %       ED Course:   ED Course as of 09/09/22 1004   Tue Sep 06, 2022   1710 NSR, rate 89, normal axis, normal intervals, no ST elevation/depression, no TWI, QTc 411   [KK]   160 26-year-old male with history of appendectomy complicated by perforation 3 months ago presenting with abdominal pain, nausea, vomiting. He reports chronic flares of abdominal pain but the symptoms are not new and worse than usual.  Has mildly distended and tympanic abdomen. Will obtain CT to rule out hernia, bowel obstruction. Will hydrate, give IV fluids and medications for pain control.  [facility]      ED Course User Index  [KK] Garrick Christina MD Procedures      Disposition   Disposition: DC- Adult Discharges: All of the diagnostic tests were reviewed and questions answered. Diagnosis, care plan and treatment options were discussed. The patient understands the instructions and will follow up as directed. The patients results have been reviewed with them. They have been counseled regarding their diagnosis. The patient verbally convey understanding and agreement of the signs, symptoms, diagnosis, treatment and prognosis and additionally agrees to follow up as recommended with their PCP in 24 - 48 hours. They also agree with the care-plan and convey that all of their questions have been answered. I have also put together some discharge instructions for them that include: 1) educational information regarding their diagnosis, 2) how to care for their diagnosis at home, as well a 3) list of reasons why they would want to return to the ED prior to their follow-up appointment, should their condition change. DISCHARGE PLAN:  1. Current Discharge Medication List        CONTINUE these medications which have NOT CHANGED    Details   valsartan-hydroCHLOROthiazide (DIOVAN-HCT) 160-25 mg per tablet Take 1 Tablet by mouth daily. Trulicity 4.14 IQ/7.9 mL sub-q pen       BD Veo Insulin Syringe UF 1 mL 31 gauge x 15/64\" syrg       clomiPHENE (CLOMID) 50 mg tablet Take 25 mg by mouth daily. Accu-Chek Guide test strips strip USE TO CHECK BLOOD SUGAR EVERY DAY      Accu-Chek Fastclix Lancet Drum misc USE TO TEST EVERY DAY      gabapentin (NEURONTIN) 800 mg tablet TAKE 1 TABLET BY MOUTH THREE TIMES A DAY      Lantus U-100 Insulin 100 unit/mL injection INJECT 64 UNITS EVERY MORNING WITH BREAKFAST      metoprolol succinate (TOPROL-XL) 100 mg tablet TAKE 1 TABLET BY MOUTH EVERY DAY      tiZANidine (ZANAFLEX) 2 mg tablet TAKE 1 TABLET BY MOUTH AT BEDTIME AS NEEDED      glipiZIDE SR (GLUCOTROL XL) 10 mg CR tablet Take 10 mg by mouth daily.       tamsulosin (FLOMAX) 0.4 mg capsule Take 0.4 mg by mouth daily. 2.   Follow-up Information       Follow up With Specialties Details Why Contact Info    Harriet Gonzalez MD Internal Medicine Physician   [address] [facility]  [phone]            3. Return to ED if worse   4. Current Discharge Medication List            Diagnosis/Clinical Impression     Clinical Impression:   1. Abdominal pain, generalized        Attestations: IMichael MD, am the primary clinician of record. Please note that this dictation was completed with "[employer] the "[employer] voice recognition software. Quite often unanticipated grammatical, syntax, homophones, and other interpretive errors are inadvertently transcribed by the computer software. Please disregard these errors. Please excuse any errors that have escaped final proofreading. Thank you.

## 2022-09-06 NOTE — ED NOTES
IV removed. Pt a&ox4. GCS 15. Pt self ambulated out of ED with discharge paperwork and personal belongings.

## 2022-09-07 LAB
ATRIAL RATE: 89 BPM
CALCULATED P AXIS, ECG09: 16 DEGREES
CALCULATED R AXIS, ECG10: -5 DEGREES
CALCULATED T AXIS, ECG11: 8 DEGREES
DIAGNOSIS, 93000: NORMAL
P-R INTERVAL, ECG05: 172 MS
Q-T INTERVAL, ECG07: 338 MS
QRS DURATION, ECG06: 80 MS
QTC CALCULATION (BEZET), ECG08: 411 MS
VENTRICULAR RATE, ECG03: 89 BPM

## 2022-09-09 ENCOUNTER — OFFICE VISIT (OUTPATIENT)
Dept: SURGERY | Age: 67
End: 2022-09-09
Payer: MEDICARE

## 2022-09-09 VITALS
SYSTOLIC BLOOD PRESSURE: 133 MMHG | HEIGHT: 71 IN | DIASTOLIC BLOOD PRESSURE: 80 MMHG | BODY MASS INDEX: 31.22 KG/M2 | OXYGEN SATURATION: 97 % | HEART RATE: 74 BPM | TEMPERATURE: 99 F | WEIGHT: 223 LBS

## 2022-09-09 DIAGNOSIS — I73.9 PERIPHERAL VASCULAR DISEASE (HCC): Primary | ICD-10-CM

## 2022-09-09 PROCEDURE — G8536 NO DOC ELDER MAL SCRN: HCPCS | Performed by: SURGERY

## 2022-09-09 PROCEDURE — 3017F COLORECTAL CA SCREEN DOC REV: CPT | Performed by: SURGERY

## 2022-09-09 PROCEDURE — G8432 DEP SCR NOT DOC, RNG: HCPCS | Performed by: SURGERY

## 2022-09-09 PROCEDURE — 1101F PT FALLS ASSESS-DOCD LE1/YR: CPT | Performed by: SURGERY

## 2022-09-09 PROCEDURE — 1123F ACP DISCUSS/DSCN MKR DOCD: CPT | Performed by: SURGERY

## 2022-09-09 PROCEDURE — G8427 DOCREV CUR MEDS BY ELIG CLIN: HCPCS | Performed by: SURGERY

## 2022-09-09 PROCEDURE — 99203 OFFICE O/P NEW LOW 30 MIN: CPT | Performed by: SURGERY

## 2022-09-09 PROCEDURE — G8417 CALC BMI ABV UP PARAM F/U: HCPCS | Performed by: SURGERY

## 2022-09-09 RX ORDER — APIXABAN 5 MG/1
5 TABLET, FILM COATED ORAL 2 TIMES DAILY
COMMUNITY
Start: 2022-08-14

## 2022-09-09 RX ORDER — CABERGOLINE 0.5 MG/1
TABLET ORAL
COMMUNITY
Start: 2022-09-02

## 2022-09-09 NOTE — PROGRESS NOTES
Chief Complaint   Patient presents with    New Patient     Referred by Dr. Elder Blood - PAD     Visit Vitals  /80 (BP 1 Location: Left upper arm, BP Patient Position: Sitting, BP Cuff Size: Adult)   Pulse 74   Temp 99 °F (37.2 °C) (Temporal)   Ht 5' 11\" (1.803 m)   Wt 223 lb (101.2 kg)   SpO2 97%   BMI 31.10 kg/m²

## 2022-09-12 PROBLEM — I73.9 PERIPHERAL VASCULAR DISEASE (HCC): Status: ACTIVE | Noted: 2022-09-12

## 2022-09-12 NOTE — PROGRESS NOTES
Vascular History and Physical    Patient: Lorel Lennox. MRN: [id number]    YOB: 1955  Age: 79 y.o. Sex: male     Chief Complaint:  Chief Complaint   Patient presents with    New Patient     Referred by Dr. Ammy Vo - PAD       History of Present Illness: Lorel Lennox. is a 79 y.o. very pleasant man is here today for vascular assessment. Patient complaining of left great toe pain. Patient recently had hip surgery as well. Patient has recovered from that. Patient had some sort of vascular test done and he was told abnormal findings. Patient denies weight loss, denies any claudication symptoms in the legs denies any chest pain shortness of breath weight loss    Social History:  Social Connections: Not on file       Past Medical History:  Past Medical History:   Diagnosis Date    BPH (benign prostatic hyperplasia)     Diabetes ([facility] [address]     Hypercholesterolemia     Hypertension     Neuropathy     Restless leg syndrome     Thromboembolus ([facility] [address]        Surgical History:  Past Surgical History:   Procedure Laterality Date    HX HIP REPLACEMENT Right     HX HIP REPLACEMENT Left     HX KNEE REPLACEMENT Left     HX SHOULDER ARTHROSCOPY Right     HX SHOULDER ARTHROSCOPY Left        Allergies: Allergies   Allergen Reactions    Tramadol Nausea and Vomiting       Current Meds:  Current Outpatient Medications   Medication Sig Dispense Refill    Eliquis 5 mg tablet Take 5 mg by mouth two (2) times a day. cabergoline (DOSTINEX) 0.5 mg tablet TAKE 0.5 TABLETS BY MOUTH TWO (2) TIMES A WEEK FOR 90 DAYS. ondansetron hcl (Zofran) 4 mg tablet Take 1 Tablet by mouth every eight (8) hours as needed for Nausea or Vomiting. 20 Tablet 0    acetaminophen (TYLENOL) 325 mg tablet Take 2 Tablets by mouth every six (6) hours as needed for Pain. 20 Tablet 0    methocarbamoL (Robaxin-750) 750 mg tablet Take 1 Tablet by mouth three (3) times daily as needed for Muscle Spasm(s).  20 Tablet 0 valsartan-hydroCHLOROthiazide (DIOVAN-HCT) 160-25 mg per tablet Take 1 Tablet by mouth daily. Trulicity 4.69 DK/8.3 mL sub-q pen       BD Veo Insulin Syringe UF 1 mL 31 gauge x 15/64\" syrg       clomiPHENE (CLOMID) 50 mg tablet Take 25 mg by mouth daily. Accu-Chek Guide test strips strip USE TO CHECK BLOOD SUGAR EVERY DAY      Accu-Chek Fastclix Lancet Drum misc USE TO TEST EVERY DAY      gabapentin (NEURONTIN) 800 mg tablet TAKE 1 TABLET BY MOUTH THREE TIMES A DAY      Lantus U-100 Insulin 100 unit/mL injection INJECT 64 UNITS EVERY MORNING WITH BREAKFAST      metoprolol succinate (TOPROL-XL) 100 mg tablet TAKE 1 TABLET BY MOUTH EVERY DAY      tiZANidine (ZANAFLEX) 2 mg tablet TAKE 1 TABLET BY MOUTH AT BEDTIME AS NEEDED      glipiZIDE SR (GLUCOTROL XL) 10 mg CR tablet Take 10 mg by mouth daily. tamsulosin (FLOMAX) 0.4 mg capsule Take 0.4 mg by mouth daily. Review of Systems:  I reviewed the rest of organ systems personally and they are negative. Pertinent findings discussed in HPI.     Physical Examination    Visit Vitals  /80 (BP 1 Location: Left upper arm, BP Patient Position: Sitting, BP Cuff Size: Adult)   Pulse 74   Temp 99 °F (37.2 °C) (Temporal)   Ht 5' 11\" (1.803 m)   Wt 223 lb (101.2 kg)   SpO2 97%   BMI 31.10 kg/m²     Gen: Well developed, well nourished 79 y.o. male in no acute distress  Head: normocephalic, atraumatic  Mouth: Clear, no overt lesions, oral mucosa pink and moist  Neck: supple, no masses, no adenopathy or carotid bruits, trachea midline  Resp: clear to auscultation bilaterally, no wheeze, rhonchi or rales, excursions normal and symmetrical  Cardio: Regular rate and rhythm, no murmurs, clicks, gallops or rubs, no edema or varicosities  Abdomen: soft, nontender, nondistended, normoactive bowel sounds, no hernias, no hepatosplenomegaly   Neuro: sensation and strength grossly intact and symmetrical  Psych: alert and oriented to person, place and time  Vascular examination: Vascular assessment shows adequate flow with arterial Doppler examination. Skin: warm and moist.    Labs:  Admission on 09/06/2022, Discharged on 09/06/2022   Component Date Value Ref Range Status    WBC 09/06/2022 7.4  4.1 - 11.1 K/uL Final    RBC 09/06/2022 4.69  4.10 - 5.70 M/uL Final    HGB 09/06/2022 15.0  12.1 - 17.0 g/dL Final    HCT 09/06/2022 42.4  36.6 - 50.3 % Final    MCV 09/06/2022 90.4  80.0 - 99.0 FL Final    MCH 09/06/2022 32.0  26.0 - 34.0 PG Final    MCHC 09/06/2022 35.4  30.0 - 36.5 g/dL Final    RDW 09/06/2022 12.2  11.5 - 14.5 % Final    PLATELET 46/52/0891 032  150 - 400 K/uL Final    MPV 09/06/2022 10.0  8.9 - 12.9 FL Final    NEUTROPHILS 09/06/2022 67  32 - 75 % Final    LYMPHOCYTES 09/06/2022 25  12 - 49 % Final    MONOCYTES 09/06/2022 6  5 - 13 % Final    EOSINOPHILS 09/06/2022 1  0 - 7 % Final    BASOPHILS 09/06/2022 1  0 - 1 % Final    IMMATURE GRANULOCYTES 09/06/2022 0  0.0 - 0.5 % Final    ABS. NEUTROPHILS 09/06/2022 4.9  1.8 - 8.0 K/UL Final    ABS. LYMPHOCYTES 09/06/2022 1.9  0.8 - 3.5 K/UL Final    ABS. MONOCYTES 09/06/2022 0.5  0.0 - 1.0 K/UL Final    ABS. EOSINOPHILS 09/06/2022 0.1  0.0 - 0.4 K/UL Final    ABS. BASOPHILS 09/06/2022 0.0  0.0 - 0.1 K/UL Final    ABS. IMM.  GRANS. 09/06/2022 0.0  0.00 - 0.04 K/UL Final    DF 09/06/2022 AUTOMATED    Final    Sodium 09/06/2022 138  136 - 145 mmol/L Final    Potassium 09/06/2022 4.4  3.5 - 5.1 mmol/L Final    Chloride 09/06/2022 103  97 - 108 mmol/L Final    CO2 09/06/2022 21  21 - 32 mmol/L Final    Anion gap 09/06/2022 14  5 - 15 mmol/L Final    Glucose 09/06/2022 207 (A) 65 - 100 mg/dL Final    BUN 09/06/2022 33 (A) 6 - 20 mg/dL Final    Creatinine 09/06/2022 2.08 (A) 0.70 - 1.30 mg/dL Final    BUN/Creatinine ratio 09/06/2022 16  12 - 20   Final    GFR est AA 09/06/2022 39 (A) >60 ml/min/1.73m2 Final    GFR est non-AA 09/06/2022 32 (A) >60 ml/min/1.73m2 Final    Comment: Estimated GFR is calculated using the IDMS-traceable Modification of Diet in Renal Disease (MDRD) Study equation, reported for both  Americans (GFRAA) and non- Americans (GFRNA), and normalized to 1.73m2 body surface area. The physician must decide which value applies to the patient. The MDRD study equation should only be used in individuals age 25 or older. It has not been validated for the following: pregnant women, patients with serious comorbid conditions, or on certain medications, or persons with extremes of body size, muscle mass, or nutritional status. Calcium 09/06/2022 10.2 (A) 8.5 - 10.1 mg/dL Final    Bilirubin, total 09/06/2022 0.9  0.2 - 1.0 mg/dL Final    AST (SGOT) 09/06/2022 67 (A) 15 - 37 U/L Final    ALT (SGPT) 09/06/2022 118 (A) 12 - 78 U/L Final    Alk. phosphatase 09/06/2022 79  45 - 117 U/L Final    Protein, total 09/06/2022 8.2  6.4 - 8.2 g/dL Final    Albumin 09/06/2022 4.3  3.5 - 5.0 g/dL Final    Globulin 09/06/2022 3.9  2.0 - 4.0 g/dL Final    A-G Ratio 09/06/2022 1.1  1.1 - 2.2   Final    Lipase 09/06/2022 169  73 - 393 U/L Final    Ventricular Rate 09/06/2022 89  BPM Final    Atrial Rate 09/06/2022 89  BPM Final    P-R Interval 09/06/2022 172  ms Final    QRS Duration 09/06/2022 80  ms Final    Q-T Interval 09/06/2022 338  ms Final    QTC Calculation (Bezet) 09/06/2022 411  ms Final    Calculated P Axis 09/06/2022 16  degrees Final    Calculated R Axis 09/06/2022 -5  degrees Final    Calculated T Axis 09/06/2022 8  degrees Final    Diagnosis 09/06/2022    Final                    Value:Normal sinus rhythm  Normal ECG  No previous ECGs available  Confirmed by RAFAEL KYLE, Valorie Rowland ([id number]) on 9/7/2022 8:24:28 AM           Images:  No images are attached to the encounter.     Kaylin Huynh MD    Assessments:  Patient Active Problem List   Diagnosis Code    Acquired hypothyroidism E03.9    Hyperprolactinemia (HCC) E22.1    BPH (benign prostatic hyperplasia) N40.0    Type 2 diabetes mellitus with hyperglycemia, without long-term current use of insulin (HCC) E11.65    DVT (deep venous thrombosis) (HCC) I82.409    Abscess L02.91    Acute appendicitis K35.80    Abscess, gluteal, left L02.31    Peripheral vascular disease (HCC) I73.9       Plans: I will arrange for duplex ultrasound of the arterial system next week. Meanwhile I will obtain RUPA results from primary care doctor's office. Patient will come see me after duplex ultrasound of the arterial system. I will schedule this test next week.           Catalina Angel MD

## 2022-09-16 DIAGNOSIS — I73.9 PVD (PERIPHERAL VASCULAR DISEASE) (HCC): Primary | ICD-10-CM

## 2022-09-23 ENCOUNTER — TELEPHONE (OUTPATIENT)
Dept: SURGERY | Age: 67
End: 2022-09-23

## 2022-09-23 NOTE — TELEPHONE ENCOUNTER
AIM information was received and is on Dr. Viral Meza desk for him to review and complete peer- to- peer.

## 2022-09-23 NOTE — TELEPHONE ENCOUNTER
Nathalie from [facility] ([facility]) called and gave me the following information--    [facility] Physician reviewed given notes and orders for the duplex ultrasound for the patient, the reviewed request was note approved due to no signs or symptoms, deemed not medically necessary. A peer to peer is an option by calling # [phone].  this information was also faxed on 9/20/22

## 2022-09-26 ENCOUNTER — TELEPHONE (OUTPATIENT)
Dept: SURGERY | Age: 67
End: 2022-09-26

## 2022-09-26 NOTE — TELEPHONE ENCOUNTER
[employer] w/ [employer] Authorization called and stated the upper duplex has been denied, no peer to peer is available but an appeal is available.  If needed please call [phone]

## 2022-09-27 NOTE — TELEPHONE ENCOUNTER
Called [employer] to inquire about the appeal process. No answer, so I left a detailed message requesting a call back.

## 2022-09-28 ENCOUNTER — HOSPITAL ENCOUNTER (OUTPATIENT)
Dept: NON INVASIVE DIAGNOSTICS | Age: 67
Discharge: HOME | End: 2022-09-28
Attending: SURGERY
Payer: MEDICARE

## 2022-09-28 DIAGNOSIS — I73.9 PVD (PERIPHERAL VASCULAR DISEASE) (HCC): ICD-10-CM

## 2022-09-28 LAB
LEFT CFA DIST SYS PSV: 88.8 CM/S
LEFT DIST ATA VELOCITY: 91.1 CM/S
LEFT DIST PTA PSV: 70.7 CM/S
LEFT PERONEAL DIST VELOCITY: 38.8 CM/S
LEFT POP A PROX VEL RATIO: 1.14
LEFT POPLITEAL PROX SYS PSV: 56.2 CM/S
LEFT SFA DIST VEL RATIO: 0.56
LEFT SFA MID VEL RATIO: 1.28
LEFT SFA PROX VEL RATIO: 0.77
LEFT SUPER FEMORAL DIST SYS PSV: 49.1 CM/S
LEFT SUPER FEMORAL MID SYS PSV: 87.3 CM/S
LEFT SUPER FEMORAL PROX SYS PSV: 68.4 CM/S

## 2022-09-28 PROCEDURE — 93926 LOWER EXTREMITY STUDY: CPT

## 2022-09-28 PROCEDURE — 93926 LOWER EXTREMITY STUDY: CPT | Performed by: SURGERY

## 2022-10-07 ENCOUNTER — HOSPITAL ENCOUNTER (EMERGENCY)
Age: 67
Discharge: HOME | End: 2022-10-07
Attending: EMERGENCY MEDICINE | Admitting: EMERGENCY MEDICINE
Payer: MEDICARE

## 2022-10-07 ENCOUNTER — APPOINTMENT (OUTPATIENT)
Dept: CT IMAGING | Age: 67
End: 2022-10-07
Attending: EMERGENCY MEDICINE
Payer: MEDICARE

## 2022-10-07 VITALS
WEIGHT: 213 LBS | BODY MASS INDEX: 29.82 KG/M2 | OXYGEN SATURATION: 98 % | RESPIRATION RATE: 116 BRPM | HEIGHT: 71 IN | TEMPERATURE: 97.5 F | DIASTOLIC BLOOD PRESSURE: 88 MMHG | HEART RATE: 64 BPM | SYSTOLIC BLOOD PRESSURE: 143 MMHG

## 2022-10-07 DIAGNOSIS — R19.7 NAUSEA VOMITING AND DIARRHEA: ICD-10-CM

## 2022-10-07 DIAGNOSIS — R11.2 NAUSEA VOMITING AND DIARRHEA: ICD-10-CM

## 2022-10-07 DIAGNOSIS — R10.84 ABDOMINAL PAIN, GENERALIZED: Primary | ICD-10-CM

## 2022-10-07 LAB
ALBUMIN SERPL-MCNC: 3.6 G/DL (ref 3.5–5)
ALBUMIN/GLOB SERPL: 0.9 {RATIO} (ref 1.1–2.2)
ALP SERPL-CCNC: 58 U/L (ref 45–117)
ALT SERPL-CCNC: 114 U/L (ref 12–78)
ANION GAP SERPL CALC-SCNC: 7 MMOL/L (ref 5–15)
AST SERPL W P-5'-P-CCNC: 60 U/L (ref 15–37)
BASOPHILS # BLD: 0 K/UL (ref 0–0.1)
BASOPHILS NFR BLD: 0 % (ref 0–1)
BILIRUB SERPL-MCNC: 0.6 MG/DL (ref 0.2–1)
BUN SERPL-MCNC: 28 MG/DL (ref 6–20)
BUN/CREAT SERPL: 13 (ref 12–20)
CA-I BLD-MCNC: 9.2 MG/DL (ref 8.5–10.1)
CHLORIDE SERPL-SCNC: 106 MMOL/L (ref 97–108)
CO2 SERPL-SCNC: 23 MMOL/L (ref 21–32)
CREAT SERPL-MCNC: 2.1 MG/DL (ref 0.7–1.3)
DIFFERENTIAL METHOD BLD: ABNORMAL
EOSINOPHIL # BLD: 0.1 K/UL (ref 0–0.4)
EOSINOPHIL NFR BLD: 1 % (ref 0–7)
ERYTHROCYTE [DISTWIDTH] IN BLOOD BY AUTOMATED COUNT: 12.4 % (ref 11.5–14.5)
GLOBULIN SER CALC-MCNC: 4.1 G/DL (ref 2–4)
GLUCOSE SERPL-MCNC: 210 MG/DL (ref 65–100)
HCT VFR BLD AUTO: 39.1 % (ref 36.6–50.3)
HGB BLD-MCNC: 13.6 G/DL (ref 12.1–17)
IMM GRANULOCYTES # BLD AUTO: 0 K/UL (ref 0–0.04)
IMM GRANULOCYTES NFR BLD AUTO: 0 % (ref 0–0.5)
INR PPP: 1.1 (ref 0.9–1.1)
LIPASE SERPL-CCNC: 263 U/L (ref 73–393)
LYMPHOCYTES # BLD: 1.9 K/UL (ref 0.8–3.5)
LYMPHOCYTES NFR BLD: 33 % (ref 12–49)
MCH RBC QN AUTO: 31.1 PG (ref 26–34)
MCHC RBC AUTO-ENTMCNC: 34.8 G/DL (ref 30–36.5)
MCV RBC AUTO: 89.3 FL (ref 80–99)
MONOCYTES # BLD: 0.6 K/UL (ref 0–1)
MONOCYTES NFR BLD: 11 % (ref 5–13)
NEUTS SEG # BLD: 3.2 K/UL (ref 1.8–8)
NEUTS SEG NFR BLD: 55 % (ref 32–75)
NRBC # BLD: 0 K/UL (ref 0–0.01)
NRBC BLD-RTO: 0 PER 100 WBC
PLATELET # BLD AUTO: 133 K/UL (ref 150–400)
PMV BLD AUTO: 10.6 FL (ref 8.9–12.9)
POTASSIUM SERPL-SCNC: 3.7 MMOL/L (ref 3.5–5.1)
PROT SERPL-MCNC: 7.7 G/DL (ref 6.4–8.2)
PROTHROMBIN TIME: 13.9 SEC (ref 11.9–14.6)
RBC # BLD AUTO: 4.38 M/UL (ref 4.1–5.7)
SODIUM SERPL-SCNC: 136 MMOL/L (ref 136–145)
WBC # BLD AUTO: 5.8 K/UL (ref 4.1–11.1)

## 2022-10-07 PROCEDURE — 85025 COMPLETE CBC W/AUTO DIFF WBC: CPT

## 2022-10-07 PROCEDURE — 83690 ASSAY OF LIPASE: CPT

## 2022-10-07 PROCEDURE — 96374 THER/PROPH/DIAG INJ IV PUSH: CPT

## 2022-10-07 PROCEDURE — 36415 COLL VENOUS BLD VENIPUNCTURE: CPT

## 2022-10-07 PROCEDURE — 99284 EMERGENCY DEPT VISIT MOD MDM: CPT

## 2022-10-07 PROCEDURE — 96375 TX/PRO/DX INJ NEW DRUG ADDON: CPT

## 2022-10-07 PROCEDURE — 85610 PROTHROMBIN TIME: CPT

## 2022-10-07 PROCEDURE — 80053 COMPREHEN METABOLIC PANEL: CPT

## 2022-10-07 PROCEDURE — 74011250636 HC RX REV CODE- 250/636: Performed by: EMERGENCY MEDICINE

## 2022-10-07 PROCEDURE — 74176 CT ABD & PELVIS W/O CONTRAST: CPT

## 2022-10-07 RX ORDER — MORPHINE SULFATE 4 MG/ML
4 INJECTION INTRAVENOUS
Status: COMPLETED | OUTPATIENT
Start: 2022-10-07 | End: 2022-10-07

## 2022-10-07 RX ORDER — HYDROCODONE BITARTRATE AND ACETAMINOPHEN 5; 325 MG/1; MG/1
1 TABLET ORAL
Qty: 15 TABLET | Refills: 0 | Status: SHIPPED | OUTPATIENT
Start: 2022-10-07 | End: 2022-10-10

## 2022-10-07 RX ORDER — ONDANSETRON 2 MG/ML
4 INJECTION INTRAMUSCULAR; INTRAVENOUS
Status: COMPLETED | OUTPATIENT
Start: 2022-10-07 | End: 2022-10-07

## 2022-10-07 RX ORDER — ONDANSETRON 4 MG/1
4 TABLET, ORALLY DISINTEGRATING ORAL
Qty: 20 TABLET | Refills: 0 | Status: SHIPPED | OUTPATIENT
Start: 2022-10-07

## 2022-10-07 RX ADMIN — ONDANSETRON 4 MG: 2 INJECTION INTRAMUSCULAR; INTRAVENOUS at 15:19

## 2022-10-07 RX ADMIN — SODIUM CHLORIDE 1000 ML: 9 INJECTION, SOLUTION INTRAVENOUS at 15:18

## 2022-10-07 RX ADMIN — MORPHINE SULFATE 4 MG: 4 INJECTION INTRAVENOUS at 15:19

## 2022-10-07 NOTE — ED PROVIDER NOTES
EMERGENCY DEPARTMENT HISTORY AND PHYSICAL EXAM      Date: 10/7/2022  Patient Name: Henry Gu. History of Presenting Illness     Chief Complaint   Patient presents with    Abdominal Pain       History Provided By: Patient    HPI: Henry Gu., 79 y.o. male presents to the emergency department with complaint of diffuse abdominal pain. Patient reports he has had mild to moderate sharp pain diffusely in the abdomen ever since an appendectomy in May of this year. Patient states for the last 3 days, the pain has become more severe, with associated nausea vomiting diarrhea. Patient states has been unable to tolerate p.o. as he vomits anything he eats or drinks. Patient denies associated fevers or chills. There are no other complaints, changes, or physical findings at this time. PCP: Ezekiel Hernandez MD    No current facility-administered medications on file prior to encounter. Current Outpatient Medications on File Prior to Encounter   Medication Sig Dispense Refill    Eliquis 5 mg tablet Take 5 mg by mouth two (2) times a day. cabergoline (DOSTINEX) 0.5 mg tablet TAKE 0.5 TABLETS BY MOUTH TWO (2) TIMES A WEEK FOR 90 DAYS. ondansetron hcl (Zofran) 4 mg tablet Take 1 Tablet by mouth every eight (8) hours as needed for Nausea or Vomiting. 20 Tablet 0    acetaminophen (TYLENOL) 325 mg tablet Take 2 Tablets by mouth every six (6) hours as needed for Pain. 20 Tablet 0    methocarbamoL (Robaxin-750) 750 mg tablet Take 1 Tablet by mouth three (3) times daily as needed for Muscle Spasm(s). 20 Tablet 0    valsartan-hydroCHLOROthiazide (DIOVAN-HCT) 160-25 mg per tablet Take 1 Tablet by mouth daily. Trulicity 6.64 EF/2.3 mL sub-q pen       BD Veo Insulin Syringe UF 1 mL 31 gauge x 15/64\" syrg       clomiPHENE (CLOMID) 50 mg tablet Take 25 mg by mouth daily.       Accu-Chek Guide test strips strip USE TO CHECK BLOOD SUGAR EVERY DAY      Accu-Chek Fastclix Lancet Drum misc USE TO TEST EVERY DAY      gabapentin (NEURONTIN) 800 mg tablet TAKE 1 TABLET BY MOUTH THREE TIMES A DAY      Lantus U-100 Insulin 100 unit/mL injection INJECT 64 UNITS EVERY MORNING WITH BREAKFAST      metoprolol succinate (TOPROL-XL) 100 mg tablet TAKE 1 TABLET BY MOUTH EVERY DAY      tiZANidine (ZANAFLEX) 2 mg tablet TAKE 1 TABLET BY MOUTH AT BEDTIME AS NEEDED      glipiZIDE SR (GLUCOTROL XL) 10 mg CR tablet Take 10 mg by mouth daily. tamsulosin (FLOMAX) 0.4 mg capsule Take 0.4 mg by mouth daily. Past History     Past Medical History:  Past Medical History:   Diagnosis Date    BPH (benign prostatic hyperplasia)     Diabetes ([facility] [address]     Hypercholesterolemia     Hypertension     Neuropathy     Restless leg syndrome     Thromboembolus ([facility] [address]        Past Surgical History:  Past Surgical History:   Procedure Laterality Date    HX HIP REPLACEMENT Right     HX HIP REPLACEMENT Left     HX KNEE REPLACEMENT Left     HX SHOULDER ARTHROSCOPY Right     HX SHOULDER ARTHROSCOPY Left        Family History:  Family History   Problem Relation Age of Onset    Diabetes Mother     Cancer Father        Social History:  Social History     Tobacco Use    Smoking status: Never    Smokeless tobacco: Never   Vaping Use    Vaping Use: Never used   Substance Use Topics    Alcohol use: Yes     Comment: rarely     Drug use: Never     Comment: medical marijuana        Allergies: Allergies   Allergen Reactions    Tramadol Nausea and Vomiting       Review of Systems   Review of Systems  Review of Systems   Constitutional: Negative for chills and fever. HENT: Negative for sinus pressure and sinus pain. Eyes: Negative for photophobia and redness. Respiratory: Negative for shortness of breath and wheezing. Cardiovascular: Negative for chest pain and palpitations. Gastrointestinal: Positive for abdominal pain and nausea. Genitourinary: Negative for flank pain and hematuria. Musculoskeletal: Negative for arthralgias and gait problem.    Skin: Negative for color change and pallor. Neurological: Negative for dizziness and weakness. Physical Exam   Physical Exam  Physical Exam  Constitutional:       General: No acute distress. Appearance: Normal appearance. Not toxic-appearing. HENT:      Head: Normocephalic and atraumatic. Nose: Nose normal.      Mouth/Throat:      Mouth: Mucous membranes are moist.   Eyes:      Extraocular Movements: Extraocular movements intact. Pupils: Pupils are equal, round, and reactive to light. Cardiovascular:      Rate and Rhythm: Normal rate. Pulses: Normal pulses. Pulmonary:      Effort: Pulmonary effort is normal.      Breath sounds: No stridor. Abdominal:      General: Abdomen is flat. There is no distension. Diffuse abdominal tenderness with mild voluntary guarding without rebound. Musculoskeletal:         General: Normal range of motion. Cervical back: Normal range of motion and neck supple. Skin:     General: Skin is warm and dry. Capillary Refill: Capillary refill takes less than 2 seconds. Neurological:      General: No focal deficit present. Mental Status: Aert and oriented to person, place, and time. Psychiatric:         Mood and Affect: Mood normal.         Behavior: Behavior normal.     Lab and Diagnostic Study Results   Labs -   No results found for this or any previous visit (from the past 12 hour(s)). Radiologic Studies -   @lastxrresult@  CT Results  (Last 48 hours)      None          CXR Results  (Last 48 hours)      None            Medical Decision Making and ED Course   Differential Diagnosis & Medical Decision Making Provider Note:       - I am the first provider for this patient. I reviewed the vital signs, available nursing notes, past medical history, past surgical history, family history and social history. The patients presenting problems have been discussed, and they are in agreement with the care plan formulated and outlined with them.   I have encouraged them to ask questions as they arise throughout their visit. Vital Signs-Reviewed the patient's vital signs. Patient Vitals for the past 12 hrs:   Temp Pulse Resp BP SpO2   10/07/22 1426 97.5 °F (36.4 °C) 78 18 129/84 98 %       ED Course:   ED Course as of 10/07/22 1729   Fri Oct 07, 2022   1727 Patient states his pain is resolved at this time. Discussed the case with his surgeon as well as the patient and his wife. Options include observation in the hospital versus treatment at home. Patient states he is comfortable with treatment at home, understands the need to follow-up with a surgeon, as well as return precautions. [CS]      ED Course User Index  [CS] Raisa Lozada MD         Disposition   Disposition: DC- Adult Discharges: All of the diagnostic tests were reviewed and questions answered. Diagnosis, care plan and treatment options were discussed. The patient understands the instructions and will follow up as directed. The patients results have been reviewed with them. They have been counseled regarding their diagnosis. The patient verbally convey understanding and agreement of the signs, symptoms, diagnosis, treatment and prognosis and additionally agrees to follow up as recommended with their PCP in 24 - 48 hours. They also agree with the care-plan and convey that all of their questions have been answered. I have also put together some discharge instructions for them that include: 1) educational information regarding their diagnosis, 2) how to care for their diagnosis at home, as well a 3) list of reasons why they would want to return to the ED prior to their follow-up appointment, should their condition change. DISCHARGE PLAN:  1. Current Discharge Medication List        CONTINUE these medications which have NOT CHANGED    Details   Eliquis 5 mg tablet Take 5 mg by mouth two (2) times a day.       cabergoline (DOSTINEX) 0.5 mg tablet TAKE 0.5 TABLETS BY MOUTH TWO (2) TIMES A WEEK FOR 90 DAYS. ondansetron hcl (Zofran) 4 mg tablet Take 1 Tablet by mouth every eight (8) hours as needed for Nausea or Vomiting. Qty: 20 Tablet, Refills: 0      acetaminophen (TYLENOL) 325 mg tablet Take 2 Tablets by mouth every six (6) hours as needed for Pain. Qty: 20 Tablet, Refills: 0      methocarbamoL (Robaxin-750) 750 mg tablet Take 1 Tablet by mouth three (3) times daily as needed for Muscle Spasm(s). Qty: 20 Tablet, Refills: 0      valsartan-hydroCHLOROthiazide (DIOVAN-HCT) 160-25 mg per tablet Take 1 Tablet by mouth daily. Trulicity 0.90 PB/2.6 mL sub-q pen       BD Veo Insulin Syringe UF 1 mL 31 gauge x 15/64\" syrg       clomiPHENE (CLOMID) 50 mg tablet Take 25 mg by mouth daily. Accu-Chek Guide test strips strip USE TO CHECK BLOOD SUGAR EVERY DAY      Accu-Chek Fastclix Lancet Drum misc USE TO TEST EVERY DAY      gabapentin (NEURONTIN) 800 mg tablet TAKE 1 TABLET BY MOUTH THREE TIMES A DAY      Lantus U-100 Insulin 100 unit/mL injection INJECT 64 UNITS EVERY MORNING WITH BREAKFAST      metoprolol succinate (TOPROL-XL) 100 mg tablet TAKE 1 TABLET BY MOUTH EVERY DAY      tiZANidine (ZANAFLEX) 2 mg tablet TAKE 1 TABLET BY MOUTH AT BEDTIME AS NEEDED      glipiZIDE SR (GLUCOTROL XL) 10 mg CR tablet Take 10 mg by mouth daily. tamsulosin (FLOMAX) 0.4 mg capsule Take 0.4 mg by mouth daily. 2.   Follow-up Information    None       3. Return to ED if worse   4. Current Discharge Medication List            Diagnosis/Clinical Impression     Clinical Impression:   1. Abdominal pain, generalized    2. Nausea vomiting and diarrhea        Attestations: Kaylen Miller MD, am the primary clinician of record. Please note that this dictation was completed with [employer] the [employer] voice recognition software. Quite often unanticipated grammatical, syntax, homophones, and other interpretive errors are inadvertently transcribed by the computer software.   Please disregard these errors. Please excuse any errors that have escaped final proofreading. Thank you.

## 2022-10-07 NOTE — DISCHARGE INSTRUCTIONS
Thank you! Thank you for allowing me to care for you in the emergency department. It is my goal to provide you with excellent care. If you have not received excellent quality care, please ask to speak to the nurse manager. Please fill out the survey that will come to you by mail or email since we listen to your feedback! Below you will find a list of your tests from today's visit. Should you have any questions, please do not hesitate to call the emergency department. Labs  Recent Results (from the past 12 hour(s))   CBC WITH AUTOMATED DIFF    Collection Time: 10/07/22  3:23 PM   Result Value Ref Range    WBC 5.8 4.1 - 11.1 K/uL    RBC 4.38 4.10 - 5.70 M/uL    HGB 13.6 12.1 - 17.0 g/dL    HCT 39.1 36.6 - 50.3 %    MCV 89.3 80.0 - 99.0 FL    MCH 31.1 26.0 - 34.0 PG    MCHC 34.8 30.0 - 36.5 g/dL    RDW 12.4 11.5 - 14.5 %    PLATELET 497 (L) 545 - 400 K/uL    MPV 10.6 8.9 - 12.9 FL    NRBC 0.0 0.0  WBC    ABSOLUTE NRBC 0.00 0.00 - 0.01 K/uL    NEUTROPHILS 55 32 - 75 %    LYMPHOCYTES 33 12 - 49 %    MONOCYTES 11 5 - 13 %    EOSINOPHILS 1 0 - 7 %    BASOPHILS 0 0 - 1 %    IMMATURE GRANULOCYTES 0 0 - 0.5 %    ABS. NEUTROPHILS 3.2 1.8 - 8.0 K/UL    ABS. LYMPHOCYTES 1.9 0.8 - 3.5 K/UL    ABS. MONOCYTES 0.6 0.0 - 1.0 K/UL    ABS. EOSINOPHILS 0.1 0.0 - 0.4 K/UL    ABS. BASOPHILS 0.0 0.0 - 0.1 K/UL    ABS. IMM. GRANS. 0.0 0.00 - 0.04 K/UL    DF AUTOMATED     METABOLIC PANEL, COMPREHENSIVE    Collection Time: 10/07/22  3:23 PM   Result Value Ref Range    Sodium 136 136 - 145 mmol/L    Potassium 3.7 3.5 - 5.1 mmol/L    Chloride 106 97 - 108 mmol/L    CO2 23 21 - 32 mmol/L    Anion gap 7 5 - 15 mmol/L    Glucose 210 (H) 65 - 100 mg/dL    BUN 28 (H) 6 - 20 mg/dL    Creatinine 2.10 (H) 0.70 - 1.30 mg/dL    BUN/Creatinine ratio 13 12 - 20      eGFR 34 (L) >60 ml/min/1.73m2    Calcium 9.2 8.5 - 10.1 mg/dL    Bilirubin, total 0.6 0.2 - 1.0 mg/dL    AST (SGOT) 60 (H) 15 - 37 U/L    ALT (SGPT) 114 (H) 12 - 78 U/L    Alk. phosphatase 58 45 - 117 U/L    Protein, total 7.7 6.4 - 8.2 g/dL    Albumin 3.6 3.5 - 5.0 g/dL    Globulin 4.1 (H) 2.0 - 4.0 g/dL    A-G Ratio 0.9 (L) 1.1 - 2.2     PROTHROMBIN TIME + INR    Collection Time: 10/07/22  3:23 PM   Result Value Ref Range    Prothrombin time 13.9 11.9 - 14.6 sec    INR 1.1 0.9 - 1.1     LIPASE    Collection Time: 10/07/22  3:23 PM   Result Value Ref Range    Lipase 263 73 - 393 U/L       Radiologic Studies  CT ABD PELV WO CONT   Final Result   No obstruction, ileus or perforation. No intraabdominal abscess. CT Results  (Last 48 hours)                 10/07/22 1630  CT ABD PELV WO CONT Final result    Impression:  No obstruction, ileus or perforation. No intraabdominal abscess. Narrative:  INDICATION: Diffuse abdominal pain, recent appendectomy. Exam: Noncontrast CT of the abdomen and pelvis is performed with 5 mm   collimation. Sagittal and coronal reformatted images were also performed. CT dose reduction was achieved through the use of a standardized protocol   tailored for this examination and automatic exposure control for dose   modulation. There is no prior study for direct comparison. FINDINGS:       The visualized lung bases are clear. Abdomen:        Liver: There is diffuse hepatic steatosis. Spleen: The spleen is normal on noncontrast images. Adrenals: The adrenals are normal on noncontrast images. Pancreas: The pancreas is normal on noncontrast images. Gallbladder: The gallbladder is normal on noncontrast images. Kidneys: There is stable bilateral perinephric stranding. There is a 7.1 cm left   renal cyst for which no further imaging is warranted. There is no   hydronephrosis. Bowel: No thickened or dilated loop of large or small bowel seen. Scattered   colonic diverticulosis is noted. Appendix: The appendix is surgically absent.        Pelvis: Urinary bladder is partially filled and grossly normal.       Miscellaneous: There is beam hardening artifact in the pelvis secondary to right   hip prosthesis There is no free intraperitoneal gas or fluid. There is no focal   fluid collection to suggest abscess. CXR Results  (Last 48 hours)      None          ------------------------------------------------------------------------------------------------------------  The exam and treatment you received in the Emergency Department were for an urgent problem and are not intended as complete care. It is important that you follow-up with a doctor, nurse practitioner, or physician assistant to:  (1) confirm your diagnosis,  (2) re-evaluation of changes in your illness and treatment, and  (3) for ongoing care. Please take your discharge instructions with you when you go to your follow-up appointment. If you have any problem arranging a follow-up appointment, contact the Emergency Department. If your symptoms become worse or you do not improve as expected and you are unable to reach your health care provider, please return to the Emergency Department. We are available 24 hours a day. If a prescription has been provided, please have it filled as soon as possible to prevent a delay in treatment. If you have any questions or reservations about taking the medication due to side effects or interactions with other medications, please call your primary care provider or contact the ER.

## 2022-10-07 NOTE — ED TRIAGE NOTES
Patient complains of severe abdominal pain. Patient states he had appendectomy in may and has had pain every since. Patient states he has not been able to eat for the past 3 days combined with nausea, vomiting and diarrhea. Pinky Angles

## 2022-10-24 ENCOUNTER — OFFICE VISIT (OUTPATIENT)
Dept: SURGERY | Age: 67
End: 2022-10-24
Payer: MEDICARE

## 2022-10-24 VITALS
TEMPERATURE: 98.4 F | SYSTOLIC BLOOD PRESSURE: 143 MMHG | DIASTOLIC BLOOD PRESSURE: 83 MMHG | HEIGHT: 71 IN | WEIGHT: 227.5 LBS | BODY MASS INDEX: 31.85 KG/M2 | OXYGEN SATURATION: 99 % | HEART RATE: 63 BPM

## 2022-10-24 DIAGNOSIS — I73.9 PERIPHERAL VASCULAR DISEASE (HCC): Primary | ICD-10-CM

## 2022-10-24 PROCEDURE — G8536 NO DOC ELDER MAL SCRN: HCPCS | Performed by: SURGERY

## 2022-10-24 PROCEDURE — 1123F ACP DISCUSS/DSCN MKR DOCD: CPT | Performed by: SURGERY

## 2022-10-24 PROCEDURE — G8427 DOCREV CUR MEDS BY ELIG CLIN: HCPCS | Performed by: SURGERY

## 2022-10-24 PROCEDURE — G8510 SCR DEP NEG, NO PLAN REQD: HCPCS | Performed by: SURGERY

## 2022-10-24 PROCEDURE — 99213 OFFICE O/P EST LOW 20 MIN: CPT | Performed by: SURGERY

## 2022-10-24 PROCEDURE — G8417 CALC BMI ABV UP PARAM F/U: HCPCS | Performed by: SURGERY

## 2022-10-24 PROCEDURE — 3017F COLORECTAL CA SCREEN DOC REV: CPT | Performed by: SURGERY

## 2022-10-24 PROCEDURE — 1101F PT FALLS ASSESS-DOCD LE1/YR: CPT | Performed by: SURGERY

## 2022-10-24 RX ORDER — CILOSTAZOL 50 MG/1
50 TABLET ORAL
Qty: 60 TABLET | Refills: 2 | Status: SHIPPED | OUTPATIENT
Start: 2022-10-24

## 2022-10-24 NOTE — PROGRESS NOTES
Identified pt with two pt identifiers (name and ). Reviewed chart in preparation for visit and have obtained necessary documentation. Kym Nunez is a 79 y.o. male  Chief Complaint   Patient presents with    Follow-up     1 month - PVD     Visit Vitals  BP (!) 143/83 (BP 1 Location: Left upper arm, BP Patient Position: Sitting, BP Cuff Size: Adult)   Pulse 63   Temp 98.4 °F (36.9 °C) (Temporal)   Ht 5' 11\" (1.803 m)   Wt 227 lb 8 oz (103.2 kg)   SpO2 99%   BMI 31.73 kg/m²       1. Have you been to the ER, urgent care clinic since your last visit? Hospitalized since your last visit? Yes - abd pain  2. Have you seen or consulted any other health care providers outside of the [address] [place] [address] since your last visit? Include any pap smears or colon screening.  No

## 2022-10-27 NOTE — PROGRESS NOTES
VASCULAR FOLLOW UP      Subjective:   CHIEF COMPLAINTS:  Left leg swelling  PRESENTATION OF ILLNESS:  Henry Gu. is a 79 y.o. very pleasant gentleman is here today for follow-up. Patient has a left leg deep vein thrombosis for which he is on Eliquis. Patient also has a small vessel disease with a PAD and he is currently on Pletal therapy. Past Medical History:   Diagnosis Date    BPH (benign prostatic hyperplasia)     Diabetes ([facility] [address]     Hypercholesterolemia     Hypertension     Neuropathy     Restless leg syndrome     Thromboembolus ([facility] [address]       Past Surgical History:   Procedure Laterality Date    HX HIP REPLACEMENT Right     HX HIP REPLACEMENT Left     HX KNEE REPLACEMENT Left     HX SHOULDER ARTHROSCOPY Right     HX SHOULDER ARTHROSCOPY Left      Family History   Problem Relation Age of Onset    Diabetes Mother     Cancer Father       Social History     Tobacco Use    Smoking status: Never    Smokeless tobacco: Never   Substance Use Topics    Alcohol use: Yes     Comment: rarely        Prior to Admission medications    Medication Sig Start Date End Date Taking? Authorizing Provider   cilostazoL (PLETAL) 50 mg tablet Take 1 Tablet by mouth Before breakfast and dinner. 10/24/22  Yes Saravanan Nolan MD   ondansetron (ZOFRAN ODT) 4 mg disintegrating tablet Take 1 Tablet by mouth every eight (8) hours as needed for Nausea or Vomiting. 10/7/22  Yes Caroline Lan MD   Eliquis 5 mg tablet Take 5 mg by mouth two (2) times a day. 8/14/22  Yes Provider, Historical   cabergoline (DOSTINEX) 0.5 mg tablet TAKE 0.5 TABLETS BY MOUTH TWO (2) TIMES A WEEK FOR 90 DAYS. 9/2/22  Yes Provider, Historical   ondansetron hcl (Zofran) 4 mg tablet Take 1 Tablet by mouth every eight (8) hours as needed for Nausea or Vomiting. 9/6/22  Yes Pablito Disla MD   acetaminophen (TYLENOL) 325 mg tablet Take 2 Tablets by mouth every six (6) hours as needed for Pain.  9/6/22  Yes Yonatan Medrano MD   methocarbamoL (BPZBIGR-328) 750 mg tablet Take 1 Tablet by mouth three (3) times daily as needed for Muscle Spasm(s). 9/6/22  Yes Pablito Disla MD   valsartan-hydroCHLOROthiazide (DIOVAN-HCT) 160-25 mg per tablet Take 1 Tablet by mouth daily. Yes Provider, Historical   Trulicity 5.97 MCMAHON/7.1 mL sub-q pen  1/5/22  Yes Provider, Historical   BD Veo Insulin Syringe UF 1 mL 31 gauge x 15/64\" syrg  1/5/22  Yes Provider, Historical   clomiPHENE (CLOMID) 50 mg tablet Take 25 mg by mouth daily. 9/23/21  Yes Provider, Historical   Accu-Chek Guide test strips strip USE TO CHECK BLOOD SUGAR EVERY DAY 5/11/21  Yes Provider, Historical   Accu-Chek Fastclix Lancet Drum misc USE TO TEST EVERY DAY 7/23/21  Yes Provider, Historical   gabapentin (NEURONTIN) 800 mg tablet TAKE 1 TABLET BY MOUTH THREE TIMES A DAY 7/21/21  Yes Provider, Historical   Lantus U-100 Insulin 100 unit/mL injection INJECT 64 UNITS EVERY MORNING WITH BREAKFAST 7/16/21  Yes Provider, Historical   metoprolol succinate (TOPROL-XL) 100 mg tablet TAKE 1 TABLET BY MOUTH EVERY DAY 7/23/21  Yes Provider, Historical   tiZANidine (ZANAFLEX) 2 mg tablet TAKE 1 TABLET BY MOUTH AT BEDTIME AS NEEDED 7/19/21  Yes Provider, Historical   glipiZIDE SR (GLUCOTROL XL) 10 mg CR tablet Take 10 mg by mouth daily. Yes Maryjo, MD Gabrielle   tamsulosin (FLOMAX) 0.4 mg capsule Take 0.4 mg by mouth daily. Yes Other, MD Gabrielle     Allergies   Allergen Reactions    Tramadol Nausea and Vomiting        Review of Systems:  I reviewed the rest of organ systems personally and they were negative signed by Dr. Bradley Perez    Objective:   Visit Vitals  BP (!) 143/83 (BP 1 Location: Left upper arm, BP Patient Position: Sitting, BP Cuff Size: Adult)   Pulse 63   Temp 98.4 °F (36.9 °C) (Temporal)   Ht 5' 11\" (1.803 m)   Wt 227 lb 8 oz (103.2 kg)   SpO2 99%   BMI 31.73 kg/m²     VITAL SIGNS REVIEWED. Physical Exam:  Patient is well-nourished pleasant in conversation is appropriate. Head and neck examination atraumatic, normocephalic. Gaze appropriate. Conversation appropriate. Neck examination shows supple. No mass. No obvious carotid bruit. Chest examination shows lungs are clear bilaterally well-expanded, no crackles or wheezes. Cardiovascular system regular rate, no obvious murmur. Skin warm to touch  and moist, no skin lesions. Abdomen is soft ,not tender or distended bowel sounds present. No palpable mass. Neurological examinations, no focal neuro deficits moving all 4 extremities. Cranial nerves intact. Sensation is intact as well. Hematologic: No obvious bruise or swelling or obvious lymphadenopathy. Psychosocial: Appropriate. Has good effect. Musculoskeletal system: No muscle wasting, appropriate movements upper and lower extremity. Vascular examination: Vascular assessment shows biphasic signal noted on left DP and PT swelling is a severe so I wrapped the patient's left leg with Ace wrap. Data Review:   Admission on 10/07/2022, Discharged on 10/07/2022   Component Date Value Ref Range Status    WBC 10/07/2022 5.8  4.1 - 11.1 K/uL Final    RBC 10/07/2022 4.38  4.10 - 5.70 M/uL Final    HGB 10/07/2022 13.6  12.1 - 17.0 g/dL Final    HCT 10/07/2022 39.1  36.6 - 50.3 % Final    MCV 10/07/2022 89.3  80.0 - 99.0 FL Final    MCH 10/07/2022 31.1  26.0 - 34.0 PG Final    MCHC 10/07/2022 34.8  30.0 - 36.5 g/dL Final    RDW 10/07/2022 12.4  11.5 - 14.5 % Final    PLATELET 28/02/7328 446 (A)  150 - 400 K/uL Final    MPV 10/07/2022 10.6  8.9 - 12.9 FL Final    NRBC 10/07/2022 0.0  0.0  WBC Final    ABSOLUTE NRBC 10/07/2022 0.00  0.00 - 0.01 K/uL Final    NEUTROPHILS 10/07/2022 55  32 - 75 % Final    LYMPHOCYTES 10/07/2022 33  12 - 49 % Final    MONOCYTES 10/07/2022 11  5 - 13 % Final    EOSINOPHILS 10/07/2022 1  0 - 7 % Final    BASOPHILS 10/07/2022 0  0 - 1 % Final    IMMATURE GRANULOCYTES 10/07/2022 0  0 - 0.5 % Final    ABS. NEUTROPHILS 10/07/2022 3.2  1.8 - 8.0 K/UL Final    ABS.  LYMPHOCYTES 10/07/2022 1.9  0.8 - 3.5 K/UL Final    ABS. MONOCYTES 10/07/2022 0.6  0.0 - 1.0 K/UL Final    ABS. EOSINOPHILS 10/07/2022 0.1  0.0 - 0.4 K/UL Final    ABS. BASOPHILS 10/07/2022 0.0  0.0 - 0.1 K/UL Final    ABS. IMM. GRANS. 10/07/2022 0.0  0.00 - 0.04 K/UL Final    DF 10/07/2022 AUTOMATED    Final    Sodium 10/07/2022 136  136 - 145 mmol/L Final    Potassium 10/07/2022 3.7  3.5 - 5.1 mmol/L Final    Chloride 10/07/2022 106  97 - 108 mmol/L Final    CO2 10/07/2022 23  21 - 32 mmol/L Final    Anion gap 10/07/2022 7  5 - 15 mmol/L Final    Glucose 10/07/2022 210 (A)  65 - 100 mg/dL Final    BUN 10/07/2022 28 (A)  6 - 20 mg/dL Final    Creatinine 10/07/2022 2.10 (A)  0.70 - 1.30 mg/dL Final    BUN/Creatinine ratio 10/07/2022 13  12 - 20   Final    eGFR 10/07/2022 34 (A)  >60 ml/min/1.73m2 Final    Calcium 10/07/2022 9.2  8.5 - 10.1 mg/dL Final    Bilirubin, total 10/07/2022 0.6  0.2 - 1.0 mg/dL Final    AST (SGOT) 10/07/2022 60 (A)  15 - 37 U/L Final    ALT (SGPT) 10/07/2022 114 (A)  12 - 78 U/L Final    Alk.  phosphatase 10/07/2022 58  45 - 117 U/L Final    Protein, total 10/07/2022 7.7  6.4 - 8.2 g/dL Final    Albumin 10/07/2022 3.6  3.5 - 5.0 g/dL Final    Globulin 10/07/2022 4.1 (A)  2.0 - 4.0 g/dL Final    A-G Ratio 10/07/2022 0.9 (A)  1.1 - 2.2   Final    Prothrombin time 10/07/2022 13.9  11.9 - 14.6 sec Final    INR 10/07/2022 1.1  0.9 - 1.1   Final    Lipase 10/07/2022 263  73 - 393 U/L Final   Hospital Outpatient Visit on 09/28/2022   Component Date Value Ref Range Status    Left SFA Prox Peter Ratio 09/28/2022 0.77   Final    Left SFA Mid Peter Ratio 09/28/2022 1.28   Final    Left SFA Dist Peter Ratio 09/28/2022 0.56   Final    Left Pop A Prox Peter Ratio 09/28/2022 1.14   Final    Left CFA dist sys PSV 09/28/2022 88.8  cm/s Final    Left Dist ANNIE Velocity 09/28/2022 91.1  cm/s Final    Left Dist PTA PSV 09/28/2022 70.7  cm/s Final    Left Dist Peroneal Velocity 09/28/2022 38.8  cm/s Final    Left super femoral dist sys PSV 09/28/2022 49.1 cm/s Final    Left super femoral mid sys PSV 09/28/2022 87.3  cm/s Final    Left super femoral prox sys PSV 09/28/2022 68.4  cm/s Final    Left popliteal prox sys PSV 09/28/2022 56.2  cm/s Final        Assessment:     Problem List Items Addressed This Visit          Circulatory    Peripheral vascular disease ([address] - Primary    Relevant Medications    cilostazoL (PLETAL) 50 mg tablet           Plan:     Patient will be reassessed in 3-month follow-up. Patient was advised to wear compression stocking or use Ace wrap to control swelling. If swelling does not improve we may have to repeat the venous duplex ultrasound to further investigate previous history of deep vein thrombosis. Patient on RUPA examination patient has small vessel disease. We will continue Pletal therapy.         Davy Hinton MD

## 2023-01-26 ENCOUNTER — OFFICE VISIT (OUTPATIENT)
Dept: SURGERY | Age: 68
End: 2023-01-26
Payer: MEDICARE

## 2023-01-26 VITALS
HEIGHT: 71 IN | HEART RATE: 71 BPM | SYSTOLIC BLOOD PRESSURE: 132 MMHG | BODY MASS INDEX: 32.41 KG/M2 | TEMPERATURE: 97.7 F | OXYGEN SATURATION: 98 % | DIASTOLIC BLOOD PRESSURE: 80 MMHG | WEIGHT: 231.5 LBS

## 2023-01-26 DIAGNOSIS — I73.9 PERIPHERAL VASCULAR DISEASE (HCC): Primary | ICD-10-CM

## 2023-01-26 DIAGNOSIS — I87.303 CHRONIC VENOUS HYPERTENSION INVOLVING BOTH SIDES: ICD-10-CM

## 2023-01-26 PROCEDURE — G8536 NO DOC ELDER MAL SCRN: HCPCS | Performed by: SURGERY

## 2023-01-26 PROCEDURE — 99213 OFFICE O/P EST LOW 20 MIN: CPT | Performed by: SURGERY

## 2023-01-26 PROCEDURE — 3017F COLORECTAL CA SCREEN DOC REV: CPT | Performed by: SURGERY

## 2023-01-26 PROCEDURE — 1123F ACP DISCUSS/DSCN MKR DOCD: CPT | Performed by: SURGERY

## 2023-01-26 PROCEDURE — G8417 CALC BMI ABV UP PARAM F/U: HCPCS | Performed by: SURGERY

## 2023-01-26 PROCEDURE — G8510 SCR DEP NEG, NO PLAN REQD: HCPCS | Performed by: SURGERY

## 2023-01-26 PROCEDURE — 1101F PT FALLS ASSESS-DOCD LE1/YR: CPT | Performed by: SURGERY

## 2023-01-26 PROCEDURE — G8427 DOCREV CUR MEDS BY ELIG CLIN: HCPCS | Performed by: SURGERY

## 2023-01-26 NOTE — PROGRESS NOTES
Identified pt with two pt identifiers (name and ). Reviewed chart in preparation for visit and have obtained necessary documentation. Quiana Simmons. is a 79 y.o. male  Chief Complaint   Patient presents with    Follow-up      3 month follow up-PVD     Visit Vitals  /80 (BP 1 Location: Left upper arm, BP Patient Position: Sitting, BP Cuff Size: Large adult)   Pulse 71   Temp 97.7 °F (36.5 °C) (Temporal)   Ht 5' 11\" (1.803 m)   Wt 231 lb 8 oz (105 kg)   SpO2 98%   BMI 32.29 kg/m²       1. Have you been to the ER, urgent care clinic since your last visit? Hospitalized since your last visit? No    2. Have you seen or consulted any other health care providers outside of the [address] [place] [address] since your last visit? Include any pap smears or colon screening.  No

## 2023-01-30 RX ORDER — CILOSTAZOL 50 MG/1
50 TABLET ORAL
Qty: 60 TABLET | Refills: 2 | Status: SHIPPED | OUTPATIENT
Start: 2023-01-30

## 2023-01-31 NOTE — PROGRESS NOTES
VASCULAR FOLLOW UP      Subjective:   CHIEF COMPLAINTS:  Leg swelling  PRESENTATION OF ILLNESS:  Erick Acevedo. is a 79 y.o. very pleasant gentleman who is here today for follow-up. Patient currently on Eliquis for deep vein thrombosis. Continued having problem with the leg swelling but not too bad. Patient wearing compression stocking as instructed. Last RUPA examination shows the patient may be suffering from small vessel disease and currently on Pletal therapy. Past Medical History:   Diagnosis Date    BPH (benign prostatic hyperplasia)     Diabetes ([facility] [address]     Hypercholesterolemia     Hypertension     Neuropathy     Restless leg syndrome     Thromboembolus ([facility] [address]       Past Surgical History:   Procedure Laterality Date    HX HIP REPLACEMENT Right     HX HIP REPLACEMENT Left     HX KNEE REPLACEMENT Left     HX SHOULDER ARTHROSCOPY Right     HX SHOULDER ARTHROSCOPY Left      Family History   Problem Relation Age of Onset    Diabetes Mother     Cancer Father       Social History     Tobacco Use    Smoking status: Never    Smokeless tobacco: Never   Substance Use Topics    Alcohol use: Yes     Comment: rarely        Prior to Admission medications    Medication Sig Start Date End Date Taking? Authorizing Provider   ondansetron (ZOFRAN ODT) 4 mg disintegrating tablet Take 1 Tablet by mouth every eight (8) hours as needed for Nausea or Vomiting. 10/7/22  Yes Francisca Villafana MD   Eliquis 5 mg tablet Take 5 mg by mouth two (2) times a day. 8/14/22  Yes Provider, Historical   cabergoline (DOSTINEX) 0.5 mg tablet TAKE 0.5 TABLETS BY MOUTH TWO (2) TIMES A WEEK FOR 90 DAYS. 9/2/22  Yes Provider, Historical   ondansetron hcl (Zofran) 4 mg tablet Take 1 Tablet by mouth every eight (8) hours as needed for Nausea or Vomiting. 9/6/22  Yes Pablito Disla MD   acetaminophen (TYLENOL) 325 mg tablet Take 2 Tablets by mouth every six (6) hours as needed for Pain.  9/6/22  Yes Lima Barakat MD   methocarbamoL (Robaxin-750) 750 mg tablet Take 1 Tablet by mouth three (3) times daily as needed for Muscle Spasm(s). 9/6/22  Yes Pablito Disla MD   valsartan-hydroCHLOROthiazide (DIOVAN-HCT) 160-25 mg per tablet Take 1 Tablet by mouth daily. Yes Provider, Historical   Trulicity 2.14 PA/1.9 mL sub-q pen  1/5/22  Yes Provider, Historical   BD Veo Insulin Syringe UF 1 mL 31 gauge x 15/64\" syrg  1/5/22  Yes Provider, Historical   clomiPHENE (CLOMID) 50 mg tablet Take 25 mg by mouth daily. 9/23/21  Yes Provider, Historical   Accu-Chek Guide test strips strip USE TO CHECK BLOOD SUGAR EVERY DAY 5/11/21  Yes Provider, Historical   Accu-Chek Fastclix Lancet Drum misc USE TO TEST EVERY DAY 7/23/21  Yes Provider, Historical   gabapentin (NEURONTIN) 800 mg tablet TAKE 1 TABLET BY MOUTH THREE TIMES A DAY 7/21/21  Yes Provider, Historical   Lantus U-100 Insulin 100 unit/mL injection INJECT 64 UNITS EVERY MORNING WITH BREAKFAST 7/16/21  Yes Provider, Historical   metoprolol succinate (TOPROL-XL) 100 mg tablet TAKE 1 TABLET BY MOUTH EVERY DAY 7/23/21  Yes Provider, Historical   tiZANidine (ZANAFLEX) 2 mg tablet TAKE 1 TABLET BY MOUTH AT BEDTIME AS NEEDED 7/19/21  Yes Provider, Historical   glipiZIDE SR (GLUCOTROL XL) 10 mg CR tablet Take 10 mg by mouth daily. Yes Other, MD Gabrielle   tamsulosin (FLOMAX) 0.4 mg capsule Take 0.4 mg by mouth daily. Yes Other, MD Gabrielle   cilostazoL (PLETAL) 50 mg tablet TAKE 1 TABLET BY MOUTH BEFORE BREAKFAST AND DINNER.  1/30/23   Ted, Charo Yusuf MD     Allergies   Allergen Reactions    Tramadol Nausea and Vomiting        Review of Systems:  I reviewed the rest of organ systems personally and they were negative signed by Dr. Bethany Roberts    Objective:   Visit Vitals  /80 (BP 1 Location: Left upper arm, BP Patient Position: Sitting, BP Cuff Size: Large adult)   Pulse 71   Temp 97.7 °F (36.5 °C) (Temporal)   Ht 5' 11\" (1.803 m)   Wt 231 lb 8 oz (105 kg)   SpO2 98%   BMI 32.29 kg/m²     VITAL SIGNS REVIEWED. Physical Exam:  Patient is well-nourished pleasant in conversation is appropriate. Head and neck examination atraumatic, normocephalic. Gaze appropriate. Conversation appropriate. Neck examination shows supple. No mass. No obvious carotid bruit. Chest examination shows lungs are clear bilaterally well-expanded, no crackles or wheezes. Cardiovascular system regular rate, no obvious murmur. Skin warm to touch  and moist, no skin lesions. Abdomen is soft ,not tender or distended bowel sounds present. No palpable mass. Neurological examinations, no focal neuro deficits moving all 4 extremities. Cranial nerves intact. Sensation is intact as well. Hematologic: No obvious bruise or swelling or obvious lymphadenopathy. Psychosocial: Appropriate. Has good effect. Musculoskeletal system: No muscle wasting, appropriate movements upper and lower extremity. Vascular examination: Vascular examination shows strong Doppler signal noted biphasic both DP and PT. Swelling is mild. Varicosities is noted. Patient has CEAP C3 venous disorder. Data Review:   No visits with results within 1 Month(s) from this visit.    Latest known visit with results is:   Admission on 10/07/2022, Discharged on 10/07/2022   Component Date Value Ref Range Status    WBC 10/07/2022 5.8  4.1 - 11.1 K/uL Final    RBC 10/07/2022 4.38  4.10 - 5.70 M/uL Final    HGB 10/07/2022 13.6  12.1 - 17.0 g/dL Final    HCT 10/07/2022 39.1  36.6 - 50.3 % Final    MCV 10/07/2022 89.3  80.0 - 99.0 FL Final    MCH 10/07/2022 31.1  26.0 - 34.0 PG Final    MCHC 10/07/2022 34.8  30.0 - 36.5 g/dL Final    RDW 10/07/2022 12.4  11.5 - 14.5 % Final    PLATELET 41/27/8775 648 (A)  150 - 400 K/uL Final    MPV 10/07/2022 10.6  8.9 - 12.9 FL Final    NRBC 10/07/2022 0.0  0.0  WBC Final    ABSOLUTE NRBC 10/07/2022 0.00  0.00 - 0.01 K/uL Final    NEUTROPHILS 10/07/2022 55  32 - 75 % Final    LYMPHOCYTES 10/07/2022 33  12 - 49 % Final MONOCYTES 10/07/2022 11  5 - 13 % Final    EOSINOPHILS 10/07/2022 1  0 - 7 % Final    BASOPHILS 10/07/2022 0  0 - 1 % Final    IMMATURE GRANULOCYTES 10/07/2022 0  0 - 0.5 % Final    ABS. NEUTROPHILS 10/07/2022 3.2  1.8 - 8.0 K/UL Final    ABS. LYMPHOCYTES 10/07/2022 1.9  0.8 - 3.5 K/UL Final    ABS. MONOCYTES 10/07/2022 0.6  0.0 - 1.0 K/UL Final    ABS. EOSINOPHILS 10/07/2022 0.1  0.0 - 0.4 K/UL Final    ABS. BASOPHILS 10/07/2022 0.0  0.0 - 0.1 K/UL Final    ABS. IMM. GRANS. 10/07/2022 0.0  0.00 - 0.04 K/UL Final    DF 10/07/2022 AUTOMATED    Final    Sodium 10/07/2022 136  136 - 145 mmol/L Final    Potassium 10/07/2022 3.7  3.5 - 5.1 mmol/L Final    Chloride 10/07/2022 106  97 - 108 mmol/L Final    CO2 10/07/2022 23  21 - 32 mmol/L Final    Anion gap 10/07/2022 7  5 - 15 mmol/L Final    Glucose 10/07/2022 210 (A)  65 - 100 mg/dL Final    BUN 10/07/2022 28 (A)  6 - 20 mg/dL Final    Creatinine 10/07/2022 2.10 (A)  0.70 - 1.30 mg/dL Final    BUN/Creatinine ratio 10/07/2022 13  12 - 20   Final    eGFR 10/07/2022 34 (A)  >60 ml/min/1.73m2 Final    Calcium 10/07/2022 9.2  8.5 - 10.1 mg/dL Final    Bilirubin, total 10/07/2022 0.6  0.2 - 1.0 mg/dL Final    AST (SGOT) 10/07/2022 60 (A)  15 - 37 U/L Final    ALT (SGPT) 10/07/2022 114 (A)  12 - 78 U/L Final    Alk. phosphatase 10/07/2022 58  45 - 117 U/L Final    Protein, total 10/07/2022 7.7  6.4 - 8.2 g/dL Final    Albumin 10/07/2022 3.6  3.5 - 5.0 g/dL Final    Globulin 10/07/2022 4.1 (A)  2.0 - 4.0 g/dL Final    A-G Ratio 10/07/2022 0.9 (A)  1.1 - 2.2   Final    Prothrombin time 10/07/2022 13.9  11.9 - 14.6 sec Final    INR 10/07/2022 1.1  0.9 - 1.1   Final    Lipase 10/07/2022 263  73 - 393 U/L Final        Assessment:     Problem List Items Addressed This Visit    None          Plan:     Patient will continue conservative management for leg swelling for chronic venous hypertension with a complication with a deep vein thrombosis.   We will maintain current compression stocking gradient. Patient was reminded again about PAD with a small vessel disease we will continue maintain Pletal therapy. Patient was encouraged to continue to be active and exercising keep hydration optimal.  Continue participating exercise program.  Patient be reassessed in 6-month follow-up for surveillance vascular examination.         Danay Sparks MD

## 2023-02-23 ENCOUNTER — HOSPITAL ENCOUNTER (OUTPATIENT)
Dept: GENERAL RADIOLOGY | Age: 68
Discharge: HOME | End: 2023-02-23
Payer: MEDICARE

## 2023-02-23 ENCOUNTER — TRANSCRIBE ORDER (OUTPATIENT)
Dept: GENERAL RADIOLOGY | Age: 68
End: 2023-02-23

## 2023-02-23 DIAGNOSIS — M79.605 PAIN IN LEFT LEG: Primary | ICD-10-CM

## 2023-02-23 DIAGNOSIS — M79.605 PAIN IN LEFT LEG: ICD-10-CM

## 2023-02-23 PROCEDURE — 73590 X-RAY EXAM OF LOWER LEG: CPT

## 2023-03-06 ENCOUNTER — TELEPHONE (OUTPATIENT)
Dept: SURGERY | Age: 68
End: 2023-03-06

## 2023-03-06 NOTE — TELEPHONE ENCOUNTER
Ms Sky Aledemarcus called this afternoon and wanted to let Dr Gracie Tapia know that his left foot has been swollen for about a week and not going down, [phone] please call when able

## 2023-03-07 NOTE — TELEPHONE ENCOUNTER
Spoke to patient he is stating his foot has been swollen for about a week, when asked if it was painful he stated not too much, when asked if it was warm to the touch patient stated \"I am unable to reach my feet and I have shoes on right now\". Patient also mentioned since starting petal he has been very itchy. I advised patient that may be a reaction and he may have to stop the medication, I advised  patient to go to the ER to get evaluated for a blood clot. He stated he would go later due to having to be home with his elderly parents until his wife gets home but he will call us after he goes and lets us know what is said.

## 2023-03-14 ENCOUNTER — HOSPITAL ENCOUNTER (EMERGENCY)
Age: 68
Discharge: HOME | End: 2023-03-14
Attending: EMERGENCY MEDICINE
Payer: MEDICARE

## 2023-03-14 ENCOUNTER — HOSPITAL ENCOUNTER (EMERGENCY)
Age: 68
Discharge: HOME | End: 2023-03-14
Attending: FAMILY MEDICINE
Payer: MEDICARE

## 2023-03-14 ENCOUNTER — HOSPITAL ENCOUNTER (OUTPATIENT)
Dept: NON INVASIVE DIAGNOSTICS | Age: 68
Discharge: HOME | End: 2023-03-14
Payer: MEDICARE

## 2023-03-14 VITALS
BODY MASS INDEX: 30.8 KG/M2 | HEIGHT: 71 IN | DIASTOLIC BLOOD PRESSURE: 75 MMHG | TEMPERATURE: 98.4 F | HEART RATE: 81 BPM | RESPIRATION RATE: 19 BRPM | OXYGEN SATURATION: 97 % | SYSTOLIC BLOOD PRESSURE: 137 MMHG | WEIGHT: 220 LBS

## 2023-03-14 VITALS
BODY MASS INDEX: 30.8 KG/M2 | HEIGHT: 71 IN | WEIGHT: 220 LBS | HEART RATE: 78 BPM | OXYGEN SATURATION: 100 % | DIASTOLIC BLOOD PRESSURE: 70 MMHG | RESPIRATION RATE: 18 BRPM | SYSTOLIC BLOOD PRESSURE: 129 MMHG | TEMPERATURE: 97.8 F

## 2023-03-14 DIAGNOSIS — I82.4Y2 ACUTE DEEP VEIN THROMBOSIS (DVT) OF PROXIMAL END OF LEFT LOWER EXTREMITY (HCC): Primary | ICD-10-CM

## 2023-03-14 DIAGNOSIS — I82.4Y9 DEEP VEIN THROMBOSIS (DVT) OF PROXIMAL LOWER EXTREMITY, UNSPECIFIED CHRONICITY, UNSPECIFIED LATERALITY (HCC): Primary | ICD-10-CM

## 2023-03-14 DIAGNOSIS — M79.89 SWELLING OF LOWER EXTREMITY: Primary | ICD-10-CM

## 2023-03-14 PROCEDURE — 99283 EMERGENCY DEPT VISIT LOW MDM: CPT

## 2023-03-14 PROCEDURE — 99284 EMERGENCY DEPT VISIT MOD MDM: CPT

## 2023-03-14 PROCEDURE — 93971 EXTREMITY STUDY: CPT

## 2023-03-14 RX ORDER — APIXABAN 5 MG (74)
KIT ORAL
Qty: 1 DOSE PACK | Refills: 0 | Status: SHIPPED | OUTPATIENT
Start: 2023-03-14

## 2023-03-14 NOTE — ED TRIAGE NOTES
Pt reports left lower leg pain and swelling x 2 weeks with onset of increased swelling to left foot. Pt states he stayed home from work for 2 days with it elevated with no resolve. Hx of blood clots, stopped blood thinners about 6 months ago.

## 2023-03-14 NOTE — ED TRIAGE NOTES
Seen at the [facility] for left leg pain and swelling for two weeks, duplex performed and was told he has a blood clot in left leg.

## 2023-03-14 NOTE — ED PROVIDER NOTES
[facility] EMERGENCY DEPT  EMERGENCY DEPARTMENT HISTORY AND PHYSICAL EXAM      Date: 3/14/2023  Patient Name: Laury Hernandez. MRN: [id number]  YOB: 1955  Date of evaluation: 3/14/2023  Provider: Joel Boone MD   Note Started: 7:14 PM 3/14/23    HISTORY OF PRESENT ILLNESS     Chief Complaint   Patient presents with    Leg Pain       History Provided By: Patient    HPI: Laury Hernandez., 76 y.o. male with a past medical history of BPH, diabetes, hypercholesterolemia, hypertension and VTE presents for evaluation of DVT in his left leg. Patient was seen at the [facility] emergency department earlier today. He came here for DVT ultrasound which was positive. Patient states he has had 2 unprovoked DVTs previously. Has had a good response to Eliquis however it stopped about a year ago. For last 2 weeks patient has had pain and swelling in his left lower extremity which prompted him to seek medical care. He denies any chest pain or shortness of breath. Is otherwise been in his normal state of health. PAST MEDICAL HISTORY   Past Medical History:  Past Medical History:   Diagnosis Date    BPH (benign prostatic hyperplasia)     Diabetes ([address]     Hypercholesterolemia     Hypertension     Neuropathy     Restless leg syndrome     Thromboembolus ([facility] [address]        Past Surgical History:  Past Surgical History:   Procedure Laterality Date    HX HIP REPLACEMENT Right     HX HIP REPLACEMENT Left     HX KNEE REPLACEMENT Left     HX SHOULDER ARTHROSCOPY Right     HX SHOULDER ARTHROSCOPY Left        Family History:  Family History   Problem Relation Age of Onset    Diabetes Mother     Cancer Father        Social History:  Social History     Tobacco Use    Smoking status: Never    Smokeless tobacco: Never   Vaping Use    Vaping Use: Never used   Substance Use Topics    Alcohol use: Yes     Comment: rarely     Drug use: Never     Comment: medical marijuana        Allergies:   Allergies   Allergen Reactions    Tramadol Nausea and Vomiting       PCP: Shadia León MD    Current Meds:   Previous Medications    ACCU-CHEK FASTCLIX LANCET DRUM MISC    USE TO TEST EVERY DAY    ACCU-CHEK GUIDE TEST STRIPS STRIP    USE TO CHECK BLOOD SUGAR EVERY DAY    ACETAMINOPHEN (TYLENOL) 325 MG TABLET    Take 2 Tablets by mouth every six (6) hours as needed for Pain. BD VEO INSULIN SYRINGE UF 1 ML 31 GAUGE X 15/64\" SYRG        CABERGOLINE (DOSTINEX) 0.5 MG TABLET    TAKE 0.5 TABLETS BY MOUTH TWO (2) TIMES A WEEK FOR 90 DAYS. CILOSTAZOL (PLETAL) 50 MG TABLET    TAKE 1 TABLET BY MOUTH BEFORE BREAKFAST AND DINNER. CLOMIPHENE (CLOMID) 50 MG TABLET    Take 25 mg by mouth daily. GABAPENTIN (NEURONTIN) 800 MG TABLET    TAKE 1 TABLET BY MOUTH THREE TIMES A DAY    GLIPIZIDE SR (GLUCOTROL XL) 10 MG CR TABLET    Take 10 mg by mouth daily. LANTUS U-100 INSULIN 100 UNIT/ML INJECTION    INJECT 64 UNITS EVERY MORNING WITH BREAKFAST    METHOCARBAMOL (ROBAXIN-750) 750 MG TABLET    Take 1 Tablet by mouth three (3) times daily as needed for Muscle Spasm(s). METOPROLOL SUCCINATE (TOPROL-XL) 100 MG TABLET    TAKE 1 TABLET BY MOUTH EVERY DAY    ONDANSETRON (ZOFRAN ODT) 4 MG DISINTEGRATING TABLET    Take 1 Tablet by mouth every eight (8) hours as needed for Nausea or Vomiting. ONDANSETRON HCL (ZOFRAN) 4 MG TABLET    Take 1 Tablet by mouth every eight (8) hours as needed for Nausea or Vomiting.    TAMSULOSIN (FLOMAX) 0.4 MG CAPSULE    Take 0.4 mg by mouth daily. TIZANIDINE (ZANAFLEX) 2 MG TABLET    TAKE 1 TABLET BY MOUTH AT BEDTIME AS NEEDED    TRULICITY 7.05 IA/1.7 ML SUB-Q PEN        VALSARTAN-HYDROCHLOROTHIAZIDE (DIOVAN-HCT) 160-25 MG PER TABLET    Take 1 Tablet by mouth daily. REVIEW OF SYSTEMS     Positives and Pertinent negatives as per HPI.     PHYSICAL EXAM     ED Triage Vitals   ED Encounter Vitals Group      BP 03/14/23 1651 126/72      Pulse (Heart Rate) 03/14/23 1651 72      Resp Rate 03/14/23 1651 20      Temp 03/14/23 1651 97.8 °F (36.6 °C)      Temp src --       O2 Sat (%) 03/14/23 1651 100 %      Weight 03/14/23 1649 220 lb      Height 03/14/23 1649 5' 11\"      Physical Exam  Vitals and nursing note reviewed. Constitutional:       General: He is not in acute distress. Appearance: Normal appearance. HENT:      Head: Normocephalic and atraumatic. Eyes:      Pupils: Pupils are equal, round, and reactive to light. Cardiovascular:      Rate and Rhythm: Normal rate and regular rhythm. Pulses: Normal pulses. Pulmonary:      Effort: Pulmonary effort is normal.   Musculoskeletal:         General: No swelling or deformity. Right lower leg: No edema. Left lower leg: Edema present. Comments: Bilateral lower extremities are neurovascularly intact. 2+ DP pulses. Skin:     Coloration: Skin is not pale. Findings: No rash. Neurological:      General: No focal deficit present. Mental Status: He is alert. Psychiatric:         Mood and Affect: Mood normal.         Behavior: Behavior normal.        ED COURSE and DIFFERENTIAL DIAGNOSIS/MDM   Records Reviewed (source and summary of external notes): Prior medical records    Vitals:    Vitals:    03/14/23 1649 03/14/23 1651   BP:  126/72   Pulse:  72   Resp:  20   Temp:  97.8 °F (36.6 °C)   SpO2:  100%   Weight: 99.8 kg (220 lb)    Height: 5' 11\" (1.803 m)        CC/HPI Summary, DDx, ED Course, and Reassessment:     ED Course as of 03/14/23 1914   Tue Mar 14, 2023   1820 77 yo M who presents for DVT in the E after 2 weeks of swelling and pain. No chest pain or shortness of breath to suggest pulmonary embolus. Patient was started on Eliquis about a year ago. Had a good response to this medication at that time. Plan to restart Eliquis and have patient follow-up with cardiology who was previously managed his Eliquis, Dr. Maren Knutson as well as his vascular surgeon, Dr. Den Paige. [LW]   Nickyy Battles with pharmacy who agrees w plan for eliquis.   Patient does have a mildly increased bleeding risk due to other medications however pharmacy is reviewed and this is most appropriate medication for him. Discussed importance of seeking medical care if he was to hit his head and increased risk of bleeding from minor cuts due to blood thinners. Patient and his wife understand and agree. Discharged home. [LW]      ED Course User Index  [LW] Walker Celis MD        Patient was given the following medications:  Medications - No data to display    LAB, EKG AND DIAGNOSTIC RESULTS   Labs:  No results found for this or any previous visit (from the past 12 hour(s)). Radiologic Studies:  Non-plain film images such as CT, Ultrasound and MRI are read by the radiologist. Plain radiographic images are visualized and preliminarily interpreted by the ED Provider with the below findings:        Interpretation per the Radiologist below, if available at the time of this note:  No results found. FINAL IMPRESSION     1. Acute deep vein thrombosis (DVT) of proximal end of left lower extremity [facility])          DISPOSITION/PLAN   Discharged    Discharge Note: The patient is stable for discharge home. The signs, symptoms, diagnosis, and discharge instructions have been discussed, understanding conveyed, and agreed upon. The patient is to follow up as recommended or return to ER should their symptoms worsen.       PATIENT REFERRED TO:  Follow-up Information       Follow up With Specialties Details Why Contact Info    Keya Brothers MD Internal Medicine Physician In 2 days  [address] [facility]      [address] [facility] EMERGENCY DEPT Emergency Medicine  As needed [address] [facility] [address]  [facility] [address] [facility] [address] [phone]              DISCHARGE MEDICATIONS:  Current Discharge Medication List        START taking these medications    Details   apixaban (Eliquis DVT-PE Treat 30D Start) 5 mg (74 tabs) starter pack Take 10 mg (two 5 mg tablets) by mouth twice a day for 7 days Followed by 5 mg (one 5 mg tablet) by mouth twice a day  Qty: 1 Dose Pack, Refills: 0  Start date: 3/14/2023           STOP taking these medications       Eliquis 5 mg tablet Comments:   Reason for Stopping:                 DISCONTINUED MEDICATIONS:  Current Discharge Medication List        STOP taking these medications       Eliquis 5 mg tablet Comments:   Reason for Stopping:               I am the Primary Clinician of Record: Layo Orellana MD (electronically signed)    (Please note that parts of this dictation were completed with voice recognition software. Quite often unanticipated grammatical, syntax, homophones, and other interpretive errors are inadvertently transcribed by the computer software. Please disregards these errors.  Please excuse any errors that have escaped final proofreading.)

## 2023-03-16 NOTE — ED PROVIDER NOTES
EMERGENCY DEPARTMENT HISTORY AND PHYSICAL EXAM      Date: 3/14/2023  Patient Name: Jessie Devine. History of Presenting Illness     Chief Complaint   Patient presents with    Leg Pain    Foot Swelling       History Provided By:     HPI: Jessie Devine., is a very pleasant 76 y.o. male presenting to the ED with a chief complaint of leg pain, foot swelling. Patient states he developed left calf pain/foot pain and swelling approximately 2 weeks ago. Symptoms progressively worsening. Patient has a history of unprovoked DVT and was previously on Eliquis. He has been off anticoagulation for approximately 6 months. No numbness tingling or weakness in extremities. No fevers chills rigors. No chest pain, shortness of breath nor pleuritic symptoms. The patient denies any other symptoms at this time. PCP: Nenita Potter MD    No current facility-administered medications on file prior to encounter. Current Outpatient Medications on File Prior to Encounter   Medication Sig Dispense Refill    cilostazoL (PLETAL) 50 mg tablet TAKE 1 TABLET BY MOUTH BEFORE BREAKFAST AND DINNER. 60 Tablet 2    ondansetron (ZOFRAN ODT) 4 mg disintegrating tablet Take 1 Tablet by mouth every eight (8) hours as needed for Nausea or Vomiting. 20 Tablet 0    cabergoline (DOSTINEX) 0.5 mg tablet TAKE 0.5 TABLETS BY MOUTH TWO (2) TIMES A WEEK FOR 90 DAYS. ondansetron hcl (Zofran) 4 mg tablet Take 1 Tablet by mouth every eight (8) hours as needed for Nausea or Vomiting. 20 Tablet 0    acetaminophen (TYLENOL) 325 mg tablet Take 2 Tablets by mouth every six (6) hours as needed for Pain. 20 Tablet 0    methocarbamoL (Robaxin-750) 750 mg tablet Take 1 Tablet by mouth three (3) times daily as needed for Muscle Spasm(s). 20 Tablet 0    valsartan-hydroCHLOROthiazide (DIOVAN-HCT) 160-25 mg per tablet Take 1 Tablet by mouth daily.       Trulicity 1.39 WM/2.0 mL sub-q pen       BD Veo Insulin Syringe UF 1 mL 31 gauge x 15/64\" syrg clomiPHENE (CLOMID) 50 mg tablet Take 25 mg by mouth daily. Accu-Chek Guide test strips strip USE TO CHECK BLOOD SUGAR EVERY DAY      Accu-Chek Fastclix Lancet Drum misc USE TO TEST EVERY DAY      gabapentin (NEURONTIN) 800 mg tablet TAKE 1 TABLET BY MOUTH THREE TIMES A DAY      Lantus U-100 Insulin 100 unit/mL injection INJECT 64 UNITS EVERY MORNING WITH BREAKFAST      metoprolol succinate (TOPROL-XL) 100 mg tablet TAKE 1 TABLET BY MOUTH EVERY DAY      tiZANidine (ZANAFLEX) 2 mg tablet TAKE 1 TABLET BY MOUTH AT BEDTIME AS NEEDED      glipiZIDE SR (GLUCOTROL XL) 10 mg CR tablet Take 10 mg by mouth daily. tamsulosin (FLOMAX) 0.4 mg capsule Take 0.4 mg by mouth daily. Past History     Past Medical History:  Past Medical History:   Diagnosis Date    BPH (benign prostatic hyperplasia)     Diabetes ([facility] [address]     Hypercholesterolemia     Hypertension     Neuropathy     Restless leg syndrome     Thromboembolus ([facility] [address]        Past Surgical History:  Past Surgical History:   Procedure Laterality Date    HX HIP REPLACEMENT Right     HX HIP REPLACEMENT Left     HX KNEE REPLACEMENT Left     HX SHOULDER ARTHROSCOPY Right     HX SHOULDER ARTHROSCOPY Left        Family History:  Family History   Problem Relation Age of Onset    Diabetes Mother     Cancer Father        Social History:  Social History     Tobacco Use    Smoking status: Never    Smokeless tobacco: Never   Vaping Use    Vaping Use: Never used   Substance Use Topics    Alcohol use: Yes     Comment: rarely     Drug use: Never     Comment: medical marijuana        Allergies: Allergies   Allergen Reactions    Tramadol Nausea and Vomiting         Review of Systems     Review of Systems   Constitutional:  Negative for activity change, appetite change, chills, fatigue and fever. HENT:  Negative for congestion and sore throat. Eyes:  Negative for photophobia and visual disturbance. Respiratory:  Negative for cough, shortness of breath and wheezing. Cardiovascular:  Negative for chest pain, palpitations and leg swelling. Gastrointestinal:  Negative for abdominal pain, diarrhea, nausea and vomiting. Endocrine: Negative for cold intolerance and heat intolerance. Musculoskeletal:  Negative for gait problem and joint swelling. Left lower leg swelling and pain   Skin:  Negative for color change and rash. Neurological:  Negative for dizziness and headaches. Physical Exam     Physical Exam  Constitutional:       General: He is not in acute distress. Appearance: Normal appearance. He is not toxic-appearing. HENT:      Head: Normocephalic and atraumatic. Right Ear: External ear normal.      Left Ear: External ear normal.      Mouth/Throat:      Mouth: Mucous membranes are moist.      Pharynx: Oropharynx is clear. Eyes:      Extraocular Movements: Extraocular movements intact. Conjunctiva/sclera: Conjunctivae normal.   Cardiovascular:      Rate and Rhythm: Normal rate and regular rhythm. Pulses: Normal pulses. Heart sounds: Normal heart sounds. Pulmonary:      Effort: Pulmonary effort is normal. No respiratory distress. Breath sounds: Normal breath sounds. No wheezing, rhonchi or rales. Abdominal:      General: There is no distension. Palpations: Abdomen is soft. Tenderness: There is no abdominal tenderness. There is no guarding or rebound. Musculoskeletal:         General: No deformity. Cervical back: Normal range of motion and neck supple. Right lower leg: No edema. Left lower leg: No edema. Comments: Nonpitting edema from left knee to dorsum of left foot. Posterior calf tenderness with palpation. Dorsalis pedis and posterior tibial pulses 2+   Skin:     Capillary Refill: Capillary refill takes less than 2 seconds. Findings: No erythema or rash. Neurological:      General: No focal deficit present. Mental Status: He is alert and oriented to person, place, and time. Gait: Gait normal.   Psychiatric:         Mood and Affect: Mood normal.         Behavior: Behavior normal.       Lab and Diagnostic Study Results     Labs -   No results found for this or any previous visit (from the past 12 hour(s)). Radiologic Studies -   @lastxrresult@  CT Results  (Last 48 hours)      None          CXR Results  (Last 48 hours)      None              Medical Decision Making   - I am the first provider for this patient. - I reviewed the vital signs, available nursing notes, past medical history, past surgical history, family history and social history. - Initial assessment performed. The patients presenting problems have been discussed, and they are in agreement with the care plan formulated and outlined with them. I have encouraged them to ask questions as they arise throughout their visit. Vital Signs-Reviewed the patient's vital signs. No data found. CONSULTS: (Who and What was discussed)  None      Chronic Conditions: Reviewed above     Social Determinants affecting Dx or Tx: None     Records Reviewed (source and summary of external notes): Nursing notes           ED Course/ Provider Notes (Medical Decision Making):     Patient presented to the emergency department with the aforementioned chief complaint. On examination the patient is nontoxic. Vitals were reviewed per above. Patient with unilateral leg swelling and tenderness with history of DVT not anticoagulated. Concern for DVT. No Doppler capabilities at this facility. Patient given prescription to go to [facility] now for study. Through shared decision making, because he is able to go directly to [facility], will defer prophylactic anticoagulation at this time. Procedures   Medical Decision Makingedical Decision Making  Performed by: Kiera Logan,   Procedures  None       Disposition   Disposition:     Home     All of the diagnostic tests were reviewed and questions answered.  Diagnosis, care plan and treatment options were discussed. The patient understands the instructions and will follow up as directed. The patients results have been reviewed with them. They have been counseled regarding their diagnosis. The patient verbally convey understanding and agreement of the signs, symptoms, diagnosis, treatment and prognosis and additionally agrees to follow up as recommended with their PCP in 24 - 48 hours. They also agree with the care-plan and convey that all of their questions have been answered. I have also put together some discharge instructions for them that include: 1) educational information regarding their diagnosis, 2) how to care for their diagnosis at home, as well a 3) list of reasons why they would want to return to the ED prior to their follow-up appointment, should their condition change. DISCHARGE PLAN:    1. Cannot display discharge medications since this patient is not currently admitted. 2.   Follow-up Information       Follow up With Specialties Details Why Contact Info    Your primary care doctor  Schedule an appointment as soon as possible for a visit in 1 day      Please go directly to [facility] for Doppler study                  3.  Return to ED if worse       4. Discharge Medication List as of 3/14/2023  3:44 PM            Diagnosis     Clinical Impression:    1. Swelling of lower extremity        Attestations:    Yasmin Wadsworth, DO    Please note that this dictation was completed with [employer]BAL[employer], the computer voice recognition software. Quite often unanticipated grammatical, syntax, homophones, and other interpretive errors are inadvertently transcribed by the computer software. Please disregard these errors. Please excuse any errors that have escaped final proofreading. Thank you.

## 2023-07-13 ENCOUNTER — APPOINTMENT (OUTPATIENT)
Facility: HOSPITAL | Age: 68
DRG: 684 | End: 2023-07-13
Payer: MEDICARE

## 2023-07-13 ENCOUNTER — HOSPITAL ENCOUNTER (INPATIENT)
Facility: HOSPITAL | Age: 68
LOS: 2 days | Discharge: HOME | DRG: 684 | End: 2023-07-16
Attending: STUDENT IN AN ORGANIZED HEALTH CARE EDUCATION/TRAINING PROGRAM | Admitting: INTERNAL MEDICINE
Payer: MEDICARE

## 2023-07-13 DIAGNOSIS — I95.9 HYPOTENSION, UNSPECIFIED HYPOTENSION TYPE: ICD-10-CM

## 2023-07-13 DIAGNOSIS — R55 SYNCOPE AND COLLAPSE: Primary | ICD-10-CM

## 2023-07-13 DIAGNOSIS — N17.9 ACUTE KIDNEY INJURY (HCC): ICD-10-CM

## 2023-07-13 LAB
ALBUMIN SERPL-MCNC: 3 G/DL (ref 3.5–5)
ALBUMIN/GLOB SERPL: 1.1 (ref 1.1–2.2)
ALP SERPL-CCNC: 45 U/L (ref 45–117)
ALT SERPL-CCNC: 26 U/L (ref 12–78)
ANION GAP SERPL CALC-SCNC: 4 MMOL/L (ref 5–15)
APPEARANCE UR: CLEAR
APTT PPP: 31.1 SEC (ref 21.2–34.1)
AST SERPL W P-5'-P-CCNC: 19 U/L (ref 15–37)
BACTERIA URNS QL MICRO: NEGATIVE /HPF
BASOPHILS # BLD: 0.1 K/UL (ref 0–0.1)
BASOPHILS NFR BLD: 1 % (ref 0–1)
BILIRUB SERPL-MCNC: 0.5 MG/DL (ref 0.2–1)
BILIRUB UR QL: NEGATIVE
BUN SERPL-MCNC: 54 MG/DL (ref 6–20)
BUN/CREAT SERPL: 13 (ref 12–20)
CA-I BLD-MCNC: 8.2 MG/DL (ref 8.5–10.1)
CHLORIDE SERPL-SCNC: 111 MMOL/L (ref 97–108)
CO2 SERPL-SCNC: 25 MMOL/L (ref 21–32)
COLOR UR: NORMAL
CREAT SERPL-MCNC: 4.23 MG/DL (ref 0.7–1.3)
DIFFERENTIAL METHOD BLD: ABNORMAL
EOSINOPHIL # BLD: 0.1 K/UL (ref 0–0.4)
EOSINOPHIL NFR BLD: 1 % (ref 0–7)
EPITH CASTS URNS QL MICRO: NORMAL /LPF
ERYTHROCYTE [DISTWIDTH] IN BLOOD BY AUTOMATED COUNT: 12.8 % (ref 11.5–14.5)
GLOBULIN SER CALC-MCNC: 2.8 G/DL (ref 2–4)
GLUCOSE BLD STRIP.AUTO-MCNC: 212 MG/DL (ref 65–100)
GLUCOSE SERPL-MCNC: 100 MG/DL (ref 65–100)
GLUCOSE UR STRIP.AUTO-MCNC: NEGATIVE MG/DL
HCT VFR BLD AUTO: 32.7 % (ref 36.6–50.3)
HGB BLD-MCNC: 11 G/DL (ref 12.1–17)
HGB UR QL STRIP: NEGATIVE
IMM GRANULOCYTES # BLD AUTO: 0 K/UL (ref 0–0.04)
IMM GRANULOCYTES NFR BLD AUTO: 0 % (ref 0–0.5)
INR PPP: 1.6 (ref 0.9–1.1)
KETONES UR QL STRIP.AUTO: NEGATIVE MG/DL
LACTATE SERPL-SCNC: 1.2 MMOL/L (ref 0.4–2)
LEUKOCYTE ESTERASE UR QL STRIP.AUTO: NEGATIVE
LYMPHOCYTES # BLD: 1.4 K/UL (ref 0.8–3.5)
LYMPHOCYTES NFR BLD: 17 % (ref 12–49)
MCH RBC QN AUTO: 30.5 PG (ref 26–34)
MCHC RBC AUTO-ENTMCNC: 33.6 G/DL (ref 30–36.5)
MCV RBC AUTO: 90.6 FL (ref 80–99)
MONOCYTES # BLD: 0.7 K/UL (ref 0–1)
MONOCYTES NFR BLD: 8 % (ref 5–13)
NEUTS SEG # BLD: 6 K/UL (ref 1.8–8)
NEUTS SEG NFR BLD: 73 % (ref 32–75)
NITRITE UR QL STRIP.AUTO: NEGATIVE
NRBC # BLD: 0 K/UL (ref 0–0.01)
NRBC BLD-RTO: 0 PER 100 WBC
PERFORMED BY:: ABNORMAL
PH UR STRIP: 5 (ref 5–8)
PLATELET # BLD AUTO: 133 K/UL (ref 150–400)
PMV BLD AUTO: 11 FL (ref 8.9–12.9)
POTASSIUM SERPL-SCNC: 4.8 MMOL/L (ref 3.5–5.1)
PROT SERPL-MCNC: 5.8 G/DL (ref 6.4–8.2)
PROT UR STRIP-MCNC: NEGATIVE MG/DL
PROTHROMBIN TIME: 19.7 SEC (ref 11.9–14.6)
RBC # BLD AUTO: 3.61 M/UL (ref 4.1–5.7)
RBC #/AREA URNS HPF: NORMAL /HPF (ref 0–5)
SODIUM SERPL-SCNC: 140 MMOL/L (ref 136–145)
SP GR UR REFRACTOMETRY: 1.03 (ref 1–1.03)
THERAPEUTIC RANGE: NORMAL SEC (ref 82–109)
TROPONIN I SERPL HS-MCNC: 46 NG/L (ref 0–76)
URINE CULTURE IF INDICATED: NORMAL
UROBILINOGEN UR QL STRIP.AUTO: 0.1 EU/DL (ref 0.1–1)
WBC # BLD AUTO: 8.3 K/UL (ref 4.1–11.1)
WBC URNS QL MICRO: NORMAL /HPF (ref 0–4)

## 2023-07-13 PROCEDURE — 74174 CTA ABD&PLVS W/CONTRAST: CPT

## 2023-07-13 PROCEDURE — 96361 HYDRATE IV INFUSION ADD-ON: CPT

## 2023-07-13 PROCEDURE — 82570 ASSAY OF URINE CREATININE: CPT

## 2023-07-13 PROCEDURE — 71045 X-RAY EXAM CHEST 1 VIEW: CPT

## 2023-07-13 PROCEDURE — 93005 ELECTROCARDIOGRAM TRACING: CPT | Performed by: STUDENT IN AN ORGANIZED HEALTH CARE EDUCATION/TRAINING PROGRAM

## 2023-07-13 PROCEDURE — 6370000000 HC RX 637 (ALT 250 FOR IP): Performed by: INTERNAL MEDICINE

## 2023-07-13 PROCEDURE — 85610 PROTHROMBIN TIME: CPT

## 2023-07-13 PROCEDURE — 36415 COLL VENOUS BLD VENIPUNCTURE: CPT

## 2023-07-13 PROCEDURE — 70450 CT HEAD/BRAIN W/O DYE: CPT

## 2023-07-13 PROCEDURE — 85025 COMPLETE CBC W/AUTO DIFF WBC: CPT

## 2023-07-13 PROCEDURE — 96365 THER/PROPH/DIAG IV INF INIT: CPT

## 2023-07-13 PROCEDURE — 84300 ASSAY OF URINE SODIUM: CPT

## 2023-07-13 PROCEDURE — 81001 URINALYSIS AUTO W/SCOPE: CPT

## 2023-07-13 PROCEDURE — 4A03X5D MEASUREMENT OF ARTERIAL FLOW, INTRACRANIAL, EXTERNAL APPROACH: ICD-10-PCS | Performed by: RADIOLOGY

## 2023-07-13 PROCEDURE — 70498 CT ANGIOGRAPHY NECK: CPT

## 2023-07-13 PROCEDURE — 80053 COMPREHEN METABOLIC PANEL: CPT

## 2023-07-13 PROCEDURE — 80307 DRUG TEST PRSMV CHEM ANLYZR: CPT

## 2023-07-13 PROCEDURE — 85730 THROMBOPLASTIN TIME PARTIAL: CPT

## 2023-07-13 PROCEDURE — 96366 THER/PROPH/DIAG IV INF ADDON: CPT

## 2023-07-13 PROCEDURE — 84484 ASSAY OF TROPONIN QUANT: CPT

## 2023-07-13 PROCEDURE — 83605 ASSAY OF LACTIC ACID: CPT

## 2023-07-13 PROCEDURE — 84156 ASSAY OF PROTEIN URINE: CPT

## 2023-07-13 PROCEDURE — 99285 EMERGENCY DEPT VISIT HI MDM: CPT

## 2023-07-13 PROCEDURE — 6360000004 HC RX CONTRAST MEDICATION: Performed by: STUDENT IN AN ORGANIZED HEALTH CARE EDUCATION/TRAINING PROGRAM

## 2023-07-13 PROCEDURE — 2500000003 HC RX 250 WO HCPCS: Performed by: STUDENT IN AN ORGANIZED HEALTH CARE EDUCATION/TRAINING PROGRAM

## 2023-07-13 PROCEDURE — 82962 GLUCOSE BLOOD TEST: CPT

## 2023-07-13 PROCEDURE — 2580000003 HC RX 258: Performed by: INTERNAL MEDICINE

## 2023-07-13 PROCEDURE — 2580000003 HC RX 258: Performed by: STUDENT IN AN ORGANIZED HEALTH CARE EDUCATION/TRAINING PROGRAM

## 2023-07-13 RX ORDER — HYDROCHLOROTHIAZIDE 25 MG/1
25 TABLET ORAL DAILY
Status: DISCONTINUED | OUTPATIENT
Start: 2023-07-14 | End: 2023-07-14

## 2023-07-13 RX ORDER — SODIUM CHLORIDE, SODIUM LACTATE, POTASSIUM CHLORIDE, CALCIUM CHLORIDE 600; 310; 30; 20 MG/100ML; MG/100ML; MG/100ML; MG/100ML
INJECTION, SOLUTION INTRAVENOUS CONTINUOUS
Status: DISPENSED | OUTPATIENT
Start: 2023-07-13 | End: 2023-07-14

## 2023-07-13 RX ORDER — INSULIN LISPRO 100 [IU]/ML
0-4 INJECTION, SOLUTION INTRAVENOUS; SUBCUTANEOUS
Status: DISCONTINUED | OUTPATIENT
Start: 2023-07-14 | End: 2023-07-16 | Stop reason: HOSPADM

## 2023-07-13 RX ORDER — DOXYCYCLINE HYCLATE 100 MG/1
100 CAPSULE ORAL EVERY 12 HOURS SCHEDULED
Status: DISCONTINUED | OUTPATIENT
Start: 2023-07-13 | End: 2023-07-16 | Stop reason: HOSPADM

## 2023-07-13 RX ORDER — SODIUM CHLORIDE 9 MG/ML
INJECTION, SOLUTION INTRAVENOUS CONTINUOUS
Status: DISCONTINUED | OUTPATIENT
Start: 2023-07-13 | End: 2023-07-13

## 2023-07-13 RX ORDER — CILOSTAZOL 50 MG/1
50 TABLET ORAL
Status: DISCONTINUED | OUTPATIENT
Start: 2023-07-14 | End: 2023-07-16 | Stop reason: HOSPADM

## 2023-07-13 RX ORDER — ACETAMINOPHEN 650 MG/1
650 SUPPOSITORY RECTAL EVERY 6 HOURS PRN
Status: DISCONTINUED | OUTPATIENT
Start: 2023-07-13 | End: 2023-07-16 | Stop reason: HOSPADM

## 2023-07-13 RX ORDER — POLYETHYLENE GLYCOL 3350 17 G/17G
17 POWDER, FOR SOLUTION ORAL DAILY PRN
Status: DISCONTINUED | OUTPATIENT
Start: 2023-07-13 | End: 2023-07-16 | Stop reason: HOSPADM

## 2023-07-13 RX ORDER — INSULIN LISPRO 100 [IU]/ML
0-4 INJECTION, SOLUTION INTRAVENOUS; SUBCUTANEOUS NIGHTLY
Status: DISCONTINUED | OUTPATIENT
Start: 2023-07-13 | End: 2023-07-16 | Stop reason: HOSPADM

## 2023-07-13 RX ORDER — CEPHALEXIN 500 MG/1
500 CAPSULE ORAL EVERY 8 HOURS SCHEDULED
Status: DISCONTINUED | OUTPATIENT
Start: 2023-07-13 | End: 2023-07-16 | Stop reason: HOSPADM

## 2023-07-13 RX ORDER — SODIUM CHLORIDE 9 MG/ML
INJECTION, SOLUTION INTRAVENOUS PRN
Status: DISCONTINUED | OUTPATIENT
Start: 2023-07-13 | End: 2023-07-16 | Stop reason: HOSPADM

## 2023-07-13 RX ORDER — SODIUM CHLORIDE 0.9 % (FLUSH) 0.9 %
5-40 SYRINGE (ML) INJECTION PRN
Status: DISCONTINUED | OUTPATIENT
Start: 2023-07-13 | End: 2023-07-16 | Stop reason: HOSPADM

## 2023-07-13 RX ORDER — TAMSULOSIN HYDROCHLORIDE 0.4 MG/1
0.4 CAPSULE ORAL DAILY
Status: DISCONTINUED | OUTPATIENT
Start: 2023-07-14 | End: 2023-07-16 | Stop reason: HOSPADM

## 2023-07-13 RX ORDER — ATORVASTATIN CALCIUM 40 MG/1
80 TABLET, FILM COATED ORAL NIGHTLY
Status: DISCONTINUED | OUTPATIENT
Start: 2023-07-13 | End: 2023-07-16 | Stop reason: HOSPADM

## 2023-07-13 RX ORDER — SODIUM CHLORIDE 0.9 % (FLUSH) 0.9 %
5-40 SYRINGE (ML) INJECTION EVERY 12 HOURS SCHEDULED
Status: DISCONTINUED | OUTPATIENT
Start: 2023-07-13 | End: 2023-07-16 | Stop reason: HOSPADM

## 2023-07-13 RX ORDER — MULTIVIT-MINERALS/FA/LYCOPENE 400-370MCG
1 TABLET ORAL DAILY
COMMUNITY

## 2023-07-13 RX ORDER — VALSARTAN 80 MG/1
160 TABLET ORAL DAILY
Status: DISCONTINUED | OUTPATIENT
Start: 2023-07-14 | End: 2023-07-14

## 2023-07-13 RX ORDER — INSULIN GLARGINE 100 [IU]/ML
64 INJECTION, SOLUTION SUBCUTANEOUS DAILY
Status: DISCONTINUED | OUTPATIENT
Start: 2023-07-14 | End: 2023-07-16 | Stop reason: HOSPADM

## 2023-07-13 RX ORDER — ACETAMINOPHEN 325 MG/1
650 TABLET ORAL EVERY 6 HOURS PRN
Status: DISCONTINUED | OUTPATIENT
Start: 2023-07-13 | End: 2023-07-16 | Stop reason: HOSPADM

## 2023-07-13 RX ORDER — CABERGOLINE 0.5 MG/1
0.5 TABLET ORAL
Status: DISCONTINUED | OUTPATIENT
Start: 2023-07-16 | End: 2023-07-16 | Stop reason: HOSPADM

## 2023-07-13 RX ORDER — PANTOPRAZOLE SODIUM 40 MG/1
40 TABLET, DELAYED RELEASE ORAL
Status: DISCONTINUED | OUTPATIENT
Start: 2023-07-14 | End: 2023-07-16 | Stop reason: HOSPADM

## 2023-07-13 RX ORDER — TIZANIDINE 2 MG/1
2 TABLET ORAL NIGHTLY
Status: DISCONTINUED | OUTPATIENT
Start: 2023-07-13 | End: 2023-07-16 | Stop reason: HOSPADM

## 2023-07-13 RX ORDER — ONDANSETRON 2 MG/ML
4 INJECTION INTRAMUSCULAR; INTRAVENOUS EVERY 6 HOURS PRN
Status: DISCONTINUED | OUTPATIENT
Start: 2023-07-13 | End: 2023-07-16 | Stop reason: HOSPADM

## 2023-07-13 RX ORDER — SODIUM BICARBONATE 650 MG/1
650 TABLET ORAL 2 TIMES DAILY
Status: DISCONTINUED | OUTPATIENT
Start: 2023-07-13 | End: 2023-07-16 | Stop reason: HOSPADM

## 2023-07-13 RX ORDER — ENOXAPARIN SODIUM 100 MG/ML
30 INJECTION SUBCUTANEOUS DAILY
Status: DISCONTINUED | OUTPATIENT
Start: 2023-07-14 | End: 2023-07-13

## 2023-07-13 RX ORDER — 0.9 % SODIUM CHLORIDE 0.9 %
1000 INTRAVENOUS SOLUTION INTRAVENOUS ONCE
Status: COMPLETED | OUTPATIENT
Start: 2023-07-13 | End: 2023-07-13

## 2023-07-13 RX ORDER — ONDANSETRON 4 MG/1
4 TABLET, ORALLY DISINTEGRATING ORAL EVERY 8 HOURS PRN
Status: DISCONTINUED | OUTPATIENT
Start: 2023-07-13 | End: 2023-07-16 | Stop reason: HOSPADM

## 2023-07-13 RX ORDER — SODIUM CHLORIDE, SODIUM LACTATE, POTASSIUM CHLORIDE, AND CALCIUM CHLORIDE .6; .31; .03; .02 G/100ML; G/100ML; G/100ML; G/100ML
1000 INJECTION, SOLUTION INTRAVENOUS ONCE
Status: COMPLETED | OUTPATIENT
Start: 2023-07-13 | End: 2023-07-14

## 2023-07-13 RX ORDER — NOREPINEPHRINE BITARTRATE 0.06 MG/ML
1-100 INJECTION, SOLUTION INTRAVENOUS CONTINUOUS
Status: DISCONTINUED | OUTPATIENT
Start: 2023-07-13 | End: 2023-07-13

## 2023-07-13 RX ORDER — GABAPENTIN 300 MG/1
600 CAPSULE ORAL NIGHTLY
Status: DISCONTINUED | OUTPATIENT
Start: 2023-07-13 | End: 2023-07-16 | Stop reason: HOSPADM

## 2023-07-13 RX ADMIN — DOXYCYCLINE HYCLATE 100 MG: 100 CAPSULE ORAL at 23:11

## 2023-07-13 RX ADMIN — SODIUM CHLORIDE: 9 INJECTION, SOLUTION INTRAVENOUS at 21:06

## 2023-07-13 RX ADMIN — Medication 5 MCG/MIN: at 18:52

## 2023-07-13 RX ADMIN — GABAPENTIN 600 MG: 300 CAPSULE ORAL at 23:11

## 2023-07-13 RX ADMIN — TIZANIDINE 2 MG: 2 TABLET ORAL at 23:20

## 2023-07-13 RX ADMIN — SODIUM BICARBONATE 650 MG: 650 TABLET ORAL at 23:20

## 2023-07-13 RX ADMIN — IOPAMIDOL 100 ML: 755 INJECTION, SOLUTION INTRAVENOUS at 18:09

## 2023-07-13 RX ADMIN — ATORVASTATIN CALCIUM 80 MG: 40 TABLET, FILM COATED ORAL at 23:11

## 2023-07-13 RX ADMIN — SODIUM CHLORIDE, PRESERVATIVE FREE 10 ML: 5 INJECTION INTRAVENOUS at 23:16

## 2023-07-13 RX ADMIN — APIXABAN 5 MG: 5 TABLET, FILM COATED ORAL at 23:11

## 2023-07-13 RX ADMIN — SODIUM CHLORIDE, POTASSIUM CHLORIDE, SODIUM LACTATE AND CALCIUM CHLORIDE 1000 ML: 600; 310; 30; 20 INJECTION, SOLUTION INTRAVENOUS at 22:45

## 2023-07-13 RX ADMIN — CEPHALEXIN 500 MG: 500 CAPSULE ORAL at 23:11

## 2023-07-13 RX ADMIN — SODIUM CHLORIDE 1000 ML: 9 INJECTION, SOLUTION INTRAVENOUS at 18:33

## 2023-07-13 ASSESSMENT — LIFESTYLE VARIABLES
HOW OFTEN DO YOU HAVE A DRINK CONTAINING ALCOHOL: NEVER
HOW MANY STANDARD DRINKS CONTAINING ALCOHOL DO YOU HAVE ON A TYPICAL DAY: PATIENT DOES NOT DRINK

## 2023-07-13 ASSESSMENT — PAIN SCALES - GENERAL: PAINLEVEL_OUTOF10: 5

## 2023-07-13 ASSESSMENT — PAIN DESCRIPTION - LOCATION: LOCATION: BACK

## 2023-07-14 ENCOUNTER — APPOINTMENT (OUTPATIENT)
Facility: HOSPITAL | Age: 68
DRG: 684 | End: 2023-07-14
Attending: INTERNAL MEDICINE
Payer: MEDICARE

## 2023-07-14 ENCOUNTER — APPOINTMENT (OUTPATIENT)
Facility: HOSPITAL | Age: 68
DRG: 684 | End: 2023-07-14
Payer: MEDICARE

## 2023-07-14 PROBLEM — R55 SYNCOPE AND COLLAPSE: Status: ACTIVE | Noted: 2023-07-14

## 2023-07-14 LAB
AMPHET UR QL SCN: NEGATIVE
ANION GAP SERPL CALC-SCNC: 6 MMOL/L (ref 5–15)
BARBITURATES UR QL SCN: NEGATIVE
BASOPHILS # BLD: 0 K/UL (ref 0–0.1)
BASOPHILS NFR BLD: 1 % (ref 0–1)
BENZODIAZ UR QL: NEGATIVE
BUN SERPL-MCNC: 50 MG/DL (ref 6–20)
BUN/CREAT SERPL: 15 (ref 12–20)
CA-I BLD-MCNC: 8.4 MG/DL (ref 8.5–10.1)
CANNABINOIDS UR QL SCN: POSITIVE
CHLORIDE SERPL-SCNC: 108 MMOL/L (ref 97–108)
CHOLEST SERPL-MCNC: 155 MG/DL
CO2 SERPL-SCNC: 24 MMOL/L (ref 21–32)
COCAINE UR QL SCN: NEGATIVE
CREAT SERPL-MCNC: 3.33 MG/DL (ref 0.7–1.3)
CREAT UR-MCNC: 133 MG/DL
DIFFERENTIAL METHOD BLD: ABNORMAL
EKG ATRIAL RATE: 75 BPM
EKG DIAGNOSIS: NORMAL
EKG P AXIS: 30 DEGREES
EKG P-R INTERVAL: 246 MS
EKG Q-T INTERVAL: 396 MS
EKG QRS DURATION: 84 MS
EKG QTC CALCULATION (BAZETT): 442 MS
EKG R AXIS: 0 DEGREES
EKG T AXIS: 19 DEGREES
EKG VENTRICULAR RATE: 75 BPM
EOSINOPHIL # BLD: 0.2 K/UL (ref 0–0.4)
EOSINOPHIL NFR BLD: 4 % (ref 0–7)
ERYTHROCYTE [DISTWIDTH] IN BLOOD BY AUTOMATED COUNT: 13 % (ref 11.5–14.5)
FLUAV RNA SPEC QL NAA+PROBE: NOT DETECTED
FLUBV RNA SPEC QL NAA+PROBE: NOT DETECTED
GLUCOSE BLD STRIP.AUTO-MCNC: 171 MG/DL (ref 65–100)
GLUCOSE BLD STRIP.AUTO-MCNC: 203 MG/DL (ref 65–100)
GLUCOSE BLD STRIP.AUTO-MCNC: 246 MG/DL (ref 65–100)
GLUCOSE BLD STRIP.AUTO-MCNC: 290 MG/DL (ref 65–100)
GLUCOSE SERPL-MCNC: 267 MG/DL (ref 65–100)
HCT VFR BLD AUTO: 34 % (ref 36.6–50.3)
HDLC SERPL-MCNC: 29 MG/DL
HDLC SERPL: 5.3 (ref 0–5)
HGB BLD-MCNC: 11.4 G/DL (ref 12.1–17)
IMM GRANULOCYTES # BLD AUTO: 0 K/UL (ref 0–0.04)
IMM GRANULOCYTES NFR BLD AUTO: 0 % (ref 0–0.5)
LDLC SERPL CALC-MCNC: 66.8 MG/DL (ref 0–100)
LIPID PANEL: ABNORMAL
LYMPHOCYTES # BLD: 2 K/UL (ref 0.8–3.5)
LYMPHOCYTES NFR BLD: 33 % (ref 12–49)
Lab: ABNORMAL
MCH RBC QN AUTO: 30.5 PG (ref 26–34)
MCHC RBC AUTO-ENTMCNC: 33.5 G/DL (ref 30–36.5)
MCV RBC AUTO: 90.9 FL (ref 80–99)
METHADONE UR QL: NEGATIVE
MONOCYTES # BLD: 0.5 K/UL (ref 0–1)
MONOCYTES NFR BLD: 9 % (ref 5–13)
NEUTS SEG # BLD: 3.2 K/UL (ref 1.8–8)
NEUTS SEG NFR BLD: 53 % (ref 32–75)
NRBC # BLD: 0 K/UL (ref 0–0.01)
NRBC BLD-RTO: 0 PER 100 WBC
OPIATES UR QL: NEGATIVE
PCP UR QL: NEGATIVE
PERFORMED BY:: ABNORMAL
PLATELET # BLD AUTO: 133 K/UL (ref 150–400)
PMV BLD AUTO: 11.1 FL (ref 8.9–12.9)
POTASSIUM SERPL-SCNC: 3.9 MMOL/L (ref 3.5–5.1)
PROT UR-MCNC: 22 MG/DL (ref 0–11.9)
PROT/CREAT UR-RTO: 0.2
RBC # BLD AUTO: 3.74 M/UL (ref 4.1–5.7)
SARS-COV-2 RNA RESP QL NAA+PROBE: NOT DETECTED
SODIUM SERPL-SCNC: 138 MMOL/L (ref 136–145)
SODIUM UR-SCNC: 30 MMOL/L
TRIGL SERPL-MCNC: 296 MG/DL
VLDLC SERPL CALC-MCNC: 59.2 MG/DL
WBC # BLD AUTO: 6 K/UL (ref 4.1–11.1)

## 2023-07-14 PROCEDURE — 93306 TTE W/DOPPLER COMPLETE: CPT

## 2023-07-14 PROCEDURE — G0378 HOSPITAL OBSERVATION PER HR: HCPCS

## 2023-07-14 PROCEDURE — 92610 EVALUATE SWALLOWING FUNCTION: CPT

## 2023-07-14 PROCEDURE — 6370000000 HC RX 637 (ALT 250 FOR IP): Performed by: INTERNAL MEDICINE

## 2023-07-14 PROCEDURE — 87636 SARSCOV2 & INF A&B AMP PRB: CPT

## 2023-07-14 PROCEDURE — 2580000003 HC RX 258: Performed by: PHYSICIAN ASSISTANT

## 2023-07-14 PROCEDURE — 2580000003 HC RX 258: Performed by: INTERNAL MEDICINE

## 2023-07-14 PROCEDURE — 70551 MRI BRAIN STEM W/O DYE: CPT

## 2023-07-14 PROCEDURE — 85025 COMPLETE CBC W/AUTO DIFF WBC: CPT

## 2023-07-14 PROCEDURE — 2060000000 HC ICU INTERMEDIATE R&B

## 2023-07-14 PROCEDURE — 82962 GLUCOSE BLOOD TEST: CPT

## 2023-07-14 PROCEDURE — 80048 BASIC METABOLIC PNL TOTAL CA: CPT

## 2023-07-14 PROCEDURE — 83970 ASSAY OF PARATHORMONE: CPT

## 2023-07-14 PROCEDURE — 96361 HYDRATE IV INFUSION ADD-ON: CPT

## 2023-07-14 PROCEDURE — 36415 COLL VENOUS BLD VENIPUNCTURE: CPT

## 2023-07-14 PROCEDURE — 80061 LIPID PANEL: CPT

## 2023-07-14 PROCEDURE — 6370000000 HC RX 637 (ALT 250 FOR IP): Performed by: PHYSICIAN ASSISTANT

## 2023-07-14 RX ORDER — MIDODRINE HYDROCHLORIDE 5 MG/1
5 TABLET ORAL
Status: DISCONTINUED | OUTPATIENT
Start: 2023-07-14 | End: 2023-07-16

## 2023-07-14 RX ORDER — 0.9 % SODIUM CHLORIDE 0.9 %
250 INTRAVENOUS SOLUTION INTRAVENOUS ONCE
Status: COMPLETED | OUTPATIENT
Start: 2023-07-14 | End: 2023-07-14

## 2023-07-14 RX ORDER — GUAIFENESIN 600 MG/1
600 TABLET, EXTENDED RELEASE ORAL 2 TIMES DAILY
Status: DISCONTINUED | OUTPATIENT
Start: 2023-07-14 | End: 2023-07-16 | Stop reason: HOSPADM

## 2023-07-14 RX ORDER — SODIUM CHLORIDE 9 MG/ML
INJECTION, SOLUTION INTRAVENOUS CONTINUOUS
Status: DISCONTINUED | OUTPATIENT
Start: 2023-07-14 | End: 2023-07-16 | Stop reason: HOSPADM

## 2023-07-14 RX ADMIN — CILOSTAZOL 50 MG: 50 TABLET ORAL at 17:24

## 2023-07-14 RX ADMIN — ACETAMINOPHEN 650 MG: 325 TABLET ORAL at 20:51

## 2023-07-14 RX ADMIN — PANTOPRAZOLE SODIUM 40 MG: 40 TABLET, DELAYED RELEASE ORAL at 05:55

## 2023-07-14 RX ADMIN — INSULIN LISPRO 2 UNITS: 100 INJECTION, SOLUTION INTRAVENOUS; SUBCUTANEOUS at 08:32

## 2023-07-14 RX ADMIN — GUAIFENESIN 600 MG: 600 TABLET, EXTENDED RELEASE ORAL at 10:35

## 2023-07-14 RX ADMIN — ACETAMINOPHEN 650 MG: 325 TABLET ORAL at 12:53

## 2023-07-14 RX ADMIN — SODIUM BICARBONATE 650 MG: 650 TABLET ORAL at 20:54

## 2023-07-14 RX ADMIN — DOXYCYCLINE HYCLATE 100 MG: 100 CAPSULE ORAL at 20:51

## 2023-07-14 RX ADMIN — SODIUM BICARBONATE 650 MG: 650 TABLET ORAL at 08:32

## 2023-07-14 RX ADMIN — INSULIN GLARGINE 64 UNITS: 100 INJECTION, SOLUTION SUBCUTANEOUS at 08:32

## 2023-07-14 RX ADMIN — CEPHALEXIN 500 MG: 500 CAPSULE ORAL at 20:52

## 2023-07-14 RX ADMIN — INSULIN LISPRO 1 UNITS: 100 INJECTION, SOLUTION INTRAVENOUS; SUBCUTANEOUS at 17:25

## 2023-07-14 RX ADMIN — TIZANIDINE 2 MG: 2 TABLET ORAL at 20:51

## 2023-07-14 RX ADMIN — SODIUM CHLORIDE: 9 INJECTION, SOLUTION INTRAVENOUS at 10:35

## 2023-07-14 RX ADMIN — CILOSTAZOL 50 MG: 50 TABLET ORAL at 05:55

## 2023-07-14 RX ADMIN — ATORVASTATIN CALCIUM 80 MG: 40 TABLET, FILM COATED ORAL at 20:52

## 2023-07-14 RX ADMIN — SODIUM CHLORIDE, PRESERVATIVE FREE 10 ML: 5 INJECTION INTRAVENOUS at 08:34

## 2023-07-14 RX ADMIN — SODIUM CHLORIDE 250 ML: 9 INJECTION, SOLUTION INTRAVENOUS at 08:31

## 2023-07-14 RX ADMIN — APIXABAN 5 MG: 5 TABLET, FILM COATED ORAL at 20:52

## 2023-07-14 RX ADMIN — GUAIFENESIN 600 MG: 600 TABLET, EXTENDED RELEASE ORAL at 20:52

## 2023-07-14 RX ADMIN — MIDODRINE HYDROCHLORIDE 5 MG: 5 TABLET ORAL at 12:52

## 2023-07-14 RX ADMIN — SODIUM CHLORIDE, POTASSIUM CHLORIDE, SODIUM LACTATE AND CALCIUM CHLORIDE: 600; 310; 30; 20 INJECTION, SOLUTION INTRAVENOUS at 01:00

## 2023-07-14 RX ADMIN — GABAPENTIN 600 MG: 300 CAPSULE ORAL at 20:51

## 2023-07-14 RX ADMIN — TAMSULOSIN HYDROCHLORIDE 0.4 MG: 0.4 CAPSULE ORAL at 08:33

## 2023-07-14 RX ADMIN — CEPHALEXIN 500 MG: 500 CAPSULE ORAL at 12:52

## 2023-07-14 RX ADMIN — MUPIROCIN: 20 OINTMENT TOPICAL at 08:33

## 2023-07-14 RX ADMIN — APIXABAN 5 MG: 5 TABLET, FILM COATED ORAL at 08:33

## 2023-07-14 RX ADMIN — DOXYCYCLINE HYCLATE 100 MG: 100 CAPSULE ORAL at 08:32

## 2023-07-14 RX ADMIN — CEPHALEXIN 500 MG: 500 CAPSULE ORAL at 05:55

## 2023-07-14 RX ADMIN — MIDODRINE HYDROCHLORIDE 5 MG: 5 TABLET ORAL at 08:33

## 2023-07-14 RX ADMIN — SODIUM CHLORIDE, PRESERVATIVE FREE 10 ML: 5 INJECTION INTRAVENOUS at 20:57

## 2023-07-14 ASSESSMENT — PAIN SCALES - GENERAL
PAINLEVEL_OUTOF10: 3
PAINLEVEL_OUTOF10: 0
PAINLEVEL_OUTOF10: 8
PAINLEVEL_OUTOF10: 0

## 2023-07-14 ASSESSMENT — PAIN DESCRIPTION - DESCRIPTORS: DESCRIPTORS: ACHING

## 2023-07-14 ASSESSMENT — PAIN DESCRIPTION - LOCATION
LOCATION: HEAD
LOCATION: HEAD

## 2023-07-14 ASSESSMENT — PAIN SCALES - WONG BAKER: WONGBAKER_NUMERICALRESPONSE: 0

## 2023-07-15 LAB
ALBUMIN SERPL-MCNC: 2.8 G/DL (ref 3.5–5)
ANION GAP SERPL CALC-SCNC: 5 MMOL/L (ref 5–15)
BASOPHILS # BLD: 0 K/UL (ref 0–0.1)
BASOPHILS NFR BLD: 0 % (ref 0–1)
BUN SERPL-MCNC: 36 MG/DL (ref 6–20)
BUN/CREAT SERPL: 17 (ref 12–20)
CA-I BLD-MCNC: 8.4 MG/DL (ref 8.5–10.1)
CA-I BLD-MCNC: 9 MG/DL (ref 8.5–10.1)
CHLORIDE SERPL-SCNC: 111 MMOL/L (ref 97–108)
CO2 SERPL-SCNC: 25 MMOL/L (ref 21–32)
CREAT SERPL-MCNC: 2.06 MG/DL (ref 0.7–1.3)
DIFFERENTIAL METHOD BLD: ABNORMAL
EOSINOPHIL # BLD: 0.3 K/UL (ref 0–0.4)
EOSINOPHIL NFR BLD: 5 % (ref 0–7)
ERYTHROCYTE [DISTWIDTH] IN BLOOD BY AUTOMATED COUNT: 12.6 % (ref 11.5–14.5)
GLUCOSE BLD STRIP.AUTO-MCNC: 103 MG/DL (ref 65–100)
GLUCOSE BLD STRIP.AUTO-MCNC: 149 MG/DL (ref 65–100)
GLUCOSE BLD STRIP.AUTO-MCNC: 184 MG/DL (ref 65–100)
GLUCOSE BLD STRIP.AUTO-MCNC: 186 MG/DL (ref 65–100)
GLUCOSE SERPL-MCNC: 176 MG/DL (ref 65–100)
HCT VFR BLD AUTO: 32.5 % (ref 36.6–50.3)
HGB BLD-MCNC: 11.2 G/DL (ref 12.1–17)
IMM GRANULOCYTES # BLD AUTO: 0 K/UL (ref 0–0.04)
IMM GRANULOCYTES NFR BLD AUTO: 0 % (ref 0–0.5)
LYMPHOCYTES # BLD: 2.2 K/UL (ref 0.8–3.5)
LYMPHOCYTES NFR BLD: 41 % (ref 12–49)
MCH RBC QN AUTO: 30.7 PG (ref 26–34)
MCHC RBC AUTO-ENTMCNC: 34.5 G/DL (ref 30–36.5)
MCV RBC AUTO: 89 FL (ref 80–99)
MONOCYTES # BLD: 0.5 K/UL (ref 0–1)
MONOCYTES NFR BLD: 9 % (ref 5–13)
NEUTS SEG # BLD: 2.3 K/UL (ref 1.8–8)
NEUTS SEG NFR BLD: 45 % (ref 32–75)
NRBC # BLD: 0 K/UL (ref 0–0.01)
NRBC BLD-RTO: 0 PER 100 WBC
PERFORMED BY:: ABNORMAL
PHOSPHATE SERPL-MCNC: 2.9 MG/DL (ref 2.6–4.7)
PLATELET # BLD AUTO: 125 K/UL (ref 150–400)
PMV BLD AUTO: 11.3 FL (ref 8.9–12.9)
POTASSIUM SERPL-SCNC: 4.1 MMOL/L (ref 3.5–5.1)
PTH-INTACT SERPL-MCNC: 75.8 PG/ML (ref 18.4–88)
RBC # BLD AUTO: 3.65 M/UL (ref 4.1–5.7)
SODIUM SERPL-SCNC: 141 MMOL/L (ref 136–145)
WBC # BLD AUTO: 5.3 K/UL (ref 4.1–11.1)

## 2023-07-15 PROCEDURE — 80069 RENAL FUNCTION PANEL: CPT

## 2023-07-15 PROCEDURE — 96361 HYDRATE IV INFUSION ADD-ON: CPT

## 2023-07-15 PROCEDURE — 6370000000 HC RX 637 (ALT 250 FOR IP): Performed by: INTERNAL MEDICINE

## 2023-07-15 PROCEDURE — 6370000000 HC RX 637 (ALT 250 FOR IP): Performed by: PHYSICIAN ASSISTANT

## 2023-07-15 PROCEDURE — 2580000003 HC RX 258: Performed by: INTERNAL MEDICINE

## 2023-07-15 PROCEDURE — 82962 GLUCOSE BLOOD TEST: CPT

## 2023-07-15 PROCEDURE — 2060000000 HC ICU INTERMEDIATE R&B

## 2023-07-15 PROCEDURE — 85025 COMPLETE CBC W/AUTO DIFF WBC: CPT

## 2023-07-15 PROCEDURE — 36415 COLL VENOUS BLD VENIPUNCTURE: CPT

## 2023-07-15 PROCEDURE — G0378 HOSPITAL OBSERVATION PER HR: HCPCS

## 2023-07-15 RX ORDER — BUTALBITAL, ACETAMINOPHEN AND CAFFEINE 50; 325; 40 MG/1; MG/1; MG/1
2 TABLET ORAL ONCE
Status: COMPLETED | OUTPATIENT
Start: 2023-07-15 | End: 2023-07-15

## 2023-07-15 RX ADMIN — SODIUM BICARBONATE 650 MG: 650 TABLET ORAL at 09:34

## 2023-07-15 RX ADMIN — MUPIROCIN: 20 OINTMENT TOPICAL at 09:36

## 2023-07-15 RX ADMIN — SODIUM CHLORIDE, PRESERVATIVE FREE 10 ML: 5 INJECTION INTRAVENOUS at 20:18

## 2023-07-15 RX ADMIN — TAMSULOSIN HYDROCHLORIDE 0.4 MG: 0.4 CAPSULE ORAL at 09:33

## 2023-07-15 RX ADMIN — CILOSTAZOL 50 MG: 50 TABLET ORAL at 07:00

## 2023-07-15 RX ADMIN — MIDODRINE HYDROCHLORIDE 5 MG: 5 TABLET ORAL at 09:33

## 2023-07-15 RX ADMIN — SODIUM CHLORIDE: 9 INJECTION, SOLUTION INTRAVENOUS at 20:32

## 2023-07-15 RX ADMIN — CEPHALEXIN 500 MG: 500 CAPSULE ORAL at 21:30

## 2023-07-15 RX ADMIN — GUAIFENESIN 600 MG: 600 TABLET, EXTENDED RELEASE ORAL at 20:15

## 2023-07-15 RX ADMIN — CEPHALEXIN 500 MG: 500 CAPSULE ORAL at 13:03

## 2023-07-15 RX ADMIN — ATORVASTATIN CALCIUM 80 MG: 40 TABLET, FILM COATED ORAL at 20:14

## 2023-07-15 RX ADMIN — CEPHALEXIN 500 MG: 500 CAPSULE ORAL at 07:00

## 2023-07-15 RX ADMIN — TIZANIDINE 2 MG: 2 TABLET ORAL at 20:14

## 2023-07-15 RX ADMIN — APIXABAN 5 MG: 5 TABLET, FILM COATED ORAL at 20:30

## 2023-07-15 RX ADMIN — SODIUM CHLORIDE: 9 INJECTION, SOLUTION INTRAVENOUS at 09:49

## 2023-07-15 RX ADMIN — GABAPENTIN 600 MG: 300 CAPSULE ORAL at 20:30

## 2023-07-15 RX ADMIN — BUTALBITAL, ACETAMINOPHEN, AND CAFFEINE 2 TABLET: 325; 50; 40 TABLET ORAL at 09:34

## 2023-07-15 RX ADMIN — CILOSTAZOL 50 MG: 50 TABLET ORAL at 17:04

## 2023-07-15 RX ADMIN — APIXABAN 5 MG: 5 TABLET, FILM COATED ORAL at 09:34

## 2023-07-15 RX ADMIN — DOXYCYCLINE HYCLATE 100 MG: 100 CAPSULE ORAL at 20:30

## 2023-07-15 RX ADMIN — GUAIFENESIN 600 MG: 600 TABLET, EXTENDED RELEASE ORAL at 09:34

## 2023-07-15 RX ADMIN — DOXYCYCLINE HYCLATE 100 MG: 100 CAPSULE ORAL at 09:34

## 2023-07-15 RX ADMIN — MIDODRINE HYDROCHLORIDE 5 MG: 5 TABLET ORAL at 13:03

## 2023-07-15 RX ADMIN — PANTOPRAZOLE SODIUM 40 MG: 40 TABLET, DELAYED RELEASE ORAL at 09:34

## 2023-07-15 RX ADMIN — MIDODRINE HYDROCHLORIDE 5 MG: 5 TABLET ORAL at 17:04

## 2023-07-15 RX ADMIN — SODIUM CHLORIDE, PRESERVATIVE FREE 10 ML: 5 INJECTION INTRAVENOUS at 09:37

## 2023-07-15 RX ADMIN — SODIUM BICARBONATE 650 MG: 650 TABLET ORAL at 20:16

## 2023-07-15 ASSESSMENT — PAIN SCALES - GENERAL: PAINLEVEL_OUTOF10: 6

## 2023-07-15 ASSESSMENT — PAIN DESCRIPTION - DESCRIPTORS: DESCRIPTORS: ACHING;THROBBING

## 2023-07-15 ASSESSMENT — PAIN DESCRIPTION - LOCATION: LOCATION: HEAD

## 2023-07-16 VITALS
WEIGHT: 222 LBS | OXYGEN SATURATION: 99 % | HEART RATE: 71 BPM | RESPIRATION RATE: 16 BRPM | BODY MASS INDEX: 31.78 KG/M2 | TEMPERATURE: 98.1 F | SYSTOLIC BLOOD PRESSURE: 140 MMHG | HEIGHT: 70 IN | DIASTOLIC BLOOD PRESSURE: 78 MMHG

## 2023-07-16 LAB
ANION GAP SERPL CALC-SCNC: 4 MMOL/L (ref 5–15)
BASOPHILS # BLD: 0 K/UL (ref 0–0.1)
BASOPHILS NFR BLD: 1 % (ref 0–1)
BUN SERPL-MCNC: 26 MG/DL (ref 6–20)
BUN/CREAT SERPL: 16 (ref 12–20)
CA-I BLD-MCNC: 8.4 MG/DL (ref 8.5–10.1)
CHLORIDE SERPL-SCNC: 111 MMOL/L (ref 97–108)
CO2 SERPL-SCNC: 23 MMOL/L (ref 21–32)
CREAT SERPL-MCNC: 1.6 MG/DL (ref 0.7–1.3)
DIFFERENTIAL METHOD BLD: ABNORMAL
ECHO AO ASC DIAM: 3.7 CM
ECHO AO ASCENDING AORTA INDEX: 1.7 CM/M2
ECHO AO ROOT DIAM: 3.2 CM
ECHO AO ROOT INDEX: 1.47 CM/M2
ECHO AV AREA PEAK VELOCITY: 2.9 CM2
ECHO AV AREA VTI: 3.2 CM2
ECHO AV AREA/BSA PEAK VELOCITY: 1.3 CM2/M2
ECHO AV AREA/BSA VTI: 1.5 CM2/M2
ECHO AV MEAN GRADIENT: 6 MMHG
ECHO AV MEAN VELOCITY: 1.1 M/S
ECHO AV PEAK GRADIENT: 9 MMHG
ECHO AV PEAK VELOCITY: 1.5 M/S
ECHO AV VELOCITY RATIO: 0.87
ECHO AV VTI: 28.8 CM
ECHO BSA: 2.23 M2
ECHO IVC EXP: 1.3 CM
ECHO LA AREA 2C: 12.6 CM2
ECHO LA AREA 4C: 16.5 CM2
ECHO LA DIAMETER INDEX: 1.83 CM/M2
ECHO LA DIAMETER: 4 CM
ECHO LA MAJOR AXIS: 4.8 CM
ECHO LA MINOR AXIS: 3.7 CM
ECHO LA TO AORTIC ROOT RATIO: 1.25
ECHO LA VOL A-L A2C: 34 ML (ref 18–58)
ECHO LA VOL A-L A4C: 46 ML (ref 18–58)
ECHO LA VOLUME INDEX A-L A2C: 16 ML/M2 (ref 16–34)
ECHO LA VOLUME INDEX A-L A4C: 21 ML/M2 (ref 16–34)
ECHO LV E' LATERAL VELOCITY: 13 CM/S
ECHO LV E' SEPTAL VELOCITY: 10 CM/S
ECHO LV EDV A2C: 78 ML
ECHO LV EDV A4C: 96 ML
ECHO LV EDV INDEX A4C: 44 ML/M2
ECHO LV EDV NDEX A2C: 36 ML/M2
ECHO LV EJECTION FRACTION A2C: 60 %
ECHO LV EJECTION FRACTION A4C: 66 %
ECHO LV EJECTION FRACTION BIPLANE: 64 % (ref 55–100)
ECHO LV ESV A2C: 31 ML
ECHO LV ESV A4C: 32 ML
ECHO LV ESV INDEX A2C: 14 ML/M2
ECHO LV ESV INDEX A4C: 15 ML/M2
ECHO LV FRACTIONAL SHORTENING: 31 % (ref 28–44)
ECHO LV INTERNAL DIMENSION DIASTOLE INDEX: 1.65 CM/M2
ECHO LV INTERNAL DIMENSION DIASTOLIC: 3.6 CM (ref 4.2–5.9)
ECHO LV INTERNAL DIMENSION SYSTOLIC INDEX: 1.15 CM/M2
ECHO LV INTERNAL DIMENSION SYSTOLIC: 2.5 CM
ECHO LV IVSD: 1.1 CM (ref 0.6–1)
ECHO LV MASS 2D: 132.7 G (ref 88–224)
ECHO LV MASS INDEX 2D: 60.9 G/M2 (ref 49–115)
ECHO LV POSTERIOR WALL DIASTOLIC: 1.2 CM (ref 0.6–1)
ECHO LV RELATIVE WALL THICKNESS RATIO: 0.67
ECHO LVOT AREA: 3.5 CM2
ECHO LVOT AV VTI INDEX: 0.93
ECHO LVOT DIAM: 2.1 CM
ECHO LVOT MEAN GRADIENT: 3 MMHG
ECHO LVOT PEAK GRADIENT: 6 MMHG
ECHO LVOT PEAK VELOCITY: 1.3 M/S
ECHO LVOT STROKE VOLUME INDEX: 42.4 ML/M2
ECHO LVOT SV: 92.4 ML
ECHO LVOT VTI: 26.7 CM
ECHO MV A VELOCITY: 0.79 M/S
ECHO MV E DECELERATION TIME (DT): 264 MS
ECHO MV E VELOCITY: 0.81 M/S
ECHO MV E/A RATIO: 1.03
ECHO MV E/E' LATERAL: 6.23
ECHO MV E/E' RATIO (AVERAGED): 7.17
ECHO MV E/E' SEPTAL: 8.1
ECHO MV REGURGITANT PEAK GRADIENT: 9 MMHG
ECHO MV REGURGITANT PEAK VELOCITY: 1.5 M/S
ECHO PV MAX VELOCITY: 1 M/S
ECHO PV PEAK GRADIENT: 4 MMHG
ECHO RA AREA 4C: 12.8 CM2
ECHO RA END SYSTOLIC VOLUME APICAL 4 CHAMBER INDEX BSA: 12 ML/M2
ECHO RA VOLUME: 26 ML
ECHO RV BASAL DIMENSION: 3.6 CM
ECHO RV TAPSE: 2 CM (ref 1.7–?)
ECHO TV REGURGITANT MAX VELOCITY: 1.33 M/S
ECHO TV REGURGITANT PEAK GRADIENT: 7 MMHG
EOSINOPHIL # BLD: 0.3 K/UL (ref 0–0.4)
EOSINOPHIL NFR BLD: 5 % (ref 0–7)
ERYTHROCYTE [DISTWIDTH] IN BLOOD BY AUTOMATED COUNT: 12.4 % (ref 11.5–14.5)
GLUCOSE BLD STRIP.AUTO-MCNC: 177 MG/DL (ref 65–100)
GLUCOSE BLD STRIP.AUTO-MCNC: 185 MG/DL (ref 65–100)
GLUCOSE SERPL-MCNC: 171 MG/DL (ref 65–100)
HCT VFR BLD AUTO: 33 % (ref 36.6–50.3)
HGB BLD-MCNC: 11.1 G/DL (ref 12.1–17)
IMM GRANULOCYTES # BLD AUTO: 0 K/UL (ref 0–0.04)
IMM GRANULOCYTES NFR BLD AUTO: 0 % (ref 0–0.5)
LYMPHOCYTES # BLD: 1.9 K/UL (ref 0.8–3.5)
LYMPHOCYTES NFR BLD: 30 % (ref 12–49)
MCH RBC QN AUTO: 29.9 PG (ref 26–34)
MCHC RBC AUTO-ENTMCNC: 33.6 G/DL (ref 30–36.5)
MCV RBC AUTO: 88.9 FL (ref 80–99)
MONOCYTES # BLD: 0.5 K/UL (ref 0–1)
MONOCYTES NFR BLD: 8 % (ref 5–13)
NEUTS SEG # BLD: 3.6 K/UL (ref 1.8–8)
NEUTS SEG NFR BLD: 56 % (ref 32–75)
NRBC # BLD: 0 K/UL (ref 0–0.01)
NRBC BLD-RTO: 0 PER 100 WBC
PERFORMED BY:: ABNORMAL
PERFORMED BY:: ABNORMAL
PLATELET # BLD AUTO: 128 K/UL (ref 150–400)
PMV BLD AUTO: 10.4 FL (ref 8.9–12.9)
POTASSIUM SERPL-SCNC: 4 MMOL/L (ref 3.5–5.1)
RBC # BLD AUTO: 3.71 M/UL (ref 4.1–5.7)
SODIUM SERPL-SCNC: 138 MMOL/L (ref 136–145)
WBC # BLD AUTO: 6.3 K/UL (ref 4.1–11.1)

## 2023-07-16 PROCEDURE — 6370000000 HC RX 637 (ALT 250 FOR IP): Performed by: PHYSICIAN ASSISTANT

## 2023-07-16 PROCEDURE — 6370000000 HC RX 637 (ALT 250 FOR IP): Performed by: INTERNAL MEDICINE

## 2023-07-16 PROCEDURE — 36415 COLL VENOUS BLD VENIPUNCTURE: CPT

## 2023-07-16 PROCEDURE — 96361 HYDRATE IV INFUSION ADD-ON: CPT

## 2023-07-16 PROCEDURE — 85025 COMPLETE CBC W/AUTO DIFF WBC: CPT

## 2023-07-16 PROCEDURE — 2580000003 HC RX 258: Performed by: INTERNAL MEDICINE

## 2023-07-16 PROCEDURE — G0378 HOSPITAL OBSERVATION PER HR: HCPCS

## 2023-07-16 PROCEDURE — 80048 BASIC METABOLIC PNL TOTAL CA: CPT

## 2023-07-16 PROCEDURE — 82962 GLUCOSE BLOOD TEST: CPT

## 2023-07-16 RX ORDER — DOXYCYCLINE HYCLATE 100 MG/1
100 CAPSULE ORAL EVERY 12 HOURS SCHEDULED
Qty: 8 CAPSULE | Refills: 0 | Status: SHIPPED | OUTPATIENT
Start: 2023-07-16 | End: 2023-07-20

## 2023-07-16 RX ORDER — ATORVASTATIN CALCIUM 80 MG/1
80 TABLET, FILM COATED ORAL NIGHTLY
Qty: 30 TABLET | Refills: 3 | Status: SHIPPED | OUTPATIENT
Start: 2023-07-16

## 2023-07-16 RX ORDER — SODIUM BICARBONATE 650 MG/1
650 TABLET ORAL 2 TIMES DAILY
Qty: 60 TABLET | Refills: 0 | Status: SHIPPED | OUTPATIENT
Start: 2023-07-16 | End: 2023-08-15

## 2023-07-16 RX ORDER — CEPHALEXIN 500 MG/1
500 CAPSULE ORAL EVERY 8 HOURS SCHEDULED
Qty: 12 CAPSULE | Refills: 0 | Status: SHIPPED | OUTPATIENT
Start: 2023-07-16 | End: 2023-07-20

## 2023-07-16 RX ADMIN — CILOSTAZOL 50 MG: 50 TABLET ORAL at 06:23

## 2023-07-16 RX ADMIN — SODIUM CHLORIDE: 9 INJECTION, SOLUTION INTRAVENOUS at 05:12

## 2023-07-16 RX ADMIN — SODIUM BICARBONATE 650 MG: 650 TABLET ORAL at 08:35

## 2023-07-16 RX ADMIN — DOXYCYCLINE HYCLATE 100 MG: 100 CAPSULE ORAL at 08:35

## 2023-07-16 RX ADMIN — GUAIFENESIN 600 MG: 600 TABLET, EXTENDED RELEASE ORAL at 08:35

## 2023-07-16 RX ADMIN — APIXABAN 5 MG: 5 TABLET, FILM COATED ORAL at 08:35

## 2023-07-16 RX ADMIN — CEPHALEXIN 500 MG: 500 CAPSULE ORAL at 06:23

## 2023-07-16 RX ADMIN — MUPIROCIN: 20 OINTMENT TOPICAL at 08:37

## 2023-07-16 RX ADMIN — CABERGOLINE 0.5 MG: 0.5 TABLET ORAL at 08:35

## 2023-07-16 RX ADMIN — SODIUM CHLORIDE, PRESERVATIVE FREE 10 ML: 5 INJECTION INTRAVENOUS at 08:36

## 2023-07-16 RX ADMIN — PANTOPRAZOLE SODIUM 40 MG: 40 TABLET, DELAYED RELEASE ORAL at 06:23

## 2023-07-16 RX ADMIN — TAMSULOSIN HYDROCHLORIDE 0.4 MG: 0.4 CAPSULE ORAL at 08:35

## 2023-07-16 RX ADMIN — MIDODRINE HYDROCHLORIDE 5 MG: 5 TABLET ORAL at 08:35

## 2023-07-16 NOTE — DISCHARGE SUMMARY
Hospitalist Discharge Summary     Patient ID:    Melody Corley  [id number]  81 y.o.  1955    Admit date: 7/13/2023    Discharge date : 7/16/2023    Final Diagnoses:   1. Syncopal episode  2. Orthostatic hypotension - resolved  3. Acute on chronic kidney disease stage III secondary to dehydration  4. Pituitary disease  5. Diabetes  6. Hypertension with hypotensive episodes  7. PAD  8. Recurrent DVT  9. Chronic right toe wound    Reason for Hospitalization: LOC      Hospital Course: Melody Donaldson is a 76 y.o. male with PMH of diabetes, hypertension, RLS, recurrent DVT, CKD that presented to the ED with chief complaint of LOC. He was working outdoors under the sun for the past 4 hours before going into this house. When he stool up from the chair he suddenly felt dizzy and loss consciousness. After he regained consciousness, he vomited once. Associated with slurred speech, blurry vision and right sided weakness and numbness after waking up. No facial droop. He does has chronic right toe wound, but denies worsening pain, erythema, purulent discharge, fever or chills. In the ED, noted hypotensive. Afebrile, no leukocytosis. However lab showed JASON, urinalysis not indicative of UTI. CT and CTA head and neck showed no acute findings, no large vessels occlusion. Tele-neurologist recomended inpatient for stroke work-up. MRI of brain negative. ECHO pending     Renal function improving with IV fluids. Nephrology following     Orthostatics positive on admission. Started on midodrine. Resolved. Midodrine stopped. BP slightly elevated. Resume beta blocker.  Continue to hold ARB/HCTZ      Discharge Medications:      Medication List        START taking these medications      atorvastatin 80 MG tablet  Commonly known as: LIPITOR  Take 1 tablet by mouth nightly     cephALEXin 500 MG capsule  Commonly known as: KEFLEX  Take 1 capsule by mouth every 8 hours for 4 days

## 2023-07-16 NOTE — PROGRESS NOTES
Transition of Care Plan:    RUR: 11%  Prior Level of Functioning: independent  Disposition: home  If SNF or IPR: Date FOC offered: n/a  Date FOC received: n/a  Accepting facility: n/a  Date authorization started with reference number: n/a  Date authorization received and expires: n/a  Follow up appointments: sunday  DME needed: n/a  Transportation at discharge: wife  IM/IMM Medicare/ letter given: yes  Is patient a [place] and connected with [facility]? N/a   If yes, was [employer] transfer form completed and [facility] notified? Caregiver Contact: n/a  Discharge Caregiver contacted prior to discharge? N/a  Care Conference needed?  N/a  Barriers to discharge: n/a

## 2023-07-16 NOTE — DISCHARGE INSTRUCTIONS
Hospitalist Discharge Instructions     Patient ID:    Frandy Stephens  [id number]  40 y.o.  1955    Admit date: 7/13/2023    Discharge date : 7/16/2023    Final Diagnoses:   1. Syncopal episode  2. Orthostatic hypotension - resolved  3. Acute on chronic kidney disease stage III secondary to dehydration  4. Pituitary disease  5. Diabetes  6. Hypertension with hypotensive episodes  7. PAD  8. Recurrent DVT  9.   Chronic right toe wound    Reason for Hospitalization: LOC      Discharge Medications:      Medication List        START taking these medications      atorvastatin 80 MG tablet  Commonly known as: LIPITOR  Take 1 tablet by mouth nightly     cephALEXin 500 MG capsule  Commonly known as: KEFLEX  Take 1 capsule by mouth every 8 hours for 4 days     doxycycline hyclate 100 MG capsule  Commonly known as: VIBRAMYCIN  Take 1 capsule by mouth every 12 hours for 4 days     sodium bicarbonate 650 MG tablet  Take 1 tablet by mouth 2 times daily            CONTINUE taking these medications      apixaban 5 MG Tabs tablet  Commonly known as: ELIQUIS     cabergoline 0.5 MG tablet  Commonly known as: DOSTINEX     cilostazol 50 MG tablet  Commonly known as: PLETAL     clomiPHENE 50 MG tablet  Commonly known as: CLOMID     gabapentin 800 MG tablet  Commonly known as: NEURONTIN     glipiZIDE 10 MG extended release tablet  Commonly known as: GLUCOTROL XL     insulin glargine 100 UNIT/ML injection vial  Commonly known as: LANTUS     metoprolol succinate 100 MG extended release tablet  Commonly known as: TOPROL XL     One-A-Day Mens 50+ Tabs     tamsulosin 0.4 MG capsule  Commonly known as: FLOMAX     tiZANidine 2 MG tablet  Commonly known as: ZANAFLEX     Trulicity 5.07 WR/9.9RD Sopn  Generic drug: Dulaglutide            STOP taking these medications      acetaminophen 325 MG tablet  Commonly known as: TYLENOL     ondansetron 4 MG disintegrating tablet  Commonly known as: ZOFRAN-ODT

## 2023-07-16 NOTE — PROGRESS NOTES
Renal Consultation Note    NAME:  Carmita Jarquin :   1955   MRN:   [id number]     ATTENDING: Génesis Dozier MD  PCP:  Camila Dodge MD    Date/Time:  2023 7:43 AM      Subjective:   REQUESTING PHYSICIAN:  REASON FOR CONSULT:    don on ckd  -Patient seen at bedside this morning is comfortably resting. Denies any complaints of focal weakness or numbness or speech abilities. He had an MRI of brain done yesterday and is aware of result which was reportedly normal.    Past Medical History:   Diagnosis Date    BPH (benign prostatic hyperplasia)     Diabetes ([address])     Hypercholesterolemia     Hypertension     Neuropathy     Restless leg syndrome     Thromboembolus ([facility])       Past Surgical History:   Procedure Laterality Date    SHOULDER ARTHROSCOPY Left     SHOULDER ARTHROSCOPY Right     TOTAL HIP ARTHROPLASTY Right     TOTAL HIP ARTHROPLASTY Left     TOTAL KNEE ARTHROPLASTY Left      Social History     Tobacco Use    Smoking status: Never    Smokeless tobacco: Never   Substance Use Topics    Alcohol use: Yes      Family History   Problem Relation Age of Onset    Diabetes Mother     Cancer Father        Allergies   Allergen Reactions    Tramadol Nausea And Vomiting      Prior to Admission medications    Medication Sig Start Date End Date Taking?  Authorizing Provider   apixaban (ELIQUIS) 5 MG TABS tablet Take 1 tablet by mouth 2 times daily   Yes Historical Provider, MD   Multiple Vitamins-Minerals (ONE-A-DAY MENS 50+) TABS Take 1 tablet by mouth daily   Yes Historical Provider, MD   acetaminophen (TYLENOL) 325 MG tablet Take 2 tablets by mouth every 6 hours as needed  Patient not taking: Reported on 2023   Ar Automatic Reconciliation   cabergoline (DOSTINEX) 0.5 MG tablet TAKE 0.5 TABLETS BY MOUTH TWO (2) TIMES A WEEK FOR 90 DAYS. 22   Ar Automatic Reconciliation   cilostazol (PLETAL) 50 MG tablet Take 1 tablet by mouth 2 times daily (before meals) 23   Ar Automatic

## 2023-07-16 NOTE — PROGRESS NOTES
Discharge plan of care/case management plan validated with provider discharge order. I have reviewed discharge instructions with the patient. The patient verbalized understanding. IV and Tele Box removed. VSS. Wife at bedside, denies any further questions. Per provider okay to take tylenol.  Waiting on wheelchair to go home

## 2023-07-27 ENCOUNTER — OFFICE VISIT (OUTPATIENT)
Age: 68
End: 2023-07-27
Payer: MEDICARE

## 2023-07-27 VITALS
OXYGEN SATURATION: 94 % | DIASTOLIC BLOOD PRESSURE: 63 MMHG | TEMPERATURE: 97.8 F | HEART RATE: 77 BPM | BODY MASS INDEX: 31.28 KG/M2 | WEIGHT: 218.5 LBS | HEIGHT: 70 IN | SYSTOLIC BLOOD PRESSURE: 104 MMHG | RESPIRATION RATE: 18 BRPM

## 2023-07-27 DIAGNOSIS — I87.303 CHRONIC VENOUS HYPERTENSION INVOLVING BOTH SIDES: ICD-10-CM

## 2023-07-27 DIAGNOSIS — I73.9 PERIPHERAL VASCULAR DISEASE (HCC): Primary | ICD-10-CM

## 2023-07-27 PROCEDURE — 1123F ACP DISCUSS/DSCN MKR DOCD: CPT | Performed by: SURGERY

## 2023-07-27 PROCEDURE — 99213 OFFICE O/P EST LOW 20 MIN: CPT | Performed by: SURGERY

## 2023-07-27 RX ORDER — CEPHALEXIN 500 MG/1
500 CAPSULE ORAL 4 TIMES DAILY
COMMUNITY

## 2023-07-27 RX ORDER — DOXYCYCLINE HYCLATE 100 MG/1
100 CAPSULE ORAL 2 TIMES DAILY
COMMUNITY

## 2023-07-27 ASSESSMENT — PATIENT HEALTH QUESTIONNAIRE - PHQ9
1. LITTLE INTEREST OR PLEASURE IN DOING THINGS: 0
SUM OF ALL RESPONSES TO PHQ9 QUESTIONS 1 & 2: 0
SUM OF ALL RESPONSES TO PHQ QUESTIONS 1-9: 0
SUM OF ALL RESPONSES TO PHQ QUESTIONS 1-9: 0
2. FEELING DOWN, DEPRESSED OR HOPELESS: 0
SUM OF ALL RESPONSES TO PHQ QUESTIONS 1-9: 0
SUM OF ALL RESPONSES TO PHQ QUESTIONS 1-9: 0

## 2023-07-31 PROBLEM — I87.303 CHRONIC VENOUS HYPERTENSION INVOLVING BOTH SIDES: Status: ACTIVE | Noted: 2023-07-31

## 2023-07-31 NOTE — PROGRESS NOTES
Vascular History and Physical    Patient: Liv Maddox MRN: [id number]    YOB: 1955  Age: 76 y.o. Sex: male     Chief Complaint:  Chief Complaint   Patient presents with    Follow-up     6 month followup: PVD       History of Present Illness: Liv Maddox is a 76 y.o. very pleasant gentleman is here today surveillance vascular examination. Patient has small vessel disease bilateral pedal area and currently on Pletal therapy. Patient also has a chronic venous hypertension leg swelling. Patient denies any worsening swelling or pain in both legs. Patient denies chest pain shortness breath. Patient has been participating exercise program as well. Social History:  Social Connections: Not on file       Past Medical History:  Past Medical History:   Diagnosis Date    BPH (benign prostatic hyperplasia)     Diabetes ([address])     Hypercholesterolemia     Hypertension     Neuropathy     Restless leg syndrome     Thromboembolus ([address])        Surgical History:  Past Surgical History:   Procedure Laterality Date    SHOULDER ARTHROSCOPY Left     SHOULDER ARTHROSCOPY Right     TOTAL HIP ARTHROPLASTY Right     TOTAL HIP ARTHROPLASTY Left     TOTAL KNEE ARTHROPLASTY Left        Allergies: Allergies   Allergen Reactions    Tramadol Nausea And Vomiting       Current Meds:  Current Outpatient Medications   Medication Sig Dispense Refill    cephALEXin (KEFLEX) 500 MG capsule Take 1 capsule by mouth 4 times daily      doxycycline hyclate (VIBRAMYCIN) 100 MG capsule Take 1 capsule by mouth 2 times daily      sodium bicarbonate 650 MG tablet Take 1 tablet by mouth 2 times daily 60 tablet 0    atorvastatin (LIPITOR) 80 MG tablet Take 1 tablet by mouth nightly 30 tablet 3    apixaban (ELIQUIS) 5 MG TABS tablet Take 1 tablet by mouth 2 times daily      cabergoline (DOSTINEX) 0.5 MG tablet TAKE 0.5 TABLETS BY MOUTH TWO (2) TIMES A WEEK FOR 90 DAYS.       cilostazol (PLETAL) 50 MG tablet Take 1 tablet by mouth 2

## 2023-08-01 ENCOUNTER — HOSPITAL ENCOUNTER (EMERGENCY)
Facility: HOSPITAL | Age: 68
Discharge: HOME | End: 2023-08-01
Payer: MEDICARE

## 2023-08-01 ENCOUNTER — APPOINTMENT (OUTPATIENT)
Facility: HOSPITAL | Age: 68
End: 2023-08-01
Payer: MEDICARE

## 2023-08-01 VITALS
RESPIRATION RATE: 18 BRPM | SYSTOLIC BLOOD PRESSURE: 135 MMHG | TEMPERATURE: 98.3 F | HEIGHT: 72 IN | DIASTOLIC BLOOD PRESSURE: 88 MMHG | HEART RATE: 100 BPM | WEIGHT: 217 LBS | BODY MASS INDEX: 29.39 KG/M2 | OXYGEN SATURATION: 99 %
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
## 2023-08-01 PROCEDURE — 90714 TD VACC NO PRESV 7 YRS+ IM: CPT

## 2023-08-01 PROCEDURE — 6360000002 HC RX W HCPCS

## 2023-08-01 PROCEDURE — 6370000000 HC RX 637 (ALT 250 FOR IP)

## 2023-08-01 PROCEDURE — 99284 EMERGENCY DEPT VISIT MOD MDM: CPT

## 2023-08-01 PROCEDURE — 73130 X-RAY EXAM OF HAND: CPT

## 2023-08-01 PROCEDURE — 73090 X-RAY EXAM OF FOREARM: CPT

## 2023-08-01 PROCEDURE — 90471 IMMUNIZATION ADMIN: CPT

## 2023-08-01 PROCEDURE — 73110 X-RAY EXAM OF WRIST: CPT

## 2023-08-01 RX ORDER — AMOXICILLIN AND CLAVULANATE POTASSIUM 875; 125 MG/1; MG/1
1 TABLET, FILM COATED ORAL
Status: COMPLETED | OUTPATIENT
Start: 2023-08-01 | End: 2023-08-01

## 2023-08-01 RX ORDER — AMOXICILLIN AND CLAVULANATE POTASSIUM 875; 125 MG/1; MG/1
1 TABLET, FILM COATED ORAL 2 TIMES DAILY
Qty: 14 TABLET | Refills: 0 | Status: SHIPPED | OUTPATIENT
Start: 2023-08-01 | End: 2023-08-08

## 2023-08-01 RX ORDER — HYDROCODONE BITARTRATE AND ACETAMINOPHEN 5; 325 MG/1; MG/1
1 TABLET ORAL
Status: COMPLETED | OUTPATIENT
Start: 2023-08-01 | End: 2023-08-01

## 2023-08-01 RX ORDER — OXYCODONE HYDROCHLORIDE 5 MG/1
5 TABLET ORAL
Status: DISCONTINUED | OUTPATIENT
Start: 2023-08-01 | End: 2023-08-01

## 2023-08-01 RX ORDER — TETANUS AND DIPHTHERIA TOXOIDS ADSORBED 2; 2 [LF]/.5ML; [LF]/.5ML
0.5 INJECTION INTRAMUSCULAR ONCE
Status: DISCONTINUED | OUTPATIENT
Start: 2023-08-01 | End: 2023-08-01

## 2023-08-01 RX ORDER — OXYCODONE HYDROCHLORIDE AND ACETAMINOPHEN 5; 325 MG/1; MG/1
1 TABLET ORAL EVERY 6 HOURS PRN
Qty: 12 TABLET | Refills: 0 | Status: SHIPPED | OUTPATIENT
Start: 2023-08-01 | End: 2023-08-04

## 2023-08-01 RX ORDER — OXYCODONE HYDROCHLORIDE AND ACETAMINOPHEN 5; 325 MG/1; MG/1
1 TABLET ORAL
Status: COMPLETED | OUTPATIENT
Start: 2023-08-01 | End: 2023-08-01

## 2023-08-01 RX ADMIN — AMOXICILLIN AND CLAVULANATE POTASSIUM 1 TABLET: 875; 125 TABLET, FILM COATED ORAL at 18:34

## 2023-08-01 RX ADMIN — HYDROCODONE BITARTRATE AND ACETAMINOPHEN 1 TABLET: 5; 325 TABLET ORAL at 15:41

## 2023-08-01 RX ADMIN — OXYCODONE HYDROCHLORIDE AND ACETAMINOPHEN 1 TABLET: 5; 325 TABLET ORAL at 18:34

## 2023-08-01 RX ADMIN — CLOSTRIDIUM TETANI TOXOID ANTIGEN (FORMALDEHYDE INACTIVATED) AND CORYNEBACTERIUM DIPHTHERIAE TOXOID ANTIGEN (FORMALDEHYDE INACTIVATED) 0.5 ML: 5; 2 INJECTION, SUSPENSION INTRAMUSCULAR at 15:59

## 2023-08-01 ASSESSMENT — PAIN SCALES - GENERAL: PAINLEVEL_OUTOF10: 10

## 2023-08-01 ASSESSMENT — LIFESTYLE VARIABLES
HOW MANY STANDARD DRINKS CONTAINING ALCOHOL DO YOU HAVE ON A TYPICAL DAY: PATIENT DOES NOT DRINK
HOW OFTEN DO YOU HAVE A DRINK CONTAINING ALCOHOL: NEVER

## 2023-08-01 ASSESSMENT — PAIN DESCRIPTION - ORIENTATION: ORIENTATION: LEFT

## 2023-08-01 ASSESSMENT — PAIN DESCRIPTION - LOCATION: LOCATION: ARM

## 2023-08-01 ASSESSMENT — PAIN - FUNCTIONAL ASSESSMENT: PAIN_FUNCTIONAL_ASSESSMENT: 0-10

## 2023-08-01 NOTE — ED NOTES
Wound cleansed with wipes and wound cleanser spray. Mild bleeding noted from puncture wounds in multiple areas. Applied nonadherent dressing to bleeding areas and wrapped from L wrist to L elbow with coban.      Leslie St RN  08/01/23 1863

## 2023-08-01 NOTE — ED PROVIDER NOTES
[facility] EMERGENCY DEPARTMENT  EMERGENCY DEPARTMENT HISTORY AND PHYSICAL EXAM      Date: (Not on file)  Patient Name: Izyz Suh MRN: [id number]  Birthdate 1955  Date of evaluation: 8/1/2023  Provider: SYLVAIN Esparza NP   Note Started: 3:26 PM EDT 8/1/23    HISTORY OF PRESENT ILLNESS   No chief complaint on file. History Provided By: Patient and wife    HPI: Izzy Suh is a 76 y.o. male with a history as below presents with dog bites. Patient states that he brought home a new pitbull (female)from the rescue today and was integrating it with their existing dog (male). The male dog tried to mount the female dog and they began to fight. The patient intervened and got bit. Tetanus is not up-to-date. He is taking blood thinners. He presents with the left upper extremity wrapped in a towel with some oozing. He denies numbness, weakness, dizziness, or suspicion for foreign body. PAST MEDICAL HISTORY   Past Medical History:  Past Medical History:   Diagnosis Date    BPH (benign prostatic hyperplasia)     Diabetes ([address])     Hypercholesterolemia     Hypertension     Neuropathy     Restless leg syndrome     Thromboembolus ([address])        Past Surgical History:  Past Surgical History:   Procedure Laterality Date    SHOULDER ARTHROSCOPY Left     SHOULDER ARTHROSCOPY Right     TOTAL HIP ARTHROPLASTY Right     TOTAL HIP ARTHROPLASTY Left     TOTAL KNEE ARTHROPLASTY Left        Family History:  Family History   Problem Relation Age of Onset    Diabetes Mother     Cancer Father        Social History:  Social History     Tobacco Use    Smoking status: Never    Smokeless tobacco: Never   Substance Use Topics    Alcohol use: Yes    Drug use: Never       Allergies: Allergies   Allergen Reactions    Tramadol Nausea And Vomiting       PCP: Noelle Ward MD    Current Meds:   No current facility-administered medications for this encounter.      Current Outpatient Medications   Medication Sig Dispense

## 2023-08-01 NOTE — DISCHARGE INSTRUCTIONS
Thank you! Thank you for allowing me to care for you in the emergency department. It is my goal to provide you with excellent care. If you have not received excellent quality care, please ask to speak to the nurse manager. Please fill out the survey that will come to you by mail or email since we listen to your feedback! Below you will find a list of your tests from today's visit. Should you have any questions, please do not hesitate to call the emergency department. Labs  No results found for this or any previous visit (from the past 12 hour(s)). Radiologic Studies  XR RADIUS ULNA LEFT (2 VIEWS)   Final Result   No acute abnormality. XR WRIST LEFT (MIN 3 VIEWS)   Final Result   No acute abnormality. XR HAND LEFT (MIN 3 VIEWS)   Final Result   No acute abnormality.        ------------------------------------------------------------------------------------------------------------  The exam and treatment you received in the Emergency Department were for an urgent problem and are not intended as complete care. It is important that you follow-up with a doctor, nurse practitioner, or physician assistant to:  (1) confirm your diagnosis,  (2) re-evaluation of changes in your illness and treatment, and  (3) for ongoing care. Please take your discharge instructions with you when you go to your follow-up appointment. If you have any problem arranging a follow-up appointment, contact the Emergency Department. If your symptoms become worse or you do not improve as expected and you are unable to reach your health care provider, please return to the Emergency Department. We are available 24 hours a day. If a prescription has been provided, please have it filled as soon as possible to prevent a delay in treatment.  If you have any questions or reservations about taking the medication due to side effects or interactions with other medications, please call your primary care provider or contact the

## 2024-03-29 ENCOUNTER — ANESTHESIA EVENT (OUTPATIENT)
Facility: HOSPITAL | Age: 69
End: 2024-03-29
Payer: MEDICARE

## 2024-03-29 RX ORDER — SODIUM BICARBONATE 650 MG/1
650 TABLET ORAL 3 TIMES DAILY
COMMUNITY

## 2024-03-29 NOTE — PERIOP NOTE
Perfect served Dr. Lockhart : patient takes Eliquis, does he need to hold prior to planned procedure?    MD states No    Patient is aware he does not need to hold Eliquis prior to planned procedure

## 2024-03-29 NOTE — ANESTHESIA PRE PROCEDURE
BMI:   Wt Readings from Last 3 Encounters:   03/29/24 95.3 kg (210 lb)   08/01/23 98.4 kg (217 lb)   07/27/23 99.1 kg (218 lb 8 oz)     Body mass index is 29.29 kg/m².    CBC:   Lab Results   Component Value Date/Time    WBC 6.3 07/16/2023 04:30 AM    RBC 3.71 07/16/2023 04:30 AM    HGB 11.1 07/16/2023 04:30 AM    HCT 33.0 07/16/2023 04:30 AM    MCV 88.9 07/16/2023 04:30 AM    RDW 12.4 07/16/2023 04:30 AM     07/16/2023 04:30 AM       CMP:   Lab Results   Component Value Date/Time     07/16/2023 04:30 AM    K 4.0 07/16/2023 04:30 AM     07/16/2023 04:30 AM    CO2 23 07/16/2023 04:30 AM    BUN 26 07/16/2023 04:30 AM    CREATININE 1.60 07/16/2023 04:30 AM    GFRAA 39 09/06/2022 04:15 PM    AGRATIO 1.1 07/13/2023 06:21 PM    AGRATIO 0.9 10/07/2022 03:23 PM    LABGLOM 47 07/16/2023 04:30 AM    GLUCOSE 171 07/16/2023 04:30 AM    PROT 5.8 07/13/2023 06:21 PM    CALCIUM 8.4 07/16/2023 04:30 AM    BILITOT 0.5 07/13/2023 06:21 PM    ALKPHOS 45 07/13/2023 06:21 PM    ALKPHOS 58 10/07/2022 03:23 PM    AST 19 07/13/2023 06:21 PM    ALT 26 07/13/2023 06:21 PM       POC Tests: No results for input(s): \"POCGLU\", \"POCNA\", \"POCK\", \"POCCL\", \"POCBUN\", \"POCHEMO\", \"POCHCT\" in the last 72 hours.    Coags:   Lab Results   Component Value Date/Time    PROTIME 19.7 07/13/2023 06:21 PM    INR 1.6 07/13/2023 06:21 PM    APTT 31.1 07/13/2023 06:21 PM       HCG (If Applicable): No results found for: \"PREGTESTUR\", \"PREGSERUM\", \"HCG\", \"HCGQUANT\"     ABGs: No results found for: \"PHART\", \"PO2ART\", \"PBI0JBH\", \"BPH0VAO\", \"BEART\", \"T2WJRTTD\"     Type & Screen (If Applicable):  No results found for: \"LABABO\", \"LABRH\"    Drug/Infectious Status (If Applicable):  No results found for: \"HIV\", \"HEPCAB\"    COVID-19 Screening (If Applicable):   Lab Results   Component Value Date/Time    COVID19 Not Detected 07/14/2023 01:13 PM           Anesthesia Evaluation  Patient summary

## 2024-03-29 NOTE — PERIOP NOTE
Order clarification obtained from Dr. Lockhart. No antibiotic needed for patient's procedure scheduled on 4-01-24.

## 2024-04-01 ENCOUNTER — HOSPITAL ENCOUNTER (OUTPATIENT)
Facility: HOSPITAL | Age: 69
Discharge: HOME | End: 2024-04-04
Payer: MEDICARE

## 2024-04-01 ENCOUNTER — ANESTHESIA (OUTPATIENT)
Facility: HOSPITAL | Age: 69
End: 2024-04-01
Payer: MEDICARE

## 2024-04-01 ENCOUNTER — HOSPITAL ENCOUNTER (OUTPATIENT)
Facility: HOSPITAL | Age: 69
Discharge: HOME | End: 2024-04-01
Attending: ORTHOPAEDIC SURGERY | Admitting: ORTHOPAEDIC SURGERY
Payer: MEDICARE

## 2024-04-01 VITALS
HEART RATE: 63 BPM | OXYGEN SATURATION: 96 % | HEIGHT: 71 IN | TEMPERATURE: 97.6 F | RESPIRATION RATE: 16 BRPM | DIASTOLIC BLOOD PRESSURE: 71 MMHG | SYSTOLIC BLOOD PRESSURE: 121 MMHG | WEIGHT: 210 LBS | BODY MASS INDEX: 29.4 KG/M2

## 2024-04-01 DIAGNOSIS — Z96.641 PAIN DUE TO RIGHT HIP JOINT PROSTHESIS, SUBSEQUENT ENCOUNTER: ICD-10-CM

## 2024-04-01 DIAGNOSIS — T84.84XD PAIN DUE TO RIGHT HIP JOINT PROSTHESIS, SUBSEQUENT ENCOUNTER: ICD-10-CM

## 2024-04-01 PROBLEM — T84.84XA: Status: ACTIVE | Noted: 2024-04-01

## 2024-04-01 LAB
APPEARANCE FLD: ABNORMAL
COLOR FLD: ABNORMAL
GLUCOSE BLD STRIP.AUTO-MCNC: 66 MG/DL (ref 65–100)
GLUCOSE BLD STRIP.AUTO-MCNC: 67 MG/DL (ref 65–100)
GLUCOSE BLD STRIP.AUTO-MCNC: 94 MG/DL (ref 65–100)
NUC CELL # FLD: 2650 /CU MM
PERFORMED BY:: NORMAL
RBC # FLD: >100 /CU MM
SPECIMEN SOURCE FLD: ABNORMAL

## 2024-04-01 PROCEDURE — 2709999900 HC NON-CHARGEABLE SUPPLY: Performed by: ORTHOPAEDIC SURGERY

## 2024-04-01 PROCEDURE — 2500000003 HC RX 250 WO HCPCS: Performed by: NURSE ANESTHETIST, CERTIFIED REGISTERED

## 2024-04-01 PROCEDURE — 7100000011 HC PHASE II RECOVERY - ADDTL 15 MIN: Performed by: ORTHOPAEDIC SURGERY

## 2024-04-01 PROCEDURE — 7100000010 HC PHASE II RECOVERY - FIRST 15 MIN: Performed by: ORTHOPAEDIC SURGERY

## 2024-04-01 PROCEDURE — 87205 SMEAR GRAM STAIN: CPT

## 2024-04-01 PROCEDURE — 87070 CULTURE OTHR SPECIMN AEROBIC: CPT

## 2024-04-01 PROCEDURE — 3600000012 HC SURGERY LEVEL 2 ADDTL 15MIN: Performed by: ORTHOPAEDIC SURGERY

## 2024-04-01 PROCEDURE — 3700000000 HC ANESTHESIA ATTENDED CARE: Performed by: ORTHOPAEDIC SURGERY

## 2024-04-01 PROCEDURE — 7100000001 HC PACU RECOVERY - ADDTL 15 MIN: Performed by: ORTHOPAEDIC SURGERY

## 2024-04-01 PROCEDURE — 82962 GLUCOSE BLOOD TEST: CPT

## 2024-04-01 PROCEDURE — 6360000002 HC RX W HCPCS: Performed by: NURSE ANESTHETIST, CERTIFIED REGISTERED

## 2024-04-01 PROCEDURE — 6360000002 HC RX W HCPCS: Performed by: ORTHOPAEDIC SURGERY

## 2024-04-01 PROCEDURE — 3700000001 HC ADD 15 MINUTES (ANESTHESIA): Performed by: ORTHOPAEDIC SURGERY

## 2024-04-01 PROCEDURE — 3600000002 HC SURGERY LEVEL 2 BASE: Performed by: ORTHOPAEDIC SURGERY

## 2024-04-01 PROCEDURE — 76000 FLUOROSCOPY <1 HR PHYS/QHP: CPT

## 2024-04-01 PROCEDURE — 7100000000 HC PACU RECOVERY - FIRST 15 MIN: Performed by: ORTHOPAEDIC SURGERY

## 2024-04-01 PROCEDURE — 2580000003 HC RX 258: Performed by: ORTHOPAEDIC SURGERY

## 2024-04-01 PROCEDURE — 89050 BODY FLUID CELL COUNT: CPT

## 2024-04-01 RX ORDER — DIPHENHYDRAMINE HYDROCHLORIDE 50 MG/ML
12.5 INJECTION INTRAMUSCULAR; INTRAVENOUS
Status: DISCONTINUED | OUTPATIENT
Start: 2024-04-01 | End: 2024-04-01 | Stop reason: HOSPADM

## 2024-04-01 RX ORDER — LORAZEPAM 2 MG/ML
0.5 INJECTION INTRAMUSCULAR
Status: DISCONTINUED | OUTPATIENT
Start: 2024-04-01 | End: 2024-04-01 | Stop reason: HOSPADM

## 2024-04-01 RX ORDER — PROPOFOL 10 MG/ML
INJECTION, EMULSION INTRAVENOUS CONTINUOUS PRN
Status: DISCONTINUED | OUTPATIENT
Start: 2024-04-01 | End: 2024-04-01 | Stop reason: SDUPTHER

## 2024-04-01 RX ORDER — HYDROMORPHONE HYDROCHLORIDE 1 MG/ML
0.5 INJECTION, SOLUTION INTRAMUSCULAR; INTRAVENOUS; SUBCUTANEOUS EVERY 5 MIN PRN
Status: DISCONTINUED | OUTPATIENT
Start: 2024-04-01 | End: 2024-04-01 | Stop reason: HOSPADM

## 2024-04-01 RX ORDER — NALOXONE HYDROCHLORIDE 0.4 MG/ML
INJECTION, SOLUTION INTRAMUSCULAR; INTRAVENOUS; SUBCUTANEOUS PRN
Status: DISCONTINUED | OUTPATIENT
Start: 2024-04-01 | End: 2024-04-01 | Stop reason: HOSPADM

## 2024-04-01 RX ORDER — OXYCODONE HYDROCHLORIDE 5 MG/1
10 TABLET ORAL PRN
Status: DISCONTINUED | OUTPATIENT
Start: 2024-04-01 | End: 2024-04-01 | Stop reason: HOSPADM

## 2024-04-01 RX ORDER — OXYCODONE HYDROCHLORIDE 5 MG/1
5 TABLET ORAL PRN
Status: DISCONTINUED | OUTPATIENT
Start: 2024-04-01 | End: 2024-04-01 | Stop reason: HOSPADM

## 2024-04-01 RX ORDER — LABETALOL HYDROCHLORIDE 5 MG/ML
10 INJECTION, SOLUTION INTRAVENOUS
Status: DISCONTINUED | OUTPATIENT
Start: 2024-04-01 | End: 2024-04-01 | Stop reason: HOSPADM

## 2024-04-01 RX ORDER — SODIUM CHLORIDE 0.9 % (FLUSH) 0.9 %
5-40 SYRINGE (ML) INJECTION PRN
Status: DISCONTINUED | OUTPATIENT
Start: 2024-04-01 | End: 2024-04-01 | Stop reason: HOSPADM

## 2024-04-01 RX ORDER — SODIUM CHLORIDE 0.9 % (FLUSH) 0.9 %
5-40 SYRINGE (ML) INJECTION EVERY 12 HOURS SCHEDULED
Status: DISCONTINUED | OUTPATIENT
Start: 2024-04-01 | End: 2024-04-01 | Stop reason: HOSPADM

## 2024-04-01 RX ORDER — ONDANSETRON 2 MG/ML
4 INJECTION INTRAMUSCULAR; INTRAVENOUS
Status: DISCONTINUED | OUTPATIENT
Start: 2024-04-01 | End: 2024-04-01 | Stop reason: HOSPADM

## 2024-04-01 RX ORDER — LIDOCAINE 4 G/G
1 PATCH TOPICAL AS NEEDED
Status: DISCONTINUED | OUTPATIENT
Start: 2024-04-01 | End: 2024-04-01 | Stop reason: HOSPADM

## 2024-04-01 RX ORDER — DEXTROSE MONOHYDRATE 100 MG/ML
INJECTION, SOLUTION INTRAVENOUS CONTINUOUS PRN
Status: DISCONTINUED | OUTPATIENT
Start: 2024-04-01 | End: 2024-04-01 | Stop reason: HOSPADM

## 2024-04-01 RX ORDER — HYDRALAZINE HYDROCHLORIDE 20 MG/ML
10 INJECTION INTRAMUSCULAR; INTRAVENOUS
Status: DISCONTINUED | OUTPATIENT
Start: 2024-04-01 | End: 2024-04-01 | Stop reason: HOSPADM

## 2024-04-01 RX ORDER — SODIUM CHLORIDE, SODIUM LACTATE, POTASSIUM CHLORIDE, CALCIUM CHLORIDE 600; 310; 30; 20 MG/100ML; MG/100ML; MG/100ML; MG/100ML
INJECTION, SOLUTION INTRAVENOUS CONTINUOUS
Status: DISCONTINUED | OUTPATIENT
Start: 2024-04-01 | End: 2024-04-01 | Stop reason: HOSPADM

## 2024-04-01 RX ORDER — BUPIVACAINE HYDROCHLORIDE 5 MG/ML
INJECTION, SOLUTION PERINEURAL PRN
Status: DISCONTINUED | OUTPATIENT
Start: 2024-04-01 | End: 2024-04-01 | Stop reason: ALTCHOICE

## 2024-04-01 RX ORDER — MEPERIDINE HYDROCHLORIDE 25 MG/ML
12.5 INJECTION INTRAMUSCULAR; INTRAVENOUS; SUBCUTANEOUS EVERY 5 MIN PRN
Status: DISCONTINUED | OUTPATIENT
Start: 2024-04-01 | End: 2024-04-01 | Stop reason: HOSPADM

## 2024-04-01 RX ORDER — METOCLOPRAMIDE HYDROCHLORIDE 5 MG/ML
10 INJECTION INTRAMUSCULAR; INTRAVENOUS
Status: DISCONTINUED | OUTPATIENT
Start: 2024-04-01 | End: 2024-04-01 | Stop reason: HOSPADM

## 2024-04-01 RX ORDER — FENTANYL CITRATE 50 UG/ML
INJECTION, SOLUTION INTRAMUSCULAR; INTRAVENOUS PRN
Status: DISCONTINUED | OUTPATIENT
Start: 2024-04-01 | End: 2024-04-01 | Stop reason: SDUPTHER

## 2024-04-01 RX ORDER — LIDOCAINE HYDROCHLORIDE 20 MG/ML
INJECTION, SOLUTION EPIDURAL; INFILTRATION; INTRACAUDAL; PERINEURAL PRN
Status: DISCONTINUED | OUTPATIENT
Start: 2024-04-01 | End: 2024-04-01 | Stop reason: SDUPTHER

## 2024-04-01 RX ORDER — SODIUM CHLORIDE 9 MG/ML
INJECTION, SOLUTION INTRAVENOUS PRN
Status: DISCONTINUED | OUTPATIENT
Start: 2024-04-01 | End: 2024-04-01 | Stop reason: HOSPADM

## 2024-04-01 RX ORDER — IPRATROPIUM BROMIDE AND ALBUTEROL SULFATE 2.5; .5 MG/3ML; MG/3ML
1 SOLUTION RESPIRATORY (INHALATION)
Status: DISCONTINUED | OUTPATIENT
Start: 2024-04-01 | End: 2024-04-01 | Stop reason: HOSPADM

## 2024-04-01 RX ORDER — SODIUM CHLORIDE, SODIUM LACTATE, POTASSIUM CHLORIDE, CALCIUM CHLORIDE 600; 310; 30; 20 MG/100ML; MG/100ML; MG/100ML; MG/100ML
INJECTION, SOLUTION INTRAVENOUS ONCE
Status: DISCONTINUED | OUTPATIENT
Start: 2024-04-01 | End: 2024-04-01 | Stop reason: HOSPADM

## 2024-04-01 RX ORDER — GLUCAGON 1 MG/ML
1 KIT INJECTION PRN
Status: DISCONTINUED | OUTPATIENT
Start: 2024-04-01 | End: 2024-04-01 | Stop reason: HOSPADM

## 2024-04-01 RX ORDER — FENTANYL CITRATE 50 UG/ML
50 INJECTION, SOLUTION INTRAMUSCULAR; INTRAVENOUS EVERY 5 MIN PRN
Status: DISCONTINUED | OUTPATIENT
Start: 2024-04-01 | End: 2024-04-01 | Stop reason: HOSPADM

## 2024-04-01 RX ORDER — MIDAZOLAM HYDROCHLORIDE 1 MG/ML
INJECTION INTRAMUSCULAR; INTRAVENOUS PRN
Status: DISCONTINUED | OUTPATIENT
Start: 2024-04-01 | End: 2024-04-01 | Stop reason: SDUPTHER

## 2024-04-01 RX ADMIN — MIDAZOLAM HYDROCHLORIDE 2 MG: 2 INJECTION, SOLUTION INTRAMUSCULAR; INTRAVENOUS at 07:47

## 2024-04-01 RX ADMIN — PROPOFOL 200 MCG/KG/MIN: 10 INJECTION, EMULSION INTRAVENOUS at 07:54

## 2024-04-01 RX ADMIN — FENTANYL CITRATE 25 MCG: 50 INJECTION, SOLUTION INTRAMUSCULAR; INTRAVENOUS at 08:22

## 2024-04-01 RX ADMIN — FENTANYL CITRATE 25 MCG: 50 INJECTION, SOLUTION INTRAMUSCULAR; INTRAVENOUS at 08:25

## 2024-04-01 RX ADMIN — SODIUM CHLORIDE, POTASSIUM CHLORIDE, SODIUM LACTATE AND CALCIUM CHLORIDE: 600; 310; 30; 20 INJECTION, SOLUTION INTRAVENOUS at 07:46

## 2024-04-01 RX ADMIN — LIDOCAINE HYDROCHLORIDE 60 MG: 20 INJECTION, SOLUTION EPIDURAL; INFILTRATION; INTRACAUDAL; PERINEURAL at 07:54

## 2024-04-01 RX ADMIN — PROPOFOL 100 MG: 10 INJECTION, EMULSION INTRAVENOUS at 07:54

## 2024-04-01 ASSESSMENT — PAIN - FUNCTIONAL ASSESSMENT
PAIN_FUNCTIONAL_ASSESSMENT: NONE - DENIES PAIN
PAIN_FUNCTIONAL_ASSESSMENT: NONE - DENIES PAIN
PAIN_FUNCTIONAL_ASSESSMENT: 0-10

## 2024-04-01 NOTE — ANESTHESIA POSTPROCEDURE EVALUATION
Department of Anesthesiology  Postprocedure Note    Patient: Keo Brownlee Jr.  MRN: [id number]  YOB: 1955  Date of evaluation: 4/1/2024    Procedure Summary       Date: 04/01/24 Room / Location: [facility] MAIN OR [address] [place] [address] MAIN OR    Anesthesia Start: 0746 Anesthesia Stop: 0838    Procedure: DIAGNOSTIC ASPIRATION OF RIGHT HIP JOINT UNDER FLUOROSCOPY (Right: Hip) Diagnosis:       Pain due to right hip joint prosthesis, subsequent encounter      (Pain due to right hip joint prosthesis, subsequent encounter [T84.84XD, Z96.641])    Surgeons: Stewart Lockhart MD Responsible Provider: Evangelist Bear MD    Anesthesia Type: MAC ASA Status: 3            Anesthesia Type: MAC    Rosetta Phase I: Rosetta Score: 10    Rosetta Phase II:      Anesthesia Post Evaluation    Patient location during evaluation: PACU  Patient participation: complete - patient participated  Level of consciousness: sleepy but conscious  Pain score: 0  Airway patency: patent  Nausea & Vomiting: no nausea and no vomiting  Cardiovascular status: hemodynamically stable  Respiratory status: acceptable  Hydration status: stable  Multimodal analgesia pain management approach    No notable events documented.

## 2024-04-01 NOTE — PERIOP NOTE
0905: Pt to \[facility]\"" 22 for recovery. Pt wife at bedside for support. Pt tolerating orange juice and crackers. Pt alert and stable.   0925: Discharge instructions and medications reviewed/given. Patient and Elsie, wife verbalizes understanding. All questions answered. No distress noted, patient stable. Patient confirmed f/u appt.   0930: Pt discharged via wheelchair to private vehicle with wife, Elsie.

## 2024-04-01 NOTE — OP NOTE
Operative Note      Patient: Keo Brownlee Jr.  YOB: 1955  MRN: [id number]    Date of Procedure: 4/1/2024    Pre-Op Diagnosis Codes:     1.  Pain due to right hip joint prosthesis, subsequent encounter [T84.84XD, Z96.641]  2.  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  Diabetes mellitus type 2  4.  Stage IIIb chronic kidney disease  5.  History of MRSA infection    Post-Op Diagnosis: Same       Procedure(s):  DIAGNOSTIC ASPIRATION OF RIGHT HIP JOINT UNDER FLUOROSCOPY    Surgeon(s):  Stewart Lockhart MD    Assistant:   * No surgical staff found *    Anesthesia: Monitor Anesthesia Care    Estimated Blood Loss (mL): Minimal    Complications: None    Specimens:   ID Type Source Tests Collected by Time Destination   A : right hip joint fluid Body Fluid Joint, Hip BODY FLUID CELL COUNT, CULTURE, BODY FLUID Stewart Lockhart MD 4/1/2024 0825      Implants:  * No implants in log *      Drains: * No LDAs found *    Findings: 10 cc of grayish brown-colored slightly cloudy serous fluid obtained from the right hip joint.    Preop note: The patient underwent a primary uncemented right hip few years the patient has developed progressive pain in the right hip.  His plasma cobalt and chromium levels have been elevated, and his presentation appears to be consistent with symptomatic metal wear in his arthroplasty.  A diagnostic aspiration was recommended to evaluate for possible occult infection, prior to considering revision surgery.    Detailed Description of Procedure:   The patient and operative site were identified in the preoperative holding area.  The patient was positioned supine on a radiolucent table.  Fluoroscopy was positioned appropriately.  IV conscious sedation was administered by the anesthesia team.  Standard prepping of the right hip was performed with ChloraPrep.  Timeout was called.  I used an 18-gauge 3-1/2 inch spinal needle for the diagnostic aspiration.  Under fluoroscopic

## 2024-04-02 LAB
BACTERIA SPEC CULT: NORMAL
GRAM STN SPEC: NORMAL
GRAM STN SPEC: NORMAL
Lab: NORMAL

## 2024-04-11 ENCOUNTER — OFFICE VISIT (OUTPATIENT)
Age: 69
End: 2024-04-11
Payer: MEDICARE

## 2024-04-11 VITALS
BODY MASS INDEX: 31.08 KG/M2 | SYSTOLIC BLOOD PRESSURE: 142 MMHG | HEIGHT: 71 IN | HEART RATE: 78 BPM | TEMPERATURE: 98 F | DIASTOLIC BLOOD PRESSURE: 88 MMHG | OXYGEN SATURATION: 96 % | WEIGHT: 222 LBS | RESPIRATION RATE: 16 BRPM

## 2024-04-11 DIAGNOSIS — I73.9 PERIPHERAL VASCULAR DISEASE (HCC): Primary | ICD-10-CM

## 2024-04-11 DIAGNOSIS — I87.303 CHRONIC VENOUS HYPERTENSION INVOLVING BOTH SIDES: ICD-10-CM

## 2024-04-11 PROCEDURE — 99212 OFFICE O/P EST SF 10 MIN: CPT | Performed by: SURGERY

## 2024-04-11 PROCEDURE — 1123F ACP DISCUSS/DSCN MKR DOCD: CPT | Performed by: SURGERY

## 2024-04-11 ASSESSMENT — PATIENT HEALTH QUESTIONNAIRE - PHQ9
1. LITTLE INTEREST OR PLEASURE IN DOING THINGS: NOT AT ALL
SUM OF ALL RESPONSES TO PHQ QUESTIONS 1-9: 0
SUM OF ALL RESPONSES TO PHQ QUESTIONS 1-9: 0
2. FEELING DOWN, DEPRESSED OR HOPELESS: NOT AT ALL
SUM OF ALL RESPONSES TO PHQ QUESTIONS 1-9: 0
SUM OF ALL RESPONSES TO PHQ9 QUESTIONS 1 & 2: 0
SUM OF ALL RESPONSES TO PHQ QUESTIONS 1-9: 0

## 2024-04-12 ENCOUNTER — HOSPITAL ENCOUNTER (OUTPATIENT)
Facility: HOSPITAL | Age: 69
End: 2024-04-12
Attending: ORTHOPAEDIC SURGERY
Payer: MEDICARE

## 2024-04-12 DIAGNOSIS — Z96.649 ARTICULAR BEARING SURFACE WEAR OF PROSTHETIC HIP, SUBSEQUENT ENCOUNTER: ICD-10-CM

## 2024-04-12 DIAGNOSIS — T84.068D ARTICULAR BEARING SURFACE WEAR OF PROSTHETIC HIP, SUBSEQUENT ENCOUNTER: ICD-10-CM

## 2024-04-12 PROCEDURE — 73721 MRI JNT OF LWR EXTRE W/O DYE: CPT

## 2024-04-13 RX ORDER — CILOSTAZOL 50 MG/1
TABLET ORAL
Qty: 180 TABLET | Refills: 6 | Status: SHIPPED | OUTPATIENT
Start: 2024-04-13

## 2024-04-15 LAB
BACTERIA SPEC CULT: NORMAL
GRAM STN SPEC: NORMAL
GRAM STN SPEC: NORMAL
Lab: NORMAL

## 2024-04-18 NOTE — PROGRESS NOTES
Identified pt with two pt identifiers (name and ). Reviewed chart in preparation for visit and have obtained necessary documentation.    Keo Brownlee Jr. is a 69 y.o. male  Chief Complaint   Patient presents with    Follow-up     PVD     BP (!) 142/88 (Site: Left Upper Arm, Position: Sitting, Cuff Size: Large Adult)   Pulse 78   Temp 98 °F (36.7 °C) (Oral)   Resp 16   Ht 1.803 m (5' 11\")   Wt 100.7 kg (222 lb)   SpO2 96%   BMI 30.96 kg/m²     1. Have you been to the ER, urgent care clinic since your last visit?  Hospitalized since your last visit?NO    2. Have you seen or consulted any other health care providers outside of the [facility] since your last visit?  Include any pap smears or colon screening. NO    
Problem List   Diagnosis    BPH (benign prostatic hyperplasia)    Type 2 diabetes mellitus with hyperglycemia, without long-term current use of insulin ([facility])    DVT (deep venous thrombosis) ([facility])    Acquired hypothyroidism    Hyperprolactinemia ([facility])    Abscess    Acute appendicitis    Abscess, gluteal, left    Peripheral vascular disease ([facility])    Syncope and collapse    Chronic venous hypertension involving both sides    Pain due to right hip joint prosthesis, initial encounter ([facility])       Plans: For his chronic venous hypertension, patient was advised to continue with compression stocking.  We also talked about continue work on exercise program, reminded again about cholesterol level needs to be checked this year and will maintain current Pletal therapy 50 mg p.o. twice daily.  Patient will be referred to podiatry for ingrown toenail removal from right great toe.  Patient will be reassessed in 8 months follow-up.          Claude Selby MD

## 2024-06-25 ENCOUNTER — OFFICE VISIT (OUTPATIENT)
Age: 69
End: 2024-06-25
Payer: MEDICARE

## 2024-06-25 VITALS
TEMPERATURE: 98.7 F | WEIGHT: 225 LBS | HEIGHT: 71 IN | OXYGEN SATURATION: 98 % | HEART RATE: 81 BPM | DIASTOLIC BLOOD PRESSURE: 79 MMHG | SYSTOLIC BLOOD PRESSURE: 138 MMHG | BODY MASS INDEX: 31.5 KG/M2

## 2024-06-25 DIAGNOSIS — B35.1 ONYCHOMYCOSIS: Primary | ICD-10-CM

## 2024-06-25 DIAGNOSIS — E11.42 DIABETIC SENSORIMOTOR NEUROPATHY (HCC): ICD-10-CM

## 2024-06-25 PROCEDURE — 11721 DEBRIDE NAIL 6 OR MORE: CPT | Performed by: PODIATRIST

## 2024-06-25 PROCEDURE — 99214 OFFICE O/P EST MOD 30 MIN: CPT | Performed by: PODIATRIST

## 2024-06-25 PROCEDURE — 1123F ACP DISCUSS/DSCN MKR DOCD: CPT | Performed by: PODIATRIST

## 2024-06-25 RX ORDER — PANCRELIPASE 36000; 180000; 114000 [USP'U]/1; [USP'U]/1; [USP'U]/1
2 CAPSULE, DELAYED RELEASE PELLETS ORAL
COMMUNITY
Start: 2024-05-14

## 2024-06-25 RX ORDER — OXYCODONE HYDROCHLORIDE 5 MG/1
5 TABLET ORAL EVERY 8 HOURS PRN
COMMUNITY
Start: 2024-06-15 | End: 2024-06-25

## 2024-06-25 RX ORDER — LISINOPRIL 40 MG/1
1 TABLET ORAL DAILY
COMMUNITY

## 2024-06-25 RX ORDER — OMEPRAZOLE 40 MG/1
40 CAPSULE, DELAYED RELEASE ORAL PRN
COMMUNITY
Start: 2024-06-05

## 2024-07-03 ASSESSMENT — ENCOUNTER SYMPTOMS
VOMITING: 0
ABDOMINAL PAIN: 0
DIARRHEA: 0
SHORTNESS OF BREATH: 0

## 2024-07-03 NOTE — PROGRESS NOTES
Chief Complaint   Patient presents with    New Patient    Diabetes     /79 (Site: Left Upper Arm, Position: Sitting, Cuff Size: Large Adult)   Pulse 81   Temp 98.7 °F (37.1 °C) (Temporal)   Ht 1.803 m (5' 11\")   Wt 102.1 kg (225 lb)   SpO2 98%   BMI 31.38 kg/m²     1. Have you been to the ER, urgent care clinic since your last visit?  Hospitalized since your last visit?No    2. Have you seen or consulted any other health care providers outside of the [facility] since your last visit?  Include any pap smears or colon screening. No    
pertains to his diabetes. Patient verbalized understanding of all discussed and reviewed.  A sharp toenail plate debridement was performed to all 10 pedal digits with a nail nipper without incident.    Return in about 3 months (around 9/25/2024).       Pete Dobbins DPM, CWSP, AACFAS  [facility]  [address] [place] [id number]  O: [phone]  F: [phone]

## 2024-07-11 ENCOUNTER — TELEPHONE (OUTPATIENT)
Age: 69
End: 2024-07-11

## 2024-07-11 NOTE — TELEPHONE ENCOUNTER
07/11/24 3:52PM Patient left a VM @ 11:48AM 07/08/24 requesting a script for fungal cream be sent to his pharmacy-- he can be reached at [phone].

## 2024-07-15 RX ORDER — CLOTRIMAZOLE 1 %
CREAM (GRAM) TOPICAL
Qty: 113 G | Refills: 2 | Status: SHIPPED | OUTPATIENT
Start: 2024-07-15 | End: 2024-07-22

## 2024-10-06 ENCOUNTER — APPOINTMENT (OUTPATIENT)
Facility: HOSPITAL | Age: 69
DRG: 485 | End: 2024-10-06
Payer: MEDICARE

## 2024-10-06 ENCOUNTER — HOSPITAL ENCOUNTER (INPATIENT)
Facility: HOSPITAL | Age: 69
LOS: 15 days | Discharge: NURSING, REHAB OR LONG-TERM CARE | DRG: 485 | End: 2024-10-21
Attending: EMERGENCY MEDICINE
Payer: MEDICARE

## 2024-10-06 ENCOUNTER — APPOINTMENT (OUTPATIENT)
Facility: HOSPITAL | Age: 69
DRG: 485 | End: 2024-10-06
Attending: EMERGENCY MEDICINE
Payer: MEDICARE

## 2024-10-06 DIAGNOSIS — M79.89 LEG SWELLING: ICD-10-CM

## 2024-10-06 DIAGNOSIS — T84.53XA INFECTION OF TOTAL RIGHT KNEE REPLACEMENT, INITIAL ENCOUNTER (HCC): ICD-10-CM

## 2024-10-06 DIAGNOSIS — M79.605 LEFT LEG PAIN: ICD-10-CM

## 2024-10-06 DIAGNOSIS — M25.562 ACUTE PAIN OF LEFT KNEE: ICD-10-CM

## 2024-10-06 DIAGNOSIS — I82.512 CHRONIC DEEP VEIN THROMBOSIS (DVT) OF FEMORAL VEIN OF LEFT LOWER EXTREMITY (HCC): ICD-10-CM

## 2024-10-06 DIAGNOSIS — G89.18 POST-OP PAIN: ICD-10-CM

## 2024-10-06 DIAGNOSIS — T84.54XS INFECTION ASSOCIATED WITH INTERNAL LEFT KNEE PROSTHESIS, SEQUELA: ICD-10-CM

## 2024-10-06 PROBLEM — A41.9 SEPSIS (HCC): Status: ACTIVE | Noted: 2024-10-06

## 2024-10-06 LAB
ALBUMIN SERPL-MCNC: 2.3 G/DL (ref 3.5–5)
ALBUMIN/GLOB SERPL: 0.4 (ref 1.1–2.2)
ALP SERPL-CCNC: 76 U/L (ref 45–117)
ALT SERPL-CCNC: 23 U/L (ref 12–78)
ANION GAP SERPL CALC-SCNC: 13 MMOL/L (ref 2–12)
ANION GAP SERPL CALC-SCNC: 14 MMOL/L (ref 2–12)
APPEARANCE FLD: ABNORMAL
APPEARANCE UR: ABNORMAL
AST SERPL W P-5'-P-CCNC: 26 U/L (ref 15–37)
BACTERIA URNS QL MICRO: NEGATIVE /HPF
BASOPHILS # BLD: 0.1 K/UL (ref 0–0.1)
BASOPHILS NFR BLD: 0 % (ref 0–1)
BILIRUB SERPL-MCNC: 1.2 MG/DL (ref 0.2–1)
BILIRUB UR QL: NEGATIVE
BNP SERPL-MCNC: 991 PG/ML
BUN SERPL-MCNC: 21 MG/DL (ref 6–20)
BUN SERPL-MCNC: 21 MG/DL (ref 6–20)
BUN/CREAT SERPL: 10 (ref 12–20)
BUN/CREAT SERPL: 11 (ref 12–20)
CA-I BLD-MCNC: 10.2 MG/DL (ref 8.5–10.1)
CA-I BLD-MCNC: 9.7 MG/DL (ref 8.5–10.1)
CHLORIDE SERPL-SCNC: 93 MMOL/L (ref 97–108)
CHLORIDE SERPL-SCNC: 95 MMOL/L (ref 97–108)
CK SERPL-CCNC: 74 U/L (ref 39–308)
CO2 SERPL-SCNC: 20 MMOL/L (ref 21–32)
CO2 SERPL-SCNC: 21 MMOL/L (ref 21–32)
COLOR FLD: YELLOW
COLOR UR: ABNORMAL
CREAT SERPL-MCNC: 1.84 MG/DL (ref 0.7–1.3)
CREAT SERPL-MCNC: 2.07 MG/DL (ref 0.7–1.3)
CRP SERPL-MCNC: 34.3 MG/DL (ref 0–0.3)
DIFFERENTIAL METHOD BLD: ABNORMAL
ECHO BSA: 2.19 M2
EOSINOPHIL # BLD: 0 K/UL (ref 0–0.4)
EOSINOPHIL NFR BLD: 0 % (ref 0–7)
EOSINOPHIL NFR FLD MANUAL: 0 %
EPITH CASTS URNS QL MICRO: ABNORMAL /LPF
ERYTHROCYTE [DISTWIDTH] IN BLOOD BY AUTOMATED COUNT: 13.1 % (ref 11.5–14.5)
ERYTHROCYTE [SEDIMENTATION RATE] IN BLOOD: 86 MM/HR (ref 0–20)
FLUAV RNA SPEC QL NAA+PROBE: NOT DETECTED
FLUBV RNA SPEC QL NAA+PROBE: NOT DETECTED
GLOBULIN SER CALC-MCNC: 5.4 G/DL (ref 2–4)
GLUCOSE SERPL-MCNC: 319 MG/DL (ref 65–100)
GLUCOSE SERPL-MCNC: 323 MG/DL (ref 65–100)
GLUCOSE UR STRIP.AUTO-MCNC: >500 MG/DL
HCT VFR BLD AUTO: 36.7 % (ref 36.6–50.3)
HGB BLD-MCNC: 12.6 G/DL (ref 12.1–17)
HGB UR QL STRIP: ABNORMAL
IMM GRANULOCYTES # BLD AUTO: 0.3 K/UL (ref 0–0.04)
IMM GRANULOCYTES NFR BLD AUTO: 2 % (ref 0–0.5)
KETONES UR QL STRIP.AUTO: 20 MG/DL
LEUKOCYTE ESTERASE UR QL STRIP.AUTO: NEGATIVE
LYMPHOCYTES # BLD: 1.4 K/UL (ref 0.8–3.5)
LYMPHOCYTES NFR BLD: 7 % (ref 12–49)
LYMPHOCYTES NFR FLD: 13 %
MCH RBC QN AUTO: 29.7 PG (ref 26–34)
MCHC RBC AUTO-ENTMCNC: 34.3 G/DL (ref 30–36.5)
MCV RBC AUTO: 86.6 FL (ref 80–99)
MONOCYTES # BLD: 1.4 K/UL (ref 0–1)
MONOCYTES NFR BLD: 7 % (ref 5–13)
MONOCYTES NFR FLD: 2 %
MUCOUS THREADS URNS QL MICRO: ABNORMAL /LPF
NEUTROPHILS NFR FLD: 85 %
NEUTS BAND # FLD: 0 %
NEUTS SEG # BLD: 15.9 K/UL (ref 1.8–8)
NEUTS SEG NFR BLD: 84 % (ref 32–75)
NITRITE UR QL STRIP.AUTO: NEGATIVE
NRBC # BLD: 0 K/UL (ref 0–0.01)
NRBC BLD-RTO: 0 PER 100 WBC
NUC CELL # FLD: ABNORMAL /CU MM
PH UR STRIP: 5 (ref 5–8)
PLATELET # BLD AUTO: 321 K/UL (ref 150–400)
PMV BLD AUTO: 10 FL (ref 8.9–12.9)
POTASSIUM SERPL-SCNC: 4.3 MMOL/L (ref 3.5–5.1)
POTASSIUM SERPL-SCNC: 4.8 MMOL/L (ref 3.5–5.1)
PROCALCITONIN SERPL-MCNC: 21.44 NG/ML
PROT SERPL-MCNC: 7.7 G/DL (ref 6.4–8.2)
PROT UR STRIP-MCNC: 100 MG/DL
RBC # BLD AUTO: 4.24 M/UL (ref 4.1–5.7)
RBC # FLD: >100 /CU MM
RBC #/AREA URNS HPF: ABNORMAL /HPF (ref 0–5)
SARS-COV-2 RNA RESP QL NAA+PROBE: NOT DETECTED
SODIUM SERPL-SCNC: 128 MMOL/L (ref 136–145)
SODIUM SERPL-SCNC: 128 MMOL/L (ref 136–145)
SP GR UR REFRACTOMETRY: 1.02 (ref 1–1.03)
SPECIMEN SOURCE FLD: ABNORMAL
URATE SERPL-MCNC: 6.4 MG/DL (ref 3.5–7.2)
URINE CULTURE IF INDICATED: ABNORMAL
UROBILINOGEN UR QL STRIP.AUTO: 0.1 EU/DL (ref 0.1–1)
WBC # BLD AUTO: 19 K/UL (ref 4.1–11.1)
WBC URNS QL MICRO: ABNORMAL /HPF (ref 0–4)

## 2024-10-06 PROCEDURE — 87070 CULTURE OTHR SPECIMN AEROBIC: CPT

## 2024-10-06 PROCEDURE — 74176 CT ABD & PELVIS W/O CONTRAST: CPT

## 2024-10-06 PROCEDURE — 96374 THER/PROPH/DIAG INJ IV PUSH: CPT

## 2024-10-06 PROCEDURE — 87186 SC STD MICRODIL/AGAR DIL: CPT

## 2024-10-06 PROCEDURE — 96375 TX/PRO/DX INJ NEW DRUG ADDON: CPT

## 2024-10-06 PROCEDURE — 87075 CULTR BACTERIA EXCEPT BLOOD: CPT

## 2024-10-06 PROCEDURE — 86140 C-REACTIVE PROTEIN: CPT

## 2024-10-06 PROCEDURE — 87077 CULTURE AEROBIC IDENTIFY: CPT

## 2024-10-06 PROCEDURE — 82550 ASSAY OF CK (CPK): CPT

## 2024-10-06 PROCEDURE — 73562 X-RAY EXAM OF KNEE 3: CPT

## 2024-10-06 PROCEDURE — 6360000002 HC RX W HCPCS: Performed by: EMERGENCY MEDICINE

## 2024-10-06 PROCEDURE — 6370000000 HC RX 637 (ALT 250 FOR IP)

## 2024-10-06 PROCEDURE — 96376 TX/PRO/DX INJ SAME DRUG ADON: CPT

## 2024-10-06 PROCEDURE — 93970 EXTREMITY STUDY: CPT | Performed by: SURGERY

## 2024-10-06 PROCEDURE — 80053 COMPREHEN METABOLIC PANEL: CPT

## 2024-10-06 PROCEDURE — 0S9D3ZZ DRAINAGE OF LEFT KNEE JOINT, PERCUTANEOUS APPROACH: ICD-10-PCS | Performed by: ORTHOPAEDIC SURGERY

## 2024-10-06 PROCEDURE — 80048 BASIC METABOLIC PNL TOTAL CA: CPT

## 2024-10-06 PROCEDURE — 81001 URINALYSIS AUTO W/SCOPE: CPT

## 2024-10-06 PROCEDURE — 84145 PROCALCITONIN (PCT): CPT

## 2024-10-06 PROCEDURE — 83036 HEMOGLOBIN GLYCOSYLATED A1C: CPT

## 2024-10-06 PROCEDURE — 83880 ASSAY OF NATRIURETIC PEPTIDE: CPT

## 2024-10-06 PROCEDURE — 73502 X-RAY EXAM HIP UNI 2-3 VIEWS: CPT

## 2024-10-06 PROCEDURE — 89051 BODY FLUID CELL COUNT: CPT

## 2024-10-06 PROCEDURE — 1100000000 HC RM PRIVATE

## 2024-10-06 PROCEDURE — 87636 SARSCOV2 & INF A&B AMP PRB: CPT

## 2024-10-06 PROCEDURE — 85652 RBC SED RATE AUTOMATED: CPT

## 2024-10-06 PROCEDURE — 87154 CUL TYP ID BLD PTHGN 6+ TRGT: CPT

## 2024-10-06 PROCEDURE — 99285 EMERGENCY DEPT VISIT HI MDM: CPT

## 2024-10-06 PROCEDURE — 2580000003 HC RX 258: Performed by: EMERGENCY MEDICINE

## 2024-10-06 PROCEDURE — 87147 CULTURE TYPE IMMUNOLOGIC: CPT

## 2024-10-06 PROCEDURE — 84550 ASSAY OF BLOOD/URIC ACID: CPT

## 2024-10-06 PROCEDURE — 73610 X-RAY EXAM OF ANKLE: CPT

## 2024-10-06 PROCEDURE — 87205 SMEAR GRAM STAIN: CPT

## 2024-10-06 PROCEDURE — 87040 BLOOD CULTURE FOR BACTERIA: CPT

## 2024-10-06 PROCEDURE — 71045 X-RAY EXAM CHEST 1 VIEW: CPT

## 2024-10-06 PROCEDURE — 93970 EXTREMITY STUDY: CPT

## 2024-10-06 PROCEDURE — 85025 COMPLETE CBC W/AUTO DIFF WBC: CPT

## 2024-10-06 RX ORDER — INSULIN GLARGINE 100 [IU]/ML
62 INJECTION, SOLUTION SUBCUTANEOUS DAILY
Status: DISCONTINUED | OUTPATIENT
Start: 2024-10-07 | End: 2024-10-07

## 2024-10-06 RX ORDER — 0.9 % SODIUM CHLORIDE 0.9 %
1000 INTRAVENOUS SOLUTION INTRAVENOUS ONCE
Status: COMPLETED | OUTPATIENT
Start: 2024-10-06 | End: 2024-10-06

## 2024-10-06 RX ORDER — SODIUM CHLORIDE 0.9 % (FLUSH) 0.9 %
5-40 SYRINGE (ML) INJECTION EVERY 12 HOURS SCHEDULED
Status: DISCONTINUED | OUTPATIENT
Start: 2024-10-06 | End: 2024-10-21 | Stop reason: HOSPADM

## 2024-10-06 RX ORDER — MORPHINE SULFATE 4 MG/ML
4 INJECTION, SOLUTION INTRAMUSCULAR; INTRAVENOUS
Status: COMPLETED | OUTPATIENT
Start: 2024-10-06 | End: 2024-10-06

## 2024-10-06 RX ORDER — ACETAMINOPHEN 650 MG/1
650 SUPPOSITORY RECTAL EVERY 6 HOURS PRN
Status: DISCONTINUED | OUTPATIENT
Start: 2024-10-06 | End: 2024-10-08

## 2024-10-06 RX ORDER — ONDANSETRON 2 MG/ML
4 INJECTION INTRAMUSCULAR; INTRAVENOUS ONCE
Status: COMPLETED | OUTPATIENT
Start: 2024-10-06 | End: 2024-10-06

## 2024-10-06 RX ORDER — POTASSIUM CHLORIDE 1500 MG/1
40 TABLET, EXTENDED RELEASE ORAL PRN
Status: DISCONTINUED | OUTPATIENT
Start: 2024-10-06 | End: 2024-10-21 | Stop reason: HOSPADM

## 2024-10-06 RX ORDER — ACETAMINOPHEN 325 MG/1
650 TABLET ORAL EVERY 6 HOURS PRN
Status: DISCONTINUED | OUTPATIENT
Start: 2024-10-06 | End: 2024-10-08

## 2024-10-06 RX ORDER — MAGNESIUM SULFATE IN WATER 40 MG/ML
2000 INJECTION, SOLUTION INTRAVENOUS PRN
Status: DISCONTINUED | OUTPATIENT
Start: 2024-10-06 | End: 2024-10-21 | Stop reason: HOSPADM

## 2024-10-06 RX ORDER — ONDANSETRON 2 MG/ML
4 INJECTION INTRAMUSCULAR; INTRAVENOUS EVERY 6 HOURS PRN
Status: DISCONTINUED | OUTPATIENT
Start: 2024-10-06 | End: 2024-10-21 | Stop reason: HOSPADM

## 2024-10-06 RX ORDER — POTASSIUM CHLORIDE 7.45 MG/ML
10 INJECTION INTRAVENOUS PRN
Status: DISCONTINUED | OUTPATIENT
Start: 2024-10-06 | End: 2024-10-21 | Stop reason: HOSPADM

## 2024-10-06 RX ORDER — SODIUM CHLORIDE 9 MG/ML
INJECTION, SOLUTION INTRAVENOUS PRN
Status: DISCONTINUED | OUTPATIENT
Start: 2024-10-06 | End: 2024-10-21 | Stop reason: HOSPADM

## 2024-10-06 RX ORDER — ONDANSETRON 4 MG/1
4 TABLET, ORALLY DISINTEGRATING ORAL EVERY 8 HOURS PRN
Status: DISCONTINUED | OUTPATIENT
Start: 2024-10-06 | End: 2024-10-21 | Stop reason: HOSPADM

## 2024-10-06 RX ORDER — INSULIN LISPRO 100 [IU]/ML
0-4 INJECTION, SOLUTION INTRAVENOUS; SUBCUTANEOUS NIGHTLY
Status: DISCONTINUED | OUTPATIENT
Start: 2024-10-06 | End: 2024-10-21 | Stop reason: HOSPADM

## 2024-10-06 RX ORDER — MORPHINE SULFATE 4 MG/ML
4 INJECTION, SOLUTION INTRAMUSCULAR; INTRAVENOUS
Status: DISCONTINUED | OUTPATIENT
Start: 2024-10-06 | End: 2024-10-06

## 2024-10-06 RX ORDER — POLYETHYLENE GLYCOL 3350 17 G/17G
17 POWDER, FOR SOLUTION ORAL DAILY PRN
Status: DISCONTINUED | OUTPATIENT
Start: 2024-10-06 | End: 2024-10-21 | Stop reason: HOSPADM

## 2024-10-06 RX ORDER — GLUCAGON 1 MG/ML
1 KIT INJECTION PRN
Status: DISCONTINUED | OUTPATIENT
Start: 2024-10-06 | End: 2024-10-21 | Stop reason: HOSPADM

## 2024-10-06 RX ORDER — INSULIN LISPRO 100 [IU]/ML
0-8 INJECTION, SOLUTION INTRAVENOUS; SUBCUTANEOUS
Status: DISCONTINUED | OUTPATIENT
Start: 2024-10-07 | End: 2024-10-07

## 2024-10-06 RX ORDER — SODIUM CHLORIDE 0.9 % (FLUSH) 0.9 %
5-40 SYRINGE (ML) INJECTION PRN
Status: DISCONTINUED | OUTPATIENT
Start: 2024-10-06 | End: 2024-10-21 | Stop reason: HOSPADM

## 2024-10-06 RX ORDER — DEXTROSE MONOHYDRATE 100 MG/ML
INJECTION, SOLUTION INTRAVENOUS CONTINUOUS PRN
Status: DISCONTINUED | OUTPATIENT
Start: 2024-10-06 | End: 2024-10-21 | Stop reason: HOSPADM

## 2024-10-06 RX ORDER — DOXYCYCLINE 100 MG/1
100 CAPSULE ORAL EVERY 12 HOURS SCHEDULED
Status: DISCONTINUED | OUTPATIENT
Start: 2024-10-06 | End: 2024-10-07

## 2024-10-06 RX ORDER — PREDNISONE 20 MG/1
40 TABLET ORAL DAILY
Status: DISCONTINUED | OUTPATIENT
Start: 2024-10-06 | End: 2024-10-07

## 2024-10-06 RX ADMIN — MORPHINE SULFATE 4 MG: 4 INJECTION, SOLUTION INTRAMUSCULAR; INTRAVENOUS at 14:46

## 2024-10-06 RX ADMIN — SODIUM CHLORIDE 1000 ML: 9 INJECTION, SOLUTION INTRAVENOUS at 17:06

## 2024-10-06 RX ADMIN — VANCOMYCIN HYDROCHLORIDE 2500 MG: 1.25 INJECTION, POWDER, LYOPHILIZED, FOR SOLUTION INTRAVENOUS at 19:52

## 2024-10-06 RX ADMIN — MORPHINE SULFATE 4 MG: 4 INJECTION, SOLUTION INTRAMUSCULAR; INTRAVENOUS at 17:49

## 2024-10-06 RX ADMIN — PIPERACILLIN AND TAZOBACTAM 4500 MG: 4; .5 INJECTION, POWDER, LYOPHILIZED, FOR SOLUTION INTRAVENOUS at 18:45

## 2024-10-06 RX ADMIN — DOXYCYCLINE HYCLATE 100 MG: 100 CAPSULE ORAL at 23:53

## 2024-10-06 RX ADMIN — ONDANSETRON 4 MG: 2 INJECTION INTRAMUSCULAR; INTRAVENOUS at 14:45

## 2024-10-06 RX ADMIN — ACETAMINOPHEN 650 MG: 325 TABLET ORAL at 23:53

## 2024-10-06 RX ADMIN — ONDANSETRON 4 MG: 2 INJECTION INTRAMUSCULAR; INTRAVENOUS at 17:49

## 2024-10-06 ASSESSMENT — PAIN SCALES - GENERAL
PAINLEVEL_OUTOF10: 10
PAINLEVEL_OUTOF10: 10
PAINLEVEL_OUTOF10: 7

## 2024-10-06 ASSESSMENT — PAIN - FUNCTIONAL ASSESSMENT: PAIN_FUNCTIONAL_ASSESSMENT: 0-10

## 2024-10-06 NOTE — H&P
Hospitalist Admission Note    NAME: Keo Brownlee Jr.   :  1955   MRN:  [id number]     Date/Time:  10/6/2024 7:19 PM    Patient PCP: Russ Sebastian MD    ______________________________________________________________________  Given the patient's current clinical presentation, I have a high level of concern for decompensation if discharged from the emergency department.  Complex decision making was performed, which includes reviewing the patient's available past medical records, laboratory results, and x-ray films.       My assessment of this patient's clinical condition and my plan of care is as follows.    Assessment / Plan:      Sepsis likely secondary to left septic arthritis in setting of left knee arthroplasty  Rule out gout  Left knee erythematous, erythema migrans?  -Appears to be septic as patient has SIRS criteria of febrile episode at home, tachycardic,  -Continue antibiotics with IV vancomycin and Zosyn  -P.o. Doxy 100 mg twice daily for possible Lyme arthritis  -Continue IVF  -Follow-up blood culture  -Pro-Vince, ESR CRP elevated  -Lack acid unremarkable  -f/u Uric acid   -Ortho consulted stat, for arthrocentesis, who drained purulent synovial fluid, suspicions for Lyme as well on the left knee  -Synovial fluid Gram stain, culture sent  -Discussed with Dr. Thompson, will keep n.p.o. after midnight  -Initial imaging of knee and foot on presentation to the ED was unremarkable  -Antipyretic for fever above 101  -Will do trial of prednisone for left knee pain, and IV Dilaudid  -Trend leukocytosis      Insulin-dependent diabetes  -Takes insulin 64units daily, with sliding scale  -as pt will be NPO will do 30 units Daily and sliding scale   -On glipizide 10 mg oral daily, will continue  -accucheck ACHS     JASON on CKD versus CKD  -Could be prerenal in setting of sepsis  -Will do a trial of prednisone  -Avoid nephrotoxins  -Continue IV fluid    Neuropathy secondary to diabetes  Peripheral arterial

## 2024-10-06 NOTE — ED PROVIDER NOTES
[facility] EMERGENCY DEPT  EMERGENCY DEPARTMENT HISTORY AND PHYSICAL EXAM      Date: 10/6/2024  Patient Name: Keo Brownlee Jr.  MRN: [id number]  Birthdate 1955  Date of evaluation: 10/6/2024  Provider: Hammad Vee DO   Note Started: 4:08 PM EDT 10/6/24    HISTORY OF PRESENT ILLNESS     Chief Complaint   Patient presents with    Leg Swelling     History Provided By: Patient    HPI: Keo Brownlee Jr. is a 69 y.o. male with past medical history of arthritis, BPH, diabetes, hyperlipidemia, hypertension and DVT  who presents with left leg pain.  Symptoms started abruptly about 2 days ago.  Pain is severe and makes it so he is unable to ambulate.  Denies any injuries or falls.  He has noticed swelling to his left leg.  The pain is in his left thigh, knee, and radiates towards calf and ankle.  He denies any back pain.  No fevers.  No IV drug use history or cancer history.    PAST MEDICAL HISTORY   Past Medical History:  Past Medical History:   Diagnosis Date    Arthritis     BPH (benign prostatic hyperplasia)     Diabetes (HCC)     Erectile dysfunction     Hearing loss     Hx of blood clots     Hypercholesterolemia     Hypertension     Neuropathy     Restless leg syndrome     Thromboembolus (HCC)     2022       Past Surgical History:  Past Surgical History:   Procedure Laterality Date    APPENDECTOMY      CATARACT REMOVAL      EYE SURGERY      Cataracts    HIP SURGERY Right 04/01/2024    DIAGNOSTIC ASPIRATION OF RIGHT HIP JOINT UNDER FLUOROSCOPY performed by Stewart Lockhart MD at [facility] MAIN OR    JOINT REPLACEMENT      Left knee, right hip    SHOULDER ARTHROSCOPY Left     SHOULDER ARTHROSCOPY Right     TOTAL HIP ARTHROPLASTY Right     TOTAL HIP ARTHROPLASTY Left     TOTAL KNEE ARTHROPLASTY Left     UPPER GASTROINTESTINAL ENDOSCOPY         Family History:  Family History   Problem Relation Age of Onset    Diabetes Mother     Cancer Father        Social History:  Social History     Tobacco Use    Smoking status: Never

## 2024-10-07 ENCOUNTER — ANESTHESIA EVENT (OUTPATIENT)
Facility: HOSPITAL | Age: 69
End: 2024-10-07
Payer: MEDICARE

## 2024-10-07 LAB
ACCESSION NUMBER, LLC1M: ABNORMAL
ACINETOBACTER CALCOAC BAUMANNII COMPLEX BY PCR: NOT DETECTED
ALBUMIN SERPL-MCNC: 2 G/DL (ref 3.5–5)
ALBUMIN/GLOB SERPL: 0.4 (ref 1.1–2.2)
ALP SERPL-CCNC: 70 U/L (ref 45–117)
ALT SERPL-CCNC: 23 U/L (ref 12–78)
ANION GAP SERPL CALC-SCNC: 7 MMOL/L (ref 2–12)
ANION GAP SERPL CALC-SCNC: 9 MMOL/L (ref 2–12)
AST SERPL W P-5'-P-CCNC: 17 U/L (ref 15–37)
BACTEROIDES FRAGILIS BY PCR: NOT DETECTED
BASOPHILS # BLD: 0 K/UL (ref 0–0.1)
BASOPHILS NFR BLD: 0 % (ref 0–1)
BILIRUB SERPL-MCNC: 0.9 MG/DL (ref 0.2–1)
BIOFIRE TEST COMMENT: ABNORMAL
BUN SERPL-MCNC: 24 MG/DL (ref 6–20)
BUN SERPL-MCNC: 31 MG/DL (ref 6–20)
BUN/CREAT SERPL: 14 (ref 12–20)
BUN/CREAT SERPL: 17 (ref 12–20)
CA-I BLD-MCNC: 9.8 MG/DL (ref 8.5–10.1)
CA-I BLD-MCNC: 9.8 MG/DL (ref 8.5–10.1)
CANDIDA ALBICANS BY PCR: NOT DETECTED
CANDIDA AURIS BY PCR: NOT DETECTED
CANDIDA GLABRATA: NOT DETECTED
CANDIDA KRUSEI BY PCR: NOT DETECTED
CANDIDA PARAPSILOSIS BY PCR: NOT DETECTED
CANDIDA TROPICALIS BY PCR: NOT DETECTED
CHLORIDE SERPL-SCNC: 100 MMOL/L (ref 97–108)
CHLORIDE SERPL-SCNC: 99 MMOL/L (ref 97–108)
CO2 SERPL-SCNC: 22 MMOL/L (ref 21–32)
CO2 SERPL-SCNC: 22 MMOL/L (ref 21–32)
CREAT SERPL-MCNC: 1.76 MG/DL (ref 0.7–1.3)
CREAT SERPL-MCNC: 1.81 MG/DL (ref 0.7–1.3)
CRYPTOCOCCUS NEOFORMANS/GATTII BY PCR: NOT DETECTED
DATE LAST DOSE: NORMAL
DIFFERENTIAL METHOD BLD: ABNORMAL
DOSE AMOUNT: NORMAL UNITS
ENTEROBACTER CLOACAE COMPLEX BY PCR: NOT DETECTED
ENTEROBACTERALES BY PCR: NOT DETECTED
ENTEROCOCCUS FAECALIS BY PCR: NOT DETECTED
ENTEROCOCCUS FAECIUM BY PCR: NOT DETECTED
EOSINOPHIL # BLD: 0 K/UL (ref 0–0.4)
EOSINOPHIL NFR BLD: 0 % (ref 0–7)
ERYTHROCYTE [DISTWIDTH] IN BLOOD BY AUTOMATED COUNT: 12.8 % (ref 11.5–14.5)
ESCHERICHIA COLI: NOT DETECTED
EST. AVERAGE GLUCOSE BLD GHB EST-MCNC: 203 MG/DL
GLOBULIN SER CALC-MCNC: 4.8 G/DL (ref 2–4)
GLUCOSE BLD STRIP.AUTO-MCNC: 333 MG/DL (ref 65–100)
GLUCOSE BLD STRIP.AUTO-MCNC: 370 MG/DL (ref 65–100)
GLUCOSE BLD STRIP.AUTO-MCNC: 388 MG/DL (ref 65–100)
GLUCOSE BLD STRIP.AUTO-MCNC: 462 MG/DL (ref 65–100)
GLUCOSE BLD STRIP.AUTO-MCNC: 486 MG/DL (ref 65–100)
GLUCOSE SERPL-MCNC: 362 MG/DL (ref 65–100)
GLUCOSE SERPL-MCNC: 421 MG/DL (ref 65–100)
HAEMOPHILUS INFLUENZAE BY PCR: NOT DETECTED
HBA1C MFR BLD: 8.7 % (ref 4–5.6)
HCT VFR BLD AUTO: 32.4 % (ref 36.6–50.3)
HGB BLD-MCNC: 11.1 G/DL (ref 12.1–17)
IMM GRANULOCYTES # BLD AUTO: 0.2 K/UL (ref 0–0.04)
IMM GRANULOCYTES NFR BLD AUTO: 1 % (ref 0–0.5)
KLEBSIELLA AEROGENES BY PCR: NOT DETECTED
KLEBSIELLA OXYTOCA BY PCR: NOT DETECTED
KLEBSIELLA PNEUMONIAE GROUP BY PCR: NOT DETECTED
LACTATE SERPL-SCNC: 1.3 MMOL/L (ref 0.4–2)
LISTERIA MONOCYTOGENES BY PCR: NOT DETECTED
LYMPHOCYTES # BLD: 0.7 K/UL (ref 0.8–3.5)
LYMPHOCYTES NFR BLD: 4 % (ref 12–49)
MAGNESIUM SERPL-MCNC: 1.9 MG/DL (ref 1.6–2.4)
MCH RBC QN AUTO: 29.8 PG (ref 26–34)
MCHC RBC AUTO-ENTMCNC: 34.3 G/DL (ref 30–36.5)
MCV RBC AUTO: 86.9 FL (ref 80–99)
MONOCYTES # BLD: 0.6 K/UL (ref 0–1)
MONOCYTES NFR BLD: 4 % (ref 5–13)
NEISSERIA MENINGITIDIS BY PCR: NOT DETECTED
NEUTS SEG # BLD: 14.6 K/UL (ref 1.8–8)
NEUTS SEG NFR BLD: 91 % (ref 32–75)
NRBC # BLD: 0 K/UL (ref 0–0.01)
NRBC BLD-RTO: 0 PER 100 WBC
PERFORMED BY:: ABNORMAL
PHOSPHATE SERPL-MCNC: 3.5 MG/DL (ref 2.6–4.7)
PLATELET # BLD AUTO: 279 K/UL (ref 150–400)
PMV BLD AUTO: 9.6 FL (ref 8.9–12.9)
POTASSIUM SERPL-SCNC: 4.8 MMOL/L (ref 3.5–5.1)
POTASSIUM SERPL-SCNC: 5 MMOL/L (ref 3.5–5.1)
PROT SERPL-MCNC: 6.8 G/DL (ref 6.4–8.2)
PROTEUS BY PCR: NOT DETECTED
PSEUDOMONAS AERUGINOSA, PSAEP: NOT DETECTED
RBC # BLD AUTO: 3.73 M/UL (ref 4.1–5.7)
RESISTANT GENE TARGETS: ABNORMAL
SALMONELLA SPECIES BY PCR: NOT DETECTED
SERRATIA MARCESCENS BY PCR: NOT DETECTED
SODIUM SERPL-SCNC: 128 MMOL/L (ref 136–145)
SODIUM SERPL-SCNC: 131 MMOL/L (ref 136–145)
STAPHYLOCOCCUS AUREUS: NOT DETECTED
STAPHYLOCOCCUS EPIDERMIDIS BY PCR: NOT DETECTED
STAPHYLOCOCCUS LUGDUNENSIS BY PCR: NOT DETECTED
STAPHYLOCOCCUS: NOT DETECTED
STENOTROPHOMONAS MALTOPHILIA BY PCR: NOT DETECTED
STREPTOCOCCUS AGALACTIAE (GROUP B): DETECTED
STREPTOCOCCUS PNEUMONIAE , SPNP: NOT DETECTED
STREPTOCOCCUS PYOGENES (GROUP A), SPYOP: NOT DETECTED
STREPTOCOCCUS: DETECTED
VANCOMYCIN TROUGH SERPL-MCNC: 7 UG/ML (ref 5–10)
WBC # BLD AUTO: 16.1 K/UL (ref 4.1–11.1)

## 2024-10-07 PROCEDURE — 36415 COLL VENOUS BLD VENIPUNCTURE: CPT

## 2024-10-07 PROCEDURE — 6360000002 HC RX W HCPCS

## 2024-10-07 PROCEDURE — 2500000003 HC RX 250 WO HCPCS

## 2024-10-07 PROCEDURE — 82962 GLUCOSE BLOOD TEST: CPT

## 2024-10-07 PROCEDURE — 1100000000 HC RM PRIVATE

## 2024-10-07 PROCEDURE — 80048 BASIC METABOLIC PNL TOTAL CA: CPT

## 2024-10-07 PROCEDURE — 2580000003 HC RX 258

## 2024-10-07 PROCEDURE — 80053 COMPREHEN METABOLIC PANEL: CPT

## 2024-10-07 PROCEDURE — 85025 COMPLETE CBC W/AUTO DIFF WBC: CPT

## 2024-10-07 PROCEDURE — 80202 ASSAY OF VANCOMYCIN: CPT

## 2024-10-07 PROCEDURE — 83605 ASSAY OF LACTIC ACID: CPT

## 2024-10-07 PROCEDURE — 86618 LYME DISEASE ANTIBODY: CPT

## 2024-10-07 PROCEDURE — 99222 1ST HOSP IP/OBS MODERATE 55: CPT | Performed by: INTERNAL MEDICINE

## 2024-10-07 PROCEDURE — 6370000000 HC RX 637 (ALT 250 FOR IP)

## 2024-10-07 PROCEDURE — 6370000000 HC RX 637 (ALT 250 FOR IP): Performed by: STUDENT IN AN ORGANIZED HEALTH CARE EDUCATION/TRAINING PROGRAM

## 2024-10-07 PROCEDURE — 84100 ASSAY OF PHOSPHORUS: CPT

## 2024-10-07 PROCEDURE — 83735 ASSAY OF MAGNESIUM: CPT

## 2024-10-07 RX ORDER — INSULIN LISPRO 100 [IU]/ML
0-16 INJECTION, SOLUTION INTRAVENOUS; SUBCUTANEOUS EVERY 4 HOURS
Status: DISCONTINUED | OUTPATIENT
Start: 2024-10-07 | End: 2024-10-17

## 2024-10-07 RX ORDER — INSULIN LISPRO 100 [IU]/ML
0-4 INJECTION, SOLUTION INTRAVENOUS; SUBCUTANEOUS NIGHTLY
Status: DISCONTINUED | OUTPATIENT
Start: 2024-10-07 | End: 2024-10-07

## 2024-10-07 RX ORDER — PREDNISONE 20 MG/1
40 TABLET ORAL DAILY
Status: DISCONTINUED | OUTPATIENT
Start: 2024-10-07 | End: 2024-10-07

## 2024-10-07 RX ORDER — INSULIN LISPRO 100 [IU]/ML
5 INJECTION, SOLUTION INTRAVENOUS; SUBCUTANEOUS ONCE
Status: COMPLETED | OUTPATIENT
Start: 2024-10-07 | End: 2024-10-07

## 2024-10-07 RX ORDER — ATORVASTATIN CALCIUM 40 MG/1
80 TABLET, FILM COATED ORAL NIGHTLY
Status: DISCONTINUED | OUTPATIENT
Start: 2024-10-07 | End: 2024-10-21 | Stop reason: HOSPADM

## 2024-10-07 RX ORDER — HYDROMORPHONE HYDROCHLORIDE 1 MG/ML
0.5 INJECTION, SOLUTION INTRAMUSCULAR; INTRAVENOUS; SUBCUTANEOUS EVERY 4 HOURS PRN
Status: DISCONTINUED | OUTPATIENT
Start: 2024-10-07 | End: 2024-10-08

## 2024-10-07 RX ORDER — CLOMIPHENE CITRATE 50 MG/1
25 TABLET ORAL DAILY
Status: DISCONTINUED | OUTPATIENT
Start: 2024-10-07 | End: 2024-10-21 | Stop reason: HOSPADM

## 2024-10-07 RX ORDER — METOPROLOL SUCCINATE 50 MG/1
100 TABLET, EXTENDED RELEASE ORAL DAILY
Status: DISCONTINUED | OUTPATIENT
Start: 2024-10-07 | End: 2024-10-21 | Stop reason: HOSPADM

## 2024-10-07 RX ORDER — GLIPIZIDE 5 MG/1
10 TABLET ORAL
Status: DISCONTINUED | OUTPATIENT
Start: 2024-10-07 | End: 2024-10-15

## 2024-10-07 RX ORDER — INSULIN GLARGINE 100 [IU]/ML
44 INJECTION, SOLUTION SUBCUTANEOUS DAILY
Status: DISCONTINUED | OUTPATIENT
Start: 2024-10-08 | End: 2024-10-09

## 2024-10-07 RX ORDER — CILOSTAZOL 50 MG/1
50 TABLET ORAL
Status: DISCONTINUED | OUTPATIENT
Start: 2024-10-07 | End: 2024-10-21 | Stop reason: HOSPADM

## 2024-10-07 RX ORDER — TIZANIDINE 2 MG/1
2 TABLET ORAL NIGHTLY PRN
Status: DISCONTINUED | OUTPATIENT
Start: 2024-10-07 | End: 2024-10-17

## 2024-10-07 RX ORDER — INSULIN LISPRO 100 [IU]/ML
0-8 INJECTION, SOLUTION INTRAVENOUS; SUBCUTANEOUS EVERY 4 HOURS
Status: DISCONTINUED | OUTPATIENT
Start: 2024-10-07 | End: 2024-10-07

## 2024-10-07 RX ORDER — PANTOPRAZOLE SODIUM 40 MG/1
40 TABLET, DELAYED RELEASE ORAL
Status: DISCONTINUED | OUTPATIENT
Start: 2024-10-07 | End: 2024-10-21 | Stop reason: HOSPADM

## 2024-10-07 RX ORDER — INSULIN LISPRO 100 [IU]/ML
0-16 INJECTION, SOLUTION INTRAVENOUS; SUBCUTANEOUS
Status: DISCONTINUED | OUTPATIENT
Start: 2024-10-07 | End: 2024-10-07

## 2024-10-07 RX ORDER — INSULIN GLARGINE 100 [IU]/ML
63 INJECTION, SOLUTION SUBCUTANEOUS DAILY
Status: DISCONTINUED | OUTPATIENT
Start: 2024-10-07 | End: 2024-10-07

## 2024-10-07 RX ORDER — SODIUM CHLORIDE 9 MG/ML
INJECTION, SOLUTION INTRAVENOUS CONTINUOUS
Status: DISCONTINUED | OUTPATIENT
Start: 2024-10-07 | End: 2024-10-09

## 2024-10-07 RX ORDER — 0.9 % SODIUM CHLORIDE 0.9 %
500 INTRAVENOUS SOLUTION INTRAVENOUS ONCE
Status: COMPLETED | OUTPATIENT
Start: 2024-10-07 | End: 2024-10-07

## 2024-10-07 RX ORDER — TAMSULOSIN HYDROCHLORIDE 0.4 MG/1
0.4 CAPSULE ORAL DAILY
Status: DISCONTINUED | OUTPATIENT
Start: 2024-10-07 | End: 2024-10-21 | Stop reason: HOSPADM

## 2024-10-07 RX ORDER — INSULIN LISPRO 100 [IU]/ML
10 INJECTION, SOLUTION INTRAVENOUS; SUBCUTANEOUS ONCE
Status: COMPLETED | OUTPATIENT
Start: 2024-10-07 | End: 2024-10-07

## 2024-10-07 RX ORDER — INSULIN GLARGINE 100 [IU]/ML
30 INJECTION, SOLUTION SUBCUTANEOUS DAILY
Status: DISCONTINUED | OUTPATIENT
Start: 2024-10-07 | End: 2024-10-07

## 2024-10-07 RX ORDER — GABAPENTIN 300 MG/1
300 CAPSULE ORAL 3 TIMES DAILY
Status: DISCONTINUED | OUTPATIENT
Start: 2024-10-07 | End: 2024-10-21 | Stop reason: HOSPADM

## 2024-10-07 RX ADMIN — INSULIN LISPRO 16 UNITS: 100 INJECTION, SOLUTION INTRAVENOUS; SUBCUTANEOUS at 20:49

## 2024-10-07 RX ADMIN — HYDROMORPHONE HYDROCHLORIDE 0.5 MG: 1 INJECTION, SOLUTION INTRAMUSCULAR; INTRAVENOUS; SUBCUTANEOUS at 16:51

## 2024-10-07 RX ADMIN — SODIUM CHLORIDE 500 ML: 9 INJECTION, SOLUTION INTRAVENOUS at 17:34

## 2024-10-07 RX ADMIN — CILOSTAZOL 50 MG: 50 TABLET ORAL at 17:14

## 2024-10-07 RX ADMIN — SODIUM CHLORIDE, PRESERVATIVE FREE 10 ML: 5 INJECTION INTRAVENOUS at 09:38

## 2024-10-07 RX ADMIN — VANCOMYCIN HYDROCHLORIDE 1750 MG: 1 INJECTION, POWDER, LYOPHILIZED, FOR SOLUTION INTRAVENOUS at 21:52

## 2024-10-07 RX ADMIN — METOPROLOL SUCCINATE 100 MG: 50 TABLET, EXTENDED RELEASE ORAL at 09:39

## 2024-10-07 RX ADMIN — INSULIN GLARGINE 30 UNITS: 100 INJECTION, SOLUTION SUBCUTANEOUS at 09:38

## 2024-10-07 RX ADMIN — PANTOPRAZOLE SODIUM 40 MG: 40 TABLET, DELAYED RELEASE ORAL at 09:37

## 2024-10-07 RX ADMIN — PANCRELIPASE LIPASE, PANCRELIPASE PROTEASE, PANCRELIPASE AMYLASE 5000 UNITS: 5000; 17000; 24000 CAPSULE, DELAYED RELEASE ORAL at 12:34

## 2024-10-07 RX ADMIN — PIPERACILLIN AND TAZOBACTAM 3375 MG: 3; .375 INJECTION, POWDER, LYOPHILIZED, FOR SOLUTION INTRAVENOUS at 16:56

## 2024-10-07 RX ADMIN — DOXYCYCLINE HYCLATE 100 MG: 100 CAPSULE ORAL at 09:37

## 2024-10-07 RX ADMIN — SODIUM CHLORIDE, PRESERVATIVE FREE 10 ML: 5 INJECTION INTRAVENOUS at 21:49

## 2024-10-07 RX ADMIN — INSULIN LISPRO 5 UNITS: 100 INJECTION, SOLUTION INTRAVENOUS; SUBCUTANEOUS at 20:48

## 2024-10-07 RX ADMIN — SODIUM CHLORIDE: 9 INJECTION, SOLUTION INTRAVENOUS at 09:33

## 2024-10-07 RX ADMIN — INSULIN LISPRO 8 UNITS: 100 INJECTION, SOLUTION INTRAVENOUS; SUBCUTANEOUS at 12:31

## 2024-10-07 RX ADMIN — GLIPIZIDE 10 MG: 5 TABLET ORAL at 09:36

## 2024-10-07 RX ADMIN — ATORVASTATIN CALCIUM 80 MG: 40 TABLET, FILM COATED ORAL at 20:45

## 2024-10-07 RX ADMIN — CILOSTAZOL 50 MG: 50 TABLET ORAL at 09:37

## 2024-10-07 RX ADMIN — PIPERACILLIN AND TAZOBACTAM 3375 MG: 3; .375 INJECTION, POWDER, LYOPHILIZED, FOR SOLUTION INTRAVENOUS at 09:35

## 2024-10-07 RX ADMIN — INSULIN LISPRO 10 UNITS: 100 INJECTION, SOLUTION INTRAVENOUS; SUBCUTANEOUS at 01:21

## 2024-10-07 RX ADMIN — PREDNISONE 40 MG: 20 TABLET ORAL at 01:10

## 2024-10-07 RX ADMIN — HYDROMORPHONE HYDROCHLORIDE 0.5 MG: 1 INJECTION, SOLUTION INTRAMUSCULAR; INTRAVENOUS; SUBCUTANEOUS at 04:59

## 2024-10-07 RX ADMIN — TAMSULOSIN HYDROCHLORIDE 0.4 MG: 0.4 CAPSULE ORAL at 09:38

## 2024-10-07 RX ADMIN — HYDROMORPHONE HYDROCHLORIDE 0.5 MG: 1 INJECTION, SOLUTION INTRAMUSCULAR; INTRAVENOUS; SUBCUTANEOUS at 01:09

## 2024-10-07 RX ADMIN — PIPERACILLIN AND TAZOBACTAM 3375 MG: 3; .375 INJECTION, POWDER, LYOPHILIZED, FOR SOLUTION INTRAVENOUS at 01:26

## 2024-10-07 RX ADMIN — GABAPENTIN 300 MG: 300 CAPSULE ORAL at 14:54

## 2024-10-07 RX ADMIN — SODIUM CHLORIDE, PRESERVATIVE FREE 10 ML: 5 INJECTION INTRAVENOUS at 00:18

## 2024-10-07 RX ADMIN — INSULIN LISPRO 4 UNITS: 100 INJECTION, SOLUTION INTRAVENOUS; SUBCUTANEOUS at 20:48

## 2024-10-07 RX ADMIN — GABAPENTIN 300 MG: 300 CAPSULE ORAL at 09:36

## 2024-10-07 RX ADMIN — PANCRELIPASE LIPASE, PANCRELIPASE PROTEASE, PANCRELIPASE AMYLASE 5000 UNITS: 5000; 17000; 24000 CAPSULE, DELAYED RELEASE ORAL at 17:14

## 2024-10-07 RX ADMIN — GABAPENTIN 300 MG: 300 CAPSULE ORAL at 20:45

## 2024-10-07 RX ADMIN — INSULIN LISPRO 16 UNITS: 100 INJECTION, SOLUTION INTRAVENOUS; SUBCUTANEOUS at 17:13

## 2024-10-07 ASSESSMENT — PAIN SCALES - GENERAL
PAINLEVEL_OUTOF10: 7
PAINLEVEL_OUTOF10: 3
PAINLEVEL_OUTOF10: 10
PAINLEVEL_OUTOF10: 7

## 2024-10-07 ASSESSMENT — PAIN DESCRIPTION - LOCATION: LOCATION: KNEE

## 2024-10-07 ASSESSMENT — PAIN DESCRIPTION - ORIENTATION: ORIENTATION: LEFT

## 2024-10-07 NOTE — CARE COORDINATION
10/07/24 1137   Service Assessment   Patient Orientation Alert and Oriented   Cognition Alert   History Provided By Patient   Primary Caregiver Spouse   Support Systems Spouse/Significant Other;Family Members   Patient's Healthcare Decision Maker is: Legal Next of Kin   PCP Verified by CM Yes   Last Visit to PCP Within last 3 months   Prior Functional Level Independent in ADLs/IADLs   Current Functional Level Independent in ADLs/IADLs   Can patient return to prior living arrangement Unknown at present   Ability to make needs known: Good   Family able to assist with home care needs: Yes   Would you like for me to discuss the discharge plan with any other family members/significant others, and if so, who? Yes  (Spouse)     Advance Care Planning     General Advance Care Planning (ACP) Conversation    Date of Conversation: 10/7/2024  Conducted with: Patient with Decision Making Capacity  Other persons present: None    Healthcare Decision Maker:   Primary Decision Maker: KemKenyettan - Spouse - [phone]     Today we documented Decision Maker(s) consistent with Legal Next of Kin hierarchy.    Length of Voluntary ACP Conversation in minutes:  <16 minutes (Non-Billable)    LEONARDA Nation      CM met w/ pt at bedside to discuss dc planning. Pt lives at home w/ spouse, charted above. Prior to admission, pt is indep in ADLs, no dme/hh/rehab hx. Charted PCP is correct, uses [facility] [address]. Cm team to follow for dispo.

## 2024-10-07 NOTE — ED NOTES
Erectile dysfunction     Hearing loss     Hx of blood clots     Hypercholesterolemia     Hypertension     Neuropathy     Restless leg syndrome     Thromboembolus (HCC)     2022       Assessment  Vitals:    Level of Consciousness: Alert (0)   Vitals:    10/06/24 1207 10/06/24 1209 10/06/24 1954   BP:  (!) 145/87 (!) 148/85   Pulse:  (!) 107 (!) 104   Resp:  17 17   Temp:  99.5 °F (37.5 °C) 98.9 °F (37.2 °C)   TempSrc:  Oral Oral   SpO2:  97% 99%   Weight: 95.7 kg (211 lb)     Height: 1.803 m (5' 11\")       Deterioration Index (DI): Deterioration Index: 28.34  Deterioration Index (DI) Interventions Performed:    O2 Flow Rate:    O2 Device: O2 Device: None (Room air)  Cardiac Rhythm:    Critical Lab Results: [unfilled]  Cultures: Cultures: Knee synovial fluid  NIH Score: NIH     Active LDA's:   Peripheral IV 10/06/24 Distal;Left Cephalic (Active)     Active Central Lines:                          Active Wounds:    Active Nance's:    Active Feeding Tubes:      Administered Medications:   Medications   vancomycin (VANCOCIN) 2,500 mg in sodium chloride 0.9 % 500 mL IVPB (2,500 mg IntraVENous New Bag 10/6/24 1952)   sodium chloride flush 0.9 % injection 5-40 mL (has no administration in time range)   sodium chloride flush 0.9 % injection 5-40 mL (has no administration in time range)   0.9 % sodium chloride infusion (has no administration in time range)   potassium chloride (KLOR-CON M) extended release tablet 40 mEq (has no administration in time range)     Or   potassium bicarb-citric acid (EFFER-K) effervescent tablet 40 mEq (has no administration in time range)     Or   potassium chloride 10 mEq/100 mL IVPB (Peripheral Line) (has no administration in time range)   magnesium sulfate 2000 mg in 50 mL IVPB premix (has no administration in time range)   ondansetron (ZOFRAN-ODT) disintegrating tablet 4 mg (has no administration in time range)     Or   ondansetron (ZOFRAN) injection 4 mg (has no administration in time range)

## 2024-10-08 ENCOUNTER — APPOINTMENT (OUTPATIENT)
Facility: HOSPITAL | Age: 69
DRG: 485 | End: 2024-10-08
Payer: MEDICARE

## 2024-10-08 ENCOUNTER — ANESTHESIA (OUTPATIENT)
Facility: HOSPITAL | Age: 69
End: 2024-10-08
Payer: MEDICARE

## 2024-10-08 LAB
ABO + RH BLD: NORMAL
ALBUMIN SERPL-MCNC: 1.8 G/DL (ref 3.5–5)
ALBUMIN/GLOB SERPL: 0.4 (ref 1.1–2.2)
ALP SERPL-CCNC: 64 U/L (ref 45–117)
ALT SERPL-CCNC: 24 U/L (ref 12–78)
ANION GAP SERPL CALC-SCNC: 5 MMOL/L (ref 2–12)
AST SERPL W P-5'-P-CCNC: 25 U/L (ref 15–37)
BASOPHILS # BLD: 0 K/UL (ref 0–0.1)
BASOPHILS NFR BLD: 0 % (ref 0–1)
BILIRUB SERPL-MCNC: 0.6 MG/DL (ref 0.2–1)
BLOOD GROUP ANTIBODIES SERPL: NEGATIVE
BUN SERPL-MCNC: 33 MG/DL (ref 6–20)
BUN/CREAT SERPL: 19 (ref 12–20)
CA-I BLD-MCNC: 9.8 MG/DL (ref 8.5–10.1)
CHLORIDE SERPL-SCNC: 105 MMOL/L (ref 97–108)
CO2 SERPL-SCNC: 25 MMOL/L (ref 21–32)
CREAT SERPL-MCNC: 1.73 MG/DL (ref 0.7–1.3)
DIFFERENTIAL METHOD BLD: ABNORMAL
EOSINOPHIL # BLD: 0 K/UL (ref 0–0.4)
EOSINOPHIL NFR BLD: 0 % (ref 0–7)
ERYTHROCYTE [DISTWIDTH] IN BLOOD BY AUTOMATED COUNT: 13.2 % (ref 11.5–14.5)
GLOBULIN SER CALC-MCNC: 4.8 G/DL (ref 2–4)
GLUCOSE BLD STRIP.AUTO-MCNC: 227 MG/DL (ref 65–100)
GLUCOSE BLD STRIP.AUTO-MCNC: 255 MG/DL (ref 65–100)
GLUCOSE BLD STRIP.AUTO-MCNC: 266 MG/DL (ref 65–100)
GLUCOSE BLD STRIP.AUTO-MCNC: 269 MG/DL (ref 65–100)
GLUCOSE BLD STRIP.AUTO-MCNC: 274 MG/DL (ref 65–100)
GLUCOSE BLD STRIP.AUTO-MCNC: 346 MG/DL (ref 65–100)
GLUCOSE BLD STRIP.AUTO-MCNC: 367 MG/DL (ref 65–100)
GLUCOSE BLD STRIP.AUTO-MCNC: 369 MG/DL (ref 65–100)
GLUCOSE SERPL-MCNC: 249 MG/DL (ref 65–100)
HCT VFR BLD AUTO: 31.7 % (ref 36.6–50.3)
HGB BLD-MCNC: 10.8 G/DL (ref 12.1–17)
IMM GRANULOCYTES # BLD AUTO: 0.1 K/UL (ref 0–0.04)
IMM GRANULOCYTES NFR BLD AUTO: 1 % (ref 0–0.5)
LYME ANTIBODY: NEGATIVE
LYMPHOCYTES # BLD: 1.3 K/UL (ref 0.8–3.5)
LYMPHOCYTES NFR BLD: 10 % (ref 12–49)
MAGNESIUM SERPL-MCNC: 2 MG/DL (ref 1.6–2.4)
MCH RBC QN AUTO: 29.7 PG (ref 26–34)
MCHC RBC AUTO-ENTMCNC: 34.1 G/DL (ref 30–36.5)
MCV RBC AUTO: 87.1 FL (ref 80–99)
MONOCYTES # BLD: 0.6 K/UL (ref 0–1)
MONOCYTES NFR BLD: 5 % (ref 5–13)
MRSA DNA SPEC QL NAA+PROBE: NOT DETECTED
NEUTS SEG # BLD: 11.3 K/UL (ref 1.8–8)
NEUTS SEG NFR BLD: 84 % (ref 32–75)
NRBC # BLD: 0 K/UL (ref 0–0.01)
NRBC BLD-RTO: 0 PER 100 WBC
PERFORMED BY:: ABNORMAL
PHOSPHATE SERPL-MCNC: 2.4 MG/DL (ref 2.6–4.7)
PLATELET # BLD AUTO: 287 K/UL (ref 150–400)
PMV BLD AUTO: 9.6 FL (ref 8.9–12.9)
POTASSIUM SERPL-SCNC: 3.6 MMOL/L (ref 3.5–5.1)
PROT SERPL-MCNC: 6.6 G/DL (ref 6.4–8.2)
RBC # BLD AUTO: 3.64 M/UL (ref 4.1–5.7)
SODIUM SERPL-SCNC: 135 MMOL/L (ref 136–145)
SPECIMEN EXP DATE BLD: NORMAL
WBC # BLD AUTO: 13.4 K/UL (ref 4.1–11.1)

## 2024-10-08 PROCEDURE — 2500000003 HC RX 250 WO HCPCS: Performed by: ORTHOPAEDIC SURGERY

## 2024-10-08 PROCEDURE — 1100000000 HC RM PRIVATE

## 2024-10-08 PROCEDURE — 87641 MR-STAPH DNA AMP PROBE: CPT

## 2024-10-08 PROCEDURE — 6360000002 HC RX W HCPCS

## 2024-10-08 PROCEDURE — 6360000002 HC RX W HCPCS: Performed by: ORTHOPAEDIC SURGERY

## 2024-10-08 PROCEDURE — 87176 TISSUE HOMOGENIZATION CULTR: CPT

## 2024-10-08 PROCEDURE — 2580000003 HC RX 258

## 2024-10-08 PROCEDURE — 82962 GLUCOSE BLOOD TEST: CPT

## 2024-10-08 PROCEDURE — 7100000000 HC PACU RECOVERY - FIRST 15 MIN: Performed by: ORTHOPAEDIC SURGERY

## 2024-10-08 PROCEDURE — 2709999900 HC NON-CHARGEABLE SUPPLY: Performed by: ORTHOPAEDIC SURGERY

## 2024-10-08 PROCEDURE — 2500000003 HC RX 250 WO HCPCS: Performed by: NURSE ANESTHETIST, CERTIFIED REGISTERED

## 2024-10-08 PROCEDURE — P9045 ALBUMIN (HUMAN), 5%, 250 ML: HCPCS | Performed by: NURSE ANESTHETIST, CERTIFIED REGISTERED

## 2024-10-08 PROCEDURE — C1713 ANCHOR/SCREW BN/BN,TIS/BN: HCPCS | Performed by: ORTHOPAEDIC SURGERY

## 2024-10-08 PROCEDURE — 86850 RBC ANTIBODY SCREEN: CPT

## 2024-10-08 PROCEDURE — 2500000003 HC RX 250 WO HCPCS: Performed by: ANESTHESIOLOGY

## 2024-10-08 PROCEDURE — 84100 ASSAY OF PHOSPHORUS: CPT

## 2024-10-08 PROCEDURE — C1776 JOINT DEVICE (IMPLANTABLE): HCPCS | Performed by: ORTHOPAEDIC SURGERY

## 2024-10-08 PROCEDURE — 99232 SBSQ HOSP IP/OBS MODERATE 35: CPT | Performed by: INTERNAL MEDICINE

## 2024-10-08 PROCEDURE — 87147 CULTURE TYPE IMMUNOLOGIC: CPT

## 2024-10-08 PROCEDURE — 86900 BLOOD TYPING SEROLOGIC ABO: CPT

## 2024-10-08 PROCEDURE — 87070 CULTURE OTHR SPECIMN AEROBIC: CPT

## 2024-10-08 PROCEDURE — 27486 REVISE/REPLACE KNEE JOINT: CPT | Performed by: ORTHOPAEDIC SURGERY

## 2024-10-08 PROCEDURE — 6370000000 HC RX 637 (ALT 250 FOR IP)

## 2024-10-08 PROCEDURE — 6370000000 HC RX 637 (ALT 250 FOR IP): Performed by: STUDENT IN AN ORGANIZED HEALTH CARE EDUCATION/TRAINING PROGRAM

## 2024-10-08 PROCEDURE — 2580000003 HC RX 258: Performed by: ORTHOPAEDIC SURGERY

## 2024-10-08 PROCEDURE — 3600000014 HC SURGERY LEVEL 4 ADDTL 15MIN: Performed by: ORTHOPAEDIC SURGERY

## 2024-10-08 PROCEDURE — 2500000003 HC RX 250 WO HCPCS

## 2024-10-08 PROCEDURE — 3E0U3GC INTRODUCTION OF OTHER THERAPEUTIC SUBSTANCE INTO JOINTS, PERCUTANEOUS APPROACH: ICD-10-PCS | Performed by: ORTHOPAEDIC SURGERY

## 2024-10-08 PROCEDURE — 80053 COMPREHEN METABOLIC PANEL: CPT

## 2024-10-08 PROCEDURE — 0SPD09Z REMOVAL OF LINER FROM LEFT KNEE JOINT, OPEN APPROACH: ICD-10-PCS | Performed by: ORTHOPAEDIC SURGERY

## 2024-10-08 PROCEDURE — 86901 BLOOD TYPING SEROLOGIC RH(D): CPT

## 2024-10-08 PROCEDURE — 3600000004 HC SURGERY LEVEL 4 BASE: Performed by: ORTHOPAEDIC SURGERY

## 2024-10-08 PROCEDURE — 2720000010 HC SURG SUPPLY STERILE: Performed by: ORTHOPAEDIC SURGERY

## 2024-10-08 PROCEDURE — 2580000003 HC RX 258: Performed by: NURSE ANESTHETIST, CERTIFIED REGISTERED

## 2024-10-08 PROCEDURE — 6360000002 HC RX W HCPCS: Performed by: NURSE ANESTHETIST, CERTIFIED REGISTERED

## 2024-10-08 PROCEDURE — 0SUW09Z SUPPLEMENT LEFT KNEE JOINT, TIBIAL SURFACE WITH LINER, OPEN APPROACH: ICD-10-PCS | Performed by: ORTHOPAEDIC SURGERY

## 2024-10-08 PROCEDURE — 6370000000 HC RX 637 (ALT 250 FOR IP): Performed by: ORTHOPAEDIC SURGERY

## 2024-10-08 PROCEDURE — 73560 X-RAY EXAM OF KNEE 1 OR 2: CPT

## 2024-10-08 PROCEDURE — 3700000000 HC ANESTHESIA ATTENDED CARE: Performed by: ORTHOPAEDIC SURGERY

## 2024-10-08 PROCEDURE — 7100000001 HC PACU RECOVERY - ADDTL 15 MIN: Performed by: ORTHOPAEDIC SURGERY

## 2024-10-08 PROCEDURE — 85025 COMPLETE CBC W/AUTO DIFF WBC: CPT

## 2024-10-08 PROCEDURE — 3700000001 HC ADD 15 MINUTES (ANESTHESIA): Performed by: ORTHOPAEDIC SURGERY

## 2024-10-08 PROCEDURE — 87205 SMEAR GRAM STAIN: CPT

## 2024-10-08 PROCEDURE — 83735 ASSAY OF MAGNESIUM: CPT

## 2024-10-08 PROCEDURE — 87077 CULTURE AEROBIC IDENTIFY: CPT

## 2024-10-08 DEVICE — STIMULAN® RAPID CURE PROVIDED STERILE FOR SINGLE PATIENT USE. STIMULAN® RAPID CURE CONTAINS CALCIUM SULFATE POWDER AND MIXING SOLUTION IN PRE-MEASURED QUANTITIES SO THAT WHEN MIXED TOGETHER IN A STERILE MIXING BOWL, THE RESULTANT PASTE IS TO BE DIGITALLY PACKED INTO OPEN BONE VOID/GAP TO SET INSITU OR PLACED INTO THE MOULD PROVIDED, THE MIXTURE SETS TO FORM BEADS. THE BIODEGRADABLE, RADIOPAQUE BEADS ARE RESORBED IN APPROXIMATELY 30 – 60 DAYS WHEN USED IN ACCORDANCE WITH THE DEVICE LABELLING. STIMULAN® RAPID CURE IS MANUFACTURED FROM SYNTHETIC IMPLANT GRADE CALCIUM SULFATE DIHYDRATE(CASO4.2H2O) THAT RESORBS AND IS REPLACED WITH BONE DURING THE HEALING PROCESS. ALSO, AS THE BONE VOID FILLER BEADS ARE BIODEGRADABLE AND BIOCOMPATIBLE, THEY MAY BE USED AT AN INFECTED SITE.
Type: IMPLANTABLE DEVICE | Site: KNEE | Status: FUNCTIONAL
Brand: STIMULAN® RAPID CURE

## 2024-10-08 DEVICE — LEGION HIGHLY CROSS LINKED                                    POLYETHYLENE DISHED INSERT SIZE 7-8 9MM
Type: IMPLANTABLE DEVICE | Site: KNEE | Status: FUNCTIONAL
Brand: LEGION

## 2024-10-08 RX ORDER — ACETAMINOPHEN 500 MG
1000 TABLET ORAL EVERY 8 HOURS SCHEDULED
Status: DISCONTINUED | OUTPATIENT
Start: 2024-10-08 | End: 2024-10-21 | Stop reason: HOSPADM

## 2024-10-08 RX ORDER — INSULIN LISPRO 100 [IU]/ML
6 INJECTION, SOLUTION INTRAVENOUS; SUBCUTANEOUS ONCE
Status: COMPLETED | OUTPATIENT
Start: 2024-10-08 | End: 2024-10-08

## 2024-10-08 RX ORDER — FENTANYL CITRATE 50 UG/ML
INJECTION, SOLUTION INTRAMUSCULAR; INTRAVENOUS
Status: DISCONTINUED | OUTPATIENT
Start: 2024-10-08 | End: 2024-10-08 | Stop reason: SDUPTHER

## 2024-10-08 RX ORDER — GLUCAGON 1 MG/ML
1 KIT INJECTION PRN
Status: DISCONTINUED | OUTPATIENT
Start: 2024-10-08 | End: 2024-10-08 | Stop reason: HOSPADM

## 2024-10-08 RX ORDER — LIDOCAINE HYDROCHLORIDE 20 MG/ML
INJECTION, SOLUTION EPIDURAL; INFILTRATION; INTRACAUDAL; PERINEURAL
Status: DISCONTINUED | OUTPATIENT
Start: 2024-10-08 | End: 2024-10-08 | Stop reason: SDUPTHER

## 2024-10-08 RX ORDER — LABETALOL HYDROCHLORIDE 5 MG/ML
10 INJECTION, SOLUTION INTRAVENOUS
Status: DISCONTINUED | OUTPATIENT
Start: 2024-10-08 | End: 2024-10-08 | Stop reason: HOSPADM

## 2024-10-08 RX ORDER — OXYCODONE HYDROCHLORIDE 5 MG/1
10 TABLET ORAL PRN
Status: DISCONTINUED | OUTPATIENT
Start: 2024-10-08 | End: 2024-10-08 | Stop reason: HOSPADM

## 2024-10-08 RX ORDER — OXYCODONE HYDROCHLORIDE 5 MG/1
5 TABLET ORAL PRN
Status: DISCONTINUED | OUTPATIENT
Start: 2024-10-08 | End: 2024-10-08 | Stop reason: HOSPADM

## 2024-10-08 RX ORDER — HYDRALAZINE HYDROCHLORIDE 20 MG/ML
10 INJECTION INTRAMUSCULAR; INTRAVENOUS
Status: DISCONTINUED | OUTPATIENT
Start: 2024-10-08 | End: 2024-10-08 | Stop reason: HOSPADM

## 2024-10-08 RX ORDER — LABETALOL HYDROCHLORIDE 5 MG/ML
INJECTION, SOLUTION INTRAVENOUS
Status: DISCONTINUED | OUTPATIENT
Start: 2024-10-08 | End: 2024-10-08 | Stop reason: SDUPTHER

## 2024-10-08 RX ORDER — NALOXONE HYDROCHLORIDE 0.4 MG/ML
INJECTION, SOLUTION INTRAMUSCULAR; INTRAVENOUS; SUBCUTANEOUS PRN
Status: DISCONTINUED | OUTPATIENT
Start: 2024-10-08 | End: 2024-10-08 | Stop reason: HOSPADM

## 2024-10-08 RX ORDER — DEXMEDETOMIDINE HYDROCHLORIDE 100 UG/ML
INJECTION, SOLUTION INTRAVENOUS
Status: DISCONTINUED | OUTPATIENT
Start: 2024-10-08 | End: 2024-10-08 | Stop reason: SDUPTHER

## 2024-10-08 RX ORDER — DEXAMETHASONE SODIUM PHOSPHATE 4 MG/ML
INJECTION, SOLUTION INTRA-ARTICULAR; INTRALESIONAL; INTRAMUSCULAR; INTRAVENOUS; SOFT TISSUE
Status: DISCONTINUED | OUTPATIENT
Start: 2024-10-08 | End: 2024-10-08 | Stop reason: SDUPTHER

## 2024-10-08 RX ORDER — PROPOFOL 10 MG/ML
INJECTION, EMULSION INTRAVENOUS
Status: DISCONTINUED | OUTPATIENT
Start: 2024-10-08 | End: 2024-10-08 | Stop reason: SDUPTHER

## 2024-10-08 RX ORDER — SODIUM CHLORIDE, SODIUM LACTATE, POTASSIUM CHLORIDE, CALCIUM CHLORIDE 600; 310; 30; 20 MG/100ML; MG/100ML; MG/100ML; MG/100ML
INJECTION, SOLUTION INTRAVENOUS ONCE
Status: DISCONTINUED | OUTPATIENT
Start: 2024-10-08 | End: 2024-10-08 | Stop reason: HOSPADM

## 2024-10-08 RX ORDER — SODIUM CHLORIDE 9 MG/ML
INJECTION, SOLUTION INTRAVENOUS PRN
Status: CANCELLED | OUTPATIENT
Start: 2024-10-08

## 2024-10-08 RX ORDER — ROCURONIUM BROMIDE 10 MG/ML
INJECTION, SOLUTION INTRAVENOUS
Status: DISCONTINUED | OUTPATIENT
Start: 2024-10-08 | End: 2024-10-08 | Stop reason: SDUPTHER

## 2024-10-08 RX ORDER — SODIUM CHLORIDE 9 MG/ML
INJECTION, SOLUTION INTRAVENOUS PRN
Status: DISCONTINUED | OUTPATIENT
Start: 2024-10-08 | End: 2024-10-08 | Stop reason: HOSPADM

## 2024-10-08 RX ORDER — SODIUM CHLORIDE 0.9 % (FLUSH) 0.9 %
5-40 SYRINGE (ML) INJECTION PRN
Status: DISCONTINUED | OUTPATIENT
Start: 2024-10-08 | End: 2024-10-08 | Stop reason: HOSPADM

## 2024-10-08 RX ORDER — METOCLOPRAMIDE HYDROCHLORIDE 5 MG/ML
10 INJECTION INTRAMUSCULAR; INTRAVENOUS
Status: DISCONTINUED | OUTPATIENT
Start: 2024-10-08 | End: 2024-10-08 | Stop reason: HOSPADM

## 2024-10-08 RX ORDER — CEFAZOLIN SODIUM 1 G/3ML
INJECTION, POWDER, FOR SOLUTION INTRAMUSCULAR; INTRAVENOUS
Status: DISPENSED
Start: 2024-10-08 | End: 2024-10-09

## 2024-10-08 RX ORDER — SODIUM CHLORIDE 9 MG/ML
INJECTION, SOLUTION INTRAVENOUS
Status: DISCONTINUED | OUTPATIENT
Start: 2024-10-08 | End: 2024-10-08 | Stop reason: SDUPTHER

## 2024-10-08 RX ORDER — OXYCODONE HYDROCHLORIDE 5 MG/1
5 TABLET ORAL EVERY 4 HOURS PRN
Status: DISCONTINUED | OUTPATIENT
Start: 2024-10-08 | End: 2024-10-17

## 2024-10-08 RX ORDER — IPRATROPIUM BROMIDE AND ALBUTEROL SULFATE 2.5; .5 MG/3ML; MG/3ML
1 SOLUTION RESPIRATORY (INHALATION)
Status: DISCONTINUED | OUTPATIENT
Start: 2024-10-08 | End: 2024-10-08 | Stop reason: HOSPADM

## 2024-10-08 RX ORDER — ONDANSETRON 2 MG/ML
INJECTION INTRAMUSCULAR; INTRAVENOUS
Status: DISCONTINUED | OUTPATIENT
Start: 2024-10-08 | End: 2024-10-08 | Stop reason: SDUPTHER

## 2024-10-08 RX ORDER — ALBUMIN, HUMAN INJ 5% 5 %
SOLUTION INTRAVENOUS
Status: DISCONTINUED | OUTPATIENT
Start: 2024-10-08 | End: 2024-10-08 | Stop reason: SDUPTHER

## 2024-10-08 RX ORDER — FENTANYL CITRATE 0.05 MG/ML
50 INJECTION, SOLUTION INTRAMUSCULAR; INTRAVENOUS EVERY 5 MIN PRN
Status: DISCONTINUED | OUTPATIENT
Start: 2024-10-08 | End: 2024-10-08 | Stop reason: HOSPADM

## 2024-10-08 RX ORDER — SODIUM CHLORIDE 0.9 % (FLUSH) 0.9 %
5-40 SYRINGE (ML) INJECTION EVERY 12 HOURS SCHEDULED
Status: DISCONTINUED | OUTPATIENT
Start: 2024-10-08 | End: 2024-10-08 | Stop reason: HOSPADM

## 2024-10-08 RX ORDER — ONDANSETRON 2 MG/ML
4 INJECTION INTRAMUSCULAR; INTRAVENOUS
Status: DISCONTINUED | OUTPATIENT
Start: 2024-10-08 | End: 2024-10-08 | Stop reason: HOSPADM

## 2024-10-08 RX ORDER — DIPHENHYDRAMINE HYDROCHLORIDE 50 MG/ML
12.5 INJECTION INTRAMUSCULAR; INTRAVENOUS
Status: DISCONTINUED | OUTPATIENT
Start: 2024-10-08 | End: 2024-10-08 | Stop reason: HOSPADM

## 2024-10-08 RX ORDER — DEXTROSE MONOHYDRATE 100 MG/ML
INJECTION, SOLUTION INTRAVENOUS CONTINUOUS PRN
Status: DISCONTINUED | OUTPATIENT
Start: 2024-10-08 | End: 2024-10-08 | Stop reason: HOSPADM

## 2024-10-08 RX ORDER — HYDROMORPHONE HYDROCHLORIDE 1 MG/ML
0.5 INJECTION, SOLUTION INTRAMUSCULAR; INTRAVENOUS; SUBCUTANEOUS EVERY 5 MIN PRN
Status: DISCONTINUED | OUTPATIENT
Start: 2024-10-08 | End: 2024-10-08 | Stop reason: HOSPADM

## 2024-10-08 RX ADMIN — PROPOFOL 150 MG: 10 INJECTION, EMULSION INTRAVENOUS at 13:28

## 2024-10-08 RX ADMIN — LABETALOL HYDROCHLORIDE 5 MG: 5 INJECTION INTRAVENOUS at 14:18

## 2024-10-08 RX ADMIN — ATORVASTATIN CALCIUM 80 MG: 40 TABLET, FILM COATED ORAL at 21:05

## 2024-10-08 RX ADMIN — FENTANYL CITRATE 25 MCG: 50 INJECTION INTRAMUSCULAR; INTRAVENOUS at 14:44

## 2024-10-08 RX ADMIN — FENTANYL CITRATE 25 MCG: 50 INJECTION INTRAMUSCULAR; INTRAVENOUS at 14:39

## 2024-10-08 RX ADMIN — ROCURONIUM BROMIDE 50 MG: 10 INJECTION, SOLUTION INTRAVENOUS at 13:28

## 2024-10-08 RX ADMIN — PIPERACILLIN AND TAZOBACTAM 3375 MG: 3; .375 INJECTION, POWDER, LYOPHILIZED, FOR SOLUTION INTRAVENOUS at 18:43

## 2024-10-08 RX ADMIN — LABETALOL HYDROCHLORIDE 5 MG: 5 INJECTION INTRAVENOUS at 14:14

## 2024-10-08 RX ADMIN — INSULIN LISPRO 4 UNITS: 100 INJECTION, SOLUTION INTRAVENOUS; SUBCUTANEOUS at 21:06

## 2024-10-08 RX ADMIN — INSULIN LISPRO 4 UNITS: 100 INJECTION, SOLUTION INTRAVENOUS; SUBCUTANEOUS at 06:51

## 2024-10-08 RX ADMIN — FENTANYL CITRATE 50 MCG: 50 INJECTION INTRAMUSCULAR; INTRAVENOUS at 13:28

## 2024-10-08 RX ADMIN — HYDROMORPHONE HYDROCHLORIDE 0.5 MG: 1 INJECTION, SOLUTION INTRAMUSCULAR; INTRAVENOUS; SUBCUTANEOUS at 04:55

## 2024-10-08 RX ADMIN — METOPROLOL SUCCINATE 100 MG: 50 TABLET, EXTENDED RELEASE ORAL at 09:03

## 2024-10-08 RX ADMIN — TRANEXAMIC ACID 1000 MG: 100 INJECTION, SOLUTION INTRAVENOUS at 16:01

## 2024-10-08 RX ADMIN — ACETAMINOPHEN 1000 MG: 500 TABLET ORAL at 21:05

## 2024-10-08 RX ADMIN — INSULIN LISPRO 12 UNITS: 100 INJECTION, SOLUTION INTRAVENOUS; SUBCUTANEOUS at 19:03

## 2024-10-08 RX ADMIN — DEXMEDETOMIDINE 4 MCG: 100 INJECTION, SOLUTION INTRAVENOUS at 13:58

## 2024-10-08 RX ADMIN — INSULIN LISPRO 6 UNITS: 100 INJECTION, SOLUTION INTRAVENOUS; SUBCUTANEOUS at 21:59

## 2024-10-08 RX ADMIN — DEXMEDETOMIDINE 4 MCG: 100 INJECTION, SOLUTION INTRAVENOUS at 14:03

## 2024-10-08 RX ADMIN — HYDROMORPHONE HYDROCHLORIDE 0.5 MG: 1 INJECTION, SOLUTION INTRAMUSCULAR; INTRAVENOUS; SUBCUTANEOUS at 13:51

## 2024-10-08 RX ADMIN — PIPERACILLIN AND TAZOBACTAM 3375 MG: 3; .375 INJECTION, POWDER, LYOPHILIZED, FOR SOLUTION INTRAVENOUS at 01:56

## 2024-10-08 RX ADMIN — DEXAMETHASONE SODIUM PHOSPHATE 4 MG: 4 INJECTION INTRA-ARTICULAR; INTRALESIONAL; INTRAMUSCULAR; INTRAVENOUS; SOFT TISSUE at 13:37

## 2024-10-08 RX ADMIN — HYDROMORPHONE HYDROCHLORIDE 0.5 MG: 1 INJECTION, SOLUTION INTRAMUSCULAR; INTRAVENOUS; SUBCUTANEOUS at 15:27

## 2024-10-08 RX ADMIN — INSULIN LISPRO 16 UNITS: 100 INJECTION, SOLUTION INTRAVENOUS; SUBCUTANEOUS at 23:33

## 2024-10-08 RX ADMIN — INSULIN LISPRO 5 UNITS: 100 INJECTION, SOLUTION INTRAVENOUS; SUBCUTANEOUS at 09:02

## 2024-10-08 RX ADMIN — HYDROMORPHONE HYDROCHLORIDE 0.5 MG: 1 INJECTION, SOLUTION INTRAMUSCULAR; INTRAVENOUS; SUBCUTANEOUS at 09:11

## 2024-10-08 RX ADMIN — CEFAZOLIN 2000 MG: 1 INJECTION, POWDER, FOR SOLUTION INTRAMUSCULAR; INTRAVENOUS at 13:30

## 2024-10-08 RX ADMIN — FENTANYL CITRATE 25 MCG: 50 INJECTION INTRAMUSCULAR; INTRAVENOUS at 14:56

## 2024-10-08 RX ADMIN — ONDANSETRON 4 MG: 2 INJECTION INTRAMUSCULAR; INTRAVENOUS at 15:53

## 2024-10-08 RX ADMIN — DEXMEDETOMIDINE 4 MCG: 100 INJECTION, SOLUTION INTRAVENOUS at 14:26

## 2024-10-08 RX ADMIN — SODIUM CHLORIDE, PRESERVATIVE FREE 10 ML: 5 INJECTION INTRAVENOUS at 21:06

## 2024-10-08 RX ADMIN — GABAPENTIN 300 MG: 300 CAPSULE ORAL at 21:06

## 2024-10-08 RX ADMIN — SODIUM CHLORIDE: 9 INJECTION, SOLUTION INTRAVENOUS at 13:20

## 2024-10-08 RX ADMIN — DEXMEDETOMIDINE 4 MCG: 100 INJECTION, SOLUTION INTRAVENOUS at 15:04

## 2024-10-08 RX ADMIN — FENTANYL CITRATE 50 MCG: 50 INJECTION INTRAMUSCULAR; INTRAVENOUS at 13:46

## 2024-10-08 RX ADMIN — PROPOFOL 20 MG: 10 INJECTION, EMULSION INTRAVENOUS at 14:59

## 2024-10-08 RX ADMIN — TAMSULOSIN HYDROCHLORIDE 0.4 MG: 0.4 CAPSULE ORAL at 09:03

## 2024-10-08 RX ADMIN — PIPERACILLIN AND TAZOBACTAM 3375 MG: 3; .375 INJECTION, POWDER, LYOPHILIZED, FOR SOLUTION INTRAVENOUS at 09:03

## 2024-10-08 RX ADMIN — FENTANYL CITRATE 25 MCG: 50 INJECTION INTRAMUSCULAR; INTRAVENOUS at 14:51

## 2024-10-08 RX ADMIN — HYDROMORPHONE HYDROCHLORIDE 0.5 MG: 1 INJECTION, SOLUTION INTRAMUSCULAR; INTRAVENOUS; SUBCUTANEOUS at 17:26

## 2024-10-08 RX ADMIN — TRANEXAMIC ACID 1000 MG: 100 INJECTION, SOLUTION INTRAVENOUS at 13:55

## 2024-10-08 RX ADMIN — INSULIN GLARGINE 10 UNITS: 100 INJECTION, SOLUTION SUBCUTANEOUS at 09:14

## 2024-10-08 RX ADMIN — PROPOFOL 30 MG: 10 INJECTION, EMULSION INTRAVENOUS at 14:45

## 2024-10-08 RX ADMIN — LIDOCAINE HYDROCHLORIDE 100 MG: 20 INJECTION, SOLUTION EPIDURAL; INFILTRATION; INTRACAUDAL; PERINEURAL at 13:28

## 2024-10-08 RX ADMIN — HYDROMORPHONE HYDROCHLORIDE 0.5 MG: 1 INJECTION, SOLUTION INTRAMUSCULAR; INTRAVENOUS; SUBCUTANEOUS at 14:12

## 2024-10-08 RX ADMIN — ALBUMIN (HUMAN) 250 ML: 12.5 INJECTION, SOLUTION INTRAVENOUS at 15:12

## 2024-10-08 RX ADMIN — GABAPENTIN 300 MG: 300 CAPSULE ORAL at 09:03

## 2024-10-08 RX ADMIN — HYDROMORPHONE HYDROCHLORIDE 0.5 MG: 1 INJECTION, SOLUTION INTRAMUSCULAR; INTRAVENOUS; SUBCUTANEOUS at 15:10

## 2024-10-08 RX ADMIN — INSULIN LISPRO 8 UNITS: 100 INJECTION, SOLUTION INTRAVENOUS; SUBCUTANEOUS at 02:00

## 2024-10-08 RX ADMIN — OXYCODONE 5 MG: 5 TABLET ORAL at 21:05

## 2024-10-08 RX ADMIN — SODIUM CHLORIDE: 9 INJECTION, SOLUTION INTRAVENOUS at 22:49

## 2024-10-08 RX ADMIN — SUGAMMADEX 200 MG: 100 INJECTION, SOLUTION INTRAVENOUS at 16:14

## 2024-10-08 ASSESSMENT — PAIN DESCRIPTION - LOCATION
LOCATION: KNEE

## 2024-10-08 ASSESSMENT — PAIN SCALES - GENERAL
PAINLEVEL_OUTOF10: 8
PAINLEVEL_OUTOF10: 8
PAINLEVEL_OUTOF10: 7
PAINLEVEL_OUTOF10: 8
PAINLEVEL_OUTOF10: 9
PAINLEVEL_OUTOF10: 6
PAINLEVEL_OUTOF10: 10
PAINLEVEL_OUTOF10: 6
PAINLEVEL_OUTOF10: 10

## 2024-10-08 ASSESSMENT — PAIN DESCRIPTION - ORIENTATION
ORIENTATION: LEFT

## 2024-10-08 ASSESSMENT — PAIN - FUNCTIONAL ASSESSMENT: PAIN_FUNCTIONAL_ASSESSMENT: 0-10

## 2024-10-08 ASSESSMENT — PAIN DESCRIPTION - DESCRIPTORS: DESCRIPTORS: THROBBING

## 2024-10-08 NOTE — ANESTHESIA POSTPROCEDURE EVALUATION
Department of Anesthesiology  Postprocedure Note    Patient: Keo Brownlee Jr.  MRN: [id number]  YOB: 1955  Date of evaluation: 10/8/2024    Procedure Summary       Date: 10/08/24 Room / Location: [facility] MAIN OR 07 / [facility] MAIN OR    Anesthesia Start: 1320 Anesthesia Stop: 1632    Procedure: LEFT TOTAL KNEE REVISION, POLY EXCHANGE, STIMULAN BEADS (Left: Knee) Diagnosis:       Infection of total right knee replacement, initial encounter ([facility])      (Infection of total right knee replacement, initial encounter ([facility]) [T84.53XA])    Surgeons: Obinna Jimenez MD Responsible Provider: Evangelist Bear MD    Anesthesia Type: General ASA Status: 3            Anesthesia Type: General    Rosetta Phase I: Rosetta Score: 10    Rosetta Phase II:      Anesthesia Post Evaluation    Patient location during evaluation: PACU  Patient participation: complete - patient participated  Level of consciousness: sleepy but conscious  Pain score: 0  Airway patency: patent  Nausea & Vomiting: no nausea and no vomiting  Cardiovascular status: hemodynamically stable  Respiratory status: acceptable  Hydration status: stable  Multimodal analgesia pain management approach    No notable events documented.

## 2024-10-08 NOTE — ANESTHESIA PRE PROCEDURE
Department of Anesthesiology  Preprocedure Note       Name:  Keo Brownlee Jr.   Age:  69 y.o.  :  1955                                          MRN:  [id number]         Date:  10/8/2024      Surgeon: Surgeon(s):  Obinna Jimenez MD    Procedure: Procedure(s):  LEFT TOTAL KNEE REVISION, POLY EXCHANGE, STIMULAN BEADS    Medications prior to admission:   Prior to Admission medications    Medication Sig Start Date End Date Taking? Authorizing Provider   lisinopril (PRINIVIL;ZESTRIL) 40 MG tablet Take 1 tablet by mouth daily   Yes Provider, MD Tay   omeprazole (PRILOSEC) 40 MG delayed release capsule Take 1 capsule by mouth as needed 24  Yes Provider, Historical, MD   CREON [id number] units CPEP delayed release capsule Take 2 capsules by mouth every morning (before breakfast) 24  Yes Provider, MD Tay   cilostazol (PLETAL) 50 MG tablet TAKE 1 TABLET BY MOUTH 2 TIMES DAILY BEFORE BREAKFAST AND DINNER. 24  Yes Bal Arce MD   sodium bicarbonate 650 MG tablet Take 1 tablet by mouth 3 times daily   Yes Provider, MD Tay   apixaban (ELIQUIS) 5 MG TABS tablet Take 1 tablet by mouth 2 times daily   Yes Provider, MD Tay   clomiPHENE (CLOMID) 50 MG tablet Take 0.5 tablets by mouth daily 21  Yes Automatic Reconciliation, Ar   Dulaglutide (TRULICITY) 0.75 MG/0.5ML SOPN ceived the following from [facility]: Outside name: Trulicity 0.75 mg/0.5 mL sub-q pen 22  Yes Automatic Reconciliation, Ar   gabapentin (NEURONTIN) 300 MG capsule Take 1 tablet by mouth 3 times daily. 300 mg  TID 21  Yes Automatic Reconciliation, Ar   glipiZIDE (GLUCOTROL XL) 10 MG extended release tablet Take 1 tablet by mouth daily   Yes Automatic Reconciliation, Ar   metoprolol succinate (TOPROL XL) 100 MG extended release tablet Take 1 tablet by mouth daily 21  Yes Automatic Reconciliation, Ar   tamsulosin (FLOMAX) 0.4 MG capsule Take 1 capsule by mouth daily   Yes

## 2024-10-08 NOTE — OP NOTE
Operative Note      Patient: Keo Brownlee Jr.  YOB: 1955  MRN: [id number]    Date of Procedure: 10/8/2024    Pre-Op Diagnosis Codes:      * Infection of total right knee replacement, initial encounter ([facility]) [T84.53XA]    Post-Op Diagnosis: Same as above       Procedure(s):  LEFT TOTAL KNEE REVISION, POLY EXCHANGE, STIMULAN BEADS    Surgeon(s):  Obinna Jimenez MD    Assistant:   * No surgical staff found *    Anesthesia: General    Estimated Blood Loss (mL): 700    Complications: None    Specimens:   ID Type Source Tests Collected by Time Destination   A : left suprapatellar pouch one Tissue Leg CULTURE, Obinna Su MD 10/8/2024 1420    B : left suprapatellar pouch two Tissue Leg CULTURE, Obinna Su MD 10/8/2024 1420    C : left suprapatellar pouch three Tissue Leg CULTURE, Obinna Su MD 10/8/2024 1420        Implants:  Implant Name Type Inv. Item Serial No.  Lot No. LRB No. Used Action   stimulan beads   NA  [id number] Left 1 Implanted   stimulan beads different lot number   NA  [id number] Left 1 Implanted   INSERT TIB SZ 7-8 THK9MM KNEEXLPE CRUC RET DP DSH LEGION - SNA  INSERT TIB SZ 7-8 THK9MM KNEEXLPE CRUC RET DP DSH LEGION NA [employer] [id number] Left 1 Implanted         Drains: * No LDAs found *    Findings:  Infection Present At Time Of Surgery (PATOS) (choose all levels that have infection present):  - Organ Space infection (below fascia) present as evidenced by pus, purulent fluid, and fluid consistent with infection  Other Findings: Stable implants    Detailed Description of Procedure:     Indications: The patient sustained presented with a painful swollen left knee in the setting of TKA.  Arthrocentesis centesis was performed that demonstrated group B streptococcus.  Patient has only been painful for 3 days.  His blood cultures are also positive.  Most likely this was a hematogenous seeding of the left TKA and he is indicated for

## 2024-10-08 NOTE — CARE COORDINATION
0710: Chart reviewed.    Per notes; patient on IV ABX followed via ID and NPO for ortho procedure today.    PT/OT evals pending.    CM will continue to follow patient and recs of medical team.

## 2024-10-09 PROBLEM — M25.562 ACUTE PAIN OF LEFT KNEE: Status: ACTIVE | Noted: 2024-10-09

## 2024-10-09 PROBLEM — T84.54XA INFECTION OF PROSTHETIC LEFT KNEE JOINT (HCC): Status: ACTIVE | Noted: 2024-10-09

## 2024-10-09 PROBLEM — D72.829 LEUKOCYTOSIS: Status: ACTIVE | Noted: 2024-10-09

## 2024-10-09 LAB
ALBUMIN SERPL-MCNC: 1.8 G/DL (ref 3.5–5)
ALBUMIN/GLOB SERPL: 0.4 (ref 1.1–2.2)
ALP SERPL-CCNC: 62 U/L (ref 45–117)
ALT SERPL-CCNC: 26 U/L (ref 12–78)
ANION GAP SERPL CALC-SCNC: 7 MMOL/L (ref 2–12)
AST SERPL W P-5'-P-CCNC: 25 U/L (ref 15–37)
BACTERIA SPEC CULT: ABNORMAL
BACTERIA SPEC CULT: NORMAL
BASOPHILS # BLD: 0 K/UL (ref 0–0.1)
BASOPHILS NFR BLD: 0 % (ref 0–1)
BILIRUB SERPL-MCNC: 0.5 MG/DL (ref 0.2–1)
BUN SERPL-MCNC: 37 MG/DL (ref 6–20)
BUN/CREAT SERPL: 20 (ref 12–20)
CA-I BLD-MCNC: 9.1 MG/DL (ref 8.5–10.1)
CHLORIDE SERPL-SCNC: 107 MMOL/L (ref 97–108)
CO2 SERPL-SCNC: 22 MMOL/L (ref 21–32)
CREAT SERPL-MCNC: 1.85 MG/DL (ref 0.7–1.3)
DIFFERENTIAL METHOD BLD: ABNORMAL
EOSINOPHIL # BLD: 0 K/UL (ref 0–0.4)
EOSINOPHIL NFR BLD: 0 % (ref 0–7)
ERYTHROCYTE [DISTWIDTH] IN BLOOD BY AUTOMATED COUNT: 13.4 % (ref 11.5–14.5)
GLOBULIN SER CALC-MCNC: 4.3 G/DL (ref 2–4)
GLUCOSE BLD STRIP.AUTO-MCNC: 195 MG/DL (ref 65–100)
GLUCOSE BLD STRIP.AUTO-MCNC: 220 MG/DL (ref 65–100)
GLUCOSE BLD STRIP.AUTO-MCNC: 303 MG/DL (ref 65–100)
GLUCOSE BLD STRIP.AUTO-MCNC: 317 MG/DL (ref 65–100)
GLUCOSE BLD STRIP.AUTO-MCNC: 342 MG/DL (ref 65–100)
GLUCOSE SERPL-MCNC: 297 MG/DL (ref 65–100)
GRAM STN SPEC: ABNORMAL
GRAM STN SPEC: ABNORMAL
HCT VFR BLD AUTO: 28.6 % (ref 36.6–50.3)
HGB BLD-MCNC: 9.2 G/DL (ref 12.1–17)
IMM GRANULOCYTES # BLD AUTO: 0.1 K/UL (ref 0–0.04)
IMM GRANULOCYTES NFR BLD AUTO: 1 % (ref 0–0.5)
LYMPHOCYTES # BLD: 1.1 K/UL (ref 0.8–3.5)
LYMPHOCYTES NFR BLD: 11 % (ref 12–49)
Lab: ABNORMAL
Lab: ABNORMAL
Lab: NORMAL
MAGNESIUM SERPL-MCNC: 2.2 MG/DL (ref 1.6–2.4)
MCH RBC QN AUTO: 29.9 PG (ref 26–34)
MCHC RBC AUTO-ENTMCNC: 32.2 G/DL (ref 30–36.5)
MCV RBC AUTO: 92.9 FL (ref 80–99)
MONOCYTES # BLD: 0.8 K/UL (ref 0–1)
MONOCYTES NFR BLD: 8 % (ref 5–13)
NEUTS SEG # BLD: 8.4 K/UL (ref 1.8–8)
NEUTS SEG NFR BLD: 80 % (ref 32–75)
NRBC # BLD: 0 K/UL (ref 0–0.01)
NRBC BLD-RTO: 0 PER 100 WBC
PERFORMED BY:: ABNORMAL
PHOSPHATE SERPL-MCNC: 3.4 MG/DL (ref 2.6–4.7)
PLATELET # BLD AUTO: 273 K/UL (ref 150–400)
PMV BLD AUTO: 10.1 FL (ref 8.9–12.9)
POTASSIUM SERPL-SCNC: 4.2 MMOL/L (ref 3.5–5.1)
PROT SERPL-MCNC: 6.1 G/DL (ref 6.4–8.2)
RBC # BLD AUTO: 3.08 M/UL (ref 4.1–5.7)
SODIUM SERPL-SCNC: 136 MMOL/L (ref 136–145)
WBC # BLD AUTO: 10.5 K/UL (ref 4.1–11.1)

## 2024-10-09 PROCEDURE — 97530 THERAPEUTIC ACTIVITIES: CPT

## 2024-10-09 PROCEDURE — 2580000003 HC RX 258

## 2024-10-09 PROCEDURE — 2580000003 HC RX 258: Performed by: INTERNAL MEDICINE

## 2024-10-09 PROCEDURE — 99024 POSTOP FOLLOW-UP VISIT: CPT

## 2024-10-09 PROCEDURE — 97162 PT EVAL MOD COMPLEX 30 MIN: CPT

## 2024-10-09 PROCEDURE — 6370000000 HC RX 637 (ALT 250 FOR IP): Performed by: ORTHOPAEDIC SURGERY

## 2024-10-09 PROCEDURE — 1100000000 HC RM PRIVATE

## 2024-10-09 PROCEDURE — 99232 SBSQ HOSP IP/OBS MODERATE 35: CPT | Performed by: INTERNAL MEDICINE

## 2024-10-09 PROCEDURE — 6370000000 HC RX 637 (ALT 250 FOR IP)

## 2024-10-09 PROCEDURE — 84100 ASSAY OF PHOSPHORUS: CPT

## 2024-10-09 PROCEDURE — 6360000002 HC RX W HCPCS

## 2024-10-09 PROCEDURE — 85025 COMPLETE CBC W/AUTO DIFF WBC: CPT

## 2024-10-09 PROCEDURE — 6370000000 HC RX 637 (ALT 250 FOR IP): Performed by: INTERNAL MEDICINE

## 2024-10-09 PROCEDURE — 82962 GLUCOSE BLOOD TEST: CPT

## 2024-10-09 PROCEDURE — 80053 COMPREHEN METABOLIC PANEL: CPT

## 2024-10-09 PROCEDURE — 36415 COLL VENOUS BLD VENIPUNCTURE: CPT

## 2024-10-09 PROCEDURE — 97165 OT EVAL LOW COMPLEX 30 MIN: CPT

## 2024-10-09 PROCEDURE — 6360000002 HC RX W HCPCS: Performed by: INTERNAL MEDICINE

## 2024-10-09 PROCEDURE — 83735 ASSAY OF MAGNESIUM: CPT

## 2024-10-09 RX ORDER — INSULIN LISPRO 100 [IU]/ML
5 INJECTION, SOLUTION INTRAVENOUS; SUBCUTANEOUS
Status: DISCONTINUED | OUTPATIENT
Start: 2024-10-09 | End: 2024-10-17

## 2024-10-09 RX ORDER — GLIPIZIDE 5 MG/1
10 TABLET ORAL
Status: CANCELLED | OUTPATIENT
Start: 2024-10-10

## 2024-10-09 RX ORDER — INSULIN GLARGINE 100 [IU]/ML
60 INJECTION, SOLUTION SUBCUTANEOUS DAILY
Status: DISCONTINUED | OUTPATIENT
Start: 2024-10-09 | End: 2024-10-10

## 2024-10-09 RX ADMIN — INSULIN LISPRO 12 UNITS: 100 INJECTION, SOLUTION INTRAVENOUS; SUBCUTANEOUS at 07:24

## 2024-10-09 RX ADMIN — ACETAMINOPHEN 1000 MG: 500 TABLET ORAL at 13:12

## 2024-10-09 RX ADMIN — PANCRELIPASE LIPASE, PANCRELIPASE PROTEASE, PANCRELIPASE AMYLASE 5000 UNITS: 5000; 17000; 24000 CAPSULE, DELAYED RELEASE ORAL at 16:33

## 2024-10-09 RX ADMIN — PIPERACILLIN AND TAZOBACTAM 3375 MG: 3; .375 INJECTION, POWDER, LYOPHILIZED, FOR SOLUTION INTRAVENOUS at 08:26

## 2024-10-09 RX ADMIN — ACETAMINOPHEN 1000 MG: 500 TABLET ORAL at 05:58

## 2024-10-09 RX ADMIN — INSULIN LISPRO 5 UNITS: 100 INJECTION, SOLUTION INTRAVENOUS; SUBCUTANEOUS at 12:10

## 2024-10-09 RX ADMIN — PANTOPRAZOLE SODIUM 40 MG: 40 TABLET, DELAYED RELEASE ORAL at 05:59

## 2024-10-09 RX ADMIN — METOPROLOL SUCCINATE 100 MG: 50 TABLET, EXTENDED RELEASE ORAL at 08:24

## 2024-10-09 RX ADMIN — GLIPIZIDE 10 MG: 5 TABLET ORAL at 05:59

## 2024-10-09 RX ADMIN — CEFTRIAXONE SODIUM 2000 MG: 2 INJECTION, POWDER, FOR SOLUTION INTRAMUSCULAR; INTRAVENOUS at 12:10

## 2024-10-09 RX ADMIN — OXYCODONE 5 MG: 5 TABLET ORAL at 13:12

## 2024-10-09 RX ADMIN — PIPERACILLIN AND TAZOBACTAM 3375 MG: 3; .375 INJECTION, POWDER, LYOPHILIZED, FOR SOLUTION INTRAVENOUS at 02:20

## 2024-10-09 RX ADMIN — OXYCODONE 5 MG: 5 TABLET ORAL at 17:35

## 2024-10-09 RX ADMIN — OXYCODONE 5 MG: 5 TABLET ORAL at 21:15

## 2024-10-09 RX ADMIN — ACETAMINOPHEN 1000 MG: 500 TABLET ORAL at 22:23

## 2024-10-09 RX ADMIN — TAMSULOSIN HYDROCHLORIDE 0.4 MG: 0.4 CAPSULE ORAL at 08:24

## 2024-10-09 RX ADMIN — ATORVASTATIN CALCIUM 80 MG: 40 TABLET, FILM COATED ORAL at 21:15

## 2024-10-09 RX ADMIN — GABAPENTIN 300 MG: 300 CAPSULE ORAL at 13:12

## 2024-10-09 RX ADMIN — INSULIN LISPRO 12 UNITS: 100 INJECTION, SOLUTION INTRAVENOUS; SUBCUTANEOUS at 03:21

## 2024-10-09 RX ADMIN — INSULIN LISPRO 4 UNITS: 100 INJECTION, SOLUTION INTRAVENOUS; SUBCUTANEOUS at 21:17

## 2024-10-09 RX ADMIN — SODIUM CHLORIDE, PRESERVATIVE FREE 10 ML: 5 INJECTION INTRAVENOUS at 20:14

## 2024-10-09 RX ADMIN — INSULIN LISPRO 4 UNITS: 100 INJECTION, SOLUTION INTRAVENOUS; SUBCUTANEOUS at 16:32

## 2024-10-09 RX ADMIN — GABAPENTIN 300 MG: 300 CAPSULE ORAL at 21:00

## 2024-10-09 RX ADMIN — OXYCODONE 5 MG: 5 TABLET ORAL at 03:20

## 2024-10-09 RX ADMIN — INSULIN LISPRO 5 UNITS: 100 INJECTION, SOLUTION INTRAVENOUS; SUBCUTANEOUS at 16:32

## 2024-10-09 RX ADMIN — GABAPENTIN 300 MG: 300 CAPSULE ORAL at 08:24

## 2024-10-09 RX ADMIN — PANCRELIPASE LIPASE, PANCRELIPASE PROTEASE, PANCRELIPASE AMYLASE 5000 UNITS: 5000; 17000; 24000 CAPSULE, DELAYED RELEASE ORAL at 12:10

## 2024-10-09 RX ADMIN — INSULIN GLARGINE 60 UNITS: 100 INJECTION, SOLUTION SUBCUTANEOUS at 12:11

## 2024-10-09 ASSESSMENT — PAIN SCALES - GENERAL
PAINLEVEL_OUTOF10: 9
PAINLEVEL_OUTOF10: 8
PAINLEVEL_OUTOF10: 2
PAINLEVEL_OUTOF10: 2
PAINLEVEL_OUTOF10: 5
PAINLEVEL_OUTOF10: 8

## 2024-10-09 ASSESSMENT — PAIN DESCRIPTION - LOCATION
LOCATION: KNEE
LOCATION: LEG
LOCATION: LEG

## 2024-10-09 ASSESSMENT — PAIN DESCRIPTION - PAIN TYPE: TYPE: SURGICAL PAIN

## 2024-10-09 ASSESSMENT — PAIN DESCRIPTION - ORIENTATION
ORIENTATION: LEFT

## 2024-10-09 ASSESSMENT — PAIN DESCRIPTION - DESCRIPTORS
DESCRIPTORS: DULL
DESCRIPTORS: DISCOMFORT
DESCRIPTORS: ACHING
DESCRIPTORS: ACHING
DESCRIPTORS: DULL

## 2024-10-09 ASSESSMENT — PAIN - FUNCTIONAL ASSESSMENT: PAIN_FUNCTIONAL_ASSESSMENT: PREVENTS OR INTERFERES SOME ACTIVE ACTIVITIES AND ADLS

## 2024-10-09 ASSESSMENT — PAIN SCALES - WONG BAKER: WONGBAKER_NUMERICALRESPONSE: NO HURT

## 2024-10-09 NOTE — CARE COORDINATION
CM met with patient and spouse at bedside to discuss discharge disposition.  CM informed patient and spouse that PT and OT will work with patient for recommendations for the next level of care.  CM inquired about rehab in a facility vs home health.  Patient has declined facility and has opted for home health. [place] of choice for no preference. CM informed patient and spouse that home health is not a guaranteed service. Patient will also need IV abx, spouse states she is able to perform IV abx administration.  [facility] of no preference in Infusion company. CM sent referrals. Patient is pending acceptance at this time.

## 2024-10-10 LAB
BASOPHILS # BLD: 0 K/UL (ref 0–0.1)
BASOPHILS NFR BLD: 0 % (ref 0–1)
CRP SERPL-MCNC: 14 MG/DL (ref 0–0.3)
CRP SERPL-MCNC: 14.7 MG/DL (ref 0–0.3)
DIFFERENTIAL METHOD BLD: ABNORMAL
EOSINOPHIL # BLD: 0.1 K/UL (ref 0–0.4)
EOSINOPHIL NFR BLD: 1 % (ref 0–7)
ERYTHROCYTE [DISTWIDTH] IN BLOOD BY AUTOMATED COUNT: 13.4 % (ref 11.5–14.5)
GLUCOSE BLD STRIP.AUTO-MCNC: 122 MG/DL (ref 65–100)
GLUCOSE BLD STRIP.AUTO-MCNC: 132 MG/DL (ref 65–100)
GLUCOSE BLD STRIP.AUTO-MCNC: 132 MG/DL (ref 65–100)
GLUCOSE BLD STRIP.AUTO-MCNC: 191 MG/DL (ref 65–100)
GLUCOSE BLD STRIP.AUTO-MCNC: 217 MG/DL (ref 65–100)
GLUCOSE BLD STRIP.AUTO-MCNC: 96 MG/DL (ref 65–100)
HCT VFR BLD AUTO: 27.5 % (ref 36.6–50.3)
HGB BLD-MCNC: 9.1 G/DL (ref 12.1–17)
IMM GRANULOCYTES # BLD AUTO: 0.1 K/UL (ref 0–0.04)
IMM GRANULOCYTES NFR BLD AUTO: 1 % (ref 0–0.5)
LYMPHOCYTES # BLD: 1.7 K/UL (ref 0.8–3.5)
LYMPHOCYTES NFR BLD: 19 % (ref 12–49)
MCH RBC QN AUTO: 29.5 PG (ref 26–34)
MCHC RBC AUTO-ENTMCNC: 33.1 G/DL (ref 30–36.5)
MCV RBC AUTO: 89.3 FL (ref 80–99)
MONOCYTES # BLD: 0.5 K/UL (ref 0–1)
MONOCYTES NFR BLD: 6 % (ref 5–13)
NEUTS SEG # BLD: 6.3 K/UL (ref 1.8–8)
NEUTS SEG NFR BLD: 73 % (ref 32–75)
NRBC # BLD: 0 K/UL (ref 0–0.01)
NRBC BLD-RTO: 0 PER 100 WBC
PERFORMED BY:: ABNORMAL
PERFORMED BY:: NORMAL
PLATELET # BLD AUTO: 285 K/UL (ref 150–400)
PMV BLD AUTO: 9.4 FL (ref 8.9–12.9)
RBC # BLD AUTO: 3.08 M/UL (ref 4.1–5.7)
WBC # BLD AUTO: 8.7 K/UL (ref 4.1–11.1)

## 2024-10-10 PROCEDURE — 36415 COLL VENOUS BLD VENIPUNCTURE: CPT

## 2024-10-10 PROCEDURE — 2580000003 HC RX 258: Performed by: INTERNAL MEDICINE

## 2024-10-10 PROCEDURE — 2580000003 HC RX 258

## 2024-10-10 PROCEDURE — 1100000000 HC RM PRIVATE

## 2024-10-10 PROCEDURE — 82962 GLUCOSE BLOOD TEST: CPT

## 2024-10-10 PROCEDURE — 6370000000 HC RX 637 (ALT 250 FOR IP)

## 2024-10-10 PROCEDURE — 97530 THERAPEUTIC ACTIVITIES: CPT

## 2024-10-10 PROCEDURE — 99232 SBSQ HOSP IP/OBS MODERATE 35: CPT | Performed by: INTERNAL MEDICINE

## 2024-10-10 PROCEDURE — 6360000002 HC RX W HCPCS: Performed by: STUDENT IN AN ORGANIZED HEALTH CARE EDUCATION/TRAINING PROGRAM

## 2024-10-10 PROCEDURE — 85025 COMPLETE CBC W/AUTO DIFF WBC: CPT

## 2024-10-10 PROCEDURE — 97116 GAIT TRAINING THERAPY: CPT

## 2024-10-10 PROCEDURE — 99024 POSTOP FOLLOW-UP VISIT: CPT

## 2024-10-10 PROCEDURE — 02HV33Z INSERTION OF INFUSION DEVICE INTO SUPERIOR VENA CAVA, PERCUTANEOUS APPROACH: ICD-10-PCS | Performed by: INTERNAL MEDICINE

## 2024-10-10 PROCEDURE — 97110 THERAPEUTIC EXERCISES: CPT

## 2024-10-10 PROCEDURE — 6370000000 HC RX 637 (ALT 250 FOR IP): Performed by: ORTHOPAEDIC SURGERY

## 2024-10-10 PROCEDURE — 36569 INSJ PICC 5 YR+ W/O IMAGING: CPT

## 2024-10-10 PROCEDURE — 6360000002 HC RX W HCPCS: Performed by: INTERNAL MEDICINE

## 2024-10-10 PROCEDURE — 86140 C-REACTIVE PROTEIN: CPT

## 2024-10-10 PROCEDURE — 6370000000 HC RX 637 (ALT 250 FOR IP): Performed by: INTERNAL MEDICINE

## 2024-10-10 RX ORDER — INSULIN GLARGINE 100 [IU]/ML
30 INJECTION, SOLUTION SUBCUTANEOUS DAILY
Status: DISCONTINUED | OUTPATIENT
Start: 2024-10-10 | End: 2024-10-12

## 2024-10-10 RX ORDER — MORPHINE SULFATE 2 MG/ML
2 INJECTION, SOLUTION INTRAMUSCULAR; INTRAVENOUS EVERY 4 HOURS PRN
Status: DISPENSED | OUTPATIENT
Start: 2024-10-10 | End: 2024-10-13

## 2024-10-10 RX ADMIN — OXYCODONE 5 MG: 5 TABLET ORAL at 13:37

## 2024-10-10 RX ADMIN — ACETAMINOPHEN 1000 MG: 500 TABLET ORAL at 05:33

## 2024-10-10 RX ADMIN — INSULIN LISPRO 4 UNITS: 100 INJECTION, SOLUTION INTRAVENOUS; SUBCUTANEOUS at 00:44

## 2024-10-10 RX ADMIN — CEFTRIAXONE SODIUM 2000 MG: 2 INJECTION, POWDER, FOR SOLUTION INTRAMUSCULAR; INTRAVENOUS at 10:33

## 2024-10-10 RX ADMIN — TAMSULOSIN HYDROCHLORIDE 0.4 MG: 0.4 CAPSULE ORAL at 08:54

## 2024-10-10 RX ADMIN — PANTOPRAZOLE SODIUM 40 MG: 40 TABLET, DELAYED RELEASE ORAL at 05:35

## 2024-10-10 RX ADMIN — OXYCODONE 5 MG: 5 TABLET ORAL at 08:55

## 2024-10-10 RX ADMIN — METOPROLOL SUCCINATE 100 MG: 50 TABLET, EXTENDED RELEASE ORAL at 08:54

## 2024-10-10 RX ADMIN — PANCRELIPASE LIPASE, PANCRELIPASE PROTEASE, PANCRELIPASE AMYLASE 5000 UNITS: 5000; 17000; 24000 CAPSULE, DELAYED RELEASE ORAL at 08:55

## 2024-10-10 RX ADMIN — MORPHINE SULFATE 2 MG: 2 INJECTION, SOLUTION INTRAMUSCULAR; INTRAVENOUS at 10:28

## 2024-10-10 RX ADMIN — PANCRELIPASE LIPASE, PANCRELIPASE PROTEASE, PANCRELIPASE AMYLASE 5000 UNITS: 5000; 17000; 24000 CAPSULE, DELAYED RELEASE ORAL at 17:24

## 2024-10-10 RX ADMIN — ATORVASTATIN CALCIUM 80 MG: 40 TABLET, FILM COATED ORAL at 20:40

## 2024-10-10 RX ADMIN — GABAPENTIN 300 MG: 300 CAPSULE ORAL at 13:37

## 2024-10-10 RX ADMIN — GABAPENTIN 300 MG: 300 CAPSULE ORAL at 20:40

## 2024-10-10 RX ADMIN — INSULIN LISPRO 5 UNITS: 100 INJECTION, SOLUTION INTRAVENOUS; SUBCUTANEOUS at 08:56

## 2024-10-10 RX ADMIN — INSULIN LISPRO 5 UNITS: 100 INJECTION, SOLUTION INTRAVENOUS; SUBCUTANEOUS at 17:23

## 2024-10-10 RX ADMIN — ACETAMINOPHEN 1000 MG: 500 TABLET ORAL at 20:40

## 2024-10-10 RX ADMIN — INSULIN GLARGINE 30 UNITS: 100 INJECTION, SOLUTION SUBCUTANEOUS at 10:27

## 2024-10-10 RX ADMIN — SODIUM CHLORIDE, PRESERVATIVE FREE 10 ML: 5 INJECTION INTRAVENOUS at 10:30

## 2024-10-10 RX ADMIN — GABAPENTIN 300 MG: 300 CAPSULE ORAL at 08:54

## 2024-10-10 RX ADMIN — ACETAMINOPHEN 1000 MG: 500 TABLET ORAL at 13:37

## 2024-10-10 RX ADMIN — PANCRELIPASE LIPASE, PANCRELIPASE PROTEASE, PANCRELIPASE AMYLASE 5000 UNITS: 5000; 17000; 24000 CAPSULE, DELAYED RELEASE ORAL at 13:37

## 2024-10-10 RX ADMIN — SODIUM CHLORIDE, PRESERVATIVE FREE 10 ML: 5 INJECTION INTRAVENOUS at 19:49

## 2024-10-10 RX ADMIN — MORPHINE SULFATE 2 MG: 2 INJECTION, SOLUTION INTRAMUSCULAR; INTRAVENOUS at 05:58

## 2024-10-10 RX ADMIN — OXYCODONE 5 MG: 5 TABLET ORAL at 03:58

## 2024-10-10 ASSESSMENT — PAIN DESCRIPTION - ORIENTATION
ORIENTATION: LEFT

## 2024-10-10 ASSESSMENT — PAIN DESCRIPTION - DESCRIPTORS
DESCRIPTORS: DULL
DESCRIPTORS: SQUEEZING;OTHER (COMMENT)
DESCRIPTORS: DULL
DESCRIPTORS: OTHER (COMMENT);SQUEEZING
DESCRIPTORS: SQUEEZING

## 2024-10-10 ASSESSMENT — PAIN SCALES - GENERAL
PAINLEVEL_OUTOF10: 4
PAINLEVEL_OUTOF10: 6
PAINLEVEL_OUTOF10: 10
PAINLEVEL_OUTOF10: 7
PAINLEVEL_OUTOF10: 3
PAINLEVEL_OUTOF10: 7
PAINLEVEL_OUTOF10: 7
PAINLEVEL_OUTOF10: 6
PAINLEVEL_OUTOF10: 3
PAINLEVEL_OUTOF10: 10

## 2024-10-10 ASSESSMENT — PAIN DESCRIPTION - LOCATION
LOCATION: LEG

## 2024-10-10 NOTE — CARE COORDINATION
CM followed up with patient and spouse at bedside and informed them of therapy recommendations.  Patient is agreeable to Inpatient rehab.  [place] of choice given for [facility] inpatient rehab in [place], [place].  CM sent referral.  Patient will need insurance authorization.  CM informed [facility] that patient will be ready for discharge tomorrow and please start insurance authorization if they are able to accept.

## 2024-10-10 NOTE — CARE COORDINATION
Patient has no accepted home health company at [place], and [place]. Therapy is recommending High intensity and comprehensive skilled therapy in a multidisciplinary setting as patient is working towards tolerating up to 3 hours of therapy/day x 5-7 days/week.  CM will follow up with patient today.  Patient will also need 6 weeks IV abx. Referral has been sent to [facility]. Patient will need some type of line other than peripheral IV for administration of outpatient IV abx.

## 2024-10-10 NOTE — PROCEDURES
PROCEDURE NOTE  Date: 10/10/2024   Name: Keo Brownlee Jr.  YOB: 1955    Procedures    PICC Line Insertion Procedure Note    Procedure: Insertion of #4 FR/18G PICC    Indications:  Long Term IV therapy    Procedure Details   Vascular access called for PICC line insertion. Informed consent was obtained for the procedure.  Risks of lung perforation, hemorrhage, and adverse drug reaction were discussed. Chart reviewed, pt was noted to have elevated creatinine and hx CKD, Dr. Siu contacted pt's home nephrologist and obtained okay for PICC insertion.  Bedside timeout performed with EMMANUEL Cardenas.     #4 FR/18G PICC inserted to the L compressible basilic vein with MST/US per hospital protocol and advanced to SVC with 3cg technology. Placement confirmation obtained.   Blood return:  yes    Findings:  Catheter inserted to 41 cm, with 0 cm. Exposed. Mid upper arm circumference is 31 cm.   Catheter was flushed with 20 cc NS. Patient did tolerate procedure well. Bed in low position, call light in reach, handoff given to EMMANUEL Cardenas.    Recommendations:  PICC is okay to use, placement to SVC confirmed with 3cg technology.   PICC Brochure given to patient with teaching instruction.

## 2024-10-11 LAB
BACTERIA SPEC CULT: ABNORMAL
BACTERIA SPEC CULT: ABNORMAL
CRP SERPL-MCNC: 15.5 MG/DL (ref 0–0.3)
GLUCOSE BLD STRIP.AUTO-MCNC: 165 MG/DL (ref 65–100)
GLUCOSE BLD STRIP.AUTO-MCNC: 167 MG/DL (ref 65–100)
GLUCOSE BLD STRIP.AUTO-MCNC: 176 MG/DL (ref 65–100)
GLUCOSE BLD STRIP.AUTO-MCNC: 203 MG/DL (ref 65–100)
GLUCOSE BLD STRIP.AUTO-MCNC: 238 MG/DL (ref 65–100)
GLUCOSE BLD STRIP.AUTO-MCNC: 265 MG/DL (ref 65–100)
GRAM STN SPEC: ABNORMAL
Lab: ABNORMAL
Lab: ABNORMAL
PERFORMED BY:: ABNORMAL

## 2024-10-11 PROCEDURE — 6360000002 HC RX W HCPCS: Performed by: INTERNAL MEDICINE

## 2024-10-11 PROCEDURE — 6370000000 HC RX 637 (ALT 250 FOR IP)

## 2024-10-11 PROCEDURE — 82962 GLUCOSE BLOOD TEST: CPT

## 2024-10-11 PROCEDURE — 6370000000 HC RX 637 (ALT 250 FOR IP): Performed by: ORTHOPAEDIC SURGERY

## 2024-10-11 PROCEDURE — 97535 SELF CARE MNGMENT TRAINING: CPT

## 2024-10-11 PROCEDURE — 2580000003 HC RX 258: Performed by: INTERNAL MEDICINE

## 2024-10-11 PROCEDURE — 2580000003 HC RX 258

## 2024-10-11 PROCEDURE — 86140 C-REACTIVE PROTEIN: CPT

## 2024-10-11 PROCEDURE — 6360000002 HC RX W HCPCS: Performed by: STUDENT IN AN ORGANIZED HEALTH CARE EDUCATION/TRAINING PROGRAM

## 2024-10-11 PROCEDURE — 97530 THERAPEUTIC ACTIVITIES: CPT

## 2024-10-11 PROCEDURE — 6370000000 HC RX 637 (ALT 250 FOR IP): Performed by: INTERNAL MEDICINE

## 2024-10-11 PROCEDURE — 36415 COLL VENOUS BLD VENIPUNCTURE: CPT

## 2024-10-11 PROCEDURE — 1100000000 HC RM PRIVATE

## 2024-10-11 PROCEDURE — 99232 SBSQ HOSP IP/OBS MODERATE 35: CPT | Performed by: INTERNAL MEDICINE

## 2024-10-11 RX ADMIN — PANCRELIPASE LIPASE, PANCRELIPASE PROTEASE, PANCRELIPASE AMYLASE 5000 UNITS: 5000; 17000; 24000 CAPSULE, DELAYED RELEASE ORAL at 08:37

## 2024-10-11 RX ADMIN — MORPHINE SULFATE 2 MG: 2 INJECTION, SOLUTION INTRAMUSCULAR; INTRAVENOUS at 04:33

## 2024-10-11 RX ADMIN — PANCRELIPASE LIPASE, PANCRELIPASE PROTEASE, PANCRELIPASE AMYLASE 5000 UNITS: 5000; 17000; 24000 CAPSULE, DELAYED RELEASE ORAL at 12:00

## 2024-10-11 RX ADMIN — INSULIN LISPRO 5 UNITS: 100 INJECTION, SOLUTION INTRAVENOUS; SUBCUTANEOUS at 12:00

## 2024-10-11 RX ADMIN — ATORVASTATIN CALCIUM 80 MG: 40 TABLET, FILM COATED ORAL at 20:30

## 2024-10-11 RX ADMIN — ACETAMINOPHEN 1000 MG: 500 TABLET ORAL at 20:30

## 2024-10-11 RX ADMIN — ACETAMINOPHEN 1000 MG: 500 TABLET ORAL at 05:49

## 2024-10-11 RX ADMIN — INSULIN LISPRO 8 UNITS: 100 INJECTION, SOLUTION INTRAVENOUS; SUBCUTANEOUS at 12:00

## 2024-10-11 RX ADMIN — PANCRELIPASE LIPASE, PANCRELIPASE PROTEASE, PANCRELIPASE AMYLASE 5000 UNITS: 5000; 17000; 24000 CAPSULE, DELAYED RELEASE ORAL at 16:24

## 2024-10-11 RX ADMIN — INSULIN GLARGINE 30 UNITS: 100 INJECTION, SOLUTION SUBCUTANEOUS at 10:36

## 2024-10-11 RX ADMIN — GABAPENTIN 300 MG: 300 CAPSULE ORAL at 10:34

## 2024-10-11 RX ADMIN — TAMSULOSIN HYDROCHLORIDE 0.4 MG: 0.4 CAPSULE ORAL at 10:34

## 2024-10-11 RX ADMIN — INSULIN LISPRO 5 UNITS: 100 INJECTION, SOLUTION INTRAVENOUS; SUBCUTANEOUS at 16:27

## 2024-10-11 RX ADMIN — INSULIN LISPRO 4 UNITS: 100 INJECTION, SOLUTION INTRAVENOUS; SUBCUTANEOUS at 00:44

## 2024-10-11 RX ADMIN — OXYCODONE 5 MG: 5 TABLET ORAL at 20:36

## 2024-10-11 RX ADMIN — GABAPENTIN 300 MG: 300 CAPSULE ORAL at 15:14

## 2024-10-11 RX ADMIN — CEFTRIAXONE SODIUM 2000 MG: 2 INJECTION, POWDER, FOR SOLUTION INTRAMUSCULAR; INTRAVENOUS at 10:37

## 2024-10-11 RX ADMIN — MORPHINE SULFATE 2 MG: 2 INJECTION, SOLUTION INTRAMUSCULAR; INTRAVENOUS at 11:51

## 2024-10-11 RX ADMIN — SODIUM CHLORIDE, PRESERVATIVE FREE 10 ML: 5 INJECTION INTRAVENOUS at 10:39

## 2024-10-11 RX ADMIN — METOPROLOL SUCCINATE 100 MG: 50 TABLET, EXTENDED RELEASE ORAL at 10:34

## 2024-10-11 RX ADMIN — PANTOPRAZOLE SODIUM 40 MG: 40 TABLET, DELAYED RELEASE ORAL at 05:49

## 2024-10-11 RX ADMIN — ACETAMINOPHEN 1000 MG: 500 TABLET ORAL at 15:14

## 2024-10-11 RX ADMIN — GABAPENTIN 300 MG: 300 CAPSULE ORAL at 20:31

## 2024-10-11 RX ADMIN — SODIUM CHLORIDE, PRESERVATIVE FREE 10 ML: 5 INJECTION INTRAVENOUS at 20:32

## 2024-10-11 RX ADMIN — INSULIN LISPRO 5 UNITS: 100 INJECTION, SOLUTION INTRAVENOUS; SUBCUTANEOUS at 08:37

## 2024-10-11 RX ADMIN — OXYCODONE 5 MG: 5 TABLET ORAL at 08:42

## 2024-10-11 ASSESSMENT — PAIN DESCRIPTION - ORIENTATION
ORIENTATION: LEFT

## 2024-10-11 ASSESSMENT — PAIN DESCRIPTION - LOCATION
LOCATION: LEG
LOCATION: KNEE

## 2024-10-11 ASSESSMENT — PAIN DESCRIPTION - DESCRIPTORS
DESCRIPTORS: THROBBING
DESCRIPTORS: BURNING
DESCRIPTORS: THROBBING
DESCRIPTORS: ACHING;PRESSURE
DESCRIPTORS: DISCOMFORT

## 2024-10-11 ASSESSMENT — PAIN SCALES - GENERAL
PAINLEVEL_OUTOF10: 7
PAINLEVEL_OUTOF10: 5
PAINLEVEL_OUTOF10: 1
PAINLEVEL_OUTOF10: 3
PAINLEVEL_OUTOF10: 4
PAINLEVEL_OUTOF10: 7
PAINLEVEL_OUTOF10: 3
PAINLEVEL_OUTOF10: 7

## 2024-10-11 ASSESSMENT — PAIN SCALES - WONG BAKER: WONGBAKER_NUMERICALRESPONSE: NO HURT

## 2024-10-11 ASSESSMENT — PAIN - FUNCTIONAL ASSESSMENT: PAIN_FUNCTIONAL_ASSESSMENT: PREVENTS OR INTERFERES WITH ALL ACTIVE AND SOME PASSIVE ACTIVITIES

## 2024-10-11 NOTE — CARE COORDINATION
Insurance authorization has been started for [facility] inpatient rehab in [facility].  Auth insurance number is Ref# [id number].  [facility] require at least 24 hours without IV pain medication to admit.

## 2024-10-12 LAB
BACTERIA SPEC CULT: NORMAL
CRP SERPL-MCNC: 14.9 MG/DL (ref 0–0.3)
GLUCOSE BLD STRIP.AUTO-MCNC: 178 MG/DL (ref 65–100)
GLUCOSE BLD STRIP.AUTO-MCNC: 181 MG/DL (ref 65–100)
GLUCOSE BLD STRIP.AUTO-MCNC: 191 MG/DL (ref 65–100)
GLUCOSE BLD STRIP.AUTO-MCNC: 193 MG/DL (ref 65–100)
GLUCOSE BLD STRIP.AUTO-MCNC: 249 MG/DL (ref 65–100)
GLUCOSE BLD STRIP.AUTO-MCNC: 270 MG/DL (ref 65–100)
Lab: NORMAL
PERFORMED BY:: ABNORMAL

## 2024-10-12 PROCEDURE — 2580000003 HC RX 258

## 2024-10-12 PROCEDURE — 97530 THERAPEUTIC ACTIVITIES: CPT

## 2024-10-12 PROCEDURE — 6370000000 HC RX 637 (ALT 250 FOR IP)

## 2024-10-12 PROCEDURE — 36415 COLL VENOUS BLD VENIPUNCTURE: CPT

## 2024-10-12 PROCEDURE — 6360000002 HC RX W HCPCS: Performed by: INTERNAL MEDICINE

## 2024-10-12 PROCEDURE — 6360000002 HC RX W HCPCS: Performed by: STUDENT IN AN ORGANIZED HEALTH CARE EDUCATION/TRAINING PROGRAM

## 2024-10-12 PROCEDURE — 86140 C-REACTIVE PROTEIN: CPT

## 2024-10-12 PROCEDURE — 6370000000 HC RX 637 (ALT 250 FOR IP): Performed by: ORTHOPAEDIC SURGERY

## 2024-10-12 PROCEDURE — 82962 GLUCOSE BLOOD TEST: CPT

## 2024-10-12 PROCEDURE — 2580000003 HC RX 258: Performed by: INTERNAL MEDICINE

## 2024-10-12 PROCEDURE — 1100000000 HC RM PRIVATE

## 2024-10-12 PROCEDURE — 6370000000 HC RX 637 (ALT 250 FOR IP): Performed by: INTERNAL MEDICINE

## 2024-10-12 RX ORDER — INSULIN GLARGINE 100 [IU]/ML
40 INJECTION, SOLUTION SUBCUTANEOUS DAILY
Status: DISCONTINUED | OUTPATIENT
Start: 2024-10-13 | End: 2024-10-17

## 2024-10-12 RX ADMIN — GABAPENTIN 300 MG: 300 CAPSULE ORAL at 14:11

## 2024-10-12 RX ADMIN — PANCRELIPASE LIPASE, PANCRELIPASE PROTEASE, PANCRELIPASE AMYLASE 5000 UNITS: 5000; 17000; 24000 CAPSULE, DELAYED RELEASE ORAL at 17:42

## 2024-10-12 RX ADMIN — SODIUM CHLORIDE, PRESERVATIVE FREE 10 ML: 5 INJECTION INTRAVENOUS at 08:13

## 2024-10-12 RX ADMIN — ATORVASTATIN CALCIUM 80 MG: 40 TABLET, FILM COATED ORAL at 21:02

## 2024-10-12 RX ADMIN — PANTOPRAZOLE SODIUM 40 MG: 40 TABLET, DELAYED RELEASE ORAL at 05:21

## 2024-10-12 RX ADMIN — INSULIN LISPRO 4 UNITS: 100 INJECTION, SOLUTION INTRAVENOUS; SUBCUTANEOUS at 01:42

## 2024-10-12 RX ADMIN — INSULIN LISPRO 5 UNITS: 100 INJECTION, SOLUTION INTRAVENOUS; SUBCUTANEOUS at 12:20

## 2024-10-12 RX ADMIN — CEFTRIAXONE SODIUM 2000 MG: 2 INJECTION, POWDER, FOR SOLUTION INTRAMUSCULAR; INTRAVENOUS at 09:37

## 2024-10-12 RX ADMIN — ACETAMINOPHEN 1000 MG: 500 TABLET ORAL at 21:02

## 2024-10-12 RX ADMIN — MORPHINE SULFATE 2 MG: 2 INJECTION, SOLUTION INTRAMUSCULAR; INTRAVENOUS at 16:28

## 2024-10-12 RX ADMIN — PANCRELIPASE LIPASE, PANCRELIPASE PROTEASE, PANCRELIPASE AMYLASE 5000 UNITS: 5000; 17000; 24000 CAPSULE, DELAYED RELEASE ORAL at 12:29

## 2024-10-12 RX ADMIN — OXYCODONE 5 MG: 5 TABLET ORAL at 09:35

## 2024-10-12 RX ADMIN — OXYCODONE 5 MG: 5 TABLET ORAL at 14:11

## 2024-10-12 RX ADMIN — OXYCODONE 5 MG: 5 TABLET ORAL at 05:21

## 2024-10-12 RX ADMIN — GABAPENTIN 300 MG: 300 CAPSULE ORAL at 09:30

## 2024-10-12 RX ADMIN — OXYCODONE 5 MG: 5 TABLET ORAL at 21:08

## 2024-10-12 RX ADMIN — INSULIN GLARGINE 30 UNITS: 100 INJECTION, SOLUTION SUBCUTANEOUS at 08:12

## 2024-10-12 RX ADMIN — MORPHINE SULFATE 2 MG: 2 INJECTION, SOLUTION INTRAMUSCULAR; INTRAVENOUS at 12:32

## 2024-10-12 RX ADMIN — ACETAMINOPHEN 1000 MG: 500 TABLET ORAL at 12:21

## 2024-10-12 RX ADMIN — METOPROLOL SUCCINATE 100 MG: 50 TABLET, EXTENDED RELEASE ORAL at 08:14

## 2024-10-12 RX ADMIN — INSULIN LISPRO 5 UNITS: 100 INJECTION, SOLUTION INTRAVENOUS; SUBCUTANEOUS at 07:55

## 2024-10-12 RX ADMIN — ACETAMINOPHEN 1000 MG: 500 TABLET ORAL at 05:21

## 2024-10-12 RX ADMIN — GABAPENTIN 300 MG: 300 CAPSULE ORAL at 21:02

## 2024-10-12 RX ADMIN — INSULIN LISPRO 8 UNITS: 100 INJECTION, SOLUTION INTRAVENOUS; SUBCUTANEOUS at 12:20

## 2024-10-12 RX ADMIN — TAMSULOSIN HYDROCHLORIDE 0.4 MG: 0.4 CAPSULE ORAL at 08:13

## 2024-10-12 RX ADMIN — SODIUM CHLORIDE, PRESERVATIVE FREE 10 ML: 5 INJECTION INTRAVENOUS at 21:03

## 2024-10-12 RX ADMIN — INSULIN LISPRO 5 UNITS: 100 INJECTION, SOLUTION INTRAVENOUS; SUBCUTANEOUS at 17:42

## 2024-10-12 RX ADMIN — PANCRELIPASE LIPASE, PANCRELIPASE PROTEASE, PANCRELIPASE AMYLASE 5000 UNITS: 5000; 17000; 24000 CAPSULE, DELAYED RELEASE ORAL at 08:12

## 2024-10-12 ASSESSMENT — PAIN SCALES - GENERAL
PAINLEVEL_OUTOF10: 7
PAINLEVEL_OUTOF10: 7
PAINLEVEL_OUTOF10: 5
PAINLEVEL_OUTOF10: 5
PAINLEVEL_OUTOF10: 6
PAINLEVEL_OUTOF10: 7
PAINLEVEL_OUTOF10: 5
PAINLEVEL_OUTOF10: 3
PAINLEVEL_OUTOF10: 3
PAINLEVEL_OUTOF10: 7
PAINLEVEL_OUTOF10: 1
PAINLEVEL_OUTOF10: 6
PAINLEVEL_OUTOF10: 7
PAINLEVEL_OUTOF10: 7
PAINLEVEL_OUTOF10: 6

## 2024-10-12 ASSESSMENT — PAIN DESCRIPTION - DESCRIPTORS
DESCRIPTORS: NAGGING
DESCRIPTORS: ACHING
DESCRIPTORS: NAGGING
DESCRIPTORS: ACHING
DESCRIPTORS: ACHING

## 2024-10-12 ASSESSMENT — PAIN DESCRIPTION - ORIENTATION
ORIENTATION: LEFT

## 2024-10-12 ASSESSMENT — PAIN - FUNCTIONAL ASSESSMENT
PAIN_FUNCTIONAL_ASSESSMENT: PREVENTS OR INTERFERES SOME ACTIVE ACTIVITIES AND ADLS
PAIN_FUNCTIONAL_ASSESSMENT: PREVENTS OR INTERFERES SOME ACTIVE ACTIVITIES AND ADLS

## 2024-10-12 ASSESSMENT — PAIN DESCRIPTION - PAIN TYPE
TYPE: ACUTE PAIN;SURGICAL PAIN
TYPE: ACUTE PAIN;SURGICAL PAIN

## 2024-10-12 ASSESSMENT — PAIN DESCRIPTION - LOCATION
LOCATION: KNEE
LOCATION: LEG
LOCATION: KNEE
LOCATION: LEG
LOCATION: KNEE

## 2024-10-12 ASSESSMENT — PAIN SCALES - WONG BAKER
WONGBAKER_NUMERICALRESPONSE: HURTS WHOLE LOT
WONGBAKER_NUMERICALRESPONSE: NO HURT

## 2024-10-13 PROBLEM — M79.605 LEFT LEG PAIN: Status: ACTIVE | Noted: 2024-10-13

## 2024-10-13 LAB
CRP SERPL-MCNC: 13.2 MG/DL (ref 0–0.3)
GLUCOSE BLD STRIP.AUTO-MCNC: 160 MG/DL (ref 65–100)
GLUCOSE BLD STRIP.AUTO-MCNC: 181 MG/DL (ref 65–100)
GLUCOSE BLD STRIP.AUTO-MCNC: 188 MG/DL (ref 65–100)
GLUCOSE BLD STRIP.AUTO-MCNC: 190 MG/DL (ref 65–100)
GLUCOSE BLD STRIP.AUTO-MCNC: 215 MG/DL (ref 65–100)
GLUCOSE BLD STRIP.AUTO-MCNC: 226 MG/DL (ref 65–100)
GLUCOSE BLD STRIP.AUTO-MCNC: 242 MG/DL (ref 65–100)
PERFORMED BY:: ABNORMAL

## 2024-10-13 PROCEDURE — 6370000000 HC RX 637 (ALT 250 FOR IP): Performed by: ORTHOPAEDIC SURGERY

## 2024-10-13 PROCEDURE — 2580000003 HC RX 258

## 2024-10-13 PROCEDURE — 36415 COLL VENOUS BLD VENIPUNCTURE: CPT

## 2024-10-13 PROCEDURE — 6370000000 HC RX 637 (ALT 250 FOR IP): Performed by: INTERNAL MEDICINE

## 2024-10-13 PROCEDURE — 97530 THERAPEUTIC ACTIVITIES: CPT

## 2024-10-13 PROCEDURE — 82962 GLUCOSE BLOOD TEST: CPT

## 2024-10-13 PROCEDURE — 97110 THERAPEUTIC EXERCISES: CPT

## 2024-10-13 PROCEDURE — 2580000003 HC RX 258: Performed by: INTERNAL MEDICINE

## 2024-10-13 PROCEDURE — 86140 C-REACTIVE PROTEIN: CPT

## 2024-10-13 PROCEDURE — 6370000000 HC RX 637 (ALT 250 FOR IP)

## 2024-10-13 PROCEDURE — 6360000002 HC RX W HCPCS: Performed by: INTERNAL MEDICINE

## 2024-10-13 PROCEDURE — 1100000000 HC RM PRIVATE

## 2024-10-13 RX ORDER — CABERGOLINE 0.5 MG/1
0.25 TABLET ORAL
COMMUNITY

## 2024-10-13 RX ADMIN — OXYCODONE 5 MG: 5 TABLET ORAL at 02:24

## 2024-10-13 RX ADMIN — GABAPENTIN 300 MG: 300 CAPSULE ORAL at 20:48

## 2024-10-13 RX ADMIN — INSULIN LISPRO 5 UNITS: 100 INJECTION, SOLUTION INTRAVENOUS; SUBCUTANEOUS at 16:41

## 2024-10-13 RX ADMIN — TAMSULOSIN HYDROCHLORIDE 0.4 MG: 0.4 CAPSULE ORAL at 08:47

## 2024-10-13 RX ADMIN — PANCRELIPASE LIPASE, PANCRELIPASE PROTEASE, PANCRELIPASE AMYLASE 5000 UNITS: 5000; 17000; 24000 CAPSULE, DELAYED RELEASE ORAL at 16:41

## 2024-10-13 RX ADMIN — GABAPENTIN 300 MG: 300 CAPSULE ORAL at 08:48

## 2024-10-13 RX ADMIN — OXYCODONE 5 MG: 5 TABLET ORAL at 20:48

## 2024-10-13 RX ADMIN — SODIUM CHLORIDE, PRESERVATIVE FREE 10 ML: 5 INJECTION INTRAVENOUS at 21:39

## 2024-10-13 RX ADMIN — METOPROLOL SUCCINATE 100 MG: 50 TABLET, EXTENDED RELEASE ORAL at 08:48

## 2024-10-13 RX ADMIN — CEFTRIAXONE SODIUM 2000 MG: 2 INJECTION, POWDER, FOR SOLUTION INTRAMUSCULAR; INTRAVENOUS at 08:49

## 2024-10-13 RX ADMIN — ACETAMINOPHEN 1000 MG: 500 TABLET ORAL at 05:27

## 2024-10-13 RX ADMIN — ACETAMINOPHEN 1000 MG: 500 TABLET ORAL at 20:48

## 2024-10-13 RX ADMIN — OXYCODONE 5 MG: 5 TABLET ORAL at 11:04

## 2024-10-13 RX ADMIN — GABAPENTIN 300 MG: 300 CAPSULE ORAL at 13:11

## 2024-10-13 RX ADMIN — ATORVASTATIN CALCIUM 80 MG: 40 TABLET, FILM COATED ORAL at 20:48

## 2024-10-13 RX ADMIN — INSULIN LISPRO 5 UNITS: 100 INJECTION, SOLUTION INTRAVENOUS; SUBCUTANEOUS at 08:48

## 2024-10-13 RX ADMIN — PANTOPRAZOLE SODIUM 40 MG: 40 TABLET, DELAYED RELEASE ORAL at 05:27

## 2024-10-13 RX ADMIN — OXYCODONE 5 MG: 5 TABLET ORAL at 06:48

## 2024-10-13 RX ADMIN — PANCRELIPASE LIPASE, PANCRELIPASE PROTEASE, PANCRELIPASE AMYLASE 5000 UNITS: 5000; 17000; 24000 CAPSULE, DELAYED RELEASE ORAL at 11:04

## 2024-10-13 RX ADMIN — PANCRELIPASE LIPASE, PANCRELIPASE PROTEASE, PANCRELIPASE AMYLASE 5000 UNITS: 5000; 17000; 24000 CAPSULE, DELAYED RELEASE ORAL at 08:47

## 2024-10-13 RX ADMIN — INSULIN LISPRO 4 UNITS: 100 INJECTION, SOLUTION INTRAVENOUS; SUBCUTANEOUS at 00:15

## 2024-10-13 RX ADMIN — INSULIN LISPRO 4 UNITS: 100 INJECTION, SOLUTION INTRAVENOUS; SUBCUTANEOUS at 12:05

## 2024-10-13 RX ADMIN — OXYCODONE 5 MG: 5 TABLET ORAL at 16:40

## 2024-10-13 RX ADMIN — INSULIN GLARGINE 40 UNITS: 100 INJECTION, SOLUTION SUBCUTANEOUS at 08:48

## 2024-10-13 RX ADMIN — ACETAMINOPHEN 1000 MG: 500 TABLET ORAL at 13:11

## 2024-10-13 ASSESSMENT — PAIN SCALES - WONG BAKER
WONGBAKER_NUMERICALRESPONSE: HURTS LITTLE MORE
WONGBAKER_NUMERICALRESPONSE: HURTS A LITTLE BIT
WONGBAKER_NUMERICALRESPONSE: NO HURT
WONGBAKER_NUMERICALRESPONSE: HURTS A LITTLE BIT
WONGBAKER_NUMERICALRESPONSE: HURTS LITTLE MORE
WONGBAKER_NUMERICALRESPONSE: HURTS LITTLE MORE

## 2024-10-13 ASSESSMENT — PAIN DESCRIPTION - DESCRIPTORS
DESCRIPTORS: NAGGING
DESCRIPTORS: ACHING

## 2024-10-13 ASSESSMENT — PAIN SCALES - GENERAL
PAINLEVEL_OUTOF10: 2
PAINLEVEL_OUTOF10: 0
PAINLEVEL_OUTOF10: 2
PAINLEVEL_OUTOF10: 4
PAINLEVEL_OUTOF10: 10
PAINLEVEL_OUTOF10: 2
PAINLEVEL_OUTOF10: 8
PAINLEVEL_OUTOF10: 2
PAINLEVEL_OUTOF10: 5
PAINLEVEL_OUTOF10: 5
PAINLEVEL_OUTOF10: 4
PAINLEVEL_OUTOF10: 8
PAINLEVEL_OUTOF10: 8
PAINLEVEL_OUTOF10: 5
PAINLEVEL_OUTOF10: 2
PAINLEVEL_OUTOF10: 6
PAINLEVEL_OUTOF10: 10
PAINLEVEL_OUTOF10: 7

## 2024-10-13 ASSESSMENT — PAIN DESCRIPTION - LOCATION
LOCATION: LEG

## 2024-10-13 ASSESSMENT — PAIN DESCRIPTION - ORIENTATION
ORIENTATION: LEFT

## 2024-10-14 LAB
CRP SERPL-MCNC: 11.9 MG/DL (ref 0–0.3)
GLUCOSE BLD STRIP.AUTO-MCNC: 179 MG/DL (ref 65–100)
GLUCOSE BLD STRIP.AUTO-MCNC: 193 MG/DL (ref 65–100)
GLUCOSE BLD STRIP.AUTO-MCNC: 198 MG/DL (ref 65–100)
GLUCOSE BLD STRIP.AUTO-MCNC: 215 MG/DL (ref 65–100)
GLUCOSE BLD STRIP.AUTO-MCNC: 217 MG/DL (ref 65–100)
PERFORMED BY:: ABNORMAL

## 2024-10-14 PROCEDURE — 36415 COLL VENOUS BLD VENIPUNCTURE: CPT

## 2024-10-14 PROCEDURE — 1100000000 HC RM PRIVATE

## 2024-10-14 PROCEDURE — 6370000000 HC RX 637 (ALT 250 FOR IP)

## 2024-10-14 PROCEDURE — 6360000002 HC RX W HCPCS: Performed by: INTERNAL MEDICINE

## 2024-10-14 PROCEDURE — 2580000003 HC RX 258

## 2024-10-14 PROCEDURE — 82962 GLUCOSE BLOOD TEST: CPT

## 2024-10-14 PROCEDURE — 86140 C-REACTIVE PROTEIN: CPT

## 2024-10-14 PROCEDURE — 97530 THERAPEUTIC ACTIVITIES: CPT

## 2024-10-14 PROCEDURE — 2580000003 HC RX 258: Performed by: INTERNAL MEDICINE

## 2024-10-14 PROCEDURE — 99232 SBSQ HOSP IP/OBS MODERATE 35: CPT | Performed by: INTERNAL MEDICINE

## 2024-10-14 PROCEDURE — 6370000000 HC RX 637 (ALT 250 FOR IP): Performed by: INTERNAL MEDICINE

## 2024-10-14 PROCEDURE — 6370000000 HC RX 637 (ALT 250 FOR IP): Performed by: ORTHOPAEDIC SURGERY

## 2024-10-14 PROCEDURE — 99024 POSTOP FOLLOW-UP VISIT: CPT

## 2024-10-14 RX ORDER — OXYCODONE HYDROCHLORIDE 5 MG/1
5 TABLET ORAL EVERY 4 HOURS PRN
Qty: 15 TABLET | Refills: 0 | Status: SHIPPED
Start: 2024-10-14 | End: 2024-10-17 | Stop reason: HOSPADM

## 2024-10-14 RX ORDER — INSULIN GLARGINE 100 [IU]/ML
40 INJECTION, SOLUTION SUBCUTANEOUS DAILY
Qty: 10 ML | Refills: 3 | Status: SHIPPED | OUTPATIENT
Start: 2024-10-15 | End: 2024-10-21 | Stop reason: HOSPADM

## 2024-10-14 RX ADMIN — INSULIN LISPRO 4 UNITS: 100 INJECTION, SOLUTION INTRAVENOUS; SUBCUTANEOUS at 08:53

## 2024-10-14 RX ADMIN — PANCRELIPASE LIPASE, PANCRELIPASE PROTEASE, PANCRELIPASE AMYLASE 5000 UNITS: 5000; 17000; 24000 CAPSULE, DELAYED RELEASE ORAL at 17:01

## 2024-10-14 RX ADMIN — ATORVASTATIN CALCIUM 80 MG: 40 TABLET, FILM COATED ORAL at 22:03

## 2024-10-14 RX ADMIN — INSULIN LISPRO 5 UNITS: 100 INJECTION, SOLUTION INTRAVENOUS; SUBCUTANEOUS at 17:01

## 2024-10-14 RX ADMIN — SODIUM CHLORIDE, PRESERVATIVE FREE 10 ML: 5 INJECTION INTRAVENOUS at 09:03

## 2024-10-14 RX ADMIN — ACETAMINOPHEN 1000 MG: 500 TABLET ORAL at 04:45

## 2024-10-14 RX ADMIN — INSULIN LISPRO 5 UNITS: 100 INJECTION, SOLUTION INTRAVENOUS; SUBCUTANEOUS at 12:55

## 2024-10-14 RX ADMIN — ACETAMINOPHEN 1000 MG: 500 TABLET ORAL at 12:55

## 2024-10-14 RX ADMIN — TAMSULOSIN HYDROCHLORIDE 0.4 MG: 0.4 CAPSULE ORAL at 08:52

## 2024-10-14 RX ADMIN — SODIUM CHLORIDE, PRESERVATIVE FREE 10 ML: 5 INJECTION INTRAVENOUS at 08:51

## 2024-10-14 RX ADMIN — SODIUM CHLORIDE, PRESERVATIVE FREE 10 ML: 5 INJECTION INTRAVENOUS at 22:05

## 2024-10-14 RX ADMIN — ACETAMINOPHEN 1000 MG: 500 TABLET ORAL at 22:04

## 2024-10-14 RX ADMIN — INSULIN LISPRO 5 UNITS: 100 INJECTION, SOLUTION INTRAVENOUS; SUBCUTANEOUS at 09:02

## 2024-10-14 RX ADMIN — GABAPENTIN 300 MG: 300 CAPSULE ORAL at 13:57

## 2024-10-14 RX ADMIN — OXYCODONE 5 MG: 5 TABLET ORAL at 08:51

## 2024-10-14 RX ADMIN — CEFTRIAXONE SODIUM 2000 MG: 2 INJECTION, POWDER, FOR SOLUTION INTRAMUSCULAR; INTRAVENOUS at 10:04

## 2024-10-14 RX ADMIN — METOPROLOL SUCCINATE 100 MG: 50 TABLET, EXTENDED RELEASE ORAL at 08:52

## 2024-10-14 RX ADMIN — PANTOPRAZOLE SODIUM 40 MG: 40 TABLET, DELAYED RELEASE ORAL at 05:09

## 2024-10-14 RX ADMIN — GABAPENTIN 300 MG: 300 CAPSULE ORAL at 22:03

## 2024-10-14 RX ADMIN — PANCRELIPASE LIPASE, PANCRELIPASE PROTEASE, PANCRELIPASE AMYLASE 5000 UNITS: 5000; 17000; 24000 CAPSULE, DELAYED RELEASE ORAL at 08:52

## 2024-10-14 RX ADMIN — GABAPENTIN 300 MG: 300 CAPSULE ORAL at 08:52

## 2024-10-14 RX ADMIN — INSULIN GLARGINE 40 UNITS: 100 INJECTION, SOLUTION SUBCUTANEOUS at 08:53

## 2024-10-14 RX ADMIN — OXYCODONE 5 MG: 5 TABLET ORAL at 14:48

## 2024-10-14 RX ADMIN — OXYCODONE 5 MG: 5 TABLET ORAL at 22:03

## 2024-10-14 RX ADMIN — PANCRELIPASE LIPASE, PANCRELIPASE PROTEASE, PANCRELIPASE AMYLASE 5000 UNITS: 5000; 17000; 24000 CAPSULE, DELAYED RELEASE ORAL at 12:55

## 2024-10-14 ASSESSMENT — PAIN DESCRIPTION - ORIENTATION
ORIENTATION: LEFT
ORIENTATION: LEFT
ORIENTATION: RIGHT

## 2024-10-14 ASSESSMENT — PAIN SCALES - GENERAL
PAINLEVEL_OUTOF10: 3
PAINLEVEL_OUTOF10: 6
PAINLEVEL_OUTOF10: 8
PAINLEVEL_OUTOF10: 6

## 2024-10-14 ASSESSMENT — PAIN DESCRIPTION - DESCRIPTORS: DESCRIPTORS: ACHING

## 2024-10-14 ASSESSMENT — PAIN DESCRIPTION - LOCATION
LOCATION: KNEE
LOCATION: LEG
LOCATION: LEG

## 2024-10-14 NOTE — CARE COORDINATION
DCP: [facility] pending auth, intiated 10/10/24  NADIRA: today    CM has reviewed pt chart. Pt on IV abx and pending auth at [facility].     0756: CM sent message to [facility] requesting update on auth. CM received return response they will check with their admissions team for auth status.     1051: CM sent message to [facility] requesting update on auth, awaiting reply.     1111: CM received message from [facility] stating auth is still pending.     1250: CM called [facility] to check status on auth and was given number to [facility] [phone] as the pt's claim was sent to them. CM called that number and they have no auth on file for [facility]. CM sent message to [facility] informing them of this information provided. CM will await response.     1303: CM received reply from [facility] reporting auth is pending with [facility]'s medical director.

## 2024-10-15 LAB
CRP SERPL-MCNC: 10 MG/DL (ref 0–0.3)
GLUCOSE BLD STRIP.AUTO-MCNC: 157 MG/DL (ref 65–100)
GLUCOSE BLD STRIP.AUTO-MCNC: 183 MG/DL (ref 65–100)
GLUCOSE BLD STRIP.AUTO-MCNC: 297 MG/DL (ref 65–100)
PERFORMED BY:: ABNORMAL

## 2024-10-15 PROCEDURE — 6370000000 HC RX 637 (ALT 250 FOR IP)

## 2024-10-15 PROCEDURE — 2580000003 HC RX 258

## 2024-10-15 PROCEDURE — 97530 THERAPEUTIC ACTIVITIES: CPT

## 2024-10-15 PROCEDURE — 99232 SBSQ HOSP IP/OBS MODERATE 35: CPT | Performed by: INTERNAL MEDICINE

## 2024-10-15 PROCEDURE — 86140 C-REACTIVE PROTEIN: CPT

## 2024-10-15 PROCEDURE — 6370000000 HC RX 637 (ALT 250 FOR IP): Performed by: INTERNAL MEDICINE

## 2024-10-15 PROCEDURE — 6360000002 HC RX W HCPCS: Performed by: INTERNAL MEDICINE

## 2024-10-15 PROCEDURE — 1100000000 HC RM PRIVATE

## 2024-10-15 PROCEDURE — 82962 GLUCOSE BLOOD TEST: CPT

## 2024-10-15 PROCEDURE — 36415 COLL VENOUS BLD VENIPUNCTURE: CPT

## 2024-10-15 PROCEDURE — 2580000003 HC RX 258: Performed by: INTERNAL MEDICINE

## 2024-10-15 PROCEDURE — 6370000000 HC RX 637 (ALT 250 FOR IP): Performed by: ORTHOPAEDIC SURGERY

## 2024-10-15 RX ORDER — ASPIRIN 81 MG/1
81 TABLET, CHEWABLE ORAL DAILY
Status: DISCONTINUED | OUTPATIENT
Start: 2024-10-15 | End: 2024-10-17

## 2024-10-15 RX ADMIN — INSULIN GLARGINE 40 UNITS: 100 INJECTION, SOLUTION SUBCUTANEOUS at 08:38

## 2024-10-15 RX ADMIN — GABAPENTIN 300 MG: 300 CAPSULE ORAL at 20:50

## 2024-10-15 RX ADMIN — ATORVASTATIN CALCIUM 80 MG: 40 TABLET, FILM COATED ORAL at 20:49

## 2024-10-15 RX ADMIN — INSULIN LISPRO 5 UNITS: 100 INJECTION, SOLUTION INTRAVENOUS; SUBCUTANEOUS at 13:41

## 2024-10-15 RX ADMIN — ACETAMINOPHEN 1000 MG: 500 TABLET ORAL at 13:41

## 2024-10-15 RX ADMIN — PANCRELIPASE LIPASE, PANCRELIPASE PROTEASE, PANCRELIPASE AMYLASE 5000 UNITS: 5000; 17000; 24000 CAPSULE, DELAYED RELEASE ORAL at 08:39

## 2024-10-15 RX ADMIN — ACETAMINOPHEN 1000 MG: 500 TABLET ORAL at 06:21

## 2024-10-15 RX ADMIN — PANTOPRAZOLE SODIUM 40 MG: 40 TABLET, DELAYED RELEASE ORAL at 08:39

## 2024-10-15 RX ADMIN — TAMSULOSIN HYDROCHLORIDE 0.4 MG: 0.4 CAPSULE ORAL at 08:39

## 2024-10-15 RX ADMIN — PANCRELIPASE LIPASE, PANCRELIPASE PROTEASE, PANCRELIPASE AMYLASE 5000 UNITS: 5000; 17000; 24000 CAPSULE, DELAYED RELEASE ORAL at 14:34

## 2024-10-15 RX ADMIN — APIXABAN 5 MG: 5 TABLET, FILM COATED ORAL at 08:39

## 2024-10-15 RX ADMIN — ACETAMINOPHEN 1000 MG: 500 TABLET ORAL at 20:50

## 2024-10-15 RX ADMIN — APIXABAN 5 MG: 5 TABLET, FILM COATED ORAL at 20:49

## 2024-10-15 RX ADMIN — METOPROLOL SUCCINATE 100 MG: 50 TABLET, EXTENDED RELEASE ORAL at 08:39

## 2024-10-15 RX ADMIN — CEFTRIAXONE SODIUM 2000 MG: 2 INJECTION, POWDER, FOR SOLUTION INTRAMUSCULAR; INTRAVENOUS at 08:39

## 2024-10-15 RX ADMIN — SODIUM CHLORIDE, PRESERVATIVE FREE 10 ML: 5 INJECTION INTRAVENOUS at 08:39

## 2024-10-15 RX ADMIN — INSULIN LISPRO 5 UNITS: 100 INJECTION, SOLUTION INTRAVENOUS; SUBCUTANEOUS at 17:20

## 2024-10-15 RX ADMIN — ASPIRIN 81 MG 81 MG: 81 TABLET ORAL at 08:40

## 2024-10-15 RX ADMIN — CILOSTAZOL 50 MG: 50 TABLET ORAL at 17:20

## 2024-10-15 RX ADMIN — OXYCODONE 5 MG: 5 TABLET ORAL at 07:38

## 2024-10-15 RX ADMIN — INSULIN LISPRO 5 UNITS: 100 INJECTION, SOLUTION INTRAVENOUS; SUBCUTANEOUS at 08:38

## 2024-10-15 RX ADMIN — GABAPENTIN 300 MG: 300 CAPSULE ORAL at 13:40

## 2024-10-15 RX ADMIN — OXYCODONE 5 MG: 5 TABLET ORAL at 14:34

## 2024-10-15 RX ADMIN — SODIUM CHLORIDE, PRESERVATIVE FREE 10 ML: 5 INJECTION INTRAVENOUS at 21:11

## 2024-10-15 RX ADMIN — OXYCODONE 5 MG: 5 TABLET ORAL at 21:04

## 2024-10-15 RX ADMIN — INSULIN LISPRO 13 UNITS: 100 INJECTION, SOLUTION INTRAVENOUS; SUBCUTANEOUS at 13:41

## 2024-10-15 RX ADMIN — PANCRELIPASE LIPASE, PANCRELIPASE PROTEASE, PANCRELIPASE AMYLASE 5000 UNITS: 5000; 17000; 24000 CAPSULE, DELAYED RELEASE ORAL at 17:42

## 2024-10-15 RX ADMIN — GABAPENTIN 300 MG: 300 CAPSULE ORAL at 08:39

## 2024-10-15 ASSESSMENT — PAIN SCALES - GENERAL
PAINLEVEL_OUTOF10: 9
PAINLEVEL_OUTOF10: 7
PAINLEVEL_OUTOF10: 6
PAINLEVEL_OUTOF10: 7
PAINLEVEL_OUTOF10: 3

## 2024-10-15 ASSESSMENT — PAIN DESCRIPTION - LOCATION
LOCATION: KNEE
LOCATION: LEG
LOCATION: KNEE

## 2024-10-15 ASSESSMENT — PAIN DESCRIPTION - ORIENTATION
ORIENTATION: LEFT
ORIENTATION: RIGHT
ORIENTATION: LEFT

## 2024-10-15 ASSESSMENT — PAIN DESCRIPTION - DESCRIPTORS: DESCRIPTORS: ACHING

## 2024-10-15 NOTE — CARE COORDINATION
DCP: [facility] P2P denied; pending SNF choice   NADIRA: today    CM has reviewed pt chart. Pt is medically cleared for dc, awaiting P2P. CM to meet with pt and wife to discuss backup plan.     0956: CM sent message to attending that P2P has been offered. Attending will need to call [phone] by 1:30pm today (10/15).     1249: CM contacted by attending that number provided goes to a message stating caller is winner of gift card. CM called number and received same message. CM reached out to [facility] and informed that the number was incorrect. Awaiting reply.     1256: CM received reply with number [phone]. CM provided corrected number to attending.     1314: CM received message from attending that denial upheld insurance recommends SNF.     1320: CM met with pt at bedside to discuss insurance denial and insurance rec for SNF. Pt provided with list, his wife will return in an hour and pt would like her to make final decision. CM will follow up with them when wife returns.     1510: CM met with pt and wife at bedside to f/u about SNF choice. Wife requesting to f/u with CM tomorrow so that she can visit the facilities before making final choice. CM will f/u tomorrow.

## 2024-10-16 LAB
CRP SERPL-MCNC: 7.89 MG/DL (ref 0–0.3)
GLUCOSE BLD STRIP.AUTO-MCNC: 155 MG/DL (ref 65–100)
GLUCOSE BLD STRIP.AUTO-MCNC: 174 MG/DL (ref 65–100)
GLUCOSE BLD STRIP.AUTO-MCNC: 182 MG/DL (ref 65–100)
GLUCOSE BLD STRIP.AUTO-MCNC: 202 MG/DL (ref 65–100)
GLUCOSE BLD STRIP.AUTO-MCNC: 219 MG/DL (ref 65–100)
PERFORMED BY:: ABNORMAL

## 2024-10-16 PROCEDURE — 97530 THERAPEUTIC ACTIVITIES: CPT

## 2024-10-16 PROCEDURE — 36415 COLL VENOUS BLD VENIPUNCTURE: CPT

## 2024-10-16 PROCEDURE — 86140 C-REACTIVE PROTEIN: CPT

## 2024-10-16 PROCEDURE — 82962 GLUCOSE BLOOD TEST: CPT

## 2024-10-16 PROCEDURE — 6370000000 HC RX 637 (ALT 250 FOR IP): Performed by: INTERNAL MEDICINE

## 2024-10-16 PROCEDURE — 6360000002 HC RX W HCPCS: Performed by: INTERNAL MEDICINE

## 2024-10-16 PROCEDURE — 6370000000 HC RX 637 (ALT 250 FOR IP)

## 2024-10-16 PROCEDURE — 99232 SBSQ HOSP IP/OBS MODERATE 35: CPT | Performed by: INTERNAL MEDICINE

## 2024-10-16 PROCEDURE — 2580000003 HC RX 258

## 2024-10-16 PROCEDURE — 6370000000 HC RX 637 (ALT 250 FOR IP): Performed by: ORTHOPAEDIC SURGERY

## 2024-10-16 PROCEDURE — 1100000000 HC RM PRIVATE

## 2024-10-16 PROCEDURE — 2580000003 HC RX 258: Performed by: INTERNAL MEDICINE

## 2024-10-16 RX ADMIN — INSULIN LISPRO 4 UNITS: 100 INJECTION, SOLUTION INTRAVENOUS; SUBCUTANEOUS at 21:46

## 2024-10-16 RX ADMIN — INSULIN LISPRO 5 UNITS: 100 INJECTION, SOLUTION INTRAVENOUS; SUBCUTANEOUS at 09:19

## 2024-10-16 RX ADMIN — METOPROLOL SUCCINATE 100 MG: 50 TABLET, EXTENDED RELEASE ORAL at 09:10

## 2024-10-16 RX ADMIN — GABAPENTIN 300 MG: 300 CAPSULE ORAL at 21:37

## 2024-10-16 RX ADMIN — ATORVASTATIN CALCIUM 80 MG: 40 TABLET, FILM COATED ORAL at 21:37

## 2024-10-16 RX ADMIN — ACETAMINOPHEN 1000 MG: 500 TABLET ORAL at 14:24

## 2024-10-16 RX ADMIN — OXYCODONE 5 MG: 5 TABLET ORAL at 18:58

## 2024-10-16 RX ADMIN — PANTOPRAZOLE SODIUM 40 MG: 40 TABLET, DELAYED RELEASE ORAL at 09:13

## 2024-10-16 RX ADMIN — APIXABAN 5 MG: 5 TABLET, FILM COATED ORAL at 09:11

## 2024-10-16 RX ADMIN — ACETAMINOPHEN 1000 MG: 500 TABLET ORAL at 05:19

## 2024-10-16 RX ADMIN — PANCRELIPASE LIPASE, PANCRELIPASE PROTEASE, PANCRELIPASE AMYLASE 5000 UNITS: 5000; 17000; 24000 CAPSULE, DELAYED RELEASE ORAL at 09:11

## 2024-10-16 RX ADMIN — GABAPENTIN 300 MG: 300 CAPSULE ORAL at 14:24

## 2024-10-16 RX ADMIN — APIXABAN 5 MG: 5 TABLET, FILM COATED ORAL at 21:37

## 2024-10-16 RX ADMIN — ACETAMINOPHEN 1000 MG: 500 TABLET ORAL at 21:37

## 2024-10-16 RX ADMIN — INSULIN GLARGINE 40 UNITS: 100 INJECTION, SOLUTION SUBCUTANEOUS at 09:18

## 2024-10-16 RX ADMIN — INSULIN LISPRO 5 UNITS: 100 INJECTION, SOLUTION INTRAVENOUS; SUBCUTANEOUS at 16:50

## 2024-10-16 RX ADMIN — GABAPENTIN 300 MG: 300 CAPSULE ORAL at 09:11

## 2024-10-16 RX ADMIN — CILOSTAZOL 50 MG: 50 TABLET ORAL at 09:11

## 2024-10-16 RX ADMIN — INSULIN LISPRO 2 UNITS: 100 INJECTION, SOLUTION INTRAVENOUS; SUBCUTANEOUS at 12:48

## 2024-10-16 RX ADMIN — SODIUM CHLORIDE, PRESERVATIVE FREE 10 ML: 5 INJECTION INTRAVENOUS at 09:24

## 2024-10-16 RX ADMIN — INSULIN LISPRO 5 UNITS: 100 INJECTION, SOLUTION INTRAVENOUS; SUBCUTANEOUS at 12:46

## 2024-10-16 RX ADMIN — PANCRELIPASE LIPASE, PANCRELIPASE PROTEASE, PANCRELIPASE AMYLASE 5000 UNITS: 5000; 17000; 24000 CAPSULE, DELAYED RELEASE ORAL at 12:46

## 2024-10-16 RX ADMIN — TAMSULOSIN HYDROCHLORIDE 0.4 MG: 0.4 CAPSULE ORAL at 09:11

## 2024-10-16 RX ADMIN — CEFTRIAXONE SODIUM 2000 MG: 2 INJECTION, POWDER, FOR SOLUTION INTRAMUSCULAR; INTRAVENOUS at 09:17

## 2024-10-16 RX ADMIN — PANCRELIPASE LIPASE, PANCRELIPASE PROTEASE, PANCRELIPASE AMYLASE 5000 UNITS: 5000; 17000; 24000 CAPSULE, DELAYED RELEASE ORAL at 16:50

## 2024-10-16 RX ADMIN — OXYCODONE 5 MG: 5 TABLET ORAL at 14:24

## 2024-10-16 RX ADMIN — CILOSTAZOL 50 MG: 50 TABLET ORAL at 16:50

## 2024-10-16 RX ADMIN — ASPIRIN 81 MG 81 MG: 81 TABLET ORAL at 09:11

## 2024-10-16 RX ADMIN — OXYCODONE 5 MG: 5 TABLET ORAL at 09:13

## 2024-10-16 RX ADMIN — OXYCODONE 5 MG: 5 TABLET ORAL at 05:19

## 2024-10-16 ASSESSMENT — PAIN SCALES - GENERAL
PAINLEVEL_OUTOF10: 7
PAINLEVEL_OUTOF10: 7
PAINLEVEL_OUTOF10: 3
PAINLEVEL_OUTOF10: 8
PAINLEVEL_OUTOF10: 2
PAINLEVEL_OUTOF10: 7
PAINLEVEL_OUTOF10: 2
PAINLEVEL_OUTOF10: 7
PAINLEVEL_OUTOF10: 0
PAINLEVEL_OUTOF10: 5

## 2024-10-16 ASSESSMENT — PAIN DESCRIPTION - ORIENTATION
ORIENTATION: LEFT
ORIENTATION: RIGHT

## 2024-10-16 ASSESSMENT — PAIN DESCRIPTION - DESCRIPTORS: DESCRIPTORS: ACHING

## 2024-10-16 ASSESSMENT — PAIN SCALES - WONG BAKER
WONGBAKER_NUMERICALRESPONSE: HURTS A LITTLE BIT
WONGBAKER_NUMERICALRESPONSE: HURTS A LITTLE BIT

## 2024-10-16 ASSESSMENT — PAIN DESCRIPTION - LOCATION
LOCATION: KNEE
LOCATION: LEG

## 2024-10-16 NOTE — CARE COORDINATION
DCP: auth denied for [facility], awaiting SNF choice  NADIRA: 24 hours    CM has reviewed pt chart. Pt medically cleared for dc, awaiting final choice for SNF and will need auth.     1251: CM uploaded clinicals and requested [employer] to start auth today and reference number when available.     1411: MERY received message from [employer] that auth has been started with ref #[id number]

## 2024-10-17 ENCOUNTER — APPOINTMENT (OUTPATIENT)
Facility: HOSPITAL | Age: 69
DRG: 485 | End: 2024-10-17
Payer: MEDICARE

## 2024-10-17 ENCOUNTER — HOSPITAL ENCOUNTER (INPATIENT)
Facility: HOSPITAL | Age: 69
Discharge: HOME | DRG: 485 | End: 2024-10-19
Payer: MEDICARE

## 2024-10-17 LAB
ANION GAP SERPL CALC-SCNC: 6 MMOL/L (ref 2–12)
APTT PPP: 37.7 SEC (ref 21.2–34.1)
BUN SERPL-MCNC: 16 MG/DL (ref 6–20)
BUN/CREAT SERPL: 10 (ref 12–20)
CA-I BLD-MCNC: 10.5 MG/DL (ref 8.5–10.1)
CHLORIDE SERPL-SCNC: 105 MMOL/L (ref 97–108)
CO2 SERPL-SCNC: 28 MMOL/L (ref 21–32)
CREAT SERPL-MCNC: 1.65 MG/DL (ref 0.7–1.3)
ECHO BSA: 2.19 M2
ERYTHROCYTE [DISTWIDTH] IN BLOOD BY AUTOMATED COUNT: 12.9 % (ref 11.5–14.5)
FOLATE SERPL-MCNC: 5 NG/ML (ref 5–21)
GLUCOSE BLD STRIP.AUTO-MCNC: 169 MG/DL (ref 65–100)
GLUCOSE BLD STRIP.AUTO-MCNC: 195 MG/DL (ref 65–100)
GLUCOSE BLD STRIP.AUTO-MCNC: 275 MG/DL (ref 65–100)
GLUCOSE BLD STRIP.AUTO-MCNC: 310 MG/DL (ref 65–100)
GLUCOSE BLD STRIP.AUTO-MCNC: 71 MG/DL (ref 65–100)
GLUCOSE SERPL-MCNC: 172 MG/DL (ref 65–100)
HCT VFR BLD AUTO: 29.6 % (ref 36.6–50.3)
HGB BLD-MCNC: 9.5 G/DL (ref 12.1–17)
INR PPP: 1.4 (ref 0.9–1.1)
IRON SATN MFR SERPL: 18 % (ref 20–50)
IRON SERPL-MCNC: 33 UG/DL (ref 35–150)
MCH RBC QN AUTO: 28.6 PG (ref 26–34)
MCHC RBC AUTO-ENTMCNC: 32.1 G/DL (ref 30–36.5)
MCV RBC AUTO: 89.2 FL (ref 80–99)
NRBC # BLD: 0 K/UL (ref 0–0.01)
NRBC BLD-RTO: 0 PER 100 WBC
PERFORMED BY:: ABNORMAL
PERFORMED BY:: NORMAL
PLATELET # BLD AUTO: 270 K/UL (ref 150–400)
PMV BLD AUTO: 9.5 FL (ref 8.9–12.9)
POTASSIUM SERPL-SCNC: 4 MMOL/L (ref 3.5–5.1)
PROTHROMBIN TIME: 17.6 SEC (ref 11.9–14.6)
RBC # BLD AUTO: 3.32 M/UL (ref 4.1–5.7)
SODIUM SERPL-SCNC: 139 MMOL/L (ref 136–145)
THERAPEUTIC RANGE: ABNORMAL SEC (ref 82–109)
TIBC SERPL-MCNC: 180 UG/DL (ref 250–450)
UFH PPP CHRO-ACNC: >1.1 IU/ML
VIT B12 SERPL-MCNC: 495 PG/ML (ref 193–986)
WBC # BLD AUTO: 8.7 K/UL (ref 4.1–11.1)

## 2024-10-17 PROCEDURE — 1100000000 HC RM PRIVATE

## 2024-10-17 PROCEDURE — 97530 THERAPEUTIC ACTIVITIES: CPT

## 2024-10-17 PROCEDURE — 82607 VITAMIN B-12: CPT

## 2024-10-17 PROCEDURE — 2580000003 HC RX 258

## 2024-10-17 PROCEDURE — 71045 X-RAY EXAM CHEST 1 VIEW: CPT

## 2024-10-17 PROCEDURE — 85027 COMPLETE CBC AUTOMATED: CPT

## 2024-10-17 PROCEDURE — 6370000000 HC RX 637 (ALT 250 FOR IP)

## 2024-10-17 PROCEDURE — 36569 INSJ PICC 5 YR+ W/O IMAGING: CPT

## 2024-10-17 PROCEDURE — 85520 HEPARIN ASSAY: CPT

## 2024-10-17 PROCEDURE — 83540 ASSAY OF IRON: CPT

## 2024-10-17 PROCEDURE — 6360000002 HC RX W HCPCS: Performed by: INTERNAL MEDICINE

## 2024-10-17 PROCEDURE — 36415 COLL VENOUS BLD VENIPUNCTURE: CPT

## 2024-10-17 PROCEDURE — 05HY33Z INSERTION OF INFUSION DEVICE INTO UPPER VEIN, PERCUTANEOUS APPROACH: ICD-10-PCS | Performed by: INTERNAL MEDICINE

## 2024-10-17 PROCEDURE — 6370000000 HC RX 637 (ALT 250 FOR IP): Performed by: ORTHOPAEDIC SURGERY

## 2024-10-17 PROCEDURE — 99232 SBSQ HOSP IP/OBS MODERATE 35: CPT | Performed by: INTERNAL MEDICINE

## 2024-10-17 PROCEDURE — 93971 EXTREMITY STUDY: CPT

## 2024-10-17 PROCEDURE — 80048 BASIC METABOLIC PNL TOTAL CA: CPT

## 2024-10-17 PROCEDURE — 82746 ASSAY OF FOLIC ACID SERUM: CPT

## 2024-10-17 PROCEDURE — 6370000000 HC RX 637 (ALT 250 FOR IP): Performed by: INTERNAL MEDICINE

## 2024-10-17 PROCEDURE — 85730 THROMBOPLASTIN TIME PARTIAL: CPT

## 2024-10-17 PROCEDURE — 6360000002 HC RX W HCPCS

## 2024-10-17 PROCEDURE — 2580000003 HC RX 258: Performed by: INTERNAL MEDICINE

## 2024-10-17 PROCEDURE — 85610 PROTHROMBIN TIME: CPT

## 2024-10-17 PROCEDURE — 82962 GLUCOSE BLOOD TEST: CPT

## 2024-10-17 RX ORDER — ATORVASTATIN CALCIUM 40 MG/1
80 TABLET, FILM COATED ORAL DAILY
Qty: 90 TABLET | Refills: 0 | Status: SHIPPED | OUTPATIENT
Start: 2024-10-17

## 2024-10-17 RX ORDER — TRAMADOL HYDROCHLORIDE 50 MG/1
50 TABLET ORAL EVERY 6 HOURS PRN
Status: DISCONTINUED | OUTPATIENT
Start: 2024-10-17 | End: 2024-10-21 | Stop reason: HOSPADM

## 2024-10-17 RX ORDER — OXYCODONE HYDROCHLORIDE 10 MG/1
10 TABLET ORAL EVERY 4 HOURS PRN
Qty: 30 TABLET | Refills: 0 | Status: SHIPPED | OUTPATIENT
Start: 2024-10-17 | End: 2024-10-21

## 2024-10-17 RX ORDER — INSULIN LISPRO 100 [IU]/ML
0-16 INJECTION, SOLUTION INTRAVENOUS; SUBCUTANEOUS
Status: DISCONTINUED | OUTPATIENT
Start: 2024-10-18 | End: 2024-10-21 | Stop reason: HOSPADM

## 2024-10-17 RX ORDER — CEPHALEXIN 500 MG/1
500 CAPSULE ORAL 2 TIMES DAILY
Qty: 60 CAPSULE | Refills: 2 | Status: SHIPPED | OUTPATIENT
Start: 2024-11-20 | End: 2025-02-18

## 2024-10-17 RX ORDER — HEPARIN SODIUM 1000 [USP'U]/ML
4000 INJECTION, SOLUTION INTRAVENOUS; SUBCUTANEOUS ONCE
Status: DISCONTINUED | OUTPATIENT
Start: 2024-10-17 | End: 2024-10-17

## 2024-10-17 RX ORDER — CYCLOBENZAPRINE HCL 10 MG
10 TABLET ORAL 3 TIMES DAILY
Status: DISCONTINUED | OUTPATIENT
Start: 2024-10-17 | End: 2024-10-17

## 2024-10-17 RX ORDER — HEPARIN SODIUM 1000 [USP'U]/ML
2000 INJECTION, SOLUTION INTRAVENOUS; SUBCUTANEOUS PRN
Status: DISCONTINUED | OUTPATIENT
Start: 2024-10-17 | End: 2024-10-17

## 2024-10-17 RX ORDER — HEPARIN SODIUM 1000 [USP'U]/ML
40 INJECTION, SOLUTION INTRAVENOUS; SUBCUTANEOUS PRN
Status: DISCONTINUED | OUTPATIENT
Start: 2024-10-17 | End: 2024-10-20

## 2024-10-17 RX ORDER — ATORVASTATIN CALCIUM 40 MG/1
40 TABLET, FILM COATED ORAL DAILY
Qty: 90 TABLET | Refills: 0 | Status: SHIPPED | OUTPATIENT
Start: 2024-10-17 | End: 2024-10-17

## 2024-10-17 RX ORDER — HEPARIN SODIUM 10000 [USP'U]/100ML
5-30 INJECTION, SOLUTION INTRAVENOUS CONTINUOUS
Status: DISCONTINUED | OUTPATIENT
Start: 2024-10-17 | End: 2024-10-20

## 2024-10-17 RX ORDER — HEPARIN SODIUM 1000 [USP'U]/ML
80 INJECTION, SOLUTION INTRAVENOUS; SUBCUTANEOUS PRN
Status: DISCONTINUED | OUTPATIENT
Start: 2024-10-17 | End: 2024-10-20

## 2024-10-17 RX ORDER — OXYCODONE HYDROCHLORIDE 10 MG/1
10 TABLET ORAL EVERY 4 HOURS PRN
Status: DISCONTINUED | OUTPATIENT
Start: 2024-10-17 | End: 2024-10-21 | Stop reason: HOSPADM

## 2024-10-17 RX ORDER — INSULIN GLARGINE 100 [IU]/ML
45 INJECTION, SOLUTION SUBCUTANEOUS DAILY
Status: DISCONTINUED | OUTPATIENT
Start: 2024-10-18 | End: 2024-10-19

## 2024-10-17 RX ORDER — INSULIN LISPRO 100 [IU]/ML
7 INJECTION, SOLUTION INTRAVENOUS; SUBCUTANEOUS
Status: DISCONTINUED | OUTPATIENT
Start: 2024-10-17 | End: 2024-10-19

## 2024-10-17 RX ORDER — HEPARIN SODIUM 1000 [USP'U]/ML
4000 INJECTION, SOLUTION INTRAVENOUS; SUBCUTANEOUS PRN
Status: DISCONTINUED | OUTPATIENT
Start: 2024-10-17 | End: 2024-10-17

## 2024-10-17 RX ORDER — CYCLOBENZAPRINE HCL 10 MG
10 TABLET ORAL 3 TIMES DAILY
Status: DISCONTINUED | OUTPATIENT
Start: 2024-10-17 | End: 2024-10-21 | Stop reason: HOSPADM

## 2024-10-17 RX ORDER — CYCLOBENZAPRINE HCL 10 MG
10 TABLET ORAL 3 TIMES DAILY PRN
Status: DISCONTINUED | OUTPATIENT
Start: 2024-10-17 | End: 2024-10-17

## 2024-10-17 RX ADMIN — OXYCODONE HYDROCHLORIDE 10 MG: 10 TABLET ORAL at 16:44

## 2024-10-17 RX ADMIN — TAMSULOSIN HYDROCHLORIDE 0.4 MG: 0.4 CAPSULE ORAL at 08:44

## 2024-10-17 RX ADMIN — GABAPENTIN 300 MG: 300 CAPSULE ORAL at 13:39

## 2024-10-17 RX ADMIN — HEPARIN SODIUM 18 UNITS/KG/HR: 10000 INJECTION, SOLUTION INTRAVENOUS at 22:21

## 2024-10-17 RX ADMIN — ACETAMINOPHEN 1000 MG: 500 TABLET ORAL at 04:50

## 2024-10-17 RX ADMIN — APIXABAN 5 MG: 5 TABLET, FILM COATED ORAL at 08:44

## 2024-10-17 RX ADMIN — CILOSTAZOL 50 MG: 50 TABLET ORAL at 16:41

## 2024-10-17 RX ADMIN — GABAPENTIN 300 MG: 300 CAPSULE ORAL at 21:12

## 2024-10-17 RX ADMIN — CYCLOBENZAPRINE 10 MG: 10 TABLET, FILM COATED ORAL at 21:12

## 2024-10-17 RX ADMIN — INSULIN LISPRO 5 UNITS: 100 INJECTION, SOLUTION INTRAVENOUS; SUBCUTANEOUS at 12:30

## 2024-10-17 RX ADMIN — CEFTRIAXONE SODIUM 2000 MG: 2 INJECTION, POWDER, FOR SOLUTION INTRAMUSCULAR; INTRAVENOUS at 08:46

## 2024-10-17 RX ADMIN — INSULIN GLARGINE 40 UNITS: 100 INJECTION, SOLUTION SUBCUTANEOUS at 08:44

## 2024-10-17 RX ADMIN — PANCRELIPASE LIPASE, PANCRELIPASE PROTEASE, PANCRELIPASE AMYLASE 5000 UNITS: 5000; 17000; 24000 CAPSULE, DELAYED RELEASE ORAL at 08:48

## 2024-10-17 RX ADMIN — OXYCODONE HYDROCHLORIDE 10 MG: 10 TABLET ORAL at 10:22

## 2024-10-17 RX ADMIN — SODIUM CHLORIDE, PRESERVATIVE FREE 10 ML: 5 INJECTION INTRAVENOUS at 21:18

## 2024-10-17 RX ADMIN — OXYCODONE 5 MG: 5 TABLET ORAL at 04:51

## 2024-10-17 RX ADMIN — PANCRELIPASE LIPASE, PANCRELIPASE PROTEASE, PANCRELIPASE AMYLASE 5000 UNITS: 5000; 17000; 24000 CAPSULE, DELAYED RELEASE ORAL at 12:31

## 2024-10-17 RX ADMIN — CYCLOBENZAPRINE 10 MG: 10 TABLET, FILM COATED ORAL at 12:46

## 2024-10-17 RX ADMIN — GABAPENTIN 300 MG: 300 CAPSULE ORAL at 08:44

## 2024-10-17 RX ADMIN — ACETAMINOPHEN 1000 MG: 500 TABLET ORAL at 13:39

## 2024-10-17 RX ADMIN — OXYCODONE 5 MG: 5 TABLET ORAL at 09:01

## 2024-10-17 RX ADMIN — INSULIN LISPRO 4 UNITS: 100 INJECTION, SOLUTION INTRAVENOUS; SUBCUTANEOUS at 22:29

## 2024-10-17 RX ADMIN — SODIUM CHLORIDE, PRESERVATIVE FREE 10 ML: 5 INJECTION INTRAVENOUS at 08:46

## 2024-10-17 RX ADMIN — PANCRELIPASE LIPASE, PANCRELIPASE PROTEASE, PANCRELIPASE AMYLASE 5000 UNITS: 5000; 17000; 24000 CAPSULE, DELAYED RELEASE ORAL at 16:41

## 2024-10-17 RX ADMIN — PANTOPRAZOLE SODIUM 40 MG: 40 TABLET, DELAYED RELEASE ORAL at 08:44

## 2024-10-17 RX ADMIN — INSULIN LISPRO 8 UNITS: 100 INJECTION, SOLUTION INTRAVENOUS; SUBCUTANEOUS at 12:31

## 2024-10-17 RX ADMIN — INSULIN LISPRO 5 UNITS: 100 INJECTION, SOLUTION INTRAVENOUS; SUBCUTANEOUS at 08:45

## 2024-10-17 RX ADMIN — OXYCODONE HYDROCHLORIDE 10 MG: 10 TABLET ORAL at 21:17

## 2024-10-17 RX ADMIN — CILOSTAZOL 50 MG: 50 TABLET ORAL at 08:44

## 2024-10-17 RX ADMIN — ACETAMINOPHEN 1000 MG: 500 TABLET ORAL at 21:12

## 2024-10-17 RX ADMIN — ATORVASTATIN CALCIUM 80 MG: 40 TABLET, FILM COATED ORAL at 21:12

## 2024-10-17 RX ADMIN — METOPROLOL SUCCINATE 100 MG: 50 TABLET, EXTENDED RELEASE ORAL at 08:44

## 2024-10-17 ASSESSMENT — PAIN DESCRIPTION - DESCRIPTORS
DESCRIPTORS: SORE
DESCRIPTORS: ACHING;BURNING
DESCRIPTORS: ACHING;BURNING
DESCRIPTORS: ACHING
DESCRIPTORS: ACHING;BURNING
DESCRIPTORS: ACHING;BURNING
DESCRIPTORS: ACHING

## 2024-10-17 ASSESSMENT — PAIN DESCRIPTION - ORIENTATION
ORIENTATION: LEFT

## 2024-10-17 ASSESSMENT — PAIN DESCRIPTION - LOCATION
LOCATION: KNEE
LOCATION: KNEE
LOCATION: LEG
LOCATION: KNEE

## 2024-10-17 ASSESSMENT — PAIN SCALES - GENERAL
PAINLEVEL_OUTOF10: 9
PAINLEVEL_OUTOF10: 5
PAINLEVEL_OUTOF10: 8
PAINLEVEL_OUTOF10: 7
PAINLEVEL_OUTOF10: 9
PAINLEVEL_OUTOF10: 2
PAINLEVEL_OUTOF10: 7
PAINLEVEL_OUTOF10: 3
PAINLEVEL_OUTOF10: 10
PAINLEVEL_OUTOF10: 3
PAINLEVEL_OUTOF10: 9
PAINLEVEL_OUTOF10: 4
PAINLEVEL_OUTOF10: 4

## 2024-10-17 ASSESSMENT — PAIN SCALES - WONG BAKER
WONGBAKER_NUMERICALRESPONSE: NO HURT
WONGBAKER_NUMERICALRESPONSE: HURTS A LITTLE BIT
WONGBAKER_NUMERICALRESPONSE: NO HURT

## 2024-10-17 NOTE — CARE COORDINATION
DCP: [facility] auth approved 10/17/24 auth good through 10/22/24  NADIRA: today vs. 24 hours, pending auth    CM has reviewed pt chart. Pt is medically cleared for dc, awaiting insurance authorization.     0915: CM sent message to [employer] checking on auth status, awaiting reply.     0918: CM received reply that auth is still pending.     1148: CM received message from [employer] reporting auth has been approved.     1258: CM observed note that pt discharge has been cancelled d/t DVT, hep gtt started, and vascular surgery consulted. CM updated Una that pt will not be able to dc today.

## 2024-10-17 NOTE — PROCEDURES
PICC Line Insertion Procedure Note    Procedure: overwire of single lumen PICC to double lumen    Indications:  Long Term IV therapy    Procedure Details   Informed consent was obtained for the procedure, including sedation.  Risks of lung perforation, hemorrhage, and adverse drug reaction were discussed.     #5Fr PICC inserted to the R Basilic vein per hospital protocol.   Blood return:  yes    Findings:  Catheter inserted to 41 cm, with 0 cm. Exposed. Mid upper arm circumference is 31 cm.   Catheter was flushed with 20 cc NS. Patient did tolerate procedure well.    Recommendations:  Unable to confirm tip with equipment. CXR ordered to verify placement. Handoff given to Kayleen.  PICC Brochure given to patient with teaching instruction.PROCEDURE NOTE  Date: 10/17/2024   Name: Keo Brownlee Jr.  YOB: 1955    Procedures

## 2024-10-18 ENCOUNTER — APPOINTMENT (OUTPATIENT)
Facility: HOSPITAL | Age: 69
DRG: 485 | End: 2024-10-18
Payer: MEDICARE

## 2024-10-18 LAB
APTT PPP: 139.6 SEC (ref 21.2–34.1)
APTT PPP: 68 SEC (ref 21.2–34.1)
APTT PPP: 78.6 SEC (ref 21.2–34.1)
APTT PPP: 96.9 SEC (ref 21.2–34.1)
BASOPHILS # BLD: 0 K/UL (ref 0–0.1)
BASOPHILS NFR BLD: 1 % (ref 0–1)
CRP SERPL-MCNC: 5.68 MG/DL (ref 0–0.3)
DIFFERENTIAL METHOD BLD: ABNORMAL
EOSINOPHIL # BLD: 0.3 K/UL (ref 0–0.4)
EOSINOPHIL NFR BLD: 3 % (ref 0–7)
ERYTHROCYTE [DISTWIDTH] IN BLOOD BY AUTOMATED COUNT: 12.9 % (ref 11.5–14.5)
ERYTHROCYTE [SEDIMENTATION RATE] IN BLOOD: 100 MM/HR (ref 0–20)
GLUCOSE BLD STRIP.AUTO-MCNC: 193 MG/DL (ref 65–100)
GLUCOSE BLD STRIP.AUTO-MCNC: 215 MG/DL (ref 65–100)
GLUCOSE BLD STRIP.AUTO-MCNC: 235 MG/DL (ref 65–100)
GLUCOSE BLD STRIP.AUTO-MCNC: 263 MG/DL (ref 65–100)
GLUCOSE BLD STRIP.AUTO-MCNC: 270 MG/DL (ref 65–100)
HCT VFR BLD AUTO: 25.8 % (ref 36.6–50.3)
HGB BLD-MCNC: 8.7 G/DL (ref 12.1–17)
IMM GRANULOCYTES # BLD AUTO: 0.1 K/UL (ref 0–0.04)
IMM GRANULOCYTES NFR BLD AUTO: 1 % (ref 0–0.5)
LYMPHOCYTES # BLD: 3 K/UL (ref 0.8–3.5)
LYMPHOCYTES NFR BLD: 38 % (ref 12–49)
MCH RBC QN AUTO: 30 PG (ref 26–34)
MCHC RBC AUTO-ENTMCNC: 33.7 G/DL (ref 30–36.5)
MCV RBC AUTO: 89 FL (ref 80–99)
MONOCYTES # BLD: 0.7 K/UL (ref 0–1)
MONOCYTES NFR BLD: 9 % (ref 5–13)
NEUTS SEG # BLD: 3.7 K/UL (ref 1.8–8)
NEUTS SEG NFR BLD: 48 % (ref 32–75)
NRBC # BLD: 0 K/UL (ref 0–0.01)
NRBC BLD-RTO: 0 PER 100 WBC
PERFORMED BY:: ABNORMAL
PLATELET # BLD AUTO: 255 K/UL (ref 150–400)
PMV BLD AUTO: 10 FL (ref 8.9–12.9)
RBC # BLD AUTO: 2.9 M/UL (ref 4.1–5.7)
THERAPEUTIC RANGE: ABNORMAL SEC (ref 82–109)
UFH PPP CHRO-ACNC: >1.1 IU/ML
WBC # BLD AUTO: 7.7 K/UL (ref 4.1–11.1)

## 2024-10-18 PROCEDURE — 2580000003 HC RX 258: Performed by: INTERNAL MEDICINE

## 2024-10-18 PROCEDURE — 82962 GLUCOSE BLOOD TEST: CPT

## 2024-10-18 PROCEDURE — 6370000000 HC RX 637 (ALT 250 FOR IP)

## 2024-10-18 PROCEDURE — 6360000004 HC RX CONTRAST MEDICATION: Performed by: STUDENT IN AN ORGANIZED HEALTH CARE EDUCATION/TRAINING PROGRAM

## 2024-10-18 PROCEDURE — 6370000000 HC RX 637 (ALT 250 FOR IP): Performed by: ORTHOPAEDIC SURGERY

## 2024-10-18 PROCEDURE — 85652 RBC SED RATE AUTOMATED: CPT

## 2024-10-18 PROCEDURE — 99232 SBSQ HOSP IP/OBS MODERATE 35: CPT | Performed by: INTERNAL MEDICINE

## 2024-10-18 PROCEDURE — 1100000000 HC RM PRIVATE

## 2024-10-18 PROCEDURE — 36415 COLL VENOUS BLD VENIPUNCTURE: CPT

## 2024-10-18 PROCEDURE — 6370000000 HC RX 637 (ALT 250 FOR IP): Performed by: PHYSICIAN ASSISTANT

## 2024-10-18 PROCEDURE — 86140 C-REACTIVE PROTEIN: CPT

## 2024-10-18 PROCEDURE — 85520 HEPARIN ASSAY: CPT

## 2024-10-18 PROCEDURE — 6360000002 HC RX W HCPCS: Performed by: INTERNAL MEDICINE

## 2024-10-18 PROCEDURE — 2580000003 HC RX 258

## 2024-10-18 PROCEDURE — 6360000002 HC RX W HCPCS

## 2024-10-18 PROCEDURE — 74177 CT ABD & PELVIS W/CONTRAST: CPT

## 2024-10-18 PROCEDURE — 85730 THROMBOPLASTIN TIME PARTIAL: CPT

## 2024-10-18 PROCEDURE — 85025 COMPLETE CBC W/AUTO DIFF WBC: CPT

## 2024-10-18 RX ORDER — IOPAMIDOL 755 MG/ML
100 INJECTION, SOLUTION INTRAVASCULAR
Status: COMPLETED | OUTPATIENT
Start: 2024-10-18 | End: 2024-10-18

## 2024-10-18 RX ADMIN — INSULIN LISPRO 7 UNITS: 100 INJECTION, SOLUTION INTRAVENOUS; SUBCUTANEOUS at 12:25

## 2024-10-18 RX ADMIN — INSULIN LISPRO 7 UNITS: 100 INJECTION, SOLUTION INTRAVENOUS; SUBCUTANEOUS at 17:06

## 2024-10-18 RX ADMIN — INSULIN LISPRO 4 UNITS: 100 INJECTION, SOLUTION INTRAVENOUS; SUBCUTANEOUS at 17:05

## 2024-10-18 RX ADMIN — PANTOPRAZOLE SODIUM 40 MG: 40 TABLET, DELAYED RELEASE ORAL at 05:29

## 2024-10-18 RX ADMIN — OXYCODONE HYDROCHLORIDE 10 MG: 10 TABLET ORAL at 16:14

## 2024-10-18 RX ADMIN — CYCLOBENZAPRINE 10 MG: 10 TABLET, FILM COATED ORAL at 13:52

## 2024-10-18 RX ADMIN — PANCRELIPASE LIPASE, PANCRELIPASE PROTEASE, PANCRELIPASE AMYLASE 5000 UNITS: 5000; 17000; 24000 CAPSULE, DELAYED RELEASE ORAL at 09:45

## 2024-10-18 RX ADMIN — HEPARIN SODIUM 4000 UNITS: 1000 INJECTION INTRAVENOUS; SUBCUTANEOUS at 19:22

## 2024-10-18 RX ADMIN — PANCRELIPASE LIPASE, PANCRELIPASE PROTEASE, PANCRELIPASE AMYLASE 5000 UNITS: 5000; 17000; 24000 CAPSULE, DELAYED RELEASE ORAL at 16:08

## 2024-10-18 RX ADMIN — CILOSTAZOL 50 MG: 50 TABLET ORAL at 05:40

## 2024-10-18 RX ADMIN — OXYCODONE HYDROCHLORIDE 10 MG: 10 TABLET ORAL at 03:48

## 2024-10-18 RX ADMIN — ATORVASTATIN CALCIUM 80 MG: 40 TABLET, FILM COATED ORAL at 20:39

## 2024-10-18 RX ADMIN — CYCLOBENZAPRINE 10 MG: 10 TABLET, FILM COATED ORAL at 20:38

## 2024-10-18 RX ADMIN — SODIUM CHLORIDE, PRESERVATIVE FREE 10 ML: 5 INJECTION INTRAVENOUS at 09:43

## 2024-10-18 RX ADMIN — IOPAMIDOL 100 ML: 755 INJECTION, SOLUTION INTRAVENOUS at 11:57

## 2024-10-18 RX ADMIN — OXYCODONE HYDROCHLORIDE 10 MG: 10 TABLET ORAL at 12:22

## 2024-10-18 RX ADMIN — INSULIN LISPRO 4 UNITS: 100 INJECTION, SOLUTION INTRAVENOUS; SUBCUTANEOUS at 20:40

## 2024-10-18 RX ADMIN — GABAPENTIN 300 MG: 300 CAPSULE ORAL at 20:38

## 2024-10-18 RX ADMIN — SODIUM CHLORIDE, PRESERVATIVE FREE 10 ML: 5 INJECTION INTRAVENOUS at 20:39

## 2024-10-18 RX ADMIN — CYCLOBENZAPRINE 10 MG: 10 TABLET, FILM COATED ORAL at 09:43

## 2024-10-18 RX ADMIN — METOPROLOL SUCCINATE 100 MG: 50 TABLET, EXTENDED RELEASE ORAL at 09:43

## 2024-10-18 RX ADMIN — PANCRELIPASE LIPASE, PANCRELIPASE PROTEASE, PANCRELIPASE AMYLASE 5000 UNITS: 5000; 17000; 24000 CAPSULE, DELAYED RELEASE ORAL at 12:23

## 2024-10-18 RX ADMIN — INSULIN LISPRO 8 UNITS: 100 INJECTION, SOLUTION INTRAVENOUS; SUBCUTANEOUS at 12:24

## 2024-10-18 RX ADMIN — CEFTRIAXONE SODIUM 2000 MG: 2 INJECTION, POWDER, FOR SOLUTION INTRAMUSCULAR; INTRAVENOUS at 09:42

## 2024-10-18 RX ADMIN — CILOSTAZOL 50 MG: 50 TABLET ORAL at 16:08

## 2024-10-18 RX ADMIN — ACETAMINOPHEN 1000 MG: 500 TABLET ORAL at 05:29

## 2024-10-18 RX ADMIN — GABAPENTIN 300 MG: 300 CAPSULE ORAL at 09:43

## 2024-10-18 RX ADMIN — GABAPENTIN 300 MG: 300 CAPSULE ORAL at 13:52

## 2024-10-18 RX ADMIN — TAMSULOSIN HYDROCHLORIDE 0.4 MG: 0.4 CAPSULE ORAL at 09:43

## 2024-10-18 RX ADMIN — OXYCODONE HYDROCHLORIDE 10 MG: 10 TABLET ORAL at 07:37

## 2024-10-18 RX ADMIN — ACETAMINOPHEN 1000 MG: 500 TABLET ORAL at 21:50

## 2024-10-18 RX ADMIN — ACETAMINOPHEN 1000 MG: 500 TABLET ORAL at 13:52

## 2024-10-18 RX ADMIN — HEPARIN SODIUM 15.12 UNITS/KG/HR: 10000 INJECTION, SOLUTION INTRAVENOUS at 13:48

## 2024-10-18 ASSESSMENT — PAIN DESCRIPTION - ORIENTATION
ORIENTATION: LEFT
ORIENTATION: RIGHT
ORIENTATION: LEFT
ORIENTATION: RIGHT
ORIENTATION: LEFT

## 2024-10-18 ASSESSMENT — PAIN DESCRIPTION - DESCRIPTORS
DESCRIPTORS: DISCOMFORT;BURNING
DESCRIPTORS: SORE
DESCRIPTORS: ACHING

## 2024-10-18 ASSESSMENT — PAIN DESCRIPTION - LOCATION
LOCATION: LEG
LOCATION: KNEE
LOCATION: KNEE
LOCATION: LEG
LOCATION: LEG

## 2024-10-18 ASSESSMENT — PAIN SCALES - GENERAL
PAINLEVEL_OUTOF10: 5
PAINLEVEL_OUTOF10: 4
PAINLEVEL_OUTOF10: 7
PAINLEVEL_OUTOF10: 7
PAINLEVEL_OUTOF10: 8
PAINLEVEL_OUTOF10: 8

## 2024-10-18 NOTE — CARE COORDINATION
DCP: [facility] approved 10/17, good through 10/22  NADIRA: Monday    CM has reviewed pt chart. Pt on IV abx, PICC line placed 10/17, hep gtt started, awaiting vascular surgery consult for new DVT.

## 2024-10-19 LAB
ANION GAP SERPL CALC-SCNC: 4 MMOL/L (ref 2–12)
APTT PPP: 102.5 SEC (ref 21.2–34.1)
APTT PPP: 130.5 SEC (ref 21.2–34.1)
APTT PPP: 67.7 SEC (ref 21.2–34.1)
BUN SERPL-MCNC: 14 MG/DL (ref 6–20)
BUN/CREAT SERPL: 8 (ref 12–20)
CA-I BLD-MCNC: 9.8 MG/DL (ref 8.5–10.1)
CHLORIDE SERPL-SCNC: 103 MMOL/L (ref 97–108)
CO2 SERPL-SCNC: 29 MMOL/L (ref 21–32)
CREAT SERPL-MCNC: 1.69 MG/DL (ref 0.7–1.3)
ERYTHROCYTE [DISTWIDTH] IN BLOOD BY AUTOMATED COUNT: 12.7 % (ref 11.5–14.5)
GLUCOSE BLD STRIP.AUTO-MCNC: 169 MG/DL (ref 65–100)
GLUCOSE BLD STRIP.AUTO-MCNC: 219 MG/DL (ref 65–100)
GLUCOSE BLD STRIP.AUTO-MCNC: 228 MG/DL (ref 65–100)
GLUCOSE BLD STRIP.AUTO-MCNC: 258 MG/DL (ref 65–100)
GLUCOSE BLD STRIP.AUTO-MCNC: 265 MG/DL (ref 65–100)
GLUCOSE BLD STRIP.AUTO-MCNC: 270 MG/DL (ref 65–100)
GLUCOSE SERPL-MCNC: 263 MG/DL (ref 65–100)
HCT VFR BLD AUTO: 26.7 % (ref 36.6–50.3)
HGB BLD-MCNC: 8.9 G/DL (ref 12.1–17)
MCH RBC QN AUTO: 29.5 PG (ref 26–34)
MCHC RBC AUTO-ENTMCNC: 33.3 G/DL (ref 30–36.5)
MCV RBC AUTO: 88.4 FL (ref 80–99)
NRBC # BLD: 0 K/UL (ref 0–0.01)
NRBC BLD-RTO: 0 PER 100 WBC
PERFORMED BY:: ABNORMAL
PLATELET # BLD AUTO: 257 K/UL (ref 150–400)
PMV BLD AUTO: 10.1 FL (ref 8.9–12.9)
POTASSIUM SERPL-SCNC: 4.1 MMOL/L (ref 3.5–5.1)
RBC # BLD AUTO: 3.02 M/UL (ref 4.1–5.7)
SODIUM SERPL-SCNC: 136 MMOL/L (ref 136–145)
THERAPEUTIC RANGE: ABNORMAL SEC (ref 82–109)
WBC # BLD AUTO: 7.6 K/UL (ref 4.1–11.1)

## 2024-10-19 PROCEDURE — 6370000000 HC RX 637 (ALT 250 FOR IP)

## 2024-10-19 PROCEDURE — 2580000003 HC RX 258

## 2024-10-19 PROCEDURE — 80048 BASIC METABOLIC PNL TOTAL CA: CPT

## 2024-10-19 PROCEDURE — 6370000000 HC RX 637 (ALT 250 FOR IP): Performed by: PHYSICIAN ASSISTANT

## 2024-10-19 PROCEDURE — 1100000000 HC RM PRIVATE

## 2024-10-19 PROCEDURE — 82962 GLUCOSE BLOOD TEST: CPT

## 2024-10-19 PROCEDURE — 85730 THROMBOPLASTIN TIME PARTIAL: CPT

## 2024-10-19 PROCEDURE — 6360000002 HC RX W HCPCS: Performed by: INTERNAL MEDICINE

## 2024-10-19 PROCEDURE — 6360000002 HC RX W HCPCS

## 2024-10-19 PROCEDURE — 6370000000 HC RX 637 (ALT 250 FOR IP): Performed by: ORTHOPAEDIC SURGERY

## 2024-10-19 PROCEDURE — 36415 COLL VENOUS BLD VENIPUNCTURE: CPT

## 2024-10-19 PROCEDURE — 85027 COMPLETE CBC AUTOMATED: CPT

## 2024-10-19 PROCEDURE — 2500000003 HC RX 250 WO HCPCS

## 2024-10-19 PROCEDURE — 2580000003 HC RX 258: Performed by: INTERNAL MEDICINE

## 2024-10-19 RX ORDER — INSULIN LISPRO 100 [IU]/ML
15 INJECTION, SOLUTION INTRAVENOUS; SUBCUTANEOUS
Status: DISCONTINUED | OUTPATIENT
Start: 2024-10-19 | End: 2024-10-20

## 2024-10-19 RX ORDER — HYDROMORPHONE HYDROCHLORIDE 1 MG/ML
0.5 INJECTION, SOLUTION INTRAMUSCULAR; INTRAVENOUS; SUBCUTANEOUS EVERY 4 HOURS PRN
Status: DISCONTINUED | OUTPATIENT
Start: 2024-10-19 | End: 2024-10-21 | Stop reason: HOSPADM

## 2024-10-19 RX ORDER — INSULIN GLARGINE 100 [IU]/ML
55 INJECTION, SOLUTION SUBCUTANEOUS DAILY
Status: DISCONTINUED | OUTPATIENT
Start: 2024-10-19 | End: 2024-10-20

## 2024-10-19 RX ADMIN — HYDROMORPHONE HYDROCHLORIDE 0.5 MG: 1 INJECTION, SOLUTION INTRAMUSCULAR; INTRAVENOUS; SUBCUTANEOUS at 19:44

## 2024-10-19 RX ADMIN — PANCRELIPASE LIPASE, PANCRELIPASE PROTEASE, PANCRELIPASE AMYLASE 5000 UNITS: 5000; 17000; 24000 CAPSULE, DELAYED RELEASE ORAL at 09:10

## 2024-10-19 RX ADMIN — PANCRELIPASE LIPASE, PANCRELIPASE PROTEASE, PANCRELIPASE AMYLASE 5000 UNITS: 5000; 17000; 24000 CAPSULE, DELAYED RELEASE ORAL at 17:10

## 2024-10-19 RX ADMIN — ACETAMINOPHEN 1000 MG: 500 TABLET ORAL at 05:22

## 2024-10-19 RX ADMIN — OXYCODONE HYDROCHLORIDE 10 MG: 10 TABLET ORAL at 15:31

## 2024-10-19 RX ADMIN — PANTOPRAZOLE SODIUM 40 MG: 40 TABLET, DELAYED RELEASE ORAL at 05:22

## 2024-10-19 RX ADMIN — INSULIN LISPRO 15 UNITS: 100 INJECTION, SOLUTION INTRAVENOUS; SUBCUTANEOUS at 17:10

## 2024-10-19 RX ADMIN — INSULIN GLARGINE 55 UNITS: 100 INJECTION, SOLUTION SUBCUTANEOUS at 09:11

## 2024-10-19 RX ADMIN — CILOSTAZOL 50 MG: 50 TABLET ORAL at 15:31

## 2024-10-19 RX ADMIN — INSULIN LISPRO 15 UNITS: 100 INJECTION, SOLUTION INTRAVENOUS; SUBCUTANEOUS at 09:11

## 2024-10-19 RX ADMIN — INSULIN LISPRO 15 UNITS: 100 INJECTION, SOLUTION INTRAVENOUS; SUBCUTANEOUS at 12:11

## 2024-10-19 RX ADMIN — CEFTRIAXONE SODIUM 2000 MG: 2 INJECTION, POWDER, FOR SOLUTION INTRAMUSCULAR; INTRAVENOUS at 12:13

## 2024-10-19 RX ADMIN — HYDROMORPHONE HYDROCHLORIDE 0.5 MG: 1 INJECTION, SOLUTION INTRAMUSCULAR; INTRAVENOUS; SUBCUTANEOUS at 13:29

## 2024-10-19 RX ADMIN — INSULIN LISPRO 4 UNITS: 100 INJECTION, SOLUTION INTRAVENOUS; SUBCUTANEOUS at 12:11

## 2024-10-19 RX ADMIN — SODIUM CHLORIDE, PRESERVATIVE FREE 10 ML: 5 INJECTION INTRAVENOUS at 09:17

## 2024-10-19 RX ADMIN — OXYCODONE HYDROCHLORIDE 10 MG: 10 TABLET ORAL at 09:14

## 2024-10-19 RX ADMIN — SODIUM CHLORIDE, PRESERVATIVE FREE 10 ML: 5 INJECTION INTRAVENOUS at 21:15

## 2024-10-19 RX ADMIN — GABAPENTIN 300 MG: 300 CAPSULE ORAL at 15:37

## 2024-10-19 RX ADMIN — CILOSTAZOL 50 MG: 50 TABLET ORAL at 05:22

## 2024-10-19 RX ADMIN — GABAPENTIN 300 MG: 300 CAPSULE ORAL at 09:11

## 2024-10-19 RX ADMIN — TAMSULOSIN HYDROCHLORIDE 0.4 MG: 0.4 CAPSULE ORAL at 09:10

## 2024-10-19 RX ADMIN — CYCLOBENZAPRINE 10 MG: 10 TABLET, FILM COATED ORAL at 21:14

## 2024-10-19 RX ADMIN — INSULIN LISPRO 4 UNITS: 100 INJECTION, SOLUTION INTRAVENOUS; SUBCUTANEOUS at 21:00

## 2024-10-19 RX ADMIN — HEPARIN SODIUM 14 UNITS/KG/HR: 10000 INJECTION, SOLUTION INTRAVENOUS at 05:47

## 2024-10-19 RX ADMIN — ATORVASTATIN CALCIUM 80 MG: 40 TABLET, FILM COATED ORAL at 21:14

## 2024-10-19 RX ADMIN — HEPARIN SODIUM 4000 UNITS: 1000 INJECTION INTRAVENOUS; SUBCUTANEOUS at 12:07

## 2024-10-19 RX ADMIN — ACETAMINOPHEN 1000 MG: 500 TABLET ORAL at 21:14

## 2024-10-19 RX ADMIN — CYCLOBENZAPRINE 10 MG: 10 TABLET, FILM COATED ORAL at 15:31

## 2024-10-19 RX ADMIN — GABAPENTIN 300 MG: 300 CAPSULE ORAL at 21:14

## 2024-10-19 RX ADMIN — METOPROLOL SUCCINATE 100 MG: 50 TABLET, EXTENDED RELEASE ORAL at 09:10

## 2024-10-19 RX ADMIN — CYCLOBENZAPRINE 10 MG: 10 TABLET, FILM COATED ORAL at 09:10

## 2024-10-19 RX ADMIN — PANCRELIPASE LIPASE, PANCRELIPASE PROTEASE, PANCRELIPASE AMYLASE 5000 UNITS: 5000; 17000; 24000 CAPSULE, DELAYED RELEASE ORAL at 12:11

## 2024-10-19 RX ADMIN — HEPARIN SODIUM 16 UNITS/KG/HR: 10000 INJECTION, SOLUTION INTRAVENOUS at 22:39

## 2024-10-19 RX ADMIN — ACETAMINOPHEN 1000 MG: 500 TABLET ORAL at 15:31

## 2024-10-19 ASSESSMENT — PAIN SCALES - GENERAL
PAINLEVEL_OUTOF10: 5
PAINLEVEL_OUTOF10: 2
PAINLEVEL_OUTOF10: 7
PAINLEVEL_OUTOF10: 2
PAINLEVEL_OUTOF10: 7
PAINLEVEL_OUTOF10: 3
PAINLEVEL_OUTOF10: 5
PAINLEVEL_OUTOF10: 2
PAINLEVEL_OUTOF10: 5
PAINLEVEL_OUTOF10: 7

## 2024-10-19 ASSESSMENT — PAIN DESCRIPTION - DESCRIPTORS
DESCRIPTORS: OTHER (COMMENT)
DESCRIPTORS: OTHER (COMMENT)
DESCRIPTORS: SHARP
DESCRIPTORS: ACHING
DESCRIPTORS: OTHER (COMMENT)

## 2024-10-19 ASSESSMENT — PAIN DESCRIPTION - ORIENTATION
ORIENTATION: LEFT

## 2024-10-19 ASSESSMENT — PAIN DESCRIPTION - LOCATION
LOCATION: LEG
LOCATION: KNEE;LEG

## 2024-10-20 LAB
ANION GAP SERPL CALC-SCNC: 5 MMOL/L (ref 2–12)
APTT PPP: 53.1 SEC (ref 21.2–34.1)
APTT PPP: >153 SEC (ref 21.2–34.1)
BUN SERPL-MCNC: 14 MG/DL (ref 6–20)
BUN/CREAT SERPL: 9 (ref 12–20)
CA-I BLD-MCNC: 10.1 MG/DL (ref 8.5–10.1)
CHLORIDE SERPL-SCNC: 103 MMOL/L (ref 97–108)
CO2 SERPL-SCNC: 28 MMOL/L (ref 21–32)
CREAT SERPL-MCNC: 1.61 MG/DL (ref 0.7–1.3)
GLUCOSE BLD STRIP.AUTO-MCNC: 197 MG/DL (ref 65–100)
GLUCOSE BLD STRIP.AUTO-MCNC: 226 MG/DL (ref 65–100)
GLUCOSE BLD STRIP.AUTO-MCNC: 238 MG/DL (ref 65–100)
GLUCOSE BLD STRIP.AUTO-MCNC: 277 MG/DL (ref 65–100)
GLUCOSE BLD STRIP.AUTO-MCNC: 282 MG/DL (ref 65–100)
GLUCOSE BLD STRIP.AUTO-MCNC: 293 MG/DL (ref 65–100)
GLUCOSE SERPL-MCNC: 232 MG/DL (ref 65–100)
PERFORMED BY:: ABNORMAL
POTASSIUM SERPL-SCNC: 3.9 MMOL/L (ref 3.5–5.1)
SODIUM SERPL-SCNC: 136 MMOL/L (ref 136–145)
THERAPEUTIC RANGE: ABNORMAL SEC (ref 82–109)
THERAPEUTIC RANGE: ABNORMAL SEC (ref 82–109)
UFH PPP CHRO-ACNC: 0.47 IU/ML
UFH PPP CHRO-ACNC: >1.1 IU/ML

## 2024-10-20 PROCEDURE — 82962 GLUCOSE BLOOD TEST: CPT

## 2024-10-20 PROCEDURE — 6370000000 HC RX 637 (ALT 250 FOR IP)

## 2024-10-20 PROCEDURE — 80048 BASIC METABOLIC PNL TOTAL CA: CPT

## 2024-10-20 PROCEDURE — 6370000000 HC RX 637 (ALT 250 FOR IP): Performed by: PHYSICIAN ASSISTANT

## 2024-10-20 PROCEDURE — 1100000000 HC RM PRIVATE

## 2024-10-20 PROCEDURE — 85730 THROMBOPLASTIN TIME PARTIAL: CPT

## 2024-10-20 PROCEDURE — 6370000000 HC RX 637 (ALT 250 FOR IP): Performed by: ORTHOPAEDIC SURGERY

## 2024-10-20 PROCEDURE — 2580000003 HC RX 258: Performed by: INTERNAL MEDICINE

## 2024-10-20 PROCEDURE — 2500000003 HC RX 250 WO HCPCS

## 2024-10-20 PROCEDURE — 6360000002 HC RX W HCPCS: Performed by: INTERNAL MEDICINE

## 2024-10-20 PROCEDURE — 85520 HEPARIN ASSAY: CPT

## 2024-10-20 PROCEDURE — 2580000003 HC RX 258

## 2024-10-20 PROCEDURE — 36415 COLL VENOUS BLD VENIPUNCTURE: CPT

## 2024-10-20 RX ORDER — INSULIN GLARGINE 100 [IU]/ML
60 INJECTION, SOLUTION SUBCUTANEOUS DAILY
Status: DISCONTINUED | OUTPATIENT
Start: 2024-10-21 | End: 2024-10-21 | Stop reason: HOSPADM

## 2024-10-20 RX ORDER — FERROUS SULFATE 325(65) MG
325 TABLET ORAL EVERY OTHER DAY
Status: DISCONTINUED | OUTPATIENT
Start: 2024-10-20 | End: 2024-10-21 | Stop reason: HOSPADM

## 2024-10-20 RX ORDER — INSULIN LISPRO 100 [IU]/ML
20 INJECTION, SOLUTION INTRAVENOUS; SUBCUTANEOUS
Status: DISCONTINUED | OUTPATIENT
Start: 2024-10-20 | End: 2024-10-21 | Stop reason: HOSPADM

## 2024-10-20 RX ADMIN — APIXABAN 10 MG: 5 TABLET, FILM COATED ORAL at 20:51

## 2024-10-20 RX ADMIN — CYCLOBENZAPRINE 10 MG: 10 TABLET, FILM COATED ORAL at 20:27

## 2024-10-20 RX ADMIN — INSULIN LISPRO 20 UNITS: 100 INJECTION, SOLUTION INTRAVENOUS; SUBCUTANEOUS at 11:27

## 2024-10-20 RX ADMIN — APIXABAN 10 MG: 5 TABLET, FILM COATED ORAL at 11:27

## 2024-10-20 RX ADMIN — PANCRELIPASE LIPASE, PANCRELIPASE PROTEASE, PANCRELIPASE AMYLASE 5000 UNITS: 5000; 17000; 24000 CAPSULE, DELAYED RELEASE ORAL at 11:25

## 2024-10-20 RX ADMIN — ACETAMINOPHEN 1000 MG: 500 TABLET ORAL at 15:17

## 2024-10-20 RX ADMIN — CEFTRIAXONE SODIUM 2000 MG: 2 INJECTION, POWDER, FOR SOLUTION INTRAMUSCULAR; INTRAVENOUS at 11:33

## 2024-10-20 RX ADMIN — HYDROMORPHONE HYDROCHLORIDE 0.5 MG: 1 INJECTION, SOLUTION INTRAMUSCULAR; INTRAVENOUS; SUBCUTANEOUS at 11:36

## 2024-10-20 RX ADMIN — TAMSULOSIN HYDROCHLORIDE 0.4 MG: 0.4 CAPSULE ORAL at 11:26

## 2024-10-20 RX ADMIN — INSULIN LISPRO 20 UNITS: 100 INJECTION, SOLUTION INTRAVENOUS; SUBCUTANEOUS at 18:13

## 2024-10-20 RX ADMIN — PANTOPRAZOLE SODIUM 40 MG: 40 TABLET, DELAYED RELEASE ORAL at 06:03

## 2024-10-20 RX ADMIN — INSULIN LISPRO 8 UNITS: 100 INJECTION, SOLUTION INTRAVENOUS; SUBCUTANEOUS at 11:30

## 2024-10-20 RX ADMIN — CYCLOBENZAPRINE 10 MG: 10 TABLET, FILM COATED ORAL at 11:26

## 2024-10-20 RX ADMIN — SODIUM CHLORIDE, PRESERVATIVE FREE 10 ML: 5 INJECTION INTRAVENOUS at 11:24

## 2024-10-20 RX ADMIN — ACETAMINOPHEN 1000 MG: 500 TABLET ORAL at 06:02

## 2024-10-20 RX ADMIN — HYDROMORPHONE HYDROCHLORIDE 0.5 MG: 1 INJECTION, SOLUTION INTRAMUSCULAR; INTRAVENOUS; SUBCUTANEOUS at 18:13

## 2024-10-20 RX ADMIN — PANCRELIPASE LIPASE, PANCRELIPASE PROTEASE, PANCRELIPASE AMYLASE 5000 UNITS: 5000; 17000; 24000 CAPSULE, DELAYED RELEASE ORAL at 07:50

## 2024-10-20 RX ADMIN — CILOSTAZOL 50 MG: 50 TABLET ORAL at 06:03

## 2024-10-20 RX ADMIN — INSULIN LISPRO 15 UNITS: 100 INJECTION, SOLUTION INTRAVENOUS; SUBCUTANEOUS at 07:50

## 2024-10-20 RX ADMIN — ACETAMINOPHEN 1000 MG: 500 TABLET ORAL at 20:27

## 2024-10-20 RX ADMIN — METOPROLOL SUCCINATE 100 MG: 50 TABLET, EXTENDED RELEASE ORAL at 11:26

## 2024-10-20 RX ADMIN — FERROUS SULFATE TAB 325 MG (65 MG ELEMENTAL FE) 325 MG: 325 (65 FE) TAB at 11:27

## 2024-10-20 RX ADMIN — INSULIN LISPRO 8 UNITS: 100 INJECTION, SOLUTION INTRAVENOUS; SUBCUTANEOUS at 07:50

## 2024-10-20 RX ADMIN — GABAPENTIN 300 MG: 300 CAPSULE ORAL at 20:51

## 2024-10-20 RX ADMIN — CILOSTAZOL 50 MG: 50 TABLET ORAL at 15:20

## 2024-10-20 RX ADMIN — ATORVASTATIN CALCIUM 80 MG: 40 TABLET, FILM COATED ORAL at 20:27

## 2024-10-20 RX ADMIN — CYCLOBENZAPRINE 10 MG: 10 TABLET, FILM COATED ORAL at 15:17

## 2024-10-20 RX ADMIN — GABAPENTIN 300 MG: 300 CAPSULE ORAL at 11:26

## 2024-10-20 RX ADMIN — INSULIN LISPRO 4 UNITS: 100 INJECTION, SOLUTION INTRAVENOUS; SUBCUTANEOUS at 18:13

## 2024-10-20 RX ADMIN — SODIUM CHLORIDE, PRESERVATIVE FREE 10 ML: 5 INJECTION INTRAVENOUS at 20:28

## 2024-10-20 RX ADMIN — PANCRELIPASE LIPASE, PANCRELIPASE PROTEASE, PANCRELIPASE AMYLASE 5000 UNITS: 5000; 17000; 24000 CAPSULE, DELAYED RELEASE ORAL at 18:12

## 2024-10-20 RX ADMIN — OXYCODONE HYDROCHLORIDE 10 MG: 10 TABLET ORAL at 01:58

## 2024-10-20 RX ADMIN — OXYCODONE HYDROCHLORIDE 10 MG: 10 TABLET ORAL at 07:28

## 2024-10-20 RX ADMIN — GABAPENTIN 300 MG: 300 CAPSULE ORAL at 15:17

## 2024-10-20 ASSESSMENT — PAIN SCALES - GENERAL
PAINLEVEL_OUTOF10: 4
PAINLEVEL_OUTOF10: 7
PAINLEVEL_OUTOF10: 7
PAINLEVEL_OUTOF10: 8
PAINLEVEL_OUTOF10: 4
PAINLEVEL_OUTOF10: 2
PAINLEVEL_OUTOF10: 5
PAINLEVEL_OUTOF10: 1
PAINLEVEL_OUTOF10: 8

## 2024-10-20 ASSESSMENT — PAIN DESCRIPTION - DESCRIPTORS
DESCRIPTORS: OTHER (COMMENT);PATIENT UNABLE TO DESCRIBE
DESCRIPTORS: SPASM
DESCRIPTORS: SHARP
DESCRIPTORS: ACHING
DESCRIPTORS: SPASM
DESCRIPTORS: ACHING
DESCRIPTORS: ACHING

## 2024-10-20 ASSESSMENT — PAIN DESCRIPTION - ORIENTATION
ORIENTATION: LEFT

## 2024-10-20 ASSESSMENT — PAIN DESCRIPTION - LOCATION
LOCATION: LEG;KNEE
LOCATION: LEG
LOCATION: KNEE
LOCATION: LEG

## 2024-10-21 VITALS
RESPIRATION RATE: 15 BRPM | SYSTOLIC BLOOD PRESSURE: 108 MMHG | OXYGEN SATURATION: 95 % | DIASTOLIC BLOOD PRESSURE: 79 MMHG | BODY MASS INDEX: 30.62 KG/M2 | HEART RATE: 100 BPM | TEMPERATURE: 98.1 F | HEIGHT: 71 IN | WEIGHT: 218.7 LBS

## 2024-10-21 LAB
ANION GAP SERPL CALC-SCNC: 7 MMOL/L (ref 2–12)
BUN SERPL-MCNC: 13 MG/DL (ref 6–20)
BUN/CREAT SERPL: 8 (ref 12–20)
CA-I BLD-MCNC: 10.4 MG/DL (ref 8.5–10.1)
CHLORIDE SERPL-SCNC: 101 MMOL/L (ref 97–108)
CO2 SERPL-SCNC: 27 MMOL/L (ref 21–32)
CREAT SERPL-MCNC: 1.56 MG/DL (ref 0.7–1.3)
GLUCOSE BLD STRIP.AUTO-MCNC: 126 MG/DL (ref 65–100)
GLUCOSE BLD STRIP.AUTO-MCNC: 286 MG/DL (ref 65–100)
GLUCOSE BLD STRIP.AUTO-MCNC: 298 MG/DL (ref 65–100)
GLUCOSE BLD STRIP.AUTO-MCNC: 303 MG/DL (ref 65–100)
GLUCOSE SERPL-MCNC: 254 MG/DL (ref 65–100)
PERFORMED BY:: ABNORMAL
POTASSIUM SERPL-SCNC: 4.1 MMOL/L (ref 3.5–5.1)
SODIUM SERPL-SCNC: 135 MMOL/L (ref 136–145)

## 2024-10-21 PROCEDURE — 6370000000 HC RX 637 (ALT 250 FOR IP)

## 2024-10-21 PROCEDURE — 6370000000 HC RX 637 (ALT 250 FOR IP): Performed by: PHYSICIAN ASSISTANT

## 2024-10-21 PROCEDURE — 97530 THERAPEUTIC ACTIVITIES: CPT

## 2024-10-21 PROCEDURE — 99222 1ST HOSP IP/OBS MODERATE 55: CPT | Performed by: SURGERY

## 2024-10-21 PROCEDURE — 6360000002 HC RX W HCPCS: Performed by: INTERNAL MEDICINE

## 2024-10-21 PROCEDURE — 2500000003 HC RX 250 WO HCPCS

## 2024-10-21 PROCEDURE — 6370000000 HC RX 637 (ALT 250 FOR IP): Performed by: ORTHOPAEDIC SURGERY

## 2024-10-21 PROCEDURE — 36415 COLL VENOUS BLD VENIPUNCTURE: CPT

## 2024-10-21 PROCEDURE — 2580000003 HC RX 258

## 2024-10-21 PROCEDURE — 82962 GLUCOSE BLOOD TEST: CPT

## 2024-10-21 PROCEDURE — 2580000003 HC RX 258: Performed by: INTERNAL MEDICINE

## 2024-10-21 PROCEDURE — 80048 BASIC METABOLIC PNL TOTAL CA: CPT

## 2024-10-21 RX ORDER — ACETAMINOPHEN 500 MG
1000 TABLET ORAL 3 TIMES DAILY PRN
Qty: 540 TABLET | Refills: 1 | Status: SHIPPED | OUTPATIENT
Start: 2024-10-21

## 2024-10-21 RX ORDER — OXYCODONE HYDROCHLORIDE 10 MG/1
10 TABLET ORAL EVERY 4 HOURS PRN
Qty: 30 TABLET | Refills: 0 | Status: SHIPPED | OUTPATIENT
Start: 2024-10-21 | End: 2024-10-28

## 2024-10-21 RX ORDER — INSULIN LISPRO 100 [IU]/ML
20 INJECTION, SOLUTION INTRAVENOUS; SUBCUTANEOUS
Qty: 10 ML | Refills: 1 | Status: SHIPPED | OUTPATIENT
Start: 2024-10-21

## 2024-10-21 RX ORDER — CYCLOBENZAPRINE HCL 10 MG
10 TABLET ORAL 3 TIMES DAILY
Qty: 30 TABLET | Refills: 0 | Status: SHIPPED | OUTPATIENT
Start: 2024-10-21 | End: 2024-10-31

## 2024-10-21 RX ORDER — INSULIN GLARGINE 100 [IU]/ML
60 INJECTION, SOLUTION SUBCUTANEOUS DAILY
Qty: 10 ML | Refills: 3 | Status: SHIPPED | OUTPATIENT
Start: 2024-10-22

## 2024-10-21 RX ORDER — FERROUS SULFATE 325(65) MG
325 TABLET ORAL EVERY OTHER DAY
Qty: 30 TABLET | Refills: 3 | Status: SHIPPED | OUTPATIENT
Start: 2024-10-22

## 2024-10-21 RX ADMIN — CILOSTAZOL 50 MG: 50 TABLET ORAL at 05:39

## 2024-10-21 RX ADMIN — METOPROLOL SUCCINATE 100 MG: 50 TABLET, EXTENDED RELEASE ORAL at 08:59

## 2024-10-21 RX ADMIN — CYCLOBENZAPRINE 10 MG: 10 TABLET, FILM COATED ORAL at 13:47

## 2024-10-21 RX ADMIN — PANTOPRAZOLE SODIUM 40 MG: 40 TABLET, DELAYED RELEASE ORAL at 05:40

## 2024-10-21 RX ADMIN — INSULIN LISPRO 20 UNITS: 100 INJECTION, SOLUTION INTRAVENOUS; SUBCUTANEOUS at 12:13

## 2024-10-21 RX ADMIN — GABAPENTIN 300 MG: 300 CAPSULE ORAL at 09:00

## 2024-10-21 RX ADMIN — TAMSULOSIN HYDROCHLORIDE 0.4 MG: 0.4 CAPSULE ORAL at 08:59

## 2024-10-21 RX ADMIN — CEFTRIAXONE SODIUM 2000 MG: 2 INJECTION, POWDER, FOR SOLUTION INTRAMUSCULAR; INTRAVENOUS at 10:48

## 2024-10-21 RX ADMIN — CYCLOBENZAPRINE 10 MG: 10 TABLET, FILM COATED ORAL at 09:00

## 2024-10-21 RX ADMIN — INSULIN LISPRO 20 UNITS: 100 INJECTION, SOLUTION INTRAVENOUS; SUBCUTANEOUS at 17:22

## 2024-10-21 RX ADMIN — HYDROMORPHONE HYDROCHLORIDE 0.5 MG: 1 INJECTION, SOLUTION INTRAMUSCULAR; INTRAVENOUS; SUBCUTANEOUS at 05:38

## 2024-10-21 RX ADMIN — PANCRELIPASE LIPASE, PANCRELIPASE PROTEASE, PANCRELIPASE AMYLASE 5000 UNITS: 5000; 17000; 24000 CAPSULE, DELAYED RELEASE ORAL at 17:22

## 2024-10-21 RX ADMIN — APIXABAN 10 MG: 5 TABLET, FILM COATED ORAL at 09:00

## 2024-10-21 RX ADMIN — PANCRELIPASE LIPASE, PANCRELIPASE PROTEASE, PANCRELIPASE AMYLASE 5000 UNITS: 5000; 17000; 24000 CAPSULE, DELAYED RELEASE ORAL at 09:00

## 2024-10-21 RX ADMIN — ACETAMINOPHEN 1000 MG: 500 TABLET ORAL at 13:47

## 2024-10-21 RX ADMIN — INSULIN LISPRO 20 UNITS: 100 INJECTION, SOLUTION INTRAVENOUS; SUBCUTANEOUS at 09:01

## 2024-10-21 RX ADMIN — INSULIN GLARGINE 60 UNITS: 100 INJECTION, SOLUTION SUBCUTANEOUS at 09:01

## 2024-10-21 RX ADMIN — GABAPENTIN 300 MG: 300 CAPSULE ORAL at 13:47

## 2024-10-21 RX ADMIN — CILOSTAZOL 50 MG: 50 TABLET ORAL at 15:06

## 2024-10-21 RX ADMIN — ACETAMINOPHEN 1000 MG: 500 TABLET ORAL at 05:39

## 2024-10-21 RX ADMIN — SODIUM CHLORIDE, PRESERVATIVE FREE 10 ML: 5 INJECTION INTRAVENOUS at 09:02

## 2024-10-21 RX ADMIN — HYDROMORPHONE HYDROCHLORIDE 0.5 MG: 1 INJECTION, SOLUTION INTRAMUSCULAR; INTRAVENOUS; SUBCUTANEOUS at 09:16

## 2024-10-21 RX ADMIN — PANCRELIPASE LIPASE, PANCRELIPASE PROTEASE, PANCRELIPASE AMYLASE 5000 UNITS: 5000; 17000; 24000 CAPSULE, DELAYED RELEASE ORAL at 12:00

## 2024-10-21 RX ADMIN — INSULIN LISPRO 12 UNITS: 100 INJECTION, SOLUTION INTRAVENOUS; SUBCUTANEOUS at 12:13

## 2024-10-21 ASSESSMENT — PAIN DESCRIPTION - ORIENTATION: ORIENTATION: LEFT

## 2024-10-21 ASSESSMENT — PAIN DESCRIPTION - DESCRIPTORS: DESCRIPTORS: ACHING;SPASM

## 2024-10-21 ASSESSMENT — PAIN SCALES - GENERAL
PAINLEVEL_OUTOF10: 0
PAINLEVEL_OUTOF10: 3
PAINLEVEL_OUTOF10: 8

## 2024-10-21 ASSESSMENT — PAIN SCALES - WONG BAKER: WONGBAKER_NUMERICALRESPONSE: NO HURT

## 2024-10-21 ASSESSMENT — PAIN DESCRIPTION - LOCATION: LOCATION: LEG

## 2024-10-21 NOTE — PLAN OF CARE
PHYSICAL THERAPY TREATMENT     Patient: Keo Brownlee Jr. (69 y.o. male)  Date: 10/10/2024  Diagnosis: Sepsis (HCC) [A41.9] Sepsis (HCC)  Procedure(s) (LRB):  LEFT TOTAL KNEE REVISION, POLY EXCHANGE, STIMULAN BEADS (Left) 2 Days Post-Op  Precautions: Bed Alarm, Fall Risk     Left Lower Extremity Weight Bearing: Weight Bearing As Tolerated                Recommendations for nursing mobility: Encourage HEP in prep for ADLs/mobility; see handout for details and Use of bed/chair alarm for safety    In place during session: None  Chart, physical therapy assessment, plan of care and goals were reviewed.  ASSESSMENT  Patient continues with skilled PT services and is slowly progressing towards goals. Pt lying in semi-supine position upon PT arrival, agreeable to session.patient with foot of bed elevated. Educated patient on keeping bed flat for foot of bed to encourage L knee extension. Patient educated to put nothing under his L knee.  Pt A&O x 4. (See below for objective details and assist levels). Wife present for last 1/2 of treatment    Overall pt tolerated session fair today with PT.  patient perform there-ex in supine. Patient transfer supine>sit with mod assist x 1 for LLE. Patient sits with L foot on lateral border. Instructed patient to place L foot flat on floor. Patient transfer sit>stand with mod /max assist x 1. Patient stood with LLE in front of RLE. Patient gt 1 step backward with LLE. Patient perform wt shift L<>R with rw. Patient gt 2 steps forward with rw WBAT LLE with mod assist x 1. Patient c/o lightheadedness and became diaphoretic. Patient gt additional 2 steps backward with rw WBAT LLE with mod assist x 1. Patient transfer stand>sit with mod assist x 1 and vc's to slide LLE in front of him prior to sitting. Patient is very slow and guarded with all mobility and there-ex. Patient scoot to HOB in sitting position with cga. Patient transfer back to supine. Ice machine placed on L knee with 
         PHYSICAL THERAPY TREATMENT     Patient: Keo Brownlee Jr. (69 y.o. male)  Date: 10/12/2024  Diagnosis: Sepsis (HCC) [A41.9] Sepsis (HCC)  Procedure(s) (LRB):  LEFT TOTAL KNEE REVISION, POLY EXCHANGE, STIMULAN BEADS (Left) 4 Days Post-Op  Precautions: Bed Alarm, Fall Risk     Left Lower Extremity Weight Bearing: Weight Bearing As Tolerated                Recommendations for nursing mobility: Frequent repositioning to prevent skin breakdown, Use of bed/chair alarm for safety, and LE elevation for management of edema    In place during session: None  Chart, physical therapy assessment, plan of care and goals were reviewed.  ASSESSMENT  Patient continues with skilled PT services and is slowly progressing towards goals. Pt semi supine upon PT arrival, agreeable to session. Pt A&O x 4. (See below for objective details and assist levels).     Overall pt tolerated session fair/poor today with supine therex, limited by activity tolerance secondary to pain. Nursing advised pt medicated for pain previously, however pt in pain at rest. Pt agreeable and motivated to participate, attempted PROM on LLE, pt very vocal about pain. Discontinued treatment until next dose of pain medication, provided with ice pack and LLE elevated. Will continue to benefit from skilled PT services, and will continue to progress as tolerated. Current PT DC recommendation High intensity and comprehensive skilled physical therapy in a multidisciplinary setting as patient is working towards tolerating up to 3 hours of therapy/day x 5-7 days/week once medically appropriate.    GOALS:    Problem: Physical Therapy - Adult  Goal: By Discharge: Performs mobility at highest level of function for planned discharge setting.  See evaluation for individualized goals.  Description: FUNCTIONAL STATUS PRIOR TO ADMISSION: Patient was independent and active without use of DME.    HOME SUPPORT PRIOR TO ADMISSION: The patient lived with wife and was modified 
         PHYSICAL THERAPY TREATMENT     Patient: Keo Brownlee Jr. (69 y.o. male)  Date: 10/12/2024  Diagnosis: Sepsis (HCC) [A41.9] Sepsis (HCC)  Procedure(s) (LRB):  LEFT TOTAL KNEE REVISION, POLY EXCHANGE, STIMULAN BEADS (Left) 4 Days Post-Op  Precautions: Bed Alarm, Fall Risk     Left Lower Extremity Weight Bearing: Weight Bearing As Tolerated                Recommendations for nursing mobility: Out of bed to chair for meals, Encourage HEP in prep for ADLs/mobility; see handout for details, Frequent repositioning to prevent skin breakdown, and Assist x1    In place during session: None  Chart, physical therapy assessment, plan of care and goals were reviewed.  ASSESSMENT  Patient continues with skilled PT services and is progressing towards goals. Pt semi supine upon PT arrival, agreeable to session. Pt A&O x 4. (See below for objective details and assist levels).     Overall pt tolerated session fair today with bed mobility, ROM, transfer, gait training and seated therex. Pt demo's improved functional mobility to date requiring less A. Performed sup to sit with CGA-Kiera and HOB elevated. Able to tolerate knee flexion in sitting to ~90, maintained ~2mins before requesting to extend. Able to ambulate 15 ft with CGA, no overt LOB or knee buckling noted with pain. Pt reports improvement with standing tolerance post stretching. Participated in seated therex and provided with ice end of treatment.  Will continue to benefit from skilled PT services, and will continue to progress as tolerated. Current PT DC recommendation High intensity and comprehensive skilled physical therapy in a multidisciplinary setting as patient is working towards tolerating up to 3 hours of therapy/day x 5-7 days/week once medically appropriate.    GOALS:    Problem: Physical Therapy - Adult  Goal: By Discharge: Performs mobility at highest level of function for planned discharge setting.  See evaluation for individualized 
         PHYSICAL THERAPY TREATMENT     Patient: Keo Brownlee Jr. (69 y.o. male)  Date: 10/14/2024  Diagnosis: Sepsis (HCC) [A41.9] Sepsis (HCC)  Procedure(s) (LRB):  LEFT TOTAL KNEE REVISION, POLY EXCHANGE, STIMULAN BEADS (Left) 6 Days Post-Op  Precautions: Bed Alarm, Fall Risk     Left Lower Extremity Weight Bearing: Weight Bearing As Tolerated                Recommendations for nursing mobility: Out of bed to chair for meals, AD and gt belt for bed to chair , Amb to bathroom with AD and gait belt, and Assist x1    In place during session: EKG/telemetry   Chart, physical therapy assessment, plan of care and goals were reviewed.  ASSESSMENT  Patient continues with skilled PT services and is progressing towards goals. Pt sitting in recliner chair upon PT arrival, agreeable to session. (See below for objective details and assist levels).     Overall pt tolerated session fair today. Pt completed LE therex sitting in recliner chair with c/o pain and discomfort. Pt ambulated ~25 ft with RW and cga gait. Pt completed toilet transfer with mod A and returned supine in bed. Pt required min/mod A for LLE management. Pt left supine in bed with all needs met. Will continue to benefit from skilled PT services, and will continue to progress as tolerated. Current PT DC recommendation High intensity and comprehensive skilled physical therapy in a multidisciplinary setting as patient is working towards tolerating up to 3 hours of therapy/day x 5-7 days/week once medically appropriate.    GOALS:  Problem: Physical Therapy - Adult  Goal: By Discharge: Performs mobility at highest level of function for planned discharge setting.  See evaluation for individualized goals.  Description: FUNCTIONAL STATUS PRIOR TO ADMISSION: Patient was independent and active without use of DME.    HOME SUPPORT PRIOR TO ADMISSION: The patient lived with wife and was modified independent for ADLS.    Physical Therapy Goals  Initiated 10/9/2024  Pt stated 
         PHYSICAL THERAPY TREATMENT     Patient: Keo Brownlee Jr. (69 y.o. male)  Date: 10/14/2024  Diagnosis: Sepsis (HCC) [A41.9] Sepsis (HCC)  Procedure(s) (LRB):  LEFT TOTAL KNEE REVISION, POLY EXCHANGE, STIMULAN BEADS (Left) 6 Days Post-Op  Precautions: Bed Alarm, Fall Risk     Left Lower Extremity Weight Bearing: Weight Bearing As Tolerated                Recommendations for nursing mobility: Out of bed to chair for meals, Encourage HEP in prep for ADLs/mobility; see handout for details, Frequent repositioning to prevent skin breakdown, and Assist x1    In place during session: EKG/telemetry   Chart, physical therapy assessment, plan of care and goals were reviewed.  ASSESSMENT  Patient continues with skilled PT services and is progressing towards goals. Pt supine in bed upon PT arrival, agreeable to session. (See below for objective details and assist levels).     Overall pt tolerated session fair today. Pt transferred to eob with no hands on assistance. Pt sat eob demonstrating good sitting balance. Pt bending LLE with no hands on assist but continues to be in pain, 8/10. Pt stood from bed min A with RW and ambulated ~40 ft. Pt demonstrates a slow and fairly steady gait with no lob or knee buckling noted. Pt demonstrates a step to gait pattern and requires additional time due to pain to perform ambulation. Pt also demonstrates tremors when fatiguing with ambulation. Pt sat in recliner chair and was left sitting up with all needs met. Will continue to benefit from skilled PT services, and will continue to progress as tolerated. Current PT DC recommendation High intensity and comprehensive skilled physical therapy in a multidisciplinary setting as patient is working towards tolerating up to 3 hours of therapy/day x 5-7 days/week once medically appropriate.    GOALS:  Problem: Physical Therapy - Adult  Goal: By Discharge: Performs mobility at highest level of function for planned discharge setting.  See 
         PHYSICAL THERAPY TREATMENT     Patient: Keo Brownlee Jr. (69 y.o. male)  Date: 10/15/2024  Diagnosis: Sepsis (HCC) [A41.9] Sepsis (HCC)  Procedure(s) (LRB):  LEFT TOTAL KNEE REVISION, POLY EXCHANGE, STIMULAN BEADS (Left) 7 Days Post-Op  Precautions: Bed Alarm, Fall Risk     Left Lower Extremity Weight Bearing: Weight Bearing As Tolerated                Recommendations for nursing mobility: Out of bed to chair for meals, AD and gt belt for bed to chair , Amb to bathroom with AD and gait belt, and Assist x1    In place during session: EKG/telemetry   Chart, physical therapy assessment, plan of care and goals were reviewed.  ASSESSMENT  Patient continues with skilled PT services and is progressing towards goals. Pt sitting up in recliner chair upon PT arrival, agreeable to session. (See below for objective details and assist levels).     Overall pt tolerated session fair today. Pt performed seated LE active assist therex on LLE with c/o pain and discomfort. Pt ambulated ~20 ft with RW and transferred onto toilet. Pt left with BANGURA in bathroom. Will continue to benefit from skilled PT services. Pt requires additional time for mobility due to high pain level. and will continue to progress as tolerated. Current PT DC recommendation High intensity and comprehensive skilled physical therapy in a multidisciplinary setting as patient is working towards tolerating up to 3 hours of therapy/day x 5-7 days/week once medically appropriate.    GOALS:  Problem: Physical Therapy - Adult  Goal: By Discharge: Performs mobility at highest level of function for planned discharge setting.  See evaluation for individualized goals.  Description: FUNCTIONAL STATUS PRIOR TO ADMISSION: Patient was independent and active without use of DME.    HOME SUPPORT PRIOR TO ADMISSION: The patient lived with wife and was modified independent for ADLS.    Physical Therapy Goals  Initiated 10/9/2024  Pt stated goal: Landon georges  Pt will be I 
         PHYSICAL THERAPY TREATMENT     Patient: Keo Brownlee Jr. (69 y.o. male)  Date: 10/15/2024  Diagnosis: Sepsis (HCC) [A41.9] Sepsis (HCC)  Procedure(s) (LRB):  LEFT TOTAL KNEE REVISION, POLY EXCHANGE, STIMULAN BEADS (Left) 7 Days Post-Op  Precautions: Bed Alarm, Fall Risk     Left Lower Extremity Weight Bearing: Weight Bearing As Tolerated                Recommendations for nursing mobility: Out of bed to chair for meals, AD and gt belt for bed to chair , Amb to bathroom with AD and gait belt, and Assist x1    In place during session: EKG/telemetry   Chart, physical therapy assessment, plan of care and goals were reviewed.  ASSESSMENT  Patient continues with skilled PT services and is progressing towards goals. Pt supine in bed upon PT arrival, agreeable to session. (See below for objective details and assist levels).     Overall pt tolerated session fair today. Pt performed LE seated heel slides with assist of RLE. Pt c/o pain with all mobility of LLE. Pt unable to lift LLE off of the floor without assistance. Pt educated on frequent mobility and therex before ambulation to help with stiffness after sitting or laying down for a long time. Pt stood from bed and ambulated ~60 ft with RW. Gait was slow and steady with no lob or knee buckling noted. Pt demonstrates a slightly forward flexed posture but fixes it. Pt sat in recliner chair and was left with all needs met. Will continue to benefit from skilled PT services, and will continue to progress as tolerated. Current PT DC recommendation High intensity and comprehensive skilled physical therapy in a multidisciplinary setting as patient is working towards tolerating up to 3 hours of therapy/day x 5-7 days/week once medically appropriate.    GOALS:  Problem: Physical Therapy - Adult  Goal: By Discharge: Performs mobility at highest level of function for planned discharge setting.  See evaluation for individualized goals.  Description: FUNCTIONAL STATUS PRIOR 
         PHYSICAL THERAPY TREATMENT     Patient: Keo Brownlee Jr. (69 y.o. male)  Date: 10/16/2024  Diagnosis: Sepsis (HCC) [A41.9] Sepsis (HCC)  Procedure(s) (LRB):  LEFT TOTAL KNEE REVISION, POLY EXCHANGE, STIMULAN BEADS (Left) 8 Days Post-Op  Precautions: Weight Bearing     Left Lower Extremity Weight Bearing: Weight Bearing As Tolerated                Recommendations for nursing mobility: Encourage HEP in prep for ADLs/mobility; see handout for details and Frequent repositioning to prevent skin breakdown    In place during session: Peripheral IV, External Catheter, and EKG/telemetry   Chart, physical therapy assessment, plan of care and goals were reviewed.  ASSESSMENT  Patient continues with skilled PT services and is slowly progressing towards goals. Pt semi supine upon PT arrival, agreeable to session. Pt A&O x 4. (See below for objective details and assist levels).     Overall pt tolerated session good today with PT.  Will continue to benefit from skilled PT services, and will continue to progress as tolerated. Current PT DC recommendation High intensity and comprehensive skilled physical therapy in a multidisciplinary setting as patient is working towards tolerating up to 3 hours of therapy/day x 5-7 days/week once medically appropriate.  Patient c/o pain 10/10 and exhibit a lot of pain in the left knee.very difficult to bend.Patient put all the efforts to do this.    GOALS:    Problem: Physical Therapy - Adult  Goal: By Discharge: Performs mobility at highest level of function for planned discharge setting.  See evaluation for individualized goals.  Description: FUNCTIONAL STATUS PRIOR TO ADMISSION: Patient was independent and active without use of DME.    HOME SUPPORT PRIOR TO ADMISSION: The patient lived with wife and was modified independent for ADLS.    Physical Therapy Goals  Initiated 10/9/2024  Reassessed 1016/2024 and revised  Pt stated goal: Fell better  Pt will be I with LE HEP in 7 days.  Pt 
         PHYSICAL THERAPY TREATMENT     Patient: Keo Brownlee Jr. (69 y.o. male)  Date: 10/17/2024  Diagnosis: Sepsis (HCC) [A41.9] Sepsis (HCC)  Procedure(s) (LRB):  LEFT TOTAL KNEE REVISION, POLY EXCHANGE, STIMULAN BEADS (Left) 9 Days Post-Op  Precautions: Weight Bearing     Left Lower Extremity Weight Bearing: Weight Bearing As Tolerated                Recommendations for nursing mobility: Out of bed to chair for meals, AD and gt belt for bed to chair , Amb to bathroom with AD and gait belt, and Assist x1    In place during session: EKG/telemetry   Chart, physical therapy assessment, plan of care and goals were reviewed.  ASSESSMENT  Patient continues with skilled PT services and is slowly progressing towards goals. Pt supine in bed upon PT arrival, agreeable to session. (See below for objective details and assist levels).     Overall pt tolerated session fair today. Pt ambulated ~75 ft with RW. Gait is slow and fairly steady with no lob or knee buckling noted. Pt completed toilet transfer cga/min A. Pt continues to be in severe pain burin any mobility and is unable to reach 90 degrees of knee flexion in LLE. PTA talked with Dr Palafox and Dr. Senior regarding high pain levels and how long he has been in this pain for. Pt left sitting up in recliner chair with all needs met. Will continue to benefit from skilled PT services, and will continue to progress as tolerated. Current PT DC recommendation High intensity and comprehensive skilled physical therapy in a multidisciplinary setting as patient is working towards tolerating up to 3 hours of therapy/day x 5-7 days/week once medically appropriate.    GOALS:  Problem: Physical Therapy - Adult  Goal: By Discharge: Performs mobility at highest level of function for planned discharge setting.  See evaluation for individualized goals.  Description: FUNCTIONAL STATUS PRIOR TO ADMISSION: Patient was independent and active without use of DME.    HOME SUPPORT PRIOR TO 
         PHYSICAL THERAPY TREATMENT     Patient: Keo Brownlee Jr. (69 y.o. male)  Date: 10/21/2024  Diagnosis: Sepsis (HCC) [A41.9] Sepsis (HCC)  Procedure(s) (LRB):  LEFT TOTAL KNEE REVISION, POLY EXCHANGE, STIMULAN BEADS (Left) 13 Days Post-Op  Precautions: Fall Risk     Left Lower Extremity Weight Bearing: Weight Bearing As Tolerated                Recommendations for nursing mobility: Encourage HEP in prep for ADLs/mobility; see handout for details, LE elevation for management of edema, and Amb to bathroom with AD and gait belt    In place during session: Peripheral IV and EKG/telemetry   Chart, physical therapy assessment, plan of care and goals were reviewed.  ASSESSMENT  Patient continues with skilled PT services and is progressing towards goals. Pt semi supine upon PT arrival, agreeable to session. Pt A&O x 4. (See below for objective details and assist levels).   Patient has been cleared by Ortho and vascular sx for working with therapy.IR has no intervention required at this time.cont with conservative treatment.  Overall pt tolerated session fair/good today with PT. Patient able to perform bed mob with min assist.sit to stand with min-mod assist, ambulated in hallways with RW and cg.Patient had dilaudid prior to ambulation, able to perform knee flexion better than before today. Will continue to benefit from skilled PT services, and will continue to progress as tolerated. Current PT DC recommendation High intensity and comprehensive skilled physical therapy in a multidisciplinary setting as patient is working towards tolerating up to 3 hours of therapy/day x 5-7 days/week once medically appropriate.      GOALS:    Problem: Physical Therapy - Adult  Goal: By Discharge: Performs mobility at highest level of function for planned discharge setting.  See evaluation for individualized goals.  Description: FUNCTIONAL STATUS PRIOR TO ADMISSION: Patient was independent and active without use of DME.    HOME 
  Problem: Chronic Conditions and Co-morbidities  Goal: Patient's chronic conditions and co-morbidity symptoms are monitored and maintained or improved  10/13/2024 1039 by Trevon Nair RN  Outcome: Progressing  10/13/2024 1039 by Trevon Nair RN  Outcome: Progressing     Problem: Discharge Planning  Goal: Discharge to home or other facility with appropriate resources  10/13/2024 1039 by Trevon Nair RN  Outcome: Progressing  10/13/2024 1039 by Trevon Nair RN  Outcome: Progressing     Problem: Pain  Goal: Verbalizes/displays adequate comfort level or baseline comfort level  10/13/2024 1039 by Trevon Nair RN  Outcome: Progressing  10/13/2024 1039 by Trevon Nair RN  Outcome: Progressing     Problem: Safety - Adult  Goal: Free from fall injury  10/13/2024 1039 by Trevon Nair RN  Outcome: Progressing  10/13/2024 1039 by Trevon Nair RN  Outcome: Progressing     Problem: Musculoskeletal - Adult  Goal: Return mobility to safest level of function  10/13/2024 1039 by Trevon Nair RN  Outcome: Progressing  10/13/2024 1039 by Trevon Nair RN  Outcome: Progressing  Goal: Return ADL status to a safe level of function  10/13/2024 1039 by Trevon Nair RN  Outcome: Progressing  10/13/2024 1039 by Trevon Nair RN  Outcome: Progressing     Problem: Skin/Tissue Integrity - Adult  Goal: Incisions, wounds, or drain sites healing without S/S of infection  10/13/2024 1039 by Trevon Nair RN  Outcome: Progressing  10/13/2024 1039 by Trevon Nair RN  Outcome: Progressing     Problem: Skin/Tissue Integrity  Goal: Absence of new skin breakdown  Description: 1.  Monitor for areas of redness and/or skin breakdown  2.  Assess vascular access sites hourly  3.  Every 4-6 hours minimum:  Change oxygen saturation probe site  4.  Every 4-6 hours:  If on nasal continuous positive airway pressure, respiratory therapy assess 
  Problem: Chronic Conditions and Co-morbidities  Goal: Patient's chronic conditions and co-morbidity symptoms are monitored and maintained or improved  10/15/2024 1116 by Dorothea Vail RN  Outcome: Progressing  Flowsheets (Taken 10/15/2024 0851)  Care Plan - Patient's Chronic Conditions and Co-Morbidity Symptoms are Monitored and Maintained or Improved: Monitor and assess patient's chronic conditions and comorbid symptoms for stability, deterioration, or improvement  10/14/2024 2222 by Ortiz Woodruff RN  Outcome: Progressing  Flowsheets (Taken 10/14/2024 0955 by Machelle Gage RN)  Care Plan - Patient's Chronic Conditions and Co-Morbidity Symptoms are Monitored and Maintained or Improved: Monitor and assess patient's chronic conditions and comorbid symptoms for stability, deterioration, or improvement     Problem: Discharge Planning  Goal: Discharge to home or other facility with appropriate resources  10/15/2024 1116 by Dorothea Vail RN  Outcome: Progressing  Flowsheets (Taken 10/15/2024 0851)  Discharge to home or other facility with appropriate resources: Identify barriers to discharge with patient and caregiver  10/14/2024 2222 by Ortiz Woodruff RN  Outcome: Progressing  Flowsheets (Taken 10/14/2024 0955 by Machelle Gage RN)  Discharge to home or other facility with appropriate resources: Identify barriers to discharge with patient and caregiver     Problem: Pain  Goal: Verbalizes/displays adequate comfort level or baseline comfort level  10/15/2024 1116 by Dorothea Vail RN  Outcome: Progressing  10/14/2024 2222 by Ortiz Woodruff RN  Outcome: Progressing     Problem: Safety - Adult  Goal: Free from fall injury  10/15/2024 1116 by Dorothea Vail RN  Outcome: Progressing  10/14/2024 2222 by Ortiz Woodruff RN  Outcome: Progressing     Problem: Musculoskeletal - Adult  Goal: Return mobility to safest level of function  10/15/2024 1116 by Dorothea Vail RN  Outcome: 
  Problem: Chronic Conditions and Co-morbidities  Goal: Patient's chronic conditions and co-morbidity symptoms are monitored and maintained or improved  10/15/2024 2227 by Ortiz Woodruff RN  Outcome: Progressing  10/15/2024 1116 by Dorothea Vail RN  Outcome: Progressing  Flowsheets (Taken 10/15/2024 0851)  Care Plan - Patient's Chronic Conditions and Co-Morbidity Symptoms are Monitored and Maintained or Improved: Monitor and assess patient's chronic conditions and comorbid symptoms for stability, deterioration, or improvement     Problem: Discharge Planning  Goal: Discharge to home or other facility with appropriate resources  10/15/2024 2227 by Ortiz Woodruff RN  Outcome: Progressing  10/15/2024 1116 by Dorothea Vail RN  Outcome: Progressing  Flowsheets (Taken 10/15/2024 0851)  Discharge to home or other facility with appropriate resources: Identify barriers to discharge with patient and caregiver     Problem: Pain  Goal: Verbalizes/displays adequate comfort level or baseline comfort level  10/15/2024 2227 by Ortiz Woodruff RN  Outcome: Progressing  10/15/2024 1116 by Dorothea Vail RN  Outcome: Progressing     Problem: Safety - Adult  Goal: Free from fall injury  10/15/2024 2227 by Ortiz Woodruff RN  Outcome: Progressing  10/15/2024 1116 by Dorothea Vail RN  Outcome: Progressing     Problem: Musculoskeletal - Adult  Goal: Return mobility to safest level of function  10/15/2024 2227 by Ortiz Woodruff RN  Outcome: Progressing  10/15/2024 1116 by Dorothea Vail RN  Outcome: Progressing  Flowsheets (Taken 10/15/2024 0851)  Return Mobility to Safest Level of Function:   Assess patient stability and activity tolerance for standing, transferring and ambulating with or without assistive devices   Obtain physical therapy/occupational therapy consults as needed  Goal: Return ADL status to a safe level of function  10/15/2024 2227 by Ortiz Woodruff RN  Outcome: Progressing  10/15/2024 1116 by 
  Problem: Chronic Conditions and Co-morbidities  Goal: Patient's chronic conditions and co-morbidity symptoms are monitored and maintained or improved  10/16/2024 1118 by Trevon Nair RN  Outcome: Progressing  10/15/2024 2227 by Ortiz Woodruff RN  Outcome: Progressing     Problem: Discharge Planning  Goal: Discharge to home or other facility with appropriate resources  10/16/2024 1118 by Trevon Nair RN  Outcome: Progressing  10/15/2024 2227 by Ortiz Woodruff RN  Outcome: Progressing     Problem: Pain  Goal: Verbalizes/displays adequate comfort level or baseline comfort level  10/16/2024 1118 by Trevon Nair RN  Outcome: Progressing  10/15/2024 2227 by Ortiz Woodruff RN  Outcome: Progressing     Problem: Safety - Adult  Goal: Free from fall injury  10/16/2024 1118 by Trevon Nair RN  Outcome: Progressing  10/15/2024 2227 by Ortiz Woodruff RN  Outcome: Progressing     Problem: Musculoskeletal - Adult  Goal: Return mobility to safest level of function  10/16/2024 1118 by Trevon Nair RN  Outcome: Progressing  10/15/2024 2227 by Ortiz Woodruff RN  Outcome: Progressing  Goal: Return ADL status to a safe level of function  10/16/2024 1118 by Trevon Nair RN  Outcome: Progressing  10/15/2024 2227 by Ortiz Woodruff RN  Outcome: Progressing     Problem: Skin/Tissue Integrity - Adult  Goal: Incisions, wounds, or drain sites healing without S/S of infection  10/16/2024 1118 by Trevon Nair RN  Outcome: Progressing  10/15/2024 2227 by Ortiz Woodruff RN  Outcome: Progressing     Problem: Skin/Tissue Integrity  Goal: Absence of new skin breakdown  Description: 1.  Monitor for areas of redness and/or skin breakdown  2.  Assess vascular access sites hourly  3.  Every 4-6 hours minimum:  Change oxygen saturation probe site  4.  Every 4-6 hours:  If on nasal continuous positive airway pressure, respiratory therapy assess nares and determine need for 
  Problem: Chronic Conditions and Co-morbidities  Goal: Patient's chronic conditions and co-morbidity symptoms are monitored and maintained or improved  10/18/2024 2206 by Shanice Negrete RN  Outcome: Progressing  10/18/2024 1131 by Machelle Gage RN  Outcome: Progressing  Flowsheets (Taken 10/18/2024 1014)  Care Plan - Patient's Chronic Conditions and Co-Morbidity Symptoms are Monitored and Maintained or Improved: Monitor and assess patient's chronic conditions and comorbid symptoms for stability, deterioration, or improvement     Problem: Discharge Planning  Goal: Discharge to home or other facility with appropriate resources  10/18/2024 2206 by Shanice Negrete RN  Outcome: Progressing  10/18/2024 1131 by Machelle Gage RN  Outcome: Progressing  Flowsheets (Taken 10/18/2024 1014)  Discharge to home or other facility with appropriate resources: Identify barriers to discharge with patient and caregiver     Problem: Pain  Goal: Verbalizes/displays adequate comfort level or baseline comfort level  Outcome: Progressing     Problem: Safety - Adult  Goal: Free from fall injury  10/18/2024 2206 by Shanice Negrete RN  Outcome: Progressing  10/18/2024 1131 by Machelle Gage RN  Outcome: Progressing     Problem: Musculoskeletal - Adult  Goal: Return mobility to safest level of function  10/18/2024 2206 by Shanice Negrete RN  Outcome: Progressing  10/18/2024 1131 by Machelle Gage RN  Outcome: Progressing  Flowsheets (Taken 10/18/2024 1014)  Return Mobility to Safest Level of Function: Assess patient stability and activity tolerance for standing, transferring and ambulating with or without assistive devices  Goal: Return ADL status to a safe level of function  Outcome: Progressing  Flowsheets (Taken 10/18/2024 1014 by Machelle Gage RN)  Return ADL Status to a Safe Level of Function: Obtain physical therapy/occupational therapy consults as needed     Problem: Skin/Tissue Integrity - Adult  Goal: Incisions, wounds, or drain 
  Problem: Chronic Conditions and Co-morbidities  Goal: Patient's chronic conditions and co-morbidity symptoms are monitored and maintained or improved  10/8/2024 0856 by Gisel Wakefield RN  Outcome: Progressing  10/7/2024 1950 by Joya Saldivar RN  Outcome: Progressing  Flowsheets (Taken 10/7/2024 1226 by Machelle Gage RN)  Care Plan - Patient's Chronic Conditions and Co-Morbidity Symptoms are Monitored and Maintained or Improved: Monitor and assess patient's chronic conditions and comorbid symptoms for stability, deterioration, or improvement     Problem: Discharge Planning  Goal: Discharge to home or other facility with appropriate resources  10/8/2024 0856 by Gisel Wakefield RN  Outcome: Progressing  10/7/2024 1950 by Joya Saldivar RN  Outcome: Progressing  Flowsheets (Taken 10/7/2024 1226 by Machelle Gage RN)  Discharge to home or other facility with appropriate resources: Identify barriers to discharge with patient and caregiver     Problem: Pain  Goal: Verbalizes/displays adequate comfort level or baseline comfort level  10/8/2024 0856 by Gisel Wakefield RN  Outcome: Progressing  10/7/2024 1950 by Joya Saldivar RN  Outcome: Progressing     Problem: Safety - Adult  Goal: Free from fall injury  10/8/2024 0856 by Gisel Wakefield RN  Outcome: Progressing  10/7/2024 1950 by Joya Saldivar RN  Outcome: Progressing     Problem: Musculoskeletal - Adult  Goal: Return mobility to safest level of function  10/8/2024 0856 by Gisel Wakefield RN  Outcome: Progressing  Flowsheets (Taken 10/8/2024 0845)  Return Mobility to Safest Level of Function:   Assess patient stability and activity tolerance for standing, transferring and ambulating with or without assistive devices   Assist with transfers and ambulation using safe patient handling equipment as needed   Ensure adequate protection for wounds/incisions during mobilization   Obtain physical therapy/occupational therapy 
  Problem: Chronic Conditions and Co-morbidities  Goal: Patient's chronic conditions and co-morbidity symptoms are monitored and maintained or improved  10/8/2024 2016 by Joya Saldivar RN  Outcome: Progressing  10/8/2024 0856 by Gisel Wakefield RN  Outcome: Progressing     Problem: Discharge Planning  Goal: Discharge to home or other facility with appropriate resources  10/8/2024 2016 by Joya Saldivar RN  Outcome: Progressing  10/8/2024 0856 by Gisel Wakefield RN  Outcome: Progressing     Problem: Pain  Goal: Verbalizes/displays adequate comfort level or baseline comfort level  10/8/2024 2016 by Joya Saldivar RN  Outcome: Progressing  10/8/2024 0856 by Gisel Wakefield RN  Outcome: Progressing     Problem: Safety - Adult  Goal: Free from fall injury  10/8/2024 2016 by Joya Saldivar RN  Outcome: Progressing  10/8/2024 0856 by Gisel Wakefield RN  Outcome: Progressing     Problem: Musculoskeletal - Adult  Goal: Return mobility to safest level of function  10/8/2024 2016 by Joya Saldivar RN  Outcome: Progressing  10/8/2024 0856 by Gisel Wakefield RN  Outcome: Progressing  Flowsheets (Taken 10/8/2024 0845)  Return Mobility to Safest Level of Function:   Assess patient stability and activity tolerance for standing, transferring and ambulating with or without assistive devices   Assist with transfers and ambulation using safe patient handling equipment as needed   Ensure adequate protection for wounds/incisions during mobilization   Obtain physical therapy/occupational therapy consults as needed   Apply continuous passive motion per provider or physical therapy orders to increase flexion toward goal  Goal: Return ADL status to a safe level of function  10/8/2024 2016 by Joya Saldivar RN  Outcome: Progressing  10/8/2024 0856 by Gisel Wakefield RN  Outcome: Progressing     Problem: Skin/Tissue Integrity - Adult  Goal: Incisions, wounds, or drain sites healing 
  Problem: Chronic Conditions and Co-morbidities  Goal: Patient's chronic conditions and co-morbidity symptoms are monitored and maintained or improved  10/9/2024 0817 by Gisel Wakefield RN  Outcome: Progressing  10/8/2024 2016 by Joya Saldivar RN  Outcome: Progressing     Problem: Discharge Planning  Goal: Discharge to home or other facility with appropriate resources  10/9/2024 0817 by Gisel Wakefield RN  Outcome: Progressing  10/8/2024 2016 by Joya Saldivar RN  Outcome: Progressing     Problem: Pain  Goal: Verbalizes/displays adequate comfort level or baseline comfort level  10/9/2024 0817 by Gisel Wakefield RN  Outcome: Progressing  10/8/2024 2016 by Joya Saldivar RN  Outcome: Progressing     Problem: Safety - Adult  Goal: Free from fall injury  10/9/2024 0817 by Gisel Wakefield RN  Outcome: Progressing  10/8/2024 2016 by Joya Saldivar RN  Outcome: Progressing     Problem: Musculoskeletal - Adult  Goal: Return mobility to safest level of function  10/9/2024 0817 by Gisel Wakefield RN  Outcome: Progressing  10/8/2024 2016 by Joya Saldivar RN  Outcome: Progressing  Goal: Return ADL status to a safe level of function  10/9/2024 0817 by Gisel Wakefield RN  Outcome: Progressing  10/8/2024 2016 by Joya Saldivar RN  Outcome: Progressing     Problem: Skin/Tissue Integrity - Adult  Goal: Incisions, wounds, or drain sites healing without S/S of infection  10/9/2024 0817 by Gisel Wakefield RN  Outcome: Progressing  10/8/2024 2016 by Joya Saldivar RN  Outcome: Progressing     Problem: Skin/Tissue Integrity  Goal: Absence of new skin breakdown  Description: 1.  Monitor for areas of redness and/or skin breakdown  2.  Assess vascular access sites hourly  3.  Every 4-6 hours minimum:  Change oxygen saturation probe site  4.  Every 4-6 hours:  If on nasal continuous positive airway pressure, respiratory therapy assess nares and determine need for 
  Problem: Chronic Conditions and Co-morbidities  Goal: Patient's chronic conditions and co-morbidity symptoms are monitored and maintained or improved  Outcome: Progressing     Problem: Discharge Planning  Goal: Discharge to home or other facility with appropriate resources  10/17/2024 1434 by Kayleen Salcedo, RN  Outcome: Progressing  10/17/2024 0627 by Monse Knox RN  Outcome: Progressing     Problem: Pain  Goal: Verbalizes/displays adequate comfort level or baseline comfort level  10/17/2024 1434 by Kayleen Salcedo, RN  Outcome: Progressing  10/17/2024 0627 by Monse Knox RN  Outcome: Progressing     Problem: Safety - Adult  Goal: Free from fall injury  10/17/2024 1434 by Kayleen Salcedo, RN  Outcome: Progressing  10/17/2024 0627 by Monse Knox RN  Outcome: Progressing     Problem: Musculoskeletal - Adult  Goal: Return mobility to safest level of function  Outcome: Progressing  Goal: Return ADL status to a safe level of function  Outcome: Progressing     
  Problem: Chronic Conditions and Co-morbidities  Goal: Patient's chronic conditions and co-morbidity symptoms are monitored and maintained or improved  Outcome: Progressing     Problem: Discharge Planning  Goal: Discharge to home or other facility with appropriate resources  Outcome: Progressing     Problem: Pain  Goal: Verbalizes/displays adequate comfort level or baseline comfort level  Outcome: Progressing     Problem: Safety - Adult  Goal: Free from fall injury  Outcome: Progressing     Problem: Musculoskeletal - Adult  Goal: Return mobility to safest level of function  Outcome: Progressing     Problem: Musculoskeletal - Adult  Goal: Return ADL status to a safe level of function  Outcome: Progressing     Problem: Skin/Tissue Integrity - Adult  Goal: Incisions, wounds, or drain sites healing without S/S of infection  Outcome: Progressing     Problem: Skin/Tissue Integrity  Goal: Absence of new skin breakdown  Description: 1.  Monitor for areas of redness and/or skin breakdown  2.  Assess vascular access sites hourly  3.  Every 4-6 hours minimum:  Change oxygen saturation probe site  4.  Every 4-6 hours:  If on nasal continuous positive airway pressure, respiratory therapy assess nares and determine need for appliance change or resting period.  Outcome: Progressing     Problem: Nutrition Deficit:  Goal: Optimize nutritional status  Outcome: Progressing     
  Problem: Chronic Conditions and Co-morbidities  Goal: Patient's chronic conditions and co-morbidity symptoms are monitored and maintained or improved  Outcome: Progressing     Problem: Discharge Planning  Goal: Discharge to home or other facility with appropriate resources  Outcome: Progressing     Problem: Pain  Goal: Verbalizes/displays adequate comfort level or baseline comfort level  Outcome: Progressing     Problem: Safety - Adult  Goal: Free from fall injury  Outcome: Progressing     Problem: Musculoskeletal - Adult  Goal: Return mobility to safest level of function  Outcome: Progressing  Goal: Return ADL status to a safe level of function  Outcome: Progressing     
  Problem: Chronic Conditions and Co-morbidities  Goal: Patient's chronic conditions and co-morbidity symptoms are monitored and maintained or improved  Outcome: Progressing     Problem: Discharge Planning  Goal: Discharge to home or other facility with appropriate resources  Outcome: Progressing     Problem: Pain  Goal: Verbalizes/displays adequate comfort level or baseline comfort level  Outcome: Progressing     Problem: Safety - Adult  Goal: Free from fall injury  Outcome: Progressing     Problem: Musculoskeletal - Adult  Goal: Return mobility to safest level of function  Outcome: Progressing  Goal: Return ADL status to a safe level of function  Outcome: Progressing     Problem: Skin/Tissue Integrity - Adult  Goal: Incisions, wounds, or drain sites healing without S/S of infection  Outcome: Progressing     Problem: Skin/Tissue Integrity  Goal: Absence of new skin breakdown  Description: 1.  Monitor for areas of redness and/or skin breakdown  2.  Assess vascular access sites hourly  3.  Every 4-6 hours minimum:  Change oxygen saturation probe site  4.  Every 4-6 hours:  If on nasal continuous positive airway pressure, respiratory therapy assess nares and determine need for appliance change or resting period.  Outcome: Progressing     
  Problem: Chronic Conditions and Co-morbidities  Goal: Patient's chronic conditions and co-morbidity symptoms are monitored and maintained or improved  Outcome: Progressing     Problem: Discharge Planning  Goal: Discharge to home or other facility with appropriate resources  Outcome: Progressing     Problem: Pain  Goal: Verbalizes/displays adequate comfort level or baseline comfort level  Outcome: Progressing     Problem: Safety - Adult  Goal: Free from fall injury  Outcome: Progressing     Problem: Musculoskeletal - Adult  Goal: Return mobility to safest level of function  Outcome: Progressing  Goal: Return ADL status to a safe level of function  Outcome: Progressing     Problem: Skin/Tissue Integrity - Adult  Goal: Incisions, wounds, or drain sites healing without S/S of infection  Outcome: Progressing     Problem: Skin/Tissue Integrity  Goal: Absence of new skin breakdown  Description: 1.  Monitor for areas of redness and/or skin breakdown  2.  Assess vascular access sites hourly  3.  Every 4-6 hours minimum:  Change oxygen saturation probe site  4.  Every 4-6 hours:  If on nasal continuous positive airway pressure, respiratory therapy assess nares and determine need for appliance change or resting period.  Outcome: Progressing     Problem: Nutrition Deficit:  Goal: Optimize nutritional status  Outcome: Progressing     
  Problem: Chronic Conditions and Co-morbidities  Goal: Patient's chronic conditions and co-morbidity symptoms are monitored and maintained or improved  Outcome: Progressing     Problem: Discharge Planning  Goal: Discharge to home or other facility with appropriate resources  Outcome: Progressing     Problem: Pain  Goal: Verbalizes/displays adequate comfort level or baseline comfort level  Outcome: Progressing     Problem: Safety - Adult  Goal: Free from fall injury  Outcome: Progressing     Problem: Musculoskeletal - Adult  Goal: Return mobility to safest level of function  Outcome: Progressing  Goal: Return ADL status to a safe level of function  Outcome: Progressing     Problem: Skin/Tissue Integrity - Adult  Goal: Incisions, wounds, or drain sites healing without S/S of infection  Outcome: Progressing     Problem: Skin/Tissue Integrity  Goal: Absence of new skin breakdown  Description: 1.  Monitor for areas of redness and/or skin breakdown  2.  Assess vascular access sites hourly  3.  Every 4-6 hours minimum:  Change oxygen saturation probe site  4.  Every 4-6 hours:  If on nasal continuous positive airway pressure, respiratory therapy assess nares and determine need for appliance change or resting period.  Outcome: Progressing     Problem: Physical Therapy - Adult  Goal: By Discharge: Performs mobility at highest level of function for planned discharge setting.  See evaluation for individualized goals.  Description: FUNCTIONAL STATUS PRIOR TO ADMISSION: Patient was independent and active without use of DME.    HOME SUPPORT PRIOR TO ADMISSION: The patient lived with wife and was modified independent for ADLS.    Physical Therapy Goals  Initiated 10/9/2024  Pt stated goal: Landon georges  Pt will be I with LE HEP in 7 days.  Pt will perform bed mobility with MI in 7 days.  Pt will perform transfers with Minimal Assist in 7 days.   Pt will amb 20 feet with LRAD safely with Minimal Assist in 7 days.  Pt will verbalize 
  Problem: Chronic Conditions and Co-morbidities  Goal: Patient's chronic conditions and co-morbidity symptoms are monitored and maintained or improved  Outcome: Progressing  Flowsheets (Taken 10/14/2024 0955 by Machelle Gage RN)  Care Plan - Patient's Chronic Conditions and Co-Morbidity Symptoms are Monitored and Maintained or Improved: Monitor and assess patient's chronic conditions and comorbid symptoms for stability, deterioration, or improvement     Problem: Discharge Planning  Goal: Discharge to home or other facility with appropriate resources  Outcome: Progressing  Flowsheets (Taken 10/14/2024 0955 by Machelle Gage RN)  Discharge to home or other facility with appropriate resources: Identify barriers to discharge with patient and caregiver     Problem: Pain  Goal: Verbalizes/displays adequate comfort level or baseline comfort level  Outcome: Progressing     Problem: Safety - Adult  Goal: Free from fall injury  Outcome: Progressing     Problem: Musculoskeletal - Adult  Goal: Return mobility to safest level of function  Outcome: Progressing  Flowsheets (Taken 10/14/2024 0955 by Machelle Gage RN)  Return Mobility to Safest Level of Function: Assess patient stability and activity tolerance for standing, transferring and ambulating with or without assistive devices  Goal: Return ADL status to a safe level of function  Outcome: Progressing  Flowsheets (Taken 10/14/2024 0955 by Machelle Gage RN)  Return ADL Status to a Safe Level of Function: Obtain physical therapy/occupational therapy consults as needed     Problem: Skin/Tissue Integrity - Adult  Goal: Incisions, wounds, or drain sites healing without S/S of infection  Outcome: Progressing  Flowsheets (Taken 10/14/2024 0955 by Machelle Gage, RN)  Incisions, Wounds, or Drain Sites Healing Without Sign and Symptoms of Infection: Implement wound care per orders     Problem: Skin/Tissue Integrity  Goal: Absence of new skin breakdown  Outcome: Progressing   
  Problem: Discharge Planning  Goal: Discharge to home or other facility with appropriate resources  Outcome: Progressing     
  Problem: Discharge Planning  Goal: Discharge to home or other facility with appropriate resources  Outcome: Progressing     Problem: Pain  Goal: Verbalizes/displays adequate comfort level or baseline comfort level  Outcome: Progressing     Problem: Safety - Adult  Goal: Free from fall injury  Outcome: Progressing     
OCCUPATIONAL THERAPY EVALUATION  Patient: Keo Brownlee Jr. (69 y.o. male)  Date: 10/9/2024  Primary Diagnosis: Sepsis (HCC) [A41.9]  Procedure(s) (LRB):  LEFT TOTAL KNEE REVISION, POLY EXCHANGE, STIMULAN BEADS (Left) 1 Day Post-Op   Precautions: Bed Alarm, Fall Risk   Left Lower Extremity Weight Bearing: Weight Bearing As Tolerated            Recommendations for nursing mobility: Out of bed to chair for meals, Encourage HEP in prep for ADLs/mobility; see handout for details, Frequent repositioning to prevent skin breakdown, Use of bed/chair alarm for safety, LE elevation for management of edema, and WBAT LLE     In place during session:Peripheral IV and EKG/telemetry   ASSESSMENT  Pt is a 69 y.o. male presenting to [facility] with c/o leg swelling, admitted 10/6/24 and currently being treated for sepsis, DM II, JASON on CKD, neuropathy, chronic DVT, hx R hip replacement. Pt s/p washout and revision of L knee 10/8 by Dr. Jimenez. Pt received semi-supine in bed with wife at bedside upon arrival, AXO x 4, and agreeable to OT evaluation.     Based on current observations, pt presents with decreased  functional mobility, independence in ADLs, high-level IADLs, strength, body mechanics, activity tolerance, endurance, safety awareness, balance, posture, increased pain levels (see below for objective details and assist levels).     Overall, pt tolerates session fair with c/o L knee pain with activity. Bed mobility and OOB functional mobility completed with mod-max A and additional time. Pt req'd additional time for all functional mobility s/t pain. Multimodal cuing req'd for hand placement during transfers. Grooming completed with set up A while seated EOB. Total A req'd for LB dressing. Pt will benefit from continued skilled OT services to address current impairments and improve IND and safety with self cares and functional transfers/mobility. Current OT d/c recommendation High intensity and comprehensive skilled occupational 
OCCUPATIONAL THERAPY TREATMENT  Patient: Keo Brownlee Jr. (69 y.o. male)  Date: 10/11/2024  Primary Diagnosis: Sepsis (HCC) [A41.9]  Procedure(s) (LRB):  LEFT TOTAL KNEE REVISION, POLY EXCHANGE, STIMULAN BEADS (Left) 3 Days Post-Op   Precautions: Bed Alarm, Fall Risk   Left Lower Extremity Weight Bearing: Weight Bearing As Tolerated            Recommendations for nursing mobility: Out of bed to chair for meals, Encourage HEP in prep for ADLs/mobility; see handout for details, Frequent repositioning to prevent skin breakdown, AD and gt belt for bed to chair , Amb to bathroom with AD and gait belt, and Assist x1    In place during session: None  Chart, occupational therapy assessment, plan of care, and goals were reviewed.    ASSESSMENT  Patient continues with skilled OT services and is progressing towards goals.  Pt received seated in chair upon arrival, AXO x 4 and agreeable to BANGURA tx at this time. Pt cooperative and demonstrated fair effort during activities with additional time d/t pain. Pt noted with improved pain tolerance at rest however increased pain 10/10 with movement in L LE.  Pt completed grooming seated in chair d/t decreased mobility and pain. Pt required increased assist from recliner d/t lower surface, then ambulated to bed with AD with CGA/Min A for steadying and verbal cues for correct hand placement and RW mgmt for safety. Pt very slow to perform transfer task d/t pain. Pt mod A for jermaine LE d/t pain.    Overall, pt limited by pain in L LE and generalized weakness that interferes with performance in ADL's and functional tf's safely.  Will continue to progress. Current OT recommendations for discharge High intensity and comprehensive skilled occupational therapy in a multidisciplinary setting as patient is working towards tolerating up to 3 hours of therapy/day x 5-7 days/week.           Start of session End of session   SPO2 (%) 97 97   Heart Rate (BPM) 84 86         GOALS:    Problem: 
OCCUPATIONAL THERAPY TREATMENT  Patient: Keo Brownlee Jr. (69 y.o. male)  Date: 10/12/2024  Primary Diagnosis: Sepsis (HCC) [A41.9]  Procedure(s) (LRB):  LEFT TOTAL KNEE REVISION, POLY EXCHANGE, STIMULAN BEADS (Left) 4 Days Post-Op   Precautions: Bed Alarm, Fall Risk   Left Lower Extremity Weight Bearing: Weight Bearing As Tolerated            Recommendations for nursing mobility: Out of bed to chair for meals, Encourage HEP in prep for ADLs/mobility; see handout for details, Frequent repositioning to prevent skin breakdown, LE elevation for management of edema, AD and gt belt for bed to chair , Amb to bathroom with AD and gait belt, and Assist x1    In place during session: None  Chart, occupational therapy assessment, plan of care, and goals were reviewed.    ASSESSMENT  Patient continues with skilled OT services and is progressing towards goals.  Pt received semi-supine in bed upon arrival, AXO x 4 and agreeable to BANGRUA tx at this time. Pt cooperative and demonstrated fair effort during activities with additional time d/t extreme pain even with pain medication taken earlier. Pain continues to limit pt's mobility.  Pt completed STS with mod/max A and  bed<>commode riser over toilet using AD with min A and verbal cues for correct hand placement and RW mgmt for safety.  Pt is extremely slow d/t pain. Reviewed HEP and added resistance to improve UE weakness and fatigue, see grid below.  Encouraged pt to perform HEP 3x a day.  (See below for objective details and assist levels)     Overall, pt limited by pain in L LE, decreased activity tolerance and generalized weakness that interferes with performance in ADL's and functional tf's safely.  Will continue to progress. Current OT recommendations for discharge High intensity and comprehensive skilled occupational therapy in a multidisciplinary setting as patient is working towards tolerating up to 3 hours of therapy/day x 5-7 days/week.           Start of session End 
OCCUPATIONAL THERAPY TREATMENT  Patient: Keo Brownlee Jr. (69 y.o. male)  Date: 10/14/2024  Primary Diagnosis: Sepsis (HCC) [A41.9]  Procedure(s) (LRB):  LEFT TOTAL KNEE REVISION, POLY EXCHANGE, STIMULAN BEADS (Left) 6 Days Post-Op   Precautions: Bed Alarm, Fall Risk   Left Lower Extremity Weight Bearing: Weight Bearing As Tolerated            Recommendations for nursing mobility: Out of bed to chair for meals, Encourage HEP in prep for ADLs/mobility; see handout for details, AD and gt belt for bed to chair , Amb to bathroom with AD and gait belt, and Assist x1    In place during session: None  Chart, occupational therapy assessment, plan of care, and goals were reviewed.  ASSESSMENT  Patient continues with skilled OT services and is progressing towards goals. Pt seated in recliner upon BANGURA arrival, agreeable to session. Pt A&O x 4. Pt pleasant and appears very motivated to increase functional level to return to PLOF. Pt completed UE therex, see grid below for details, to maintain/ increase strength and endurance to aid in adl performance. Education provided on HEP, energy conservation, safety awareness and PLB to aid w/ decreasing pain w/ pt verbalizing good understanding. (See below for objective details and assist levels).     Overall pt tolerated session well today with rest breaks.Current OT recommendations for discharge High intensity and comprehensive skilled occupational therapy in a multidisciplinary setting as patient is working towards tolerating up to 3 hours of therapy/day x 5-7 days/week. Will continue to benefit from skilled OT services, and will continue to progress as tolerated.       GOALS:    Problem: Occupational Therapy - Adult  Goal: By Discharge: Performs self-care activities at highest level of function for planned discharge setting.  See evaluation for individualized goals.  Description: FUNCTIONAL STATUS PRIOR TO ADMISSION:  Pt was independent using no AD for ADLs and functional 
OCCUPATIONAL THERAPY TREATMENT  Patient: Keo Brownlee Jr. (69 y.o. male)  Date: 10/16/2024  Primary Diagnosis: Sepsis (HCC) [A41.9]  Procedure(s) (LRB):  LEFT TOTAL KNEE REVISION, POLY EXCHANGE, STIMULAN BEADS (Left) 8 Days Post-Op   Precautions: Bed Alarm, Fall Risk   Left Lower Extremity Weight Bearing: Weight Bearing As Tolerated            Recommendations for nursing mobility: Out of bed to chair for meals, Encourage HEP in prep for ADLs/mobility; see handout for details, AD and gt belt for bed to chair , Amb to bathroom with AD and gait belt, and Assist x1    In place during session: None  Chart, occupational therapy assessment, plan of care, and goals were reviewed.  ASSESSMENT  Patient continues with skilled OT services and is progressing towards goals. Pt seated in recliner at end of PT session upon BANGURA arrival, agreeable to session. Pt A&O x 4. Spouse in room during session w/ permission.  Pt completed UE therex, see grid below for details, to maintain/ increase strength and endurance to aid in adl performance. Pt able to return demonstrate HEP w/ good accuracy.Pt requested use of toilet. Pt required min A w/ sts and used bsc over toilet to increase height. Pt required TA for posterior reg care.  Pt returned to recliner and legs elevated. Pt left in recliner with all known needs met.  (See below for objective details and assist levels).     Overall pt tolerated session well today with rest. Pt pleasant and appears very motivated to increase functional level to return to PLOF. Current OT recommendations for discharge High intensity and comprehensive skilled occupational therapy in a multidisciplinary setting as patient is working towards tolerating up to 3 hours of therapy/day x 5-7 days/week. Will continue to benefit from skilled OT services, and will continue to progress as tolerated.       GOALS:    Problem: Occupational Therapy - Adult  Goal: By Discharge: Performs self-care activities at highest 
MI in 7 days.  Pt will perform transfers with Minimal Assist in 7 days.   Pt will amb 20 feet with LRAD safely with Minimal Assist in 7 days.  Pt will verbalize and demonstrate compliance with fall precautions  in 7 days.   Pt will demonstrate improvement in standing/gait balance from poor to fair in 7 days.    10/10/2024 1451 by Anabella Winters PT  Outcome: Progressing     Problem: Occupational Therapy - Adult  Goal: By Discharge: Performs self-care activities at highest level of function for planned discharge setting.  See evaluation for individualized goals.  Description: FUNCTIONAL STATUS PRIOR TO ADMISSION:  Pt was independent using no AD for ADLs and functional mobility.    HOME SUPPORT: The patient lived with his wife.    Occupational Therapy Goals:  Initiated 10/9/2024  Patient/Family stated goal: Go home  1.  Patient will perform lower body dressing with [place] within 7 day(s).  2.  Patient will perform grooming with [place] within 7 day(s).  3.  Patient will perform bathing with [place] within 7 day(s).  4.  Patient will perform toilet transfers with [place]  within 7 day(s).  5.  Patient will perform all aspects of toileting with [place] within 7 day(s).  6.  Patient will participate in upper extremity therapeutic exercise/activities with [place] within 7 day(s).    10/10/2024 1254 by Anabell Keane OTA  Outcome: Progressing     
Management  Wheelchair Management: No  Ambulation/Gait Training:  Left Side Weight Bearing: As tolerated  Gait  Gait Training: Yes  Left Side Weight Bearing: As tolerated  Overall Level of Assistance: Minimum assistance;Assist X1;Contact-guard assistance  Distance (ft): 10 Feet  Assistive Device: Walker, rolling;Gait belt  Speed/Vee: Slow  Stance: Left decreased  Gait Abnormalities: Step to gait     Pain Ratin/10 L knee pain reported  Pain Intervention(s):   nursing notified, nursing notified and addressing, patient medicated for pain prior to session, and elevation    Activity Tolerance:   Fair     After treatment patient left in no apparent distress:   Bed locked and returned to lowest position, Patient left in no apparent distress sitting up in chair, Call bell within reach, and Heels elevated for pressure relief, and nsg updated     COMMUNICATION/COLLABORATION:   The patient’s plan of care was discussed with: Registered nurse    Patient Education  Education Given To: Patient  Education Provided: Role of Therapy;Plan of Care;Home Exercise Program;Precautions;Transfer Training;Equipment;Energy Conservation  Education Method: Verbal;Demonstration  Education Outcome: Verbalized understanding;Demonstrated understanding;Continued education needed    Stefania Santizo PTA  Minutes: 29  
Training  Transfer Training: Yes  Sit to Stand: Moderate assistance;Assist X1;Maximum assistance;Additional time  Stand to Sit: Assist X1;Moderate assistance;Additional time;Minimum assistance  Toilet Transfer: Assist X1;Moderate assistance;Additional time  Balance:  Balance  Sitting: Intact  Standing: Impaired  Standing - Static: Constant support;Fair  Standing - Dynamic: Fair;Constant support  Wheelchair Mobility:  Wheelchair Management  Wheelchair Management: No  Ambulation/Gait Training:  Left Side Weight Bearing: As tolerated  Gait  Gait Training: Yes  Left Side Weight Bearing: As tolerated  Overall Level of Assistance: Minimum assistance;Assist X1;Contact-guard assistance  Distance (ft): 16 Feet  Assistive Device: Walker, rolling;Gait belt  Speed/Vee: Slow  Stance: Left decreased  Gait Abnormalities: Step to gait    Therapeutic Exercises:     EXERCISE   Sets   Reps   Active Active Assist   Passive Self ROM   Comments   Ankle Pumps  12 [x] [] [] []    Quad Sets/Glut Sets   [] [] [] []    Hamstring Sets   [] [] [] []    Short Arc Quads   [] [] [] []    Heel Slides  5 [] [] [x] []    Straight Leg Raises   [] [] [] []    Hip abd/add   [] [] [] []    Long Arc Quads  12 [x] [] [x] []    Marching   [] [] [] []    Seated HR/TR   [] [] [] []       [] [] [] []       Pain Ratin/10 L knee pain reported, 10/10 FACES scale  Pain Intervention(s):   nursing notified and addressing, patient medicated for pain prior to session, ice, and elevation    Activity Tolerance:   Fair     After treatment patient left in no apparent distress:   Bed locked and returned to lowest position, Patient left in no apparent distress in bed, Call bell within reach, Caregiver / family present, and Side rails x3, and nsg updated     COMMUNICATION/COLLABORATION:   The patient’s plan of care was discussed with: Registered nurse    Patient Education  Education Given To: Patient  Education Provided: Role of Therapy;Plan of Care;Home Exercise 
    Tone & Sensation:   Tone: Normal         Functional Mobility:  Bed Mobility:     Bed Mobility Training  Bed Mobility Training: Yes  Overall Level of Assistance: Moderate assistance;Assist X1;Additional time  Interventions: Safety awareness training;Verbal cues  Rolling: Moderate assistance;Assist X1;Additional time  Supine to Sit: Moderate assistance;Assist X1;Additional time  Sit to Supine: Moderate assistance;Assist X1;Additional time  Scooting: Minimum assistance;Moderate assistance;Additional time;Assist X1  Transfers:     Transfer Training  Transfer Training: Yes  Overall Level of Assistance: Minimum assistance;Moderate assistance;Additional time;Assist X1  Interventions: Safety awareness training;Verbal cues;Manual cues;Weight shifting training/pressure relief;Tactile cues;Demonstration  Sit to Stand: Moderate assistance;Assist X1;Additional time (bed height raised)  Stand to Sit: Minimum assistance;Moderate assistance;Assist X1;Additional time  Balance:               Balance  Sitting: Intact  Standing: Impaired  Standing - Static: Fair;Constant support  Standing - Dynamic: Poor;Constant support  Wheelchair Mobility:        Ambulation/Gait Training:           Left Side Weight Bearing: As tolerated                    Gait  Gait Training: Yes  Left Side Weight Bearing: As tolerated  Overall Level of Assistance: Moderate assistance;Assist X1;Additional time  Distance (ft): 3 Feet  Assistive Device: Gait belt;Walker, rolling  Interventions: Safety awareness training;Tactile cues;Verbal cues;Weight shifting training/pressure relief;Demonstration  Gait Abnormalities: Shuffling gait;Antalgic                   Therapeutic Intervention provided:   bed mobility , EOB transfers, OOB transfers, amb with AD, LE therapeutic exercises, seated dynamic balance, and standing dynamic balance    Functional Measure:  [facility] AM-PAC™ “6 Clicks”         Basic Mobility Inpatient Short Form  How much difficulty does the 
Intervention(s):   nursing notified and addressing    Activity Tolerance:   Good and requires rest breaks    After treatment patient left in no apparent distress:   Bed locked and returned to lowest position, Patient left in no apparent distress in bed, Call bell within reach, Bed/ chair alarm activated, Caregiver / family present, and Side rails x3, and nsg updated     COMMUNICATION/EDUCATION:   The patient’s plan of care was discussed with: Physical therapy assistant and Registered nurse    Patient Education  Education Given To: Patient  Education Provided: Role of Therapy;Home Exercise Program;Fall Prevention Strategies;Plan of Care;Energy Conservation  Education Method: Verbal;Demonstration  Barriers to Learning: None  Education Outcome: Verbalized understanding;Demonstrated understanding;Continued education needed    Thank you for this referral.  DANA Maier  Minutes: 45    
Registered nurse, Physician, and Case management        Patient Education  Education Given To: Patient  Education Provided: Role of Therapy;Home Exercise Program;Transfer Training;Mobility Training;Energy Conservation  Education Method: Verbal  Barriers to Learning: None  Education Outcome: Verbalized understanding;Demonstrated understanding;Continued education needed    Thank you for this referral.  DANA CORREA  Minutes: 44   
addressing, ice, and elevation    Activity Tolerance:   Good, Fair , and requires frequent rest breaks    After treatment patient left in no apparent distress sitting up in chair, call bell within reach, caregiver / family present, LEs elevated, ice applied and nsg updated     COMMUNICATION/COLLABORATION:   The patient’s plan of care was discussed with: Registered nurse    Patient Education  Education Given To: Patient;Family  Education Provided: Role of Therapy;Plan of Care;Home Exercise Program;Precautions;Transfer Training;Energy Conservation;Family Education;Equipment;Fall Prevention Strategies  Education Method: Demonstration;Verbal;Teach Back  Barriers to Learning: None  Education Outcome: Verbalized understanding;Demonstrated understanding;Continued education needed    Luz Barnett PTA  Minutes: 59

## 2024-10-21 NOTE — CARE COORDINATION
Transition of Care Plan:    RUR: 10%  Prior Level of Functioning: Mod Dep  Disposition: SNF  If SNF or IPR: Date FOC offered: 10/16/24  Date FOC received: 10/16/24  Accepting facility: [facility]  Date authorization started with reference number: 10/16/24 [id number]   Date authorization received and expires: 10/17/24  10/22/24  Follow up appointments: PER SNF  DME needed:   Transportation at discharge: STRETCHER   IM/IMM Medicare/ letter given: YES  Is patient a [place] and connected with [facility]? NO   If yes, was [place] transfer form completed and [facility] notified?   Caregiver Contact: SPOUSE  Discharge Caregiver contacted prior to discharge? YES  Care Conference needed? NO  Barriers to discharge: NONE    Pt to dc to [facility] today, number for report , [phone]. Pt will need stretcher transport, HUC assisting.

## 2024-10-21 NOTE — DISCHARGE INSTR - DIET

## 2024-10-21 NOTE — PROGRESS NOTES
Infectious Disease Progress Note          Subjective:   Doing well clinically, reports left knee pain, no acute events since last seen, afebrile, WBC normalized on today's labs  Objective:   Physical Exam:     BP (!) 149/76   Pulse 84   Temp 98.2 °F (36.8 °C) (Oral)   Resp 18   Ht 1.803 m (5' 11\")   Wt 95.7 kg (211 lb)   SpO2 96%   BMI 29.43 kg/m²  O2 Flow Rate (L/min): 3 L/min O2 Device: None (Room air)    Temp (24hrs), Av °F (36.7 °C), Min:97.5 °F (36.4 °C), Max:98.2 °F (36.8 °C)    10/10 0701 - 10/10 1900  In: 110 [P.O.:100; I.V.:10]  Out: 220 [Urine:220]   10/08 1901 - 10/10 07  In: 200 [P.O.:200]  Out: 1450 [Urine:1450]    General: NAD, AAO x 4  HEENT: IVY, Moist mucosa   Lungs: CTA b/l, decreased at the bases   Heart: S1S2+, RRR, no murmur  Abdo: Soft, NT, ND, +BS   : No pérez cath   Exts: left knee dressing in place, tenderness, DROM   Skin: No wounds, No rashes or lesions    Data Review:       Recent Days:    Recent Labs     10/08/24  0756 10/09/24  0725 10/10/24  0912   WBC 13.4* 10.5 8.7   HGB 10.8* 9.2* 9.1*   HCT 31.7* 28.6* 27.5*    273 285     Recent Labs     10/07/24  1912 10/08/24  0756 10/09/24  0725   BUN 31* 33* 37*   CREATININE 1.81* 1.73* 1.85*       Lab Results   Component Value Date/Time    CRP 14.00 10/10/2024 09:12 AM          Microbiology     Results       Procedure Component Value Units Date/Time    Culture, Tissue (with Gram Stain) [[id number]] Collected: 10/08/24 1420    Order Status: Completed Specimen: Tissue from Leg Updated: 10/10/24 1030     Special Requests No Special Requests        Gram Stain No wbc's seen         No organisms seen        Culture       No growth thus far, holding 14 days.          Culture, Tissue (with Gram Stain) [[id number]] Collected: 10/08/24 1420    Order Status: Completed Specimen: Tissue from Leg Updated: 10/10/24 1030     Special Requests No Special Requests        Gram Stain No wbc's seen         No organisms 
                    Infectious Disease Progress Note          Subjective:   Pt seen and examined at bedside. Doing well, denies new complaints, no acute events since last seen. Left knee pain is better since debridement. Antibiotics changed from Zosyn to Ceftriaxone.   Objective:   Physical Exam:     /72   Pulse 59   Temp 97.4 °F (36.3 °C) (Axillary)   Resp 16   Ht 1.803 m (5' 11\")   Wt 95.7 kg (211 lb)   SpO2 99%   BMI 29.43 kg/m²  O2 Flow Rate (L/min): 3 L/min O2 Device: None (Room air)    Temp (24hrs), Av.8 °F (36.6 °C), Min:97.3 °F (36.3 °C), Max:98.5 °F (36.9 °C)    10/09 0701 - 10/09 1900  In: 100 [P.O.:100]  Out: 550 [Urine:550]   10/07 1901 - 10/09 0700  In: 515 [I.V.:405]  Out: 1450 [Urine:850]    General: NAD, AAO x 4  HEENT: IVY, Moist mucosa   Lungs: CTA b/l, decreased at the bases   Heart: S1S2+, RRR, no murmur  Abdo: Soft, NT, ND, +BS   : No pérez cath   Exts: left knee dressing in place, decreased tenderness, improved ROM   Skin: No wounds, No rashes or lesions    Data Review:       Recent Days:    Recent Labs     10/07/24  0816 10/08/24  0756 10/09/24  0725   WBC 16.1* 13.4* 10.5   HGB 11.1* 10.8* 9.2*   HCT 32.4* 31.7* 28.6*    287 273     Recent Labs     10/07/24  1912 10/08/24  0756 10/09/24  0725   BUN 31* 33* 37*   CREATININE 1.81* 1.73* 1.85*       Lab Results   Component Value Date/Time    CRP 34.30 10/06/2024 05:01 PM          Microbiology     Results       Procedure Component Value Units Date/Time    Culture, Tissue (with Gram Stain) [[id number]] Collected: 10/08/24 1420    Order Status: Sent Specimen: Tissue from Leg Updated: 10/09/24 0812    Culture, Tissue (with Gram Stain) [[id number]] Collected: 10/08/24 1420    Order Status: Sent Specimen: Tissue from Leg Updated: 10/09/24 0812    Culture, Tissue (with Gram Stain) [[id number]] Collected: 10/08/24 1420    Order Status: Sent Specimen: Tissue from Leg Updated: 10/09/24 0813    MRSA by PCR [[id number]] 
                    Infectious Disease Progress Note          Subjective:   Remains clinically stable, no acute events since last seen.  Still with intermittent left knee pain  Objective:   Physical Exam:     /71   Pulse 81   Temp 97.5 °F (36.4 °C) (Oral)   Resp 16   Ht 1.803 m (5' 11\")   Wt 99.2 kg (218 lb 11.1 oz)   SpO2 95%   BMI 30.50 kg/m²  O2 Flow Rate (L/min): 3 L/min O2 Device: None (Room air)    Temp (24hrs), Av °F (36.7 °C), Min:97.5 °F (36.4 °C), Max:98.8 °F (37.1 °C)    No intake/output data recorded.   10/14 1901 - 10/16 0700  In: 1810 [P.O.:1800; I.V.:10]  Out: 1750 [Urine:1750]    General: NAD, AAO x 4  HEENT: IVY, Moist mucosa   Lungs: CTA b/l, decreased at the bases   Heart: S1S2+, RRR, no murmur  Abdo: Soft, NT, ND, +BS   : No pérez cath   Exts: DROM in  left knee   Skin: No wounds, No rashes or lesions    Data Review:       Recent Days:    No results for input(s): \"WBC\", \"HGB\", \"HCT\", \"PLT\" in the last 72 hours.    No results for input(s): \"BUN\", \"CREATININE\" in the last 72 hours.      Lab Results   Component Value Date/Time    CRP 7.89 10/16/2024 04:35 AM       Microbiology     Results       Procedure Component Value Units Date/Time    Culture, Tissue (with Gram Stain) [[id number]]  (Abnormal) Collected: 10/08/24 142    Order Status: Completed Specimen: Tissue from Leg Updated: 10/11/24 0808     Special Requests No Special Requests        Gram Stain No wbc's seen         No organisms seen        Culture       Rare Streptococcus agalactiae serogroup B REFER TO [id number] FOR SENSITIVITIES          Culture, Tissue (with Gram Stain) [[id number]] Collected: 10/08/24 142    Order Status: Completed Specimen: Tissue from Leg Updated: 10/10/24 1030     Special Requests No Special Requests        Gram Stain No wbc's seen         No organisms seen        Culture       No growth thus far, holding 14 days.          Culture, Tissue (with Gram Stain) [[id number]]  (Abnormal) Collected: 
                    Infectious Disease Progress Note          Subjective:   Stable, left knee pain is better, denies new complaints.  No acute events since last seen  Objective:   Physical Exam:     BP (!) 154/86   Pulse 76   Temp 97.4 °F (36.3 °C) (Oral)   Resp 24   Ht 1.803 m (5' 11\")   Wt 95.7 kg (211 lb)   SpO2 98%   BMI 29.43 kg/m²  O2 Flow Rate (L/min): 3 L/min O2 Device: None (Room air)    Temp (24hrs), Av.2 °F (36.8 °C), Min:97.4 °F (36.3 °C), Max:99 °F (37.2 °C)    10/11 0701 - 10/11 1900  In: 10 [I.V.:10]  Out: 240 [Urine:240]   10/09 1901 - 10/11 0700  In: 640 [P.O.:630; I.V.:10]  Out: 2180 [Urine:2180]    General: NAD, AAO x 4  HEENT: IVY, Moist mucosa   Lungs: CTA b/l, decreased at the bases   Heart: S1S2+, RRR, no murmur  Abdo: Soft, NT, ND, +BS   : No pérez cath   Exts: left knee dressing in place, tenderness, DROM   Skin: No wounds, No rashes or lesions    Data Review:       Recent Days:    Recent Labs     10/09/24  0725 10/10/24  09   WBC 10.5 8.7   HGB 9.2* 9.1*   HCT 28.6* 27.5*    285     Recent Labs     10/09/24  07   BUN 37*   CREATININE 1.85*       Lab Results   Component Value Date/Time    CRP 15.50 10/11/2024 05:45 AM          Microbiology     Results       Procedure Component Value Units Date/Time    Culture, Tissue (with Gram Stain) [[id number]]  (Abnormal) Collected: 10/08/24 1420    Order Status: Completed Specimen: Tissue from Leg Updated: 10/11/24 0808     Special Requests No Special Requests        Gram Stain No wbc's seen         No organisms seen        Culture       Rare Streptococcus agalactiae serogroup B REFER TO [id number] FOR SENSITIVITIES          Culture, Tissue (with Gram Stain) [[id number]] Collected: 10/08/24 1420    Order Status: Completed Specimen: Tissue from Leg Updated: 10/10/24 1030     Special Requests No Special Requests        Gram Stain No wbc's seen         No organisms seen        Culture       No growth thus far, holding 14 days.       
    Hospitalist Progress Note               Daily Progress Note: 10/10/2024      Hospital Day: 5     Chief complaint:   Chief Complaint   Patient presents with    Leg Swelling        Subjective:   Hospital course to date:  10/6: Keo Brownlee is a 69 y.o.  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     3 weeks before admission, patient stated he experienced flulike symptoms with vomiting, nausea and anorexia which resolved spontaneously. 2 weeks before admission, patient stated that he fell on his right side. Pt states he was in his baseline state of health until 2 days ago he developed sudden left foot pain, which radiated to the left knee. Patient was unable to ambulate, was causing weakness and was falling.  At home patient check vitals had a Tmax of 103 which prompted them to come to the ED for further evaluation.  Any further which prompted his visit to the ED for further evaluation.  Patient states he has a right hip surgery done in June, follows up with orthopedic surgeon.  He has chronic pain of the right hip pain and has been taking pain medication and tizanidine for muscle spasm.  He has known bilateral lower neuropathy, left leg DVT, however the pain this time was too unbearable.  He endorses to fever, chills, diaphoresis.  [place]  He spends a lot of time outdoors.  He had previous tick bites.  No recent tick exposure.  Denies smoking and drinking.      In the ED, patient is to be tachycardic, hypertensive, afebrile. CBC shows leukocytosis initially of 19 and down to 16.1. CMP shows hyponatremia of 127, normal potassium, bicarb of 20, creatinine is 1.84, has baseline CKD, blood sugar 323, calcium 9.7. Pro-Vince 21, proBNP 991, CRP 34.0, ESR 86. Initial 
    Hospitalist Progress Note               Daily Progress Note: 10/10/2024      Hospital Day: 5     Chief complaint:   Chief Complaint   Patient presents with    Leg Swelling        Subjective:   Hospital course to date:  10/6: Keo Brownlee is a 69 y.o.  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     3 weeks before admission, patient stated he experienced flulike symptoms with vomiting, nausea and anorexia which resolved spontaneously. 2 weeks before admission, patient stated that he fell on his right side. Pt states he was in his baseline state of health until 2 days ago he developed sudden left foot pain, which radiated to the left knee. Patient was unable to ambulate, was causing weakness and was falling.  At home patient check vitals had a Tmax of 103 which prompted them to come to the ED for further evaluation.  Any further which prompted his visit to the ED for further evaluation.  Patient states he has a right hip surgery done in June, follows up with orthopedic surgeon.  He has chronic pain of the right hip pain and has been taking pain medication and tizanidine for muscle spasm.  He has known bilateral lower neuropathy, left leg DVT, however the pain this time was too unbearable.  He endorses to fever, chills, diaphoresis.  [place]  He spends a lot of time outdoors.  He had previous tick bites.  No recent tick exposure.  Denies smoking and drinking.      In the ED, patient is to be tachycardic, hypertensive, afebrile. CBC shows leukocytosis initially of 19 and down to 16.1. CMP shows hyponatremia of 127, normal potassium, bicarb of 20, creatinine is 1.84, has baseline CKD, blood sugar 323, calcium 9.7. Pro-Vince 21, proBNP 991, CRP 34.0, ESR 86. Initial 
    Hospitalist Progress Note               Daily Progress Note: 10/11/2024      Hospital Day: 6     Chief complaint:   Chief Complaint   Patient presents with    Leg Swelling        Subjective:   Hospital course to date:  10/6: Keo Brownlee is a 69 y.o.  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     3 weeks before admission, patient stated he experienced flulike symptoms with vomiting, nausea and anorexia which resolved spontaneously. 2 weeks before admission, patient stated that he fell on his right side. Pt states he was in his baseline state of health until 2 days ago he developed sudden left foot pain, which radiated to the left knee. Patient was unable to ambulate, was causing weakness and was falling.  At home patient check vitals had a Tmax of 103 which prompted them to come to the ED for further evaluation.  Any further which prompted his visit to the ED for further evaluation.  Patient states he has a right hip surgery done in June, follows up with orthopedic surgeon.  He has chronic pain of the right hip pain and has been taking pain medication and tizanidine for muscle spasm.  He has known bilateral lower neuropathy, left leg DVT, however the pain this time was too unbearable.  He endorses to fever, chills, diaphoresis.  [place]  He spends a lot of time outdoors.  He had previous tick bites.  No recent tick exposure.  Denies smoking and drinking.      In the ED, patient is to be tachycardic, hypertensive, afebrile. CBC shows leukocytosis initially of 19 and down to 16.1. CMP shows hyponatremia of 127, normal potassium, bicarb of 20, creatinine is 1.84, has baseline CKD, blood sugar 323, calcium 9.7. Pro-Vince 21, proBNP 991, CRP 34.0, ESR 86. Initial 
    Hospitalist Progress Note               Daily Progress Note: 10/12/2024      Hospital Day: 7     Chief complaint:   Chief Complaint   Patient presents with    Leg Swelling        Subjective:   Hospital course to date:  10/6: Keo Brownlee is a 69 y.o.  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     3 weeks before admission, patient stated he experienced flulike symptoms with vomiting, nausea and anorexia which resolved spontaneously. 2 weeks before admission, patient stated that he fell on his right side. Pt states he was in his baseline state of health until 2 days ago he developed sudden left foot pain, which radiated to the left knee. Patient was unable to ambulate, was causing weakness and was falling.  At home patient check vitals had a Tmax of 103 which prompted them to come to the ED for further evaluation.  Any further which prompted his visit to the ED for further evaluation.  Patient states he has a right hip surgery done in June, follows up with orthopedic surgeon.  He has chronic pain of the right hip pain and has been taking pain medication and tizanidine for muscle spasm.  He has known bilateral lower neuropathy, left leg DVT, however the pain this time was too unbearable.  He endorses to fever, chills, diaphoresis.  [place]  He spends a lot of time outdoors.  He had previous tick bites.  No recent tick exposure.  Denies smoking and drinking.      In the ED, patient is to be tachycardic, hypertensive, afebrile. CBC shows leukocytosis initially of 19 and down to 16.1. CMP shows hyponatremia of 127, normal potassium, bicarb of 20, creatinine is 1.84, has baseline CKD, blood sugar 323, calcium 9.7. Pro-Vince 21, proBNP 991, CRP 34.0, ESR 86. Initial 
    Hospitalist Progress Note               Daily Progress Note: 10/12/2024      Hospital Day: 7     Chief complaint:   Chief Complaint   Patient presents with    Leg Swelling        Subjective:   Hospital course to date:  10/6: Keo Brownlee is a 69 y.o.  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     3 weeks before admission, patient stated he experienced flulike symptoms with vomiting, nausea and anorexia which resolved spontaneously. 2 weeks before admission, patient stated that he fell on his right side. Pt states he was in his baseline state of health until 2 days ago he developed sudden left foot pain, which radiated to the left knee. Patient was unable to ambulate, was causing weakness and was falling.  At home patient check vitals had a Tmax of 103 which prompted them to come to the ED for further evaluation.  Any further which prompted his visit to the ED for further evaluation.  Patient states he has a right hip surgery done in June, follows up with orthopedic surgeon.  He has chronic pain of the right hip pain and has been taking pain medication and tizanidine for muscle spasm.  He has known bilateral lower neuropathy, left leg DVT, however the pain this time was too unbearable.  He endorses to fever, chills, diaphoresis.  [place]  He spends a lot of time outdoors.  He had previous tick bites.  No recent tick exposure.  Denies smoking and drinking.      In the ED, patient is to be tachycardic, hypertensive, afebrile. CBC shows leukocytosis initially of 19 and down to 16.1. CMP shows hyponatremia of 127, normal potassium, bicarb of 20, creatinine is 1.84, has baseline CKD, blood sugar 323, calcium 9.7. Pro-Vince 21, proBNP 991, CRP 34.0, ESR 86. Initial 
    Hospitalist Progress Note               Daily Progress Note: 10/13/2024      Hospital Day: 8     Chief complaint:   Chief Complaint   Patient presents with    Leg Swelling        Subjective:   Hospital course to date:  10/6: Keo Brownlee is a 69 y.o.  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     3 weeks before admission, patient stated he experienced flulike symptoms with vomiting, nausea and anorexia which resolved spontaneously. 2 weeks before admission, patient stated that he fell on his right side. Pt states he was in his baseline state of health until 2 days ago he developed sudden left foot pain, which radiated to the left knee. Patient was unable to ambulate, was causing weakness and was falling.  At home patient check vitals had a Tmax of 103 which prompted them to come to the ED for further evaluation.  Any further which prompted his visit to the ED for further evaluation.  Patient states he has a right hip surgery done in June, follows up with orthopedic surgeon.  He has chronic pain of the right hip pain and has been taking pain medication and tizanidine for muscle spasm.  He has known bilateral lower neuropathy, left leg DVT, however the pain this time was too unbearable.  He endorses to fever, chills, diaphoresis.  [place]  He spends a lot of time outdoors.  He had previous tick bites.  No recent tick exposure.  Denies smoking and drinking.      In the ED, patient is to be tachycardic, hypertensive, afebrile. CBC shows leukocytosis initially of 19 and down to 16.1. CMP shows hyponatremia of 127, normal potassium, bicarb of 20, creatinine is 1.84, has baseline CKD, blood sugar 323, calcium 9.7. Pro-Vince 21, proBNP 991, CRP 34.0, ESR 86. Initial 
    Hospitalist Progress Note               Daily Progress Note: 10/13/2024      Hospital Day: 8     Chief complaint:   Chief Complaint   Patient presents with    Leg Swelling        Subjective:   Hospital course to date:  10/6: Keo Brownlee is a 69 y.o.  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     3 weeks before admission, patient stated he experienced flulike symptoms with vomiting, nausea and anorexia which resolved spontaneously. 2 weeks before admission, patient stated that he fell on his right side. Pt states he was in his baseline state of health until 2 days ago he developed sudden left foot pain, which radiated to the left knee. Patient was unable to ambulate, was causing weakness and was falling.  At home patient check vitals had a Tmax of 103 which prompted them to come to the ED for further evaluation.  Any further which prompted his visit to the ED for further evaluation.  Patient states he has a right hip surgery done in June, follows up with orthopedic surgeon.  He has chronic pain of the right hip pain and has been taking pain medication and tizanidine for muscle spasm.  He has known bilateral lower neuropathy, left leg DVT, however the pain this time was too unbearable.  He endorses to fever, chills, diaphoresis.  [place]  He spends a lot of time outdoors.  He had previous tick bites.  No recent tick exposure.  Denies smoking and drinking.      In the ED, patient is to be tachycardic, hypertensive, afebrile. CBC shows leukocytosis initially of 19 and down to 16.1. CMP shows hyponatremia of 127, normal potassium, bicarb of 20, creatinine is 1.84, has baseline CKD, blood sugar 323, calcium 9.7. Pro-Vince 21, proBNP 991, CRP 34.0, ESR 86. Initial 
    Hospitalist Progress Note               Daily Progress Note: 10/14/2024      Hospital Day: 9     Chief complaint:   Chief Complaint   Patient presents with    Leg Swelling        Subjective:   Hospital course to date:  10/6: Keo Brownlee is a 69 y.o.  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     3 weeks before admission, patient stated he experienced flulike symptoms with vomiting, nausea and anorexia which resolved spontaneously. 2 weeks before admission, patient stated that he fell on his right side. Pt states he was in his baseline state of health until 2 days ago he developed sudden left foot pain, which radiated to the left knee. Patient was unable to ambulate, was causing weakness and was falling.  At home patient check vitals had a Tmax of 103 which prompted them to come to the ED for further evaluation.  Any further which prompted his visit to the ED for further evaluation.  Patient states he has a right hip surgery done in June, follows up with orthopedic surgeon.  He has chronic pain of the right hip pain and has been taking pain medication and tizanidine for muscle spasm.  He has known bilateral lower neuropathy, left leg DVT, however the pain this time was too unbearable.  He endorses to fever, chills, diaphoresis.  [place]  He spends a lot of time outdoors.  He had previous tick bites.  No recent tick exposure.  Denies smoking and drinking.      In the ED, patient is to be tachycardic, hypertensive, afebrile. CBC shows leukocytosis initially of 19 and down to 16.1. CMP shows hyponatremia of 127, normal potassium, bicarb of 20, creatinine is 1.84, has baseline CKD, blood sugar 323, calcium 9.7. Pro-Vince 21, proBNP 991, CRP 34.0, ESR 86. Initial 
    Hospitalist Progress Note               Daily Progress Note: 10/20/2024      Hospital Day: 15     Chief complaint:   Chief Complaint   Patient presents with    Leg Swelling        Subjective:   Patient seen and examined today during bedside rounds with the nurse.  Discussed with IR yesterday that they did CT a of the pelvis and abdomen and there is no be clot in the common iliac vein.  And only small clot extending into the pelvis.  No interventions are planned as patient was off Eliquis and it is not considered Eliquis failure  Patient is still in a lot of pain.  Will try to ask physical therapy to work with him today and see the progress      Assessment and plan    Acute on chronic DVT  Venous duplex with evidence of acute deep vein thrombosis in the left external iliac vein, acute on chronic deep vein thrombosis in the left common femoral vein.  Acute superficial vein thrombosis in the left saphenous femoral junction.  Acute superficial vein thrombosis in the thigh region of the left great saphenous vein.  Patient was off apixaban for 1 week from 10/8 to 10/15, restarted on 15, send this will not be considered Eliquis failure  PICC line was upgraded yesterday to double-lumen  Per Dr Selby no indications for urgent thrombectomy as patient does not have phlegmasia and some pain as expected  CTA abdomen pelvis 10/18 no evidence of ileal caval DVT  Patient started on heparin drip on 10/17, continue  Surgery no indications for acute interventions right now  IR consulted for thrombectomy and/or IVC filter however they are not planning to do any intervention as medical management is recommended  Compression stockings  Encourage ambulation per surgery  Leg elevation   Dilaudid for the breakthrough pain 0.5 every 4 hours.  Continue oxycodone as needed 10 mg and gabapentin 300 mg 3 times daily  Restart PT and OT today, patient cleared from medicine side    Late left prosthetic knee infection. Group B strep knee aspirate 
    Hospitalist Progress Note               Daily Progress Note: 10/21/2024      Hospital Day: 16     Chief complaint:   Chief Complaint   Patient presents with    Leg Swelling        Subjective:   Patient seen and examined today during bedside rounds with the nurse.  Patient states that his pain is 5 out of 10 today.  A little improved from yesterday.  Physical therapy is about to see him and assess.  Discussed with the patient we will give him extra pain medication before physical therapy to see how he tolerates.  Otherwise denies any fevers, however noticed some chills and sweating overnight.  Regular bowel movements      Assessment and plan    Patient is 69-year-old male initially admitted for late left prostatic knee infection related to group B strep.  GBS bacteremia.  He was started on ceftriaxone, working with PT OT and waiting for placement.  However few days ago patient started to complain of more pain, now involving his all left leg.  He was evaluated by Ortho, ID and hospitalist and ultrasound of bilateral extremities revealed new acute deep vein thrombosis of the left external iliac vein on 10/17  On chart review it appears that patient was off Eliquis for 1 week, and it was restarted on 10/15.  Patient was initially started on heparin drip after new findings of DVT, however IR and general surgery both agreed that there is no role of thrombectomy at this time.  Patient is currently waiting for reevaluation of PT OT and determination of placement    Acute on chronic DVT  Venous duplex with evidence of acute deep vein thrombosis in the left external iliac vein, acute on chronic deep vein thrombosis in the left common femoral vein.  Acute superficial vein thrombosis in the left saphenous femoral junction.  Acute superficial vein thrombosis in the thigh region of the left great saphenous vein.  Patient was off apixaban for 1 week from 10/8 to 10/15, restarted on 10/15, so this will not be considered Eliquis 
  Physician Progress Note      PATIENT:               PREMA DUEÑAS  [facility] #:                  [id number]  :                       1955  ADMIT DATE:       10/6/2024 12:02 PM  DISCH DATE:  RESPONDING  PROVIDER #:        Juan Palafox MD          QUERY TEXT:    Good afternoon.    Patient admitted with left prosthetic knee infection. Noted to have mild   malnutrition documented in 10/14 Registered Dietician note.    If possible, please document in progress notes and discharge summary if you   are evaluating and /or treating any of the following:    The medical record reflects the following:    Risk Factors: 69 yr old male, late left prosthetic knee infection, DM, CKD,   PAD, anemia    Clinical Indicators: from 10/14 Registered Dietician note: \"Malnutrition   Assessment: Malnutrition Status:  Mild malnutrition (10/14/24 1153)  Context:  Acute Illness Findings of the 6 clinical characteristics of   malnutrition: Energy Intake:  75% or less of estimated energy requirements for   7 or more days Weight Loss:  No significant weight loss (-3% x 2-3 weeks)   Body Fat Loss:  No significant body fat loss Muscle Mass Loss:  No significant   muscle mass loss Fluid Accumulation:  No significant fluid accumulation (L   lower extremity r/t infection)  Strength:  Not Performed\"; 10/08/24 07:56   Albumin: 1.8, 10/09/24 07:25 Albumin: 1.8; 10/07/24 08:16 Hemoglobin Quant:   11.1, 10/08/24 07:56 Hemoglobin Quant: 10.8, 10/09/24 07:25  Hemoglobin Quant: 9.2, 10/10/24 09:12 Hemoglobin Quant: 9.1; 10/06/24 14:05   Sodium: 128, 10/06/24 17:01 Sodium: 128, 10/07/24 08:16 Sodium: 128,  10/07/24 19:12 Sodium: 131    Treatment: Registered Dietician Malnutrition Assessment, labs, ONS      Thank you,  Shauna Waters RN, CDI  __________________________    ASPEN Criteria:    https://aspenjournals.onlinelibrary.colon.com/doi/full/10.1177/[email or web address]  5  Options provided:  -- Protein calorie malnutrition mild  -- Other - I will add my 
0719: Bedside and Verbal shift change report given to Cari Rico RN (oncoming nurse) by Lucia Wing RN (offgoing nurse). Report included the following information Nurse Handoff Report, Index, ED Encounter Summary, Adult Overview, Intake/Output, MAR, Recent Results, Alarm Parameters, Quality Measures, and Neuro Assessment.     0800: Assessment completed. Pt repositioned in bed.     1000: Pt up working with PT/OT.     1200: Pt repositioned in bed.     1400: Pt repositioned in bed.     1600: Reassessment completed. Pt up to chair with PT/OT.    1800: Pt stand at the bedside and return to bed.     1930: Bedside and Verbal shift change report given to JULIETTE Petty RN (oncoming nurse) by Cari Rico RN (offgoing nurse). Report included the following information Nurse Handoff Report, Index, ED Encounter Summary, Adult Overview, Surgery Report, Intake/Output, MAR, Recent Results, Alarm Parameters, Quality Measures, and Neuro Assessment.           
4 Eyes Skin Assessment     NAME:  Keo Brownlee Jr.  YOB: 1955  MEDICAL RECORD NUMBER:  [id number]    The patient is being assessed for  Other   weekly    I agree that at least one RN has performed a thorough Head to Toe Skin Assessment on the patient. ALL assessment sites listed below have been assessed.      Areas assessed by both nurses:    Head, Face, Ears, Shoulders, Back, Chest, Arms, Elbows, Hands, Sacrum. Buttock, Coccyx, Ischium, Legs. Feet and Heels, Under Medical Devices , and Other   LT knee surgical site        Does the Patient have a Wound? Yes wound(s) were present on assessment. LDA wound assessment was Initiated and completed by RN       Dylan Prevention initiated by RN: Yes  Wound Care Orders initiated by RN: No    Pressure Injury (Stage 3,4, Unstageable, DTI, NWPT, and Complex wounds) if present, place Wound referral order by RN under : No    New Ostomies, if present place, Ostomy referral order under : No     Nurse 1 eSignature: Electronically signed by Ortiz Woodruff RN on 10/16/24 at 1:44 AM EDT    **SHARE this note so that the co-signing nurse can place an eSignature**    Nurse 2 eSignature: Electronically signed by MOE PEKC RN on 10/16/24 at 3:52 AM EDT   
4 Eyes Skin Assessment     NAME:  Keo Brownlee Jr.  YOB: 1955  MEDICAL RECORD NUMBER:  [id number]    The patient is being assessed for  Other Weekly    I agree that at least one RN has performed a thorough Head to Toe Skin Assessment on the patient. ALL assessment sites listed below have been assessed.      Areas assessed by both nurses:    Head, Face, Ears, Shoulders, Back, Chest, Arms, Elbows, Hands, Sacrum. Buttock, Coccyx, Ischium, and Legs. Feet and Heels        Does the Patient have a Wound? Yes wound(s) were present on assessment. LDA wound assessment was Initiated and completed by RN    Surgical incision dressing noted to left knee.       Dylan Prevention initiated by RN: Yes  Wound Care Orders initiated by RN: No    Pressure Injury (Stage 3,4, Unstageable, DTI, NWPT, and Complex wounds) if present, place Wound referral order by RN under : No    New Ostomies, if present place, Ostomy referral order under : No     Nurse 1 eSignature: Electronically signed by Joya Saldivar RN on 10/9/24 at 2:53 AM EDT    **SHARE this note so that the co-signing nurse can place an eSignature**    Nurse 2 eSignature: Electronically signed by Chelsey Han RN on 10/9/24 at 2:55 AM EDT    
Called [phone] to give report but nurse was not available to receive report.this phone number  [phone] was given to be called back .  
Chart reviewed and history obtained. Per Dr. Palafox's note: \"Discussed that we will contact IR and see if they are planning to do any procedure either thrombectomy or IVC filter. \". Did not note that IR has seen patient at this time. Will continue to hold PT until IR has seen patient. Will continue to check on patient and treat when patient is appropriate. Thank you.   
Comprehensive Nutrition Assessment    Type and Reason for Visit:  Initial (LOS)    Nutrition Recommendations/Plan:   Modify ONS to Gelatein BID as pt reports disliking ONS he is receiving  Encourage intakes >75%  Optimize BG control w/ BG goal <180 inpatient  Monitor weight, intakes, and bowel fxn     Malnutrition Assessment:  Malnutrition Status:  Mild malnutrition (10/14/24 1153)    Context:  Acute Illness     Findings of the 6 clinical characteristics of malnutrition:  Energy Intake:  75% or less of estimated energy requirements for 7 or more days  Weight Loss:  No significant weight loss (-3% x 2-3 weeks)     Body Fat Loss:  No significant body fat loss     Muscle Mass Loss:  No significant muscle mass loss    Fluid Accumulation:  No significant fluid accumulation (L lower extremity r/t infection)     Strength:  Not Performed    Nutrition Assessment:    P/w b/l leg swelling, fever of 103 at home reported. Imaging unrevealing, but per MD suspect sepsis 2/2 L knee septic arthritis. Underwent L knee revision w/ ortho 10/8. Now awaiting IRF approval. Pt w/ recorded 50-75% intakes on 3CHO diet w/ LC/HP ONS BID. Pt reports UBW of 225#, reports weighing 211# on home standing scale prior to presenting to ED and 1-25% of normal intakes x 2 weeks PTA. Bed weight of 218.7# today, suspect dehydration played large role in home weight, suspect not all true weight loss. Pt reports disliking ONS, will modify to gelatein. Labs and meds reviewed. , CRP 11.9, Lantus 40 u dialy, lispro 0-16 q4h, 5 u w/ meals, 0-4 u nightly.    Nutrition Related Findings:    Pt w/o signs of muscle wasting; no reprots of n/v/d/c or problems chewing/swallowing. LBM 10/13. Wound Type: Surgical Incision       Current Nutrition Intake & Therapies:    Average Meal Intake: 51-75%, 26-50%  Average Supplements Intake: 1-25%  ADULT DIET; Regular; 3 carb choices (45 gm/meal); No Concentrated sweets  ADULT ORAL NUTRITION SUPPLEMENT; AM Snack, PM 
Comprehensive Nutrition Assessment    Type and Reason for Visit:  Reassess    Nutrition Recommendations/Plan:   Continue current diet   Discontinue current ONS  Encourage intake >50% at all meals  Monitor BM's, weights     Malnutrition Assessment:  Malnutrition Status:  Mild malnutrition (10/21/24 1436)    Context:  Acute Illness     Findings of the 6 clinical characteristics of malnutrition:  Energy Intake:  75% or less of estimated energy requirements for 7 or more days  Weight Loss:  No significant weight loss     Body Fat Loss:  No significant body fat loss     Muscle Mass Loss:  No significant muscle mass loss    Fluid Accumulation:  No significant fluid accumulation     Strength:  Not Performed    Nutrition Assessment:    Pt seen for follow up, awaiting discharge. Pt reports not liking so not drinking ONS ordered, will d/c for now. He states he is eating better and consumes more than half of his meal trays. Restated to pt importance of keeping intake up if he isn't going to be drinking the ONS, he seemed understanding. Labs and Meds reviewed.    Nutrition Related Findings:    Pt w/o signs of muscle wasting; no reprots of n/v/d/c or problems chewing/swallowing. Wound Type: Surgical Incision       Current Nutrition Intake & Therapies:    Average Meal Intake: 51-75%  Average Supplements Intake: Refusing to take  ADULT DIET; Regular; 3 carb choices (45 gm/meal); No Concentrated sweets  ADULT ORAL NUTRITION SUPPLEMENT; Lunch, Dinner; Fortified Gelatin Oral Supplement    Anthropometric Measures:  Height: 180.3 cm (5' 11\")  Ideal Body Weight (IBW): 172 lbs (78 kg)       Current Body Weight: 99.2 kg (218 lb 11.1 oz),   IBW. Weight Source: Bed Scale  Current BMI (kg/m2): 30.5  Usual Body Weight: 102.1 kg (225 lb)  % Weight Change (Calculated): -2.8  Weight Adjustment For: No Adjustment        BMI Categories: Obese Class 1 (BMI 30.0-34.9)    Estimated Daily Nutrient Needs:  Energy Requirements Based On: 
Discharge instructions and prescription discussed with patient and spouse.patient verbalized understanding,escorted to exit via stretcher by transport.  
Discharge paperwork started. Need next dose due date. Print and send with patient when patient when patient is ready to leave. Waiting for auth per CM. Charge nurse aware.  
Discharge paperwork started. Need to put in next dose due date. Print and send with patient when patient is ready to leave. Primary nurse aware. Patient to go to SNF instead of .  
Discussed with the nurse about new DVT findings in the left external iliac artery despite patient being on apixaban  Will start heparin drip now  Will consult vascular surgery for possible thrombectomy  Will hold on consulting hematology regarding anticoagulation, I do not think we should consider that as Eliquis failure because patient was off Eliquis since admission on 10/8.  I restarted when took over patient's care 2 days ago so he has been without anticoagulation for almost 1 week.  
Heparin Infusion Initiation  Keo Brownlee Jr. is a 69 y.o. male starting heparin for:  DVT  Heparin dosing: order for weight based protocol  Initial Dosing Weight: 99.2 kg (Recorded body weight)  No results for input(s): \"PLT\", \"HGB\", \"INR\", \"APTT\" in the last 72 hours.    Invalid input(s): \"HEPXA\"  Factor Xa inhibitor/LMWH use within the past 72 hours? Yes  If yes, date and time of last administration:  Apixaban on 10/17 @ 0844   Hypertriglyceridemia (> 690 mg/dL) or hyperbilirubinemia (> 37 mg/dL conjugated bilirubin, >14 mg/dL unconjugated bilirubin) present? No  No results for input(s): \"BILITOT\" in the last 72 hours.    Invalid input(s): \"TRIGL\"    Assessment/Plan:   Heparin to be monitored using aPTT until 72 hours following last factor Xa inhibitor/LMWH administration, anticipated date for change to anti-Xa monitoring is 10/20 AM  Initial bolus ordered: No  Starting rate:  18 unit/kg/hr  PRN boluses entered: Yes       
Meghan CARDOSO, called back at 16:16 pm discussed patient information,questions answered, Meghan verbalized understanding.  
OT tx attempted at 1321 however tx held per Dr Palafox till pt seen by IR. Will continue to follow and re-attempt OT at a later time. Thank you.     
Ortho  in to see  patient. Identity of pt verified by  and name. Patient education completed on the following concepts  SCD's deferred due to patient refusal and pain.  Encouraged patient to perform toe raises and ankle pump. Pt. return demonstrated in bed exercise and activity.  Incentive Spirometer education and introduction complete. Pt. return demonstrate use of incentive spirometer.   
Orthopedic Progress Note    Date:10/10/2024       Room:[facility]  Patient Name:Keo Brownlee Jr.     YOB: 1955     Age:69 y.o.  male     SUBJECTIVE    10/9/2024: POD #1 s/p left total knee revision with poly exchange stimulan beads.  Patient reports that his pain has greatly improved since surgery.  He has been able to tolerate food and liquid without difficulty.  He denies any chest pain, shortness of breath, nausea, vomiting or numbness or tingling down the lower extremity.  He has no additional complaints or concerns today.  Hgb 9.2.    10/10/2024: POD #2 s/p left total knee revision with poly exchange Stimulan beads.  Patient reports he is having pain to the knee this morning.  Nurse reports she is just given his pain medication.  He has been able to tolerate foods and liquids without difficulty.  He has no additional complaints or concerns today.    VITALS         Temp  Av °F (36.7 °C)  Min: 97.5 °F (36.4 °C)  Max: 98.2 °F (36.8 °C)  Pulse  Av.3  Min: 78  Max: 90  Systolic (24hrs), Av , Min:117 , Max:166    Diastolic (24hrs), Av, Min:70, Max:88    Resp  Av.3  Min: 16  Max: 22  SpO2  Av.5 %  Min: 96 %  Max: 100 %  LABS      Recent Labs     10/08/24  0756 10/09/24  0725   WBC 13.4* 10.5   RBC 3.64* 3.08*   HGB 10.8* 9.2*   HCT 31.7* 28.6*   MCV 87.1 92.9   RDW 13.2 13.4    273     Recent Labs     10/07/24  1912 10/08/24  0756 10/09/24  0725   * 135* 136   K 4.8 3.6 4.2    105 107   CO2 22 25 22   BUN 31* 33* 37*   GLUCOSE 421* 249* 297*   PHOS  --  2.4* 3.4   MG  --  2.0 2.2   PT/INR:No results for input(s): \"PROTIME\", \"INR\" in the last 72 hours.  ESR: --   Lab Results   Component Value Date/Time    CRP 14.70 10/10/2024 04:39 AM      Results       Procedure Component Value Units Date/Time    Culture, Tissue (with Gram Stain) [[id number]] Collected: 10/08/24 1420    Order Status: Completed Specimen: Tissue from Leg Updated: 10/09/24 1523     Special 
Orthopedic Progress Note    Date:10/11/2024       Room:[facility]  Patient Name:Keo Brownlee Jr.     YOB: 1955     Age:69 y.o.  male     SUBJECTIVE    10/9/2024: POD #1 s/p left total knee revision with poly exchange stimulan beads.  Patient reports that his pain has greatly improved since surgery.  He has been able to tolerate food and liquid without difficulty.  He denies any chest pain, shortness of breath, nausea, vomiting or numbness or tingling down the lower extremity.  He has no additional complaints or concerns today.  Hgb 9.2.    10/10/2024: POD #2 s/p left total knee revision with poly exchange Stimulan beads.  Patient reports he is having pain to the knee this morning.  Nurse reports she is just given his pain medication.  He has been able to tolerate foods and liquids without difficulty.  He has no additional complaints or concerns today.    10/11/2024: POD #3 s/p left total knee revision with poly exchange Stimulan beads.  Patient sitting up in recliner upon arrival.  He reports he had just finished working with physical therapy, and he walked around the bed onto the recliner.  He reports pain to the PT when the leg is elevated, because he feels like there is no support to his knee.  Otherwise no other complaints.  VITALS         Temp  Av.2 °F (36.8 °C)  Min: 97.4 °F (36.3 °C)  Max: 99 °F (37.2 °C)  Pulse  Av.6  Min: 76  Max: 92  Systolic (24hrs), Av , Min:120 , Max:156    Diastolic (24hrs), Av, Min:66, Max:93    Resp  Av.3  Min: 18  Max: 24  SpO2  Av.4 %  Min: 97 %  Max: 100 %  LABS      Recent Labs     10/09/24  0725 10/10/24  0912   WBC 10.5 8.7   RBC 3.08* 3.08*   HGB 9.2* 9.1*   HCT 28.6* 27.5*   MCV 92.9 89.3   RDW 13.4 13.4    285     Recent Labs     10/09/24  0725      K 4.2      CO2 22   BUN 37*   GLUCOSE 297*   PHOS 3.4   MG 2.2   PT/INR:No results for input(s): \"PROTIME\", \"INR\" in the last 72 hours.  ESR: --   Lab Results 
Orthopedic Progress Note    Date:10/14/2024       Room:[facility]  Patient Name:Keo Brownlee Jr.     YOB: 1955     Age:69 y.o.  male     SUBJECTIVE    10/9/2024: POD #1 s/p left total knee revision with poly exchange stimulan beads.  Patient reports that his pain has greatly improved since surgery.  He has been able to tolerate food and liquid without difficulty.  He denies any chest pain, shortness of breath, nausea, vomiting or numbness or tingling down the lower extremity.  He has no additional complaints or concerns today.  Hgb 9.2.    10/10/2024: POD #2 s/p left total knee revision with poly exchange Stimulan beads.  Patient reports he is having pain to the knee this morning.  Nurse reports she is just given his pain medication.  He has been able to tolerate foods and liquids without difficulty.  He has no additional complaints or concerns today.    10/11/2024: POD #3 s/p left total knee revision with poly exchange Stimulan beads.  Patient sitting up in recliner upon arrival.  He reports he had just finished working with physical therapy, and he walked around the bed onto the recliner.  He reports pain to the PT when the leg is elevated, because he feels like there is no support to his knee.  Otherwise no other complaints.    10/12/2024: Postop day 4.  Patient's knee pain continues to improve.  His CRP is slightly down at 14.9.  Blood cultures and tissue cultures as well as synovial fluid culture all demonstrate group B streptococcus.  Patient walked with physical therapy today.    10/13/2024: Postop day 5.  Patient CRP continues to decrease to 13.2 today.  Pain is better controlled today.  Has been working well with therapy.    10/14/2024: Postop day 6. Patient sitting up in bed with family at bedside. He report pain is controlled. He has intermittent throbbing sensations in the knee. He has been doing well with PT and reports they have been pushing him to get better ROM of the knee otherwise no 
Orthopedic Progress Note    Date:10/17/2024       Room:[facility]  Patient Name:Keo Brownlee Jr.     YOB: 1955     Age:69 y.o.  male     SUBJECTIVE    10/9/2024: POD #1 s/p left total knee revision with poly exchange stimulan beads.  Patient reports that his pain has greatly improved since surgery.  He has been able to tolerate food and liquid without difficulty.  He denies any chest pain, shortness of breath, nausea, vomiting or numbness or tingling down the lower extremity.  He has no additional complaints or concerns today.  Hgb 9.2.    10/10/2024: POD #2 s/p left total knee revision with poly exchange Stimulan beads.  Patient reports he is having pain to the knee this morning.  Nurse reports she is just given his pain medication.  He has been able to tolerate foods and liquids without difficulty.  He has no additional complaints or concerns today.    10/11/2024: POD #3 s/p left total knee revision with poly exchange Stimulan beads.  Patient sitting up in recliner upon arrival.  He reports he had just finished working with physical therapy, and he walked around the bed onto the recliner.  He reports pain to the PT when the leg is elevated, because he feels like there is no support to his knee.  Otherwise no other complaints.    10/12/2024: Postop day 4.  Patient's knee pain continues to improve.  His CRP is slightly down at 14.9.  Blood cultures and tissue cultures as well as synovial fluid culture all demonstrate group B streptococcus.  Patient walked with physical therapy today.    10/13/2024: Postop day 5.  Patient CRP continues to decrease to 13.2 today.  Pain is better controlled today.  Has been working well with therapy.    10/14/2024: Postop day 6. Patient sitting up in bed with family at bedside. He report pain is controlled. He has intermittent throbbing sensations in the knee. He has been doing well with PT and reports they have been pushing him to get better ROM of the knee otherwise no 
Orthopedic Progress Note    Date:10/9/2024       Room:[facility]  Patient Name:Keo Brownlee Jr.     YOB: 1955     Age:69 y.o.  male     SUBJECTIVE    10/9/2024: POD #1 s/p left total knee revision with poly exchange stimulan beads.  Patient reports that his pain has greatly improved since surgery.  He has been able to tolerate food and liquid without difficulty.  He denies any chest pain, shortness of breath, nausea, vomiting or numbness or tingling down the lower extremity.  He has no additional complaints or concerns today.  Hgb 9.2.    VITALS         Temp  Av.9 °F (36.6 °C)  Min: 97.3 °F (36.3 °C)  Max: 98.7 °F (37.1 °C)  Pulse  Av.1  Min: 59  Max: 95  Systolic (24hrs), Av , Min:112 , Max:150    Diastolic (24hrs), Av, Min:62, Max:82    Resp  Av.6  Min: 13  Max: 20  SpO2  Av.6 %  Min: 91 %  Max: 100 %  LABS      Recent Labs     10/06/24  1405 10/07/24  0816 10/08/24  0756   WBC 19.0* 16.1* 13.4*   RBC 4.24 3.73* 3.64*   HGB 12.6 11.1* 10.8*   HCT 36.7 32.4* 31.7*   MCV 86.6 86.9 87.1   RDW 13.1 12.8 13.2    279 287     Recent Labs     10/07/24  0816 10/07/24  1912 10/08/24  0756 10/09/24  0725   * 131* 135* 136   K 5.0 4.8 3.6 4.2   CL 99 100 105 107   CO2 22 22 25 22   BUN 24* 31* 33* 37*   GLUCOSE 362* 421* 249* 297*   PHOS 3.5  --  2.4* 3.4   MG 1.9  --  2.0 2.2   PT/INR:No results for input(s): \"PROTIME\", \"INR\" in the last 72 hours.  ESR: --   Lab Results   Component Value Date/Time    CRP 34.30 10/06/2024 05:01 PM      Results       Procedure Component Value Units Date/Time    Culture, Tissue (with Gram Stain) [[id number]] Collected: 10/08/24 1420    Order Status: Sent Specimen: Tissue from Leg Updated: 10/09/24 0812    Culture, Tissue (with Gram Stain) [[id number]] Collected: 10/08/24 1420    Order Status: Sent Specimen: Tissue from Leg Updated: 10/09/24 0812    Culture, Tissue (with Gram Stain) [[id number]] Collected: 10/08/24 1420    Order Status: Sent 
PT eval order received and acknowledged. PT eval attempted at 0845 however pt scheduled for surgery today per nsg. Will continue to follow patient and attempt PT eval at a later time. Thank you.    
PT treatment session held at this time per Dr Palafox. Will hold until IR sees pt. Will continue to check on pt and see them for treatment session at a later time. Thank you.   
Vancomycin Dosing Consult  Keo Brownlee Jr. is a 69 y.o. 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 Pharmacy was consulted by  to dose and monitor vancomycin. Today is day 1.    Antibiotic regimen: Vancomycin + zosyn    Temp (24hrs), Av.2 °F (37.3 °C), Min:98.9 °F (37.2 °C), Max:99.5 °F (37.5 °C)    Recent Labs     10/06/24  1405 10/06/24  1701   WBC 19.0*  --    CREATININE 2.07* 1.84*   BUN 21* 21*     Est CrCl: 45 mL/min  Concomitant nephrotoxic drugs: None    Cultures:   10/6 Blood: NGTD, preliminary  10/6 Wound: NGTD, preliminary  10/6 Knee joint anaerobic: NGTD, preliminary    MRSA Swab: Not ordered, patient already received first dose of vancomycin    Target range: Re-dose for random level <15 mcg/mL    Recent level history:  Date/Time Dose & Interval Measured Level (mcg/mL) Associated AUC/DANIEL              Assessment/Plan:   Patient presenting with 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 Rreported febrile at home, with chills.  Renal function baseline is unknown, presented with Scr 2 now down to 1.84. Will start pulse dosing vancomycin for now until baseline established/stable  LD Vancomycin IV 2500mg given today   Level scheduled for 10/7 @ 1900  CMP through 10/9  Antimicrobial stop date 7 days per consult       
Vancomycin Dosing Consult  Keo Brownlee Jr. is a 69 y.o. 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 Pharmacy was consulted by  to dose and monitor vancomycin. Today is day 2.    Antibiotic regimen: Vancomycin + zosyn    Temp (24hrs), Av °F (37.2 °C), Min:97.5 °F (36.4 °C), Max:102 °F (38.9 °C)    Recent Labs     10/06/24  1405 10/06/24  1701 10/07/24  0816 10/07/24  1912   WBC 19.0*  --  16.1*  --    CREATININE 2.07* 1.84* 1.76* 1.81*   BUN 21* 21* 24* 31*     Est CrCl: 45 mL/min  Concomitant nephrotoxic drugs: None    Cultures:   10/6 Blood: Strep agalactiae  10/6 Wound: Pending  10/6 Knee joint anaerobic: Pending    MRSA Swab: Not ordered, patient already received first dose of vancomycin    Target range: Re-dose for random level <15 mcg/mL    Recent level history:  Date/Time Dose & Interval Measured Level (mcg/mL) Associated AUC/DANIEL   10/7 @ 1912 2500mg x 1 dose (10/6) 7.0 < 400        Assessment/Plan:   Patient presenting with 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 Reported febrile at home, with chills. Empirically started on vancomycin and Zosyn with cultures pending at this time. ID following.  Renal function remaining steady ~ 1.8 and possibly at baseline. Continue pulse dosing, but given level this evening, ok with giving 2 doses before next level check given clearance is more rapid than Scr would normally indicate.  Level resulted at 7.0 after 1 dose  Start scheduled vancomycin 1750mg q24h x 2 doses starting tonight  Next level scheduled for 10//9 @ 1800  CMP through 10/9  Antimicrobial stop date 7 days per consult         
[[id number]]  (Abnormal) Collected: 10/08/24 1420    Order Status: Completed Specimen: Tissue from Leg Updated: 10/11/24 1147     Special Requests No Special Requests        Gram Stain Few WBCs seen         No organisms seen        Culture       Rare Streptococci, beta hemolytic group B Penicillin and ampicillin are drugs of choice for treatment of Beta-hemolytic streptococcal infections. Susceptibility testing of Penicillins and Beta-Lactams approved by the FDA for treatment of Beta-hemolytic streptococcal infections need not be performed roUTInely, because nonsusceptible isolates are extremely rare. CLSI 2012      MRSA by PCR [[id number]] Collected: 10/08/24 0045    Order Status: Completed Specimen: Swab Updated: 10/08/24 0313     MRSA by PCR Not Detected       Culture, Anaerobic [[id number]] Collected: 10/06/24 2043    Order Status: Completed Specimen: Fluid Updated: 10/09/24 0808     Special Requests Fluid        Culture No anaerobes isolated       Culture, Wound [[id number]] Collected: 10/06/24 2043    Order Status: Canceled Specimen: Fluid     Culture, Body Fluid [[id number]]  (Abnormal)  (Susceptibility) Collected: 10/06/24 2043    Order Status: Completed Specimen: Knee, left Updated: 10/09/24 0915     Special Requests No Special Requests        Gram Stain 2+ WBCs seen         No organisms seen        Culture       Heavy Streptococcus agalactiae serogroup B                  CATUBE culture performed on unspun fluid          Susceptibility        Beta Hemolytic Streptococcus - Group B      BACTERIAL SUSCEPTIBILITY PANEL DANIEL      ampicillin <=0.25 ug/mL Sensitive      cefotaxime <=0.12 ug/mL Sensitive      cefTRIAXone <=0.12 ug/mL Sensitive      clindamycin >=1 ug/mL Resistant      levofloxacin 1 ug/mL Sensitive      linezolid <=2 ug/mL Sensitive      Penicillin G <=0.06 ug/mL Sensitive      vancomycin 0.5 ug/mL Sensitive                           Culture, Blood 1 [[id number]]  (Abnormal)  (Susceptibility) 
x-ray of the left knee done does not show any evidence of hardware complication, status post left knee arthroplasty., Unremarkable right knee x-ray. Hip x-ray unremarkable as well. Chest x-ray unremarkable. CT of the lumbar spine and CT of the abdomen pelvis was done did not show any findings in abdomen pelvis, there is bilateral pars defect at L5 and there is stable grade 1 anterolisthesis of L5 on S1. Patient has been given broad-spectrum antibiotics. Medicine service has been consulted for admission for sepsis likely secondary to left knee septic arthritis.     10/7: He states pain is still there in L leg but somewhat better. Swelling reduced, per patient. This started Fri and he could not walk. No previous eps. Fevers at home. Denies HA, chest pain, SOB, abdominal pain, or urinary sx.     10/8: On follow-up patient reported no improvement of symptoms.  Left leg is extremely tender with radiation from mid thigh down to the foot. Right leg is moderately tender.  Swelling of the left leg has not improved, and left knee remains erythematous.  Mild swelling of the right leg as well.  Patient has been urinating well and mentioned having watery diarrhea from 10/4 to 10/6 and has not been able to pass stool since.  Patient denies shortness of breath, palpitations, headaches, and is currently afebrile.    Glucose remains elevated at 255  WBC: 13.4, RBC: 3.64, hemoglobin: 10.8, hematocrit: 31.7, neutrophils: 84%  Blood cultures came back positive for Streptococcus    10/9: Patient reports major improvement of symptoms after left total knee revision, poly exchange, stimulan beads.  Still reports some pain in the left knee but is now localized to the incision site and does not radiate.  Twitching of the left leg reproduce pain.  Patient denies any urinary symptoms and still has not passed stool likely due to being NPO.  Patient is now able to rest and sleep comfortably. Patient denied nausea, vomiting, shortness of breath, 
5,000 Units Oral TID WC    gabapentin (NEURONTIN) capsule 300 mg  300 mg Oral TID    metoprolol succinate (TOPROL XL) extended release tablet 100 mg  100 mg Oral Daily    pantoprazole (PROTONIX) tablet 40 mg  40 mg Oral QAM AC    tamsulosin (FLOMAX) capsule 0.4 mg  0.4 mg Oral Daily    atorvastatin (LIPITOR) tablet 80 mg  80 mg Oral Nightly    sodium chloride flush 0.9 % injection 5-40 mL  5-40 mL IntraVENous 2 times per day    sodium chloride flush 0.9 % injection 5-40 mL  5-40 mL IntraVENous PRN    0.9 % sodium chloride infusion   IntraVENous PRN    potassium chloride (KLOR-CON M) extended release tablet 40 mEq  40 mEq Oral PRN    Or    potassium bicarb-citric acid (EFFER-K) effervescent tablet 40 mEq  40 mEq Oral PRN    Or    potassium chloride 10 mEq/100 mL IVPB (Peripheral Line)  10 mEq IntraVENous PRN    magnesium sulfate 2000 mg in 50 mL IVPB premix  2,000 mg IntraVENous PRN    ondansetron (ZOFRAN-ODT) disintegrating tablet 4 mg  4 mg Oral Q8H PRN    Or    ondansetron (ZOFRAN) injection 4 mg  4 mg IntraVENous Q6H PRN    polyethylene glycol (GLYCOLAX) packet 17 g  17 g Oral Daily PRN    glucose chewable tablet 16 g  4 tablet Oral PRN    dextrose bolus 10% 125 mL  125 mL IntraVENous PRN    Or    dextrose bolus 10% 250 mL  250 mL IntraVENous PRN    glucagon injection 1 mg  1 mg SubCUTAneous PRN    dextrose 10 % infusion   IntraVENous Continuous PRN    insulin lispro (HUMALOG,ADMELOG) injection vial 0-4 Units  0-4 Units SubCUTAneous Nightly       Review of Systems:   Pertinent items are noted in HPI.    Objective:   Physical Exam:     /83   Pulse 83   Temp 97.9 °F (36.6 °C) (Oral)   Resp 18   Ht 1.803 m (5' 11\")   Wt 99.2 kg (218 lb 11.1 oz)   SpO2 98%   BMI 30.50 kg/m²  O2 Flow Rate (L/min): 3 L/min O2 Device: None (Room air)    Temp (24hrs), Av.2 °F (36.8 °C), Min:97.9 °F (36.6 °C), Max:98.4 °F (36.9 °C)    No intake/output data recorded.   10/16 1901 - 10/18 0700  In: 1210 [P.O.:1200; 
MRSA by PCR Not Detected       Culture, Anaerobic [[id number]] Collected: 10/06/24 2043    Order Status: Completed Specimen: Fluid Updated: 10/09/24 0808     Special Requests Fluid        Culture No anaerobes isolated       Culture, Wound [[id number]] Collected: 10/06/24 2043    Order Status: Canceled Specimen: Fluid     Culture, Body Fluid [[id number]]  (Abnormal)  (Susceptibility) Collected: 10/06/24 2043    Order Status: Completed Specimen: Knee, left Updated: 10/09/24 0915     Special Requests No Special Requests        Gram Stain 2+ WBCs seen         No organisms seen        Culture       Heavy Streptococcus agalactiae serogroup B                  CATUBE culture performed on unspun fluid          Susceptibility        Beta Hemolytic Streptococcus - Group B      BACTERIAL SUSCEPTIBILITY PANEL DANIEL      ampicillin <=0.25 ug/mL Sensitive      cefotaxime <=0.12 ug/mL Sensitive      cefTRIAXone <=0.12 ug/mL Sensitive      clindamycin >=1 ug/mL Resistant      levofloxacin 1 ug/mL Sensitive      linezolid <=2 ug/mL Sensitive      Penicillin G <=0.06 ug/mL Sensitive      vancomycin 0.5 ug/mL Sensitive                           Culture, Blood 1 [[id number]]  (Abnormal)  (Susceptibility) Collected: 10/06/24 1701    Order Status: Completed Specimen: Blood Updated: 10/09/24 1001     Special Requests No Special Requests        Culture       Streptococcus agalactiae serogroup B growing in both bottles drawn No Site Indicated            (NOTE) [facility] CALLED TO AND READ BACK BY CLAY SIMPSON RN 11:30 10/07/24 BY LBO    Susceptibility        Beta Hemolytic Streptococcus - Group B      BACTERIAL SUSCEPTIBILITY PANEL DANIEL      ampicillin <=0.25 ug/mL Sensitive      cefotaxime <=0.12 ug/mL Sensitive      cefTRIAXone <=0.12 ug/mL Sensitive      clindamycin >=1 ug/mL Resistant      levofloxacin 1 ug/mL Sensitive      linezolid <=2 ug/mL Sensitive      Penicillin G <=0.06 ug/mL Sensitive      vancomycin 0.5 ug/mL Sensitive       
vitro diagnostic test intended for the qualitative detection of nucleic acids from SARS-CoV-2, Influenza A, and Influenza B in nasopharyngeal and nasal swab specimens for use under the FDA's Emergency Use Authorization (EUA) only.   Fact sheet for Patients: https://www.fda.gov/media/115367/download Fact sheet   for Healthcare Providers: https://www.fda.fov/media/733160/download                Physical Exam   AAOx 3  Non-labored Respirations  Abdomen Non-tender  LLE extremely tender to light touch throughout. Notable left knee effusion. Pain with passive left knee ROM, and pain with axial load.  Neurovascular grossly intact    ASSESSMENT/PLAN         70 y/o M admitted to the hospitalist service with sepsis (significantly elevated indices) with concern for acute hematogenous spread PJI versus possible lyme effusion from patients history.     - Washout and revision of left with Dr. Jimenez tentatively scheduled for tomorrow  - N.p.o. at midnight  - Hold all anticoagulations at midnight.   - WBC 16.1, CRP 34.30 on (10/6).  Sed rate 86 (on 10/6).  Cell count 77,010.  Cultures pending.  - Antibiotics: Vanco, Zosyn, doxycycline; ID consulted    Dr. Jimenez is aware of plan and is in agreement.    Electronically signed by Ericka Velazquez PA-C on 10/7/2024 at 11:25 AM   
acute findings in the abdomen or pelvis. 2. Mild splenomegaly. No ascites. 3. No acute fracture. No evidence of AVN at the left hip. 4. There are bilateral pars defects at L5 and there is stable grade 1 anterolisthesis of L5 on S1. Electronically signed by LUCINA UKENDY    XR CHEST 1 VIEW    Result Date: 10/6/2024  No evidence of acute cardiopulmonary process. Electronically signed by Wong Joyce MD    XR ANKLE LEFT (MIN 3 VIEWS)    Result Date: 10/6/2024  No acute abnormality. Electronically signed by Palringo    XR HIP 2-3 VW W PELVIS LEFT    Result Date: 10/6/2024  No acute abnormality. Electronically signed by Palringo    XR KNEE RIGHT (3 VIEWS)    Result Date: 10/6/2024  No acute abnormality. Electronically signed by Palringo    XR KNEE LEFT (3 VIEWS)    Result Date: 10/6/2024  Status post left knee arthroplasty. No evidence of hardware complication. Electronically signed by LUCINA GALVEZ      Access: Peripherals     Antimicrobials:  Vanc and Zosyn     Assessment/Plan     Late left prosthetic knee infection, acute onset pain and swelling on 10/04        Left knee fluid analysis from 10/06 revealed 77,010 WBCs (85% PMNs, 13% lymphs), Cx is pending         Still w left knee pain and swelling, DROM         Group B strep isolated from blood and left knee aspirate Cx        Scheduled for left knee debridement today by Ortho        Will leave on Zosyn for now, d/c Vanc. Anticipating long term IV antibiotics upon d/c         Routine labs in the morning     2. GBS bacteremia, in the setting of # 1     3. Left knee pain due to above, symptomatic mgt      4. H/o right hip arthroplasty in 2007 and revision in 06/2024     5. DM, BS mgt per primary     Dennis Senior MD    10/8/2024      
specimens for use under the FDA's Emergency Use Authorization (EUA) only.   Fact sheet for Patients: https://www.fda.gov/media/033252/download Fact sheet   for Healthcare Providers: https://www.fda.fov/media/212362/download         Culture, Blood, PCR ID Panel [[id number]]  (Abnormal) Collected: 10/06/24 1701    Order Status: Completed Specimen: Blood Updated: 10/07/24 8391     Accession Number Blood Culture Bottle        Enterococcus faecalis by PCR Not detected     Enterococcus faecium by PCR Not detected     Listeria monocytogenes by PCR Not detected     STAPHYLOCOCCUS Not detected     Staphylococcus Aureus Not detected     Staphylococcus epidermidis by PCR Not detected     Staphylococcus lugdunensis by PCR Not detected     STREPTOCOCCUS Detected        Streptococcus agalactiae (Group B) Detected        Strep pneumoniae Not detected     Strep pyogenes,(Grp. A) Not detected     Acinetobacter calcoac baumannii complex by PCR Not detected     Bacteroides fragilis by PCR Not detected     Enterobacteriaceae by PCR Not detected     Enterobacter cloacae complex by PCR Not detected     Escherichia Coli Not detected     Klebsiella aerogenes by PCR Not detected     Klebsiella oxytoca by PCR Not detected     Klebsiella pneumoniae group by PCR Not detected     Proteus by PCR Not detected     Salmonella species by PCR Not detected     Serratia marcescens by PCR Not detected     Haemophilus Influenzae by PCR Not detected     Neisseria meningitidis by PCR Not detected     Pseudomonas aeruginosa Not detected     Stenotrophomonas maltophilia by PCR Not detected     Candida albicans by PCR Not detected     Candida auris by PCR Not detected     Candida glabrata Not detected     Candida krusei by PCR Not detected     Candida parapsilosis by PCR Not detected     Candida tropicalis by PCR Not detected     Cryptococcus neoformans/gattii by PCR Not detected     Resistant gene targets          [employer] test comment False positive results 
    Comment: clinical,epidemiologic,  and other laboratory findings  Please see BCID Interpretation Guide in EPIC Links                 Assessment/Plan     Late left prosthetic knee infection. Group B strep knee aspirate Cx (10/06) and intra-op Cx         S/p left knee revision and poly exchange/placement of antibiotic beads on 10/08        Remains afebrile w a normal WBC, CRP trending down on serial labs         Left knee swelling on exam, no erythema or warmth         Continue on Ceftriaxone through 11/19. Routine labs in the morning           2. GBS bacteremia continue antibiotics as above     3. Left knee pain, still w sig pain, unclear if from DVT       Continue symptomatic mgt     4. Venous doppler on 10/17 revealed acute deep and superficial left LE DVT      Anticoagulated on heparin drip     4. DM, blood sugars are uncontrolled, mgt per primary     Dennis Senior MD    10/18/2024      
  Final Result   No acute abnormality.      Electronically signed by LUCINA GALVEZ      Vascular duplex lower extremity venous bilateral   Final Result      XR KNEE LEFT (3 VIEWS)   Final Result   Status post left knee arthroplasty. No evidence of hardware   complication.      Electronically signed by LUCINA GALVEZ      XR KNEE RIGHT (3 VIEWS)   Final Result   No acute abnormality.      Electronically signed by LUCINA GALVEZ             Discussion/MDM:     [] High (any 2)    A. Problems (any 1)  [] Acute/Chronic Illness/injury posing threat to life or bodily function:    [] Severe exacerbation of chronic illness:    ---------------------------------------------------------------------  B. Risk of Treatment (any 1)   [] Drugs/treatments that require intensive monitoring for toxicity include:    [] IV ABX requiring serial renal monitoring for nephrotoxicity:     [] IV Narcotic analgesia for adverse drug reaction  [] Aggressive IV diuresis requiring serial monitoring for renal impairment and electrolyte derangements  [] Critical electrolyte abnormalities requiring IV replacement and close serial monitoring  [] SQ Insulin SS- monitoring serial FSBS for Hypoglycemic adverse drug reaction  [] Other -   [] Change in code status:    [] Decision to escalate care:    [] Major surgery/procedure with associated risk factors:    ----------------------------------------------------------------------  C. Data (any 2)  [x] Discussed current management and discharge planning options with Case Management.  [x] Discussed management of the case with: Patient, nurse  [] Telemetry personally reviewed and interpreted as documented above    [] Imaging personally reviewed and interpreted, includes:    [x] Data Review (any 3)  [x] All available Consultant notes from yesterday/today were reviewed  [x] All current labs were reviewed and interpreted for clinical significance   [x] Appropriate follow-up labs were ordered  [] Collateral history obtained 
Comment: clinical,epidemiologic,  and other laboratory findings  Please see BCID Interpretation Guide in EPIC Links                  Diagnostic   CT ABDOMEN PELVIS WO CONTRAST Additional Contrast? None    Result Date: 10/6/2024  1. No acute findings in the abdomen or pelvis. 2. Mild splenomegaly. No ascites. 3. No acute fracture. No evidence of AVN at the left hip. 4. There are bilateral pars defects at L5 and there is stable grade 1 anterolisthesis of L5 on S1. Electronically signed by AcrintaKENDY    XR CHEST 1 VIEW    Result Date: 10/6/2024  No evidence of acute cardiopulmonary process. Electronically signed by Wong Joyce MD    XR ANKLE LEFT (MIN 3 VIEWS)    Result Date: 10/6/2024  No acute abnormality. Electronically signed by "[employer]    XR HIP 2-3 VW W PELVIS LEFT    Result Date: 10/6/2024  No acute abnormality. Electronically signed by "[employer]    XR KNEE RIGHT (3 VIEWS)    Result Date: 10/6/2024  No acute abnormality. Electronically signed by "[employer]    XR KNEE LEFT (3 VIEWS)    Result Date: 10/6/2024  Status post left knee arthroplasty. No evidence of hardware complication. Electronically signed by LUCINA [email or web address]    Access: Peripherals     Antimicrobials:  Vanc and Zosyn     Assessment/Plan     Late left prosthetic knee infection. Group B strep knee aspirate Cx (10/06) and intra-op Cx         S/p left knee revision and poly exchange/placement of antibiotic beads on 10/08        Remains afebrile w a normal WBC on routine labs, CRP is trending down on serial labs         On Ceftriaxone through 11/19, following which will consider chronic suppressive therapy w cephalexin         Routine labs in the morning           2. GBS bacteremia continue on Ceftriaxone as above      3. Left knee pain due to above, symptomatic mgt of pain     4. DM, BS mgt per primary     Dennis Senior MD    10/15/2024      
LUCINA GALVEZ      XR KNEE RIGHT (3 VIEWS)   Final Result   No acute abnormality.      Electronically signed by LUCINA GALVEZ             Discussion/MDM:     [] High (any 2)    A. Problems (any 1)  [] Acute/Chronic Illness/injury posing threat to life or bodily function:    [] Severe exacerbation of chronic illness:    ---------------------------------------------------------------------  B. Risk of Treatment (any 1)   [] Drugs/treatments that require intensive monitoring for toxicity include:    [] IV ABX requiring serial renal monitoring for nephrotoxicity:     [] IV Narcotic analgesia for adverse drug reaction  [] Aggressive IV diuresis requiring serial monitoring for renal impairment and electrolyte derangements  [] Critical electrolyte abnormalities requiring IV replacement and close serial monitoring  [] SQ Insulin SS- monitoring serial FSBS for Hypoglycemic adverse drug reaction  [] Other -   [] Change in code status:    [] Decision to escalate care:    [] Major surgery/procedure with associated risk factors:    ----------------------------------------------------------------------  C. Data (any 2)  [x] Discussed current management and discharge planning options with Case Management.  [x] Discussed management of the case with: Patient, nurse  [] Telemetry personally reviewed and interpreted as documented above    [] Imaging personally reviewed and interpreted, includes:    [x] Data Review (any 3)  [x] All available Consultant notes from yesterday/today were reviewed  [x] All current labs were reviewed and interpreted for clinical significance   [x] Appropriate follow-up labs were ordered  [] Collateral history obtained from:         Patient awaiting insurance approval for [facility]        Code status: Full code    Social determinants of health: none      Estimated discharge date//time frame/disposition: 24 hours    Barriers to discharge:  insurance auth          Juan Palafox MD  
findings  Please see BCID Interpretation Guide in EPIC Links                 Assessment/Plan     Late left prosthetic knee infection. Group B strep knee aspirate Cx (10/06) and intra-op Cx         S/p left knee revision and poly exchange/placement of antibiotic beads on 10/08        Afebrile, WBC remains WNLs, CRP was 7.89 on 10/16        On Ceftriaxone through 11/19, following which will consider chronic suppressive therapy         F/u labs in AM including CRP and ESR           2. GBS bacteremia continue antibiotics as above     3. Left knee pain, pain is more severe today per pt (10/17), unable to work w PT       Assessed by ortho, concerns for DVT given calf tenderness and muscle stiffness       Venous doppler and muscle relaxants to be ordered     4. DM, blood sugars are uncontrolled, mgt per primary     Dennis Senior MD    10/17/2024      
SCDs bilaterally  Keep ice machine on left knee at all times unless up with physical therapy, showering, or using the bathroom.  Skin must a dry.  Elevate leg with pillow/blanket underneath calf avoiding pressure on the heels as well as avoiding anything directly under the knee.    Incentive spirometry  Anticipated discharge: TBD pending; PT OT recommends IRF  Ortho f/u: In approximately 2 weeks for postop visit.      Electronically signed by Obinna Jimenez MD on 10/12/2024 at 3:39 PM     
placement of numerous   methylmethacrylate beads within the suprapatellar compartment of the knee.      Electronically signed by James To MD      CT ABDOMEN PELVIS WO CONTRAST Additional Contrast? None   Final Result      1. No acute findings in the abdomen or pelvis.      2. Mild splenomegaly. No ascites.      3. No acute fracture. No evidence of AVN at the left hip.      4. 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      Electronically signed by LUCINA GALVEZ      XR CHEST 1 VIEW   Final Result   No evidence of acute cardiopulmonary process.         Electronically signed by Wong Joyce MD      XR HIP 2-3 VW W PELVIS LEFT   Final Result   No acute abnormality.      Electronically signed by LUCINA GALVEZ      XR ANKLE LEFT (MIN 3 VIEWS)   Final Result   No acute abnormality.      Electronically signed by LUCINA GALVEZ      Vascular duplex lower extremity venous bilateral   Final Result      XR KNEE LEFT (3 VIEWS)   Final Result   Status post left knee arthroplasty. No evidence of hardware   complication.      Electronically signed by LUCINA GALVEZ      XR KNEE RIGHT (3 VIEWS)   Final Result   No acute abnormality.      Electronically signed by LUCINA GALVEZ             Discussion/MDM:     [] High (any 2)    A. Problems (any 1)  [] Acute/Chronic Illness/injury posing threat to life or bodily function:    [] Severe exacerbation of chronic illness:    ---------------------------------------------------------------------  B. Risk of Treatment (any 1)   [] Drugs/treatments that require intensive monitoring for toxicity include:    [] IV ABX requiring serial renal monitoring for nephrotoxicity:     [] IV Narcotic analgesia for adverse drug reaction  [] Aggressive IV diuresis requiring serial monitoring for renal impairment and electrolyte derangements  [] Critical electrolyte abnormalities requiring IV replacement and close serial monitoring  [] SQ Insulin SS- monitoring serial 
risk factors:    ----------------------------------------------------------------------  C. Data (any 2)  [x] Discussed current management and discharge planning options with Case Management.  [] Discussed management of the case with:    [] Telemetry personally reviewed and interpreted as documented above    [] Imaging personally reviewed and interpreted, includes:    [] Data Review (any 3)  [x] All available Consultant notes from yesterday/today were reviewed  [x] All current labs were reviewed and interpreted for clinical significance   [x] Appropriate follow-up labs were ordered  [] Collateral history obtained from:       Time spent with patient including counseling, chart review and nursing communication: 38 minutes    Lindsey Cruz MD    
  Result Value Ref Range    MRSA by PCR Not Detected Not Detected     POCT Glucose    Collection Time: 10/08/24  1:58 AM   Result Value Ref Range    POC Glucose 269 (H) 65 - 100 mg/dL    Performed by: KALYN MARILYN    POCT Glucose    Collection Time: 10/08/24  6:38 AM   Result Value Ref Range    POC Glucose 227 (H) 65 - 100 mg/dL    Performed by: KALYN MARILYN    POCT Glucose    Collection Time: 10/08/24  7:28 AM   Result Value Ref Range    POC Glucose 255 (H) 65 - 100 mg/dL    Performed by: [facility]        CT ABDOMEN PELVIS WO CONTRAST Additional Contrast? None   Final Result      1. No acute findings in the abdomen or pelvis.      2. Mild splenomegaly. No ascites.      3. No acute fracture. No evidence of AVN at the left hip.      4. 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      Electronically signed by LUCINA UY      XR CHEST 1 VIEW   Final Result   No evidence of acute cardiopulmonary process.         Electronically signed by Wong Joyce MD      XR HIP 2-3 VW W PELVIS LEFT   Final Result   No acute abnormality.      Electronically signed by PentahoKENDY      XR ANKLE LEFT (MIN 3 VIEWS)   Final Result   No acute abnormality.      Electronically signed by PentahoKENDY      Vascular duplex lower extremity venous bilateral   Final Result      XR KNEE LEFT (3 VIEWS)   Final Result   Status post left knee arthroplasty. No evidence of hardware   complication.      Electronically signed by LUCINA LEONOR      XR KNEE RIGHT (3 VIEWS)   Final Result   No acute abnormality.      Electronically signed by LUCINA UY             Discussion/MDM:     [] High (any 2)    A. Problems (any 1)  [] Acute/Chronic Illness/injury posing threat to life or bodily function:    [] Severe exacerbation of chronic illness:    ---------------------------------------------------------------------  B. Risk of Treatment (any 1)   [] Drugs/treatments that require intensive monitoring for 
  [] Severe exacerbation of chronic illness:    ---------------------------------------------------------------------  B. Risk of Treatment (any 1)   [] Drugs/treatments that require intensive monitoring for toxicity include:    [] IV ABX requiring serial renal monitoring for nephrotoxicity:     [] IV Narcotic analgesia for adverse drug reaction  [] Aggressive IV diuresis requiring serial monitoring for renal impairment and electrolyte derangements  [] Critical electrolyte abnormalities requiring IV replacement and close serial monitoring  [] SQ Insulin SS- monitoring serial FSBS for Hypoglycemic adverse drug reaction  [] Other -   [] Change in code status:    [] Decision to escalate care:    [] Major surgery/procedure with associated risk factors:    ----------------------------------------------------------------------  C. Data (any 2)  [x] Discussed current management and discharge planning options with Case Management.  [x] Discussed management of the case with: Patient, nurse  [] Telemetry personally reviewed and interpreted as documented above    [] Imaging personally reviewed and interpreted, includes:    [x] Data Review (any 3)  [x] All available Consultant notes from yesterday/today were reviewed  [x] All current labs were reviewed and interpreted for clinical significance   [x] Appropriate follow-up labs were ordered  [] Collateral history obtained from:         Patient awaiting insurance approval for [facility]        Code status: Full code    Social determinants of health: none      Estimated discharge date//time frame/disposition: 24 hours    Barriers to discharge:  insurance auth          Juan Palafox MD  
the knee.      Electronically signed by James To MD      CT ABDOMEN PELVIS WO CONTRAST Additional Contrast? None   Final Result      1. No acute findings in the abdomen or pelvis.      2. Mild splenomegaly. No ascites.      3. No acute fracture. No evidence of AVN at the left hip.      4. 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      Electronically signed by LUCINA GALVEZ      XR CHEST 1 VIEW   Final Result   No evidence of acute cardiopulmonary process.         Electronically signed by Wong Joyce MD      XR HIP 2-3 VW W PELVIS LEFT   Final Result   No acute abnormality.      Electronically signed by LUCINA UY      XR ANKLE LEFT (MIN 3 VIEWS)   Final Result   No acute abnormality.      Electronically signed by LUCINA UY      Vascular duplex lower extremity venous bilateral   Final Result      XR KNEE LEFT (3 VIEWS)   Final Result   Status post left knee arthroplasty. No evidence of hardware   complication.      Electronically signed by LUCINA GALVEZ      XR KNEE RIGHT (3 VIEWS)   Final Result   No acute abnormality.      Electronically signed by LUCINA GALVEZ             Discussion/MDM:     [] High (any 2)    A. Problems (any 1)  [] Acute/Chronic Illness/injury posing threat to life or bodily function:    [] Severe exacerbation of chronic illness:    ---------------------------------------------------------------------  B. Risk of Treatment (any 1)   [] Drugs/treatments that require intensive monitoring for toxicity include:    [] IV ABX requiring serial renal monitoring for nephrotoxicity:     [] IV Narcotic analgesia for adverse drug reaction  [] Aggressive IV diuresis requiring serial monitoring for renal impairment and electrolyte derangements  [] Critical electrolyte abnormalities requiring IV replacement and close serial monitoring  [] SQ Insulin SS- monitoring serial FSBS for Hypoglycemic adverse drug reaction  [] Other -   [] Change in code status:  
revealed Streptococcus agalactiae group B  -Status post left total knee revision, poly exchange, stimulan beads    Insulin-dependent diabetes  -Glucose at 303    JASON on CKD versus CKD, in setting of sepsis   -Cr yesterday 1.73 but baseline variable ~2?    Anion gap met acidosis  -Resolved     Neuropathy secondary to diabetes  Peripheral arterial disease    Chronic DVT  Bilateral leg swelling  -Noted on Doppler    Right hip replacement     Plan:  Washout and revision of left with Dr. Jimenez done on 10/8   -Significant improvement in pain symptoms  Resume normal diet  Resume anticoagulants  Lyme not detected  Doxycycline discontinued  Off prednisone for left knee pain and IV Dilaudid  Discontinue prednisone due to likely infectious etiology  Discontinue vancomycin  Consider de-escalation to ceftriaxone but will defer to ID    Moved glargine to 60U   Discontinue glipizide  Added insulin lispro 5 units with every meal  Continue SSI  Glucose likely elevated due to steroids    Avoid nephrotoxins  Continue NS 75 mL/hr    Will add Bicarb if CO2 continues to be low but likely in the setting of hyperglycemia    Continue cilostazol, aspirin, high intensity statin  Continue gabapentin    Continue Eliquis, history of 3 DVTs previously, unclear etiology     Continue tizanidine for muscle spasm, prescribed by orthopedic surgeon     Code status: Full code    Social determinants of health: none      Estimated discharge date//time frame/disposition: 72 hours    Barriers to discharge: Sepsis/pain control          Jam Minaya

## 2024-10-21 NOTE — CONSULTS
General surgery history and Physical    Patient: Keo Brownlee Jr.  MRN: [id number]    YOB: 1955  Age: 69 y.o.  Sex: male     Chief Complaint:  Chief Complaint   Patient presents with    Leg Swelling       History of Present Illness: Keo Brownlee Jr. is a 69 y.o. very pleasant gentleman currently hospitalized left knee infection after knee surgery.  I was consulted for left iliac vein thrombosis.  Patient examined this morning.  Patient was comfortable.  Pain is moderate on the left leg.    On exam: Strong Doppler signal noted left DP and PT.  Edema mild.    Social History:  Social Connections: Not on file       Past Medical History:  Past Medical History:   Diagnosis Date    Arthritis     BPH (benign prostatic hyperplasia)     Diabetes (HCC)     Erectile dysfunction     Hearing loss     Hx of blood clots     Hypercholesterolemia     Hypertension     Neuropathy     Restless leg syndrome     Thromboembolus (HCC)     2022     Surgical History:  Past Surgical History:   Procedure Laterality Date    APPENDECTOMY      CATARACT REMOVAL      EYE SURGERY      Cataracts    HIP SURGERY Right 04/01/2024    DIAGNOSTIC ASPIRATION OF RIGHT HIP JOINT UNDER FLUOROSCOPY performed by Stewart Lockhart MD at [facility] MAIN OR    JOINT REPLACEMENT      Left knee, right hip    KNEE ARTHROPLASTY Left 10/8/2024    LEFT TOTAL KNEE REVISION, POLY EXCHANGE, STIMULAN BEADS performed by Obinna Jimenez MD at [facility] MAIN OR    SHOULDER ARTHROSCOPY Left     SHOULDER ARTHROSCOPY Right     TOTAL HIP ARTHROPLASTY Right     TOTAL HIP ARTHROPLASTY Left     TOTAL KNEE ARTHROPLASTY Left     UPPER GASTROINTESTINAL ENDOSCOPY         Allergies:  Allergies   Allergen Reactions    Tramadol Nausea And Vomiting       Current Meds:  Current Facility-Administered Medications   Medication Dose Route Frequency Provider Last Rate Last Admin    apixaban (ELIQUIS) tablet 10 mg  10 mg Oral BID Juan Palafox MD   10 mg at 10/20/24 2051    Followed by    
ORTHOPEDIC CONSULT    Patient: Keo Brownlee Jr. MRN: [id number]  SSN: [id number]    YOB: 1955  Age: 69 y.o.  Sex: male      Subjective:      Keo Brownlee Jr. is a 69 y.o. male who is being seen for left leg pain and swelling - ortho was consulted to evaluate for left knee prosthetic joint infection.  Pt notes he was in his usual state of health until 2 days ago when he noted an inability to bear weight on his left side/left knee and fevers and chills up to 102 at home subjectively.  He has a h/o left TKA in [place] many years ago (looks like [employer] implant).  He also notes right hip replacement and subsequent revision (head/liner exchange) in April.  He denies any recent dental work, but does endorse being in the woods and having been bit by 'ticks and bugs.'      Past Medical History:   Diagnosis Date    Arthritis     BPH (benign prostatic hyperplasia)     Diabetes (HCC)     Erectile dysfunction     Hearing loss     Hx of blood clots     Hypercholesterolemia     Hypertension     Neuropathy     Restless leg syndrome     Thromboembolus (HCC)     2022     Past Surgical History:   Procedure Laterality Date    APPENDECTOMY      CATARACT REMOVAL      EYE SURGERY      Cataracts    HIP SURGERY Right 04/01/2024    DIAGNOSTIC ASPIRATION OF RIGHT HIP JOINT UNDER FLUOROSCOPY performed by Stewart Lockhart MD at [facility] MAIN OR    JOINT REPLACEMENT      Left knee, right hip    SHOULDER ARTHROSCOPY Left     SHOULDER ARTHROSCOPY Right     TOTAL HIP ARTHROPLASTY Right     TOTAL HIP ARTHROPLASTY Left     TOTAL KNEE ARTHROPLASTY Left     UPPER GASTROINTESTINAL ENDOSCOPY        Family History   Problem Relation Age of Onset    Diabetes Mother     Cancer Father      Social History     Tobacco Use    Smoking status: Never    Smokeless tobacco: Never   Substance Use Topics    Alcohol use: Yes     Comment: Approximately 2 to 3 months in between with dinner.      Prior to Admission medications    Medication 
 Plans for debridement by Ortho. Routine labs in AM, follow pending labs     2. Left knee pain due to above, symptomatic mgt     3. H/o right hip arthroplasty in 2007 and revision in 06/2024    4. DM, BS mgt per primary           Dennis Senior MD     10/7/2024

## 2024-10-21 NOTE — DISCHARGE SUMMARY
Physician Discharge Summary     Patient ID:    Keo Brownlee Jr.  [id number]  69 y.o.  1955    Admit date: 10/6/2024    Discharge date : 10/17/2024      Final Diagnoses:   Sepsis (HCC) [A41.9]    Reason for Hospitalization: Left prosthetic knee infection    Hospital Course:   Keo Brownlee is a 69 y.o.  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     Pt states he was in his baseline state of health until 2 days ago he developed sudden left foot pain, which radiated to the left knee.  Patient was unable to ambulate, was causing weakness and was falling.  At home patient check vitals had a Tmax of 103 which prompted them to come to the ED for further evaluation.  Any further which prompted his visit to the ED for further evaluation.  Patient states he has a right hip surgery done in June, follows up with orthopedic surgeon.  He has chronic pain of the right hip pain and has been taking pain medication and tizanidine for muscle spasm.  He has known bilateral lower neuropathy, left leg DVT, however the pain this time was too unbearable.  He endorses to fever, chills, diaphoresis.  [place]  He spends a lot of time outdoors.  He had previous tick bites.  No recent tick exposure.  Denies smoking and drinking.       Past medical history: Diabetic, insulin-dependent, diabetic neuropathy, peripheral atrial disease, CKD secondary to diabetes, multiple orthopedic surgery.     In the ED, patient is to be tachycardic, hypertensive, afebrile.  CBC shows leukocytosis of 19.  CMP shows hyponatremia of 127, normal potassium, bicarb of 20, creatinine is 1.84, has baseline CKD, blood sugar 323, calcium 9.7.  Pro-Vince 21, proBNP 991, CRP 34.0, ESR 86.  Initial 
now  IR consulted for thrombectomy and/or IVC filter however they are not planning to do any intervention as medical management is recommended.  General surgery agrees with this evaluation  Compression stockings  Encourage ambulation per surgery  Leg elevation   Dilaudid for the breakthrough pain 0.5 every 4 hours.  Continue oxycodone as needed 10 mg and gabapentin 300 mg 3 times daily  Patient to be discharged to skilled nursing facility  Continue Eliquis on discharge     Late left prosthetic knee infection. Group B strep knee aspirate Cx (10/06) and intra-op Cx   GBS bacteremia  -Fever, tachycardia, leukocytosis on presentation  CRP trending down  -Lactic acid unremarkable  -Ortho consulted stat, for arthrocentesis, who drained purulent synovial fluid  -Blood culture revealed Streptococcus agalactiae group B  -Status post washout and left total knee revision, poly exchange, stimulan beads 10/8  ID following  []Continue on Ceftriaxone through 11/19/24 following will which will be placed on long term suppressive therapy with cephalexin     Insulin-dependent diabetes uncontrolled  Glargine increased to 55, lispro to 15 today  Blood glucose goal 140- 180     CKD stage IIIb  Stable     Anion gap met acidosis  -Resolved     Neuropathy secondary to diabetes  Continue gabapentin     Peripheral arterial disease  Continue cilostazol, aspirin, high intensity statin     Chronic normocytic anemia  Low iron levels, iron 33, saturation 18 plan   will start patient on iron replacement ferrous sulfate every other day     Right hip replacement       Discharge Medications:      Medication List        START taking these medications      acetaminophen 500 MG tablet  Commonly known as: TYLENOL  Take 2 tablets by mouth 3 times daily as needed for Pain     cefTRIAXone infusion  Commonly known as: ROCEPHIN  Infuse 2,000 mg intravenously every 24 hours Continue through 11/19/24    CBC, BMP, CRP and ESR wkly while on IV antibiotics

## 2024-10-23 ENCOUNTER — TELEPHONE (OUTPATIENT)
Age: 69
End: 2024-10-23

## 2024-10-23 LAB
BACTERIA SPEC CULT: NORMAL
GRAM STN SPEC: NORMAL
GRAM STN SPEC: NORMAL
Lab: NORMAL

## 2024-10-23 NOTE — TELEPHONE ENCOUNTER
Attempted to contact patient to schedule an appointment  from a  referral that was received for right knee. STACIE

## 2024-10-29 ENCOUNTER — OFFICE VISIT (OUTPATIENT)
Age: 69
End: 2024-10-29

## 2024-10-29 VITALS
WEIGHT: 206.35 LBS | BODY MASS INDEX: 28.89 KG/M2 | OXYGEN SATURATION: 98 % | DIASTOLIC BLOOD PRESSURE: 70 MMHG | HEART RATE: 61 BPM | SYSTOLIC BLOOD PRESSURE: 109 MMHG | HEIGHT: 71 IN

## 2024-10-29 DIAGNOSIS — T84.54XA INFECTION AND INFLAMMATORY REACTION DUE TO INTERNAL LEFT KNEE PROSTHESIS, INITIAL ENCOUNTER (HCC): Primary | ICD-10-CM

## 2024-10-29 DIAGNOSIS — Z86.19 HISTORY OF SEPSIS: ICD-10-CM

## 2024-10-29 DIAGNOSIS — Z96.652 STATUS POST REVISION OF TOTAL KNEE REPLACEMENT, LEFT: ICD-10-CM

## 2024-10-29 PROCEDURE — 99024 POSTOP FOLLOW-UP VISIT: CPT | Performed by: ORTHOPAEDIC SURGERY

## 2024-10-29 NOTE — PROGRESS NOTES
Patient identified by name and date of birth  Keo Brownlee Jr. is a 69 y.o. male   Chief Complaint   Patient presents with    Knee Pain    Post-Op Check      Vitals:    10/29/24 1257   BP: 109/70   Site: Left Upper Arm   Pulse: 61   SpO2: 98%   Weight: 93.6 kg (206 lb 5.6 oz)   Height: 1.803 m (5' 11\")       No LMP for male patient.      \"Have you been to the ER, urgent care clinic since your last visit?  Hospitalized since your last visit?\"    Yes patient went to [facility] ED on 10/6/24-10/21/24.    “Have you seen or consulted any other health care providers outside of [facility] since your last visit?”    Yes seen by PCP.      
and inflammatory reaction due to internal left knee prosthesis, initial encounter ([facility])    2. Status post revision of total knee replacement, left    3. History of sepsis          Plan:     Continue on a home physical therapy program.  He may be weightbearing as tolerated with a walker.  May start bathing daily using antibacterial soap, rinsing thoroughly, and leaving the incision open to air.    The patient will follow-up next month for reevaluation of the wound.

## 2024-11-08 ENCOUNTER — HOSPITAL ENCOUNTER (EMERGENCY)
Facility: HOSPITAL | Age: 69
Discharge: HOME | End: 2024-11-08
Payer: MEDICARE

## 2024-11-08 ENCOUNTER — APPOINTMENT (OUTPATIENT)
Facility: HOSPITAL | Age: 69
End: 2024-11-08
Payer: MEDICARE

## 2024-11-08 VITALS
WEIGHT: 204 LBS | OXYGEN SATURATION: 98 % | SYSTOLIC BLOOD PRESSURE: 129 MMHG | HEIGHT: 71 IN | BODY MASS INDEX: 28.56 KG/M2 | DIASTOLIC BLOOD PRESSURE: 72 MMHG | TEMPERATURE: 98 F | HEART RATE: 68 BPM | RESPIRATION RATE: 16 BRPM

## 2024-11-08 DIAGNOSIS — Z96.652 S/P REVISION OF TOTAL KNEE, LEFT: Primary | ICD-10-CM

## 2024-11-08 DIAGNOSIS — T84.53XD INFECTION OF TOTAL RIGHT KNEE REPLACEMENT, SUBSEQUENT ENCOUNTER: ICD-10-CM

## 2024-11-08 DIAGNOSIS — R26.9 ABNORMAL GAIT: ICD-10-CM

## 2024-11-08 LAB
ALBUMIN SERPL-MCNC: 3.1 G/DL (ref 3.5–5)
ALBUMIN/GLOB SERPL: 0.6 (ref 1.1–2.2)
ALP SERPL-CCNC: 67 U/L (ref 45–117)
ALT SERPL-CCNC: 24 U/L (ref 12–78)
ANION GAP SERPL CALC-SCNC: 8 MMOL/L (ref 2–12)
APPEARANCE UR: CLEAR
AST SERPL W P-5'-P-CCNC: 37 U/L (ref 15–37)
BACTERIA URNS QL MICRO: NEGATIVE /HPF
BASOPHILS # BLD: 0 K/UL (ref 0–0.1)
BASOPHILS NFR BLD: 1 % (ref 0–1)
BILIRUB SERPL-MCNC: 0.7 MG/DL (ref 0.2–1)
BILIRUB UR QL: NEGATIVE
BUN SERPL-MCNC: 20 MG/DL (ref 6–20)
BUN/CREAT SERPL: 12 (ref 12–20)
CA-I BLD-MCNC: 10.5 MG/DL (ref 8.5–10.1)
CHLORIDE SERPL-SCNC: 103 MMOL/L (ref 97–108)
CO2 SERPL-SCNC: 26 MMOL/L (ref 21–32)
COLOR UR: ABNORMAL
CREAT SERPL-MCNC: 1.63 MG/DL (ref 0.7–1.3)
CRP SERPL-MCNC: 12.7 MG/DL (ref 0–0.3)
DIFFERENTIAL METHOD BLD: ABNORMAL
EOSINOPHIL # BLD: 0.3 K/UL (ref 0–0.4)
EOSINOPHIL NFR BLD: 5 % (ref 0–7)
EPITH CASTS URNS QL MICRO: ABNORMAL /LPF
ERYTHROCYTE [DISTWIDTH] IN BLOOD BY AUTOMATED COUNT: 13.7 % (ref 11.5–14.5)
ERYTHROCYTE [SEDIMENTATION RATE] IN BLOOD: 128 MM/HR (ref 0–20)
GLOBULIN SER CALC-MCNC: 5.4 G/DL (ref 2–4)
GLUCOSE BLD STRIP.AUTO-MCNC: 240 MG/DL (ref 65–100)
GLUCOSE SERPL-MCNC: 229 MG/DL (ref 65–100)
GLUCOSE UR STRIP.AUTO-MCNC: NEGATIVE MG/DL
HCT VFR BLD AUTO: 29.3 % (ref 36.6–50.3)
HGB BLD-MCNC: 9.5 G/DL (ref 12.1–17)
HGB UR QL STRIP: ABNORMAL
HYALINE CASTS URNS QL MICRO: ABNORMAL /LPF (ref 0–5)
IMM GRANULOCYTES # BLD AUTO: 0 K/UL (ref 0–0.04)
IMM GRANULOCYTES NFR BLD AUTO: 0 % (ref 0–0.5)
KETONES UR QL STRIP.AUTO: 5 MG/DL
LACTATE BLD-SCNC: 1.12 MMOL/L (ref 0.4–2)
LEUKOCYTE ESTERASE UR QL STRIP.AUTO: NEGATIVE
LYMPHOCYTES # BLD: 2 K/UL (ref 0.8–3.5)
LYMPHOCYTES NFR BLD: 38 % (ref 12–49)
MCH RBC QN AUTO: 28.9 PG (ref 26–34)
MCHC RBC AUTO-ENTMCNC: 32.4 G/DL (ref 30–36.5)
MCV RBC AUTO: 89.1 FL (ref 80–99)
MONOCYTES # BLD: 0.4 K/UL (ref 0–1)
MONOCYTES NFR BLD: 7 % (ref 5–13)
MUCOUS THREADS URNS QL MICRO: ABNORMAL /LPF
NEUTS SEG # BLD: 2.7 K/UL (ref 1.8–8)
NEUTS SEG NFR BLD: 49 % (ref 32–75)
NITRITE UR QL STRIP.AUTO: NEGATIVE
NRBC # BLD: 0 K/UL (ref 0–0.01)
NRBC BLD-RTO: 0 PER 100 WBC
PERFORMED BY:: ABNORMAL
PH UR STRIP: 5 (ref 5–8)
PLATELET # BLD AUTO: 293 K/UL (ref 150–400)
PMV BLD AUTO: 10.2 FL (ref 8.9–12.9)
POTASSIUM SERPL-SCNC: 4.3 MMOL/L (ref 3.5–5.1)
PROCALCITONIN SERPL-MCNC: 0.59 NG/ML
PROT SERPL-MCNC: 8.5 G/DL (ref 6.4–8.2)
PROT UR STRIP-MCNC: 30 MG/DL
RBC # BLD AUTO: 3.29 M/UL (ref 4.1–5.7)
RBC #/AREA URNS HPF: ABNORMAL /HPF (ref 0–5)
SODIUM SERPL-SCNC: 137 MMOL/L (ref 136–145)
SP GR UR REFRACTOMETRY: 1.01 (ref 1–1.03)
SPECIMEN SITE: ABNORMAL
TROPONIN I SERPL HS-MCNC: 77 NG/L (ref 0–76)
URINE CULTURE IF INDICATED: ABNORMAL
UROBILINOGEN UR QL STRIP.AUTO: 0.1 EU/DL (ref 0.1–1)
WBC # BLD AUTO: 5.4 K/UL (ref 4.1–11.1)
WBC URNS QL MICRO: ABNORMAL /HPF (ref 0–4)

## 2024-11-08 PROCEDURE — 84484 ASSAY OF TROPONIN QUANT: CPT

## 2024-11-08 PROCEDURE — 83605 ASSAY OF LACTIC ACID: CPT

## 2024-11-08 PROCEDURE — 81001 URINALYSIS AUTO W/SCOPE: CPT

## 2024-11-08 PROCEDURE — 87040 BLOOD CULTURE FOR BACTERIA: CPT

## 2024-11-08 PROCEDURE — 99285 EMERGENCY DEPT VISIT HI MDM: CPT

## 2024-11-08 PROCEDURE — 85652 RBC SED RATE AUTOMATED: CPT

## 2024-11-08 PROCEDURE — 99024 POSTOP FOLLOW-UP VISIT: CPT

## 2024-11-08 PROCEDURE — 93005 ELECTROCARDIOGRAM TRACING: CPT | Performed by: NURSE PRACTITIONER

## 2024-11-08 PROCEDURE — 85025 COMPLETE CBC W/AUTO DIFF WBC: CPT

## 2024-11-08 PROCEDURE — 84145 PROCALCITONIN (PCT): CPT

## 2024-11-08 PROCEDURE — 71045 X-RAY EXAM CHEST 1 VIEW: CPT

## 2024-11-08 PROCEDURE — 80047 BASIC METABLC PNL IONIZED CA: CPT

## 2024-11-08 PROCEDURE — 96375 TX/PRO/DX INJ NEW DRUG ADDON: CPT

## 2024-11-08 PROCEDURE — 73564 X-RAY EXAM KNEE 4 OR MORE: CPT

## 2024-11-08 PROCEDURE — 86140 C-REACTIVE PROTEIN: CPT

## 2024-11-08 PROCEDURE — 80053 COMPREHEN METABOLIC PANEL: CPT

## 2024-11-08 PROCEDURE — 6360000002 HC RX W HCPCS: Performed by: NURSE PRACTITIONER

## 2024-11-08 PROCEDURE — 96374 THER/PROPH/DIAG INJ IV PUSH: CPT

## 2024-11-08 RX ORDER — ONDANSETRON 2 MG/ML
4 INJECTION INTRAMUSCULAR; INTRAVENOUS ONCE
Status: COMPLETED | OUTPATIENT
Start: 2024-11-08 | End: 2024-11-08

## 2024-11-08 RX ORDER — OXYCODONE AND ACETAMINOPHEN 5; 325 MG/1; MG/1
1 TABLET ORAL EVERY 6 HOURS PRN
Qty: 12 TABLET | Refills: 0 | Status: SHIPPED | OUTPATIENT
Start: 2024-11-08 | End: 2024-11-11

## 2024-11-08 RX ORDER — HYDROMORPHONE HYDROCHLORIDE 1 MG/ML
0.5 INJECTION, SOLUTION INTRAMUSCULAR; INTRAVENOUS; SUBCUTANEOUS
Status: COMPLETED | OUTPATIENT
Start: 2024-11-08 | End: 2024-11-08

## 2024-11-08 RX ADMIN — HYDROMORPHONE HYDROCHLORIDE 0.5 MG: 1 INJECTION, SOLUTION INTRAMUSCULAR; INTRAVENOUS; SUBCUTANEOUS at 13:01

## 2024-11-08 RX ADMIN — ONDANSETRON 4 MG: 2 INJECTION INTRAMUSCULAR; INTRAVENOUS at 12:59

## 2024-11-08 ASSESSMENT — PAIN SCALES - GENERAL
PAINLEVEL_OUTOF10: 10
PAINLEVEL_OUTOF10: 2
PAINLEVEL_OUTOF10: 8

## 2024-11-08 ASSESSMENT — ENCOUNTER SYMPTOMS
VOMITING: 0
SHORTNESS OF BREATH: 0
COLOR CHANGE: 0
NAUSEA: 0

## 2024-11-08 ASSESSMENT — PAIN DESCRIPTION - LOCATION
LOCATION: KNEE

## 2024-11-08 ASSESSMENT — PAIN DESCRIPTION - ORIENTATION
ORIENTATION: LEFT

## 2024-11-08 ASSESSMENT — PAIN - FUNCTIONAL ASSESSMENT: PAIN_FUNCTIONAL_ASSESSMENT: 0-10

## 2024-11-08 NOTE — ED TRIAGE NOTES
Left knee pain s/p surgery. On ATB, pain is increasing.   Ejection Fraction... severely reduced LV function/Ejection Fraction...

## 2024-11-08 NOTE — CARE COORDINATION
CM acknowledges HH consult, referrals sent to all agencies in pt's area. Due to pt's location and insurance, there is a low likelihood that an agency will accept. Provider aware, O/p therapy orders will be provided if needed.

## 2024-11-08 NOTE — ED PROVIDER NOTES
[facility] EMERGENCY DEPT  EMERGENCY DEPARTMENT HISTORY AND PHYSICAL EXAM      Date: 11/8/2024  Patient Name: Keo Brownlee Jr.  MRN: [id number]  YOB: 1955  Date of evaluation: 11/8/2024  Provider: SYLVAIN Pinto NP   Note Started: 2:54 PM EST 11/8/24    HISTORY OF PRESENT ILLNESS     Chief Complaint   Patient presents with    Knee Pain     Patient had left knee surgery about a month ago. Patient is having pain since and is unable to sleep, walk or anything. Patient is on ATB. Patient also had surgery/replacement  at his right hip 3 month ago. Here for further evaluation.       History Provided By: Patient    HPI: Keo Brownlee Jr. is a 69 y.o. male with past medical history as listed below presents to the ER with knee pain.  Patient with status post cleanout of his septic knee.  Patient having increased pain.  Patient currently on antibiotics.  Patient comes in for reevaluation.    PAST MEDICAL HISTORY   Past Medical History:  Past Medical History:   Diagnosis Date    Arthritis     BPH (benign prostatic hyperplasia)     Diabetes (HCC)     Erectile dysfunction     Hearing loss     Hx of blood clots     Hypercholesterolemia     Hypertension     Neuropathy     Restless leg syndrome     Thromboembolus (HCC)     2022       Past Surgical History:  Past Surgical History:   Procedure Laterality Date    APPENDECTOMY      CATARACT REMOVAL      EYE SURGERY      Cataracts    HIP SURGERY Right 04/01/2024    DIAGNOSTIC ASPIRATION OF RIGHT HIP JOINT UNDER FLUOROSCOPY performed by Stewart Lockhart MD at [facility] MAIN OR    JOINT REPLACEMENT      Left knee, right hip    KNEE ARTHROPLASTY Left 10/8/2024    LEFT TOTAL KNEE REVISION, POLY EXCHANGE, STIMULAN BEADS performed by Obinna Jimenez MD at [facility] MAIN OR    SHOULDER ARTHROSCOPY Left     SHOULDER ARTHROSCOPY Right     TOTAL HIP ARTHROPLASTY Right     TOTAL HIP ARTHROPLASTY Left     TOTAL KNEE ARTHROPLASTY Left     UPPER GASTROINTESTINAL ENDOSCOPY         Family  Denies     Sexual abuse: Denies   Utilities: Not At Risk (10/6/2024)    [facility] Utilities     Threatened with loss of utilities: No       PHYSICAL EXAM   Physical Exam  ***    SCREENINGS                  LAB, EKG AND DIAGNOSTIC RESULTS   Labs:  Recent Results (from the past 12 hour(s))   CBC with Auto Differential    Collection Time: 11/08/24 11:44 AM   Result Value Ref Range    WBC 5.4 4.1 - 11.1 K/uL    RBC 3.29 (L) 4.10 - 5.70 M/uL    Hemoglobin 9.5 (L) 12.1 - 17.0 g/dL    Hematocrit 29.3 (L) 36.6 - 50.3 %    MCV 89.1 80.0 - 99.0 FL    MCH 28.9 26.0 - 34.0 PG    MCHC 32.4 30.0 - 36.5 g/dL    RDW 13.7 11.5 - 14.5 %    Platelets 293 150 - 400 K/uL    MPV 10.2 8.9 - 12.9 FL    Nucleated RBCs 0.0 0.0  WBC    nRBC 0.00 0.00 - 0.01 K/uL    Neutrophils % 49 32 - 75 %    Lymphocytes % 38 12 - 49 %    Monocytes % 7 5 - 13 %    Eosinophils % 5 0 - 7 %    Basophils % 1 0 - 1 %    Immature Granulocytes % 0 0 - 0.5 %    Neutrophils Absolute 2.7 1.8 - 8.0 K/UL    Lymphocytes Absolute 2.0 0.8 - 3.5 K/UL    Monocytes Absolute 0.4 0.0 - 1.0 K/UL    Eosinophils Absolute 0.3 0.0 - 0.4 K/UL    Basophils Absolute 0.0 0.0 - 0.1 K/UL    Immature Granulocytes Absolute 0.0 0.00 - 0.04 K/UL    Differential Type AUTOMATED     Comprehensive Metabolic Panel    Collection Time: 11/08/24 11:44 AM   Result Value Ref Range    Sodium 137 136 - 145 mmol/L    Potassium 4.3 3.5 - 5.1 mmol/L    Chloride 103 97 - 108 mmol/L    CO2 26 21 - 32 mmol/L    Anion Gap 8 2 - 12 mmol/L    Glucose 229 (H) 65 - 100 mg/dL    BUN 20 6 - 20 mg/dL    Creatinine 1.63 (H) 0.70 - 1.30 mg/dL    BUN/Creatinine Ratio 12 12 - 20      Est, Glom Filt Rate 45 (L) >60 ml/min/1.73m2    Calcium 10.5 (H) 8.5 - 10.1 mg/dL    Total Bilirubin 0.7 0.2 - 1.0 mg/dL    AST 37 15 - 37 U/L    ALT 24 12 - 78 U/L    Alk Phosphatase 67 45 - 117 U/L    Total Protein 8.5 (H) 6.4 - 8.2 g/dL    Albumin 3.1 (L) 3.5 - 5.0 g/dL    Globulin 5.4 (H) 2.0 - 4.0 g/dL    Albumin/Globulin Ratio 0.6 (L)

## 2024-11-08 NOTE — CONSULTS
ORTHOPEDIC CONSULT    Patient: Keo Brownlee Jr. MRN: [id number]  SSN: [id number]    YOB: 1955  Age: 69 y.o.  Sex: male      Subjective:      Keo Brownlee Jr. is a 69 y.o. male who is being seen for left knee pain.  Patient underwent left total knee revision with poly liner exchange and antibiotic bead placement on October 8, 2024 with Dr. Jimenez.  Patient remained hospitalized for several days following the surgery.  After the [facility] was [facility]  He was recently discharged on Monday.  He reports while in rehab he was performing physical therapy and states he was doing well.  He reports since being discharged he has not had any physical therapy and his pain in the left knee has increased.  Patient reports since surgery, he has had jolts of pain go through the knee which has continued.  He currently takes cyclobenzaprine and oxycodone which helps with the pain.  Patient was scheduled to come to our outpatient orthopedic office on Monday, but his wife states his PICC line was occluded so they had to take care of that and did not make it to their appointment.  Due to the increasing pain and the difficulty with weightbearing the patient was transported to [facility] ED for further evaluation.  X-rays of the left knee were obtained which revealed postsurgical changes.  ESR: 128, CRP: 12.70, white blood cell count is normal at 5.4.  Patient denies any falls or injuries since increase in pain.  He reports pain is worsened with weightbearing.  Him and his wife states it is very difficult for him to ambulate even 6 feet to get to the bathroom.  Denies any nausea, vomiting, fever, chills, shortness of breath, chest pain, calf pain, numbness or tingling.  He reports occasional night sweats however but he states that has been present since the surgery.    Orthopedics consulted for further evaluation.    Past Medical History:   Diagnosis Date

## 2024-11-09 LAB
EKG ATRIAL RATE: 74 BPM
EKG DIAGNOSIS: NORMAL
EKG P AXIS: 21 DEGREES
EKG P-R INTERVAL: 182 MS
EKG Q-T INTERVAL: 360 MS
EKG QRS DURATION: 90 MS
EKG QTC CALCULATION (BAZETT): 399 MS
EKG R AXIS: -7 DEGREES
EKG T AXIS: 11 DEGREES
EKG VENTRICULAR RATE: 74 BPM

## 2024-11-10 LAB
BACTERIA SPEC CULT: NORMAL
BACTERIA SPEC CULT: NORMAL
Lab: NORMAL
Lab: NORMAL

## 2024-11-14 LAB
BACTERIA SPEC CULT: NORMAL
BACTERIA SPEC CULT: NORMAL
Lab: NORMAL
Lab: NORMAL

## 2024-11-20 DIAGNOSIS — Z47.1 AFTERCARE FOLLOWING LEFT KNEE JOINT REPLACEMENT SURGERY: Primary | ICD-10-CM

## 2024-11-20 DIAGNOSIS — Z96.652 AFTERCARE FOLLOWING LEFT KNEE JOINT REPLACEMENT SURGERY: Primary | ICD-10-CM

## 2024-11-22 ENCOUNTER — OFFICE VISIT (OUTPATIENT)
Age: 69
End: 2024-11-22

## 2024-11-22 ENCOUNTER — HOSPITAL ENCOUNTER (OUTPATIENT)
Facility: HOSPITAL | Age: 69
Discharge: HOME | End: 2024-11-22
Payer: COMMERCIAL

## 2024-11-22 VITALS
TEMPERATURE: 97.8 F | BODY MASS INDEX: 28.47 KG/M2 | HEART RATE: 82 BPM | OXYGEN SATURATION: 94 % | HEIGHT: 71 IN | DIASTOLIC BLOOD PRESSURE: 72 MMHG | SYSTOLIC BLOOD PRESSURE: 131 MMHG

## 2024-11-22 DIAGNOSIS — Z47.89 ORTHOPEDIC AFTERCARE: Primary | ICD-10-CM

## 2024-11-22 DIAGNOSIS — Z47.1 AFTERCARE FOLLOWING LEFT KNEE JOINT REPLACEMENT SURGERY: ICD-10-CM

## 2024-11-22 DIAGNOSIS — Z96.652 AFTERCARE FOLLOWING LEFT KNEE JOINT REPLACEMENT SURGERY: ICD-10-CM

## 2024-11-22 PROCEDURE — 99024 POSTOP FOLLOW-UP VISIT: CPT | Performed by: ORTHOPAEDIC SURGERY

## 2024-11-22 PROCEDURE — 73560 X-RAY EXAM OF KNEE 1 OR 2: CPT

## 2024-11-22 ASSESSMENT — PATIENT HEALTH QUESTIONNAIRE - PHQ9
SUM OF ALL RESPONSES TO PHQ QUESTIONS 1-9: 0
SUM OF ALL RESPONSES TO PHQ QUESTIONS 1-9: 0
SUM OF ALL RESPONSES TO PHQ9 QUESTIONS 1 & 2: 0
2. FEELING DOWN, DEPRESSED OR HOPELESS: NOT AT ALL
SUM OF ALL RESPONSES TO PHQ QUESTIONS 1-9: 0
1. LITTLE INTEREST OR PLEASURE IN DOING THINGS: NOT AT ALL
SUM OF ALL RESPONSES TO PHQ QUESTIONS 1-9: 0

## 2024-11-22 NOTE — PROGRESS NOTES
Identified pt with two pt identifiers (name and ). Reviewed chart in preparation for visit and have obtained necessary documentation.    Keo Brownlee Jr. is a 69 y.o. male Post-Op Check (Revision left Total knee )  .    Vitals:    24 1446   BP: 131/72   Site: Right Upper Arm   Position: Sitting   Cuff Size: Medium Adult   Pulse: 82   Temp: 97.8 °F (36.6 °C)   SpO2: 94%   Height: 1.803 m (5' 10.98\")          1. Have you been to the ER, urgent care clinic since your last visit?  Hospitalized since your last visit?  yes - Hospitalized      2. Have you seen or consulted any other health care providers outside of the [facility] since your last visit?  Include any pap smears or colon screening.  no

## 2024-11-22 NOTE — PROGRESS NOTES
Orthopedic Ambulatory Note         Patient Name:Keo Brownlee Jr.     YOB: 1955     Age:69 y.o.      male     SUBJECTIVE    SURGERY: Left TKA revision with poly exchange and stimulant beads placement  SURGEON: Tony  DOS: 10/8/2024    11/22/2024: 6-week status post DAIR procedure for left TKA PJI.  Patient has had significant symptoms postoperatively upon review of his chart.  Labs from 11/8/2024 demonstrates WBC of 6.5, CRP 12 (0-10 normal), ESR 78.  He states his pain is markedly better than 2 weeks ago when he was seen in the ER.  The patient does endorse that he had a superficial versus deep infection of his right TANNER incision earlier this year.    The patient denies any calf pain, shortness of breath, chest pain, fevers, or chills.  They are not in physical therapy.      Physical Exam   AAOx 3  Non-labored Respirations  Abdomen Non-tender    Left knee: Midline is healing appropriately with no evidence of infection.  Mild effusion is present.  No erythema or significant tenderness with palpation.  Range of motion 0 - 5 - 80 after aggressive stretching in clinic.    IMAGING   Radiographs were independently reviewed and interpreted.  Radiographs of the left knee were obtained today and demonstrate near complete dissolve of stimulant beads.  No osteolysis is present.  Components appear stable.    ASSESSMENT/PLAN        69-year-old male 6-week status post left TKA DA IR procedure for group B streptococcus infection.  I will place a outpatient physical therapy consult for aggressive range of motion.  Patient educated on stretches for greater than 15 minutes.  He will follow-up in 1 week for a range of motion check with Dr. Magill.  I recommend lifelong antibiotic suppression.  They are to reach out to infectious disease and discussed this recommendation.  Return precautions were given.  We also discussed if the patient does need a two-stage revision I recommend reaching out to his right TANNER surgeon

## 2024-11-29 ENCOUNTER — OFFICE VISIT (OUTPATIENT)
Age: 69
End: 2024-11-29

## 2024-11-29 VITALS
OXYGEN SATURATION: 98 % | HEART RATE: 88 BPM | HEIGHT: 70 IN | BODY MASS INDEX: 29.2 KG/M2 | TEMPERATURE: 98.4 F | RESPIRATION RATE: 14 BRPM | WEIGHT: 204 LBS | SYSTOLIC BLOOD PRESSURE: 129 MMHG | DIASTOLIC BLOOD PRESSURE: 85 MMHG

## 2024-11-29 DIAGNOSIS — T84.54XA INFECTION AND INFLAMMATORY REACTION DUE TO INTERNAL LEFT KNEE PROSTHESIS, INITIAL ENCOUNTER (HCC): ICD-10-CM

## 2024-11-29 DIAGNOSIS — Z47.89 ORTHOPEDIC AFTERCARE: Primary | ICD-10-CM

## 2024-11-29 DIAGNOSIS — Z96.652 STATUS POST REVISION OF TOTAL KNEE REPLACEMENT, LEFT: ICD-10-CM

## 2024-11-29 ASSESSMENT — PATIENT HEALTH QUESTIONNAIRE - PHQ9
SUM OF ALL RESPONSES TO PHQ9 QUESTIONS 1 & 2: 0
SUM OF ALL RESPONSES TO PHQ QUESTIONS 1-9: 0
SUM OF ALL RESPONSES TO PHQ QUESTIONS 1-9: 0
2. FEELING DOWN, DEPRESSED OR HOPELESS: NOT AT ALL
1. LITTLE INTEREST OR PLEASURE IN DOING THINGS: NOT AT ALL
SUM OF ALL RESPONSES TO PHQ QUESTIONS 1-9: 0
SUM OF ALL RESPONSES TO PHQ QUESTIONS 1-9: 0

## 2024-11-29 NOTE — PROGRESS NOTES
Orthopedic Ambulatory Note         Patient Name:Keo Brownlee Jr.     YOB: 1955     Age:69 y.o.      male     SUBJECTIVE    SURGERY: Left TKA revision with poly exchange and stimulant beads placement  SURGEON: Tony  DOS: 10/8/2024     11/29/2024: 7week status post DAIR procedure for left TKA PJI.  Patient has had significant symptoms postoperatively upon review of his chart.  Labs from 11/8/2024 demonstrates WBC of 6.5, CRP 12 (0-10 normal), ESR 78.  He feels he is improved from last week.  The patient does endorse that he had a superficial versus deep infection of his right TANNER incision earlier this year.     The patient denies any calf pain, shortness of breath, chest pain, fevers, or chills.  They are not in physical therapy.       Physical Exam   AAOx 3  Non-labored Respirations  Abdomen Non-tender     Left knee: Midline is healing appropriately with no evidence of infection.  Mild effusion is present.  No erythema or significant tenderness with palpation.  Range of motion 0 - 5 - 90.        IMAGING   Radiographs were independently reviewed and interpreted.  Radiographs of the left knee were obtained today and demonstrate near complete dissolve of stimulant beads.  No osteolysis is present.  Components appear stable.         ASSESSMENT/PLAN            69-year-old male 7-week status post left TKA DA IR procedure for group B streptococcus infection.  Pt should begin therapy as soon as possible.  Patient educated on stretches for greater than 15 minutes.   I agree with Dr. Do and recommend lifelong antibiotic suppression.  They are to reach out to infectious disease and discussed this recommendation.  Return precautions were given.     DVT Proph: Completed  Follow-Up: 3-4 weeks with Dr. do  Radiographs at Follow-Up: None  Weight Bearing Status: Weight-bear as tolerated  Return to work: ~TBD

## 2024-11-29 NOTE — PROGRESS NOTES
Identified pt with two pt identifiers (name and ). Reviewed chart in preparation for visit and have obtained necessary documentation.    Keo Brownlee Jr. is a 69 y.o. male Post-Op Check (S/p LT TKA revision sx: 10/08/2024)  .    Vitals:    24 1403   BP: 129/85   Site: Left Upper Arm   Position: Sitting   Cuff Size: Medium Adult   Pulse: 88   Resp: 14   Temp: 98.4 °F (36.9 °C)   TempSrc: Temporal   SpO2: 98%   Weight: 92.5 kg (204 lb)   Height: 1.778 m (5' 10\")          1. Have you been to the ER, urgent care clinic since your last visit?  Hospitalized since your last visit?  no     2. Have you seen or consulted any other health care providers outside of the [facility] since your last visit?  Include any pap smears or colon screening.  no

## 2024-12-02 ENCOUNTER — TELEPHONE (OUTPATIENT)
Age: 69
End: 2024-12-02

## 2024-12-02 NOTE — TELEPHONE ENCOUNTER
PT wife called stating PT will have PIC line removed on 12/8/24 and has been told that Dr. Jimenez would like the PT to be on oral antibiotics following LT total knee by Dr. Jimenez on 10/8/24. Mrs. Brownlee is requesting antibiotics be sent to preferred pharmacy of [facility]/PHARMACY [address] [place] [address] - [address] [place] [address] - P [phone] - F [phone] [[id number]]. PT can be reached at [phone].

## 2024-12-09 ENCOUNTER — TELEPHONE (OUTPATIENT)
Age: 69
End: 2024-12-09

## 2024-12-09 NOTE — TELEPHONE ENCOUNTER
Patient wife called stating that patient will be having his Picline out and patient wife is requesting an antibiotic prescription placed to patient preferred pharmacy [facility]/PHARMACY [address] [place], [place] - [address] [place] [address] - P [phone] - f [phone] [[id number]]

## 2024-12-16 ENCOUNTER — HOSPITAL ENCOUNTER (OUTPATIENT)
Facility: HOSPITAL | Age: 69
Setting detail: RECURRING SERIES
Discharge: HOME | End: 2024-12-19
Attending: ORTHOPAEDIC SURGERY
Payer: MEDICARE

## 2024-12-16 PROCEDURE — G0283 ELEC STIM OTHER THAN WOUND: HCPCS

## 2024-12-16 PROCEDURE — 97162 PT EVAL MOD COMPLEX 30 MIN: CPT

## 2024-12-16 PROCEDURE — 97110 THERAPEUTIC EXERCISES: CPT

## 2024-12-16 NOTE — THERAPY EVALUATION
[facility], [address]  [place] [address]  Ph: [phone]     Fax: [phone]          PHYSICAL THERAPY - EVALUATION/PLAN OF CARE NOTE (updated 3/23)      Date: 2024          Patient Name:  Keo Brownlee Jr. :  1955   Medical   Diagnosis:  Orthopedic aftercare [Z47.89] Treatment Diagnosis:  M25.562  LEFT KNEE PAIN    Referral Source:  Obinna Jimenez MD Provider #:  [id number]                [place] / Product Type: *No Product type* /      Patient  verified yes     Visit #   Current  / Total 1 16-24   Time   In / Out 11:30 am 12:38 pm   Total Treatment Time 68   Total Timed Codes 18   1:1 Treatment Time   18   MC BC Totals Reminder:  bill using total billable   min of TIMED therapeutic procedures and modalities.   8-22 min = 1 unit; 23-37 min = 2 units; 38-52 min = 3 units; 53-67 min = 4 units; 68-82 min = 5 units         SUBJECTIVE  Pain Level (0-10 scale): 5/10 L Knee  []constant []intermittent [x]improving []worsening []no change since onset    Any medication changes, allergies to medications, adverse drug reactions, diagnosis change, or new procedure performed?: [x] No    [] Yes (see summary sheet for update)  Medications: Verified on Patient Summary List    Subjective functional status/changes:     Pt had a R hip partial replacement in 2024 and developed post operative infection leading to further sepsis affecting his L knee - resulting in the need for a revision of his TKA (initial surgery was >30 years ago).   Leading up to this recent hospital admission - pt reports that he lost the use of BLE (RLE 4 days, and LLE for several weeks) and he developed several blood clots in the LLE (6 in total) managed with medication. Pt still has an IV line for antibiotics in situ in his RUE (which he has a follow up appointment for tomorrow for possible removal) and he states that he will be on antibiotics

## 2024-12-17 ENCOUNTER — HOSPITAL ENCOUNTER (OUTPATIENT)
Facility: HOSPITAL | Age: 69
Discharge: HOME | End: 2024-12-20
Payer: MEDICARE

## 2024-12-17 ENCOUNTER — OFFICE VISIT (OUTPATIENT)
Age: 69
End: 2024-12-17

## 2024-12-17 VITALS
SYSTOLIC BLOOD PRESSURE: 125 MMHG | BODY MASS INDEX: 29.06 KG/M2 | WEIGHT: 203 LBS | TEMPERATURE: 98 F | HEART RATE: 89 BPM | HEIGHT: 70 IN | OXYGEN SATURATION: 97 % | RESPIRATION RATE: 18 BRPM | DIASTOLIC BLOOD PRESSURE: 70 MMHG

## 2024-12-17 DIAGNOSIS — M25.551 RIGHT HIP PAIN: ICD-10-CM

## 2024-12-17 DIAGNOSIS — Z96.659 CHRONIC INFECTION OF KNEE JOINT PROSTHESIS, SUBSEQUENT ENCOUNTER: Primary | ICD-10-CM

## 2024-12-17 DIAGNOSIS — M25.562 PAIN AND SWELLING OF KNEE, LEFT: ICD-10-CM

## 2024-12-17 DIAGNOSIS — T84.59XD CHRONIC INFECTION OF KNEE JOINT PROSTHESIS, SUBSEQUENT ENCOUNTER: Primary | ICD-10-CM

## 2024-12-17 DIAGNOSIS — M25.462 PAIN AND SWELLING OF KNEE, LEFT: ICD-10-CM

## 2024-12-17 DIAGNOSIS — Z96.659 CHRONIC INFECTION OF KNEE JOINT PROSTHESIS, SUBSEQUENT ENCOUNTER: ICD-10-CM

## 2024-12-17 DIAGNOSIS — T84.59XD CHRONIC INFECTION OF KNEE JOINT PROSTHESIS, SUBSEQUENT ENCOUNTER: ICD-10-CM

## 2024-12-17 PROCEDURE — 73562 X-RAY EXAM OF KNEE 3: CPT

## 2024-12-17 RX ORDER — CEPHALEXIN 500 MG/1
500 CAPSULE ORAL 2 TIMES DAILY
Qty: 60 CAPSULE | Refills: 3 | Status: SHIPPED | OUTPATIENT
Start: 2024-12-17 | End: 2025-01-16

## 2024-12-18 ASSESSMENT — ENCOUNTER SYMPTOMS: RESPIRATORY NEGATIVE: 1

## 2024-12-18 NOTE — PROGRESS NOTES
\"Have you been to the ER, urgent care clinic since your last visit?  Hospitalized since your last visit?\"    YES - When: approximately 11/08/24 ago.  Where and Why: [facility] for sepsis.    “Have you seen or consulted any other health care providers outside our system since your last visit?”    YES - When: approximately 12/2024 ago.  Where and Why: PCP for follow up.      “Have you had a colorectal cancer screening such as a colonoscopy/FIT/Cologuard?    YES - Type: Colonoscopy - Where: Dr. Romero      No colonoscopy on file  No cologuard on file  No FIT/FOBT on file   No flexible sigmoidoscopy on file     “Have you had a diabetic eye exam?”    NO     No diabetic eye exam on file     Chief Complaint   Patient presents with    Follow-Up from Hospital     Sepsis     /70 (Site: Left Upper Arm, Position: Sitting, Cuff Size: Medium Adult)   Pulse 89   Temp 98 °F (36.7 °C) (Temporal)   Resp 18   Ht 1.778 m (5' 10\")   Wt 92.1 kg (203 lb)   SpO2 97%   BMI 29.13 kg/m²        
Left     UPPER GASTROINTESTINAL ENDOSCOPY          Social History     Tobacco Use    Smoking status: Never    Smokeless tobacco: Never   Substance Use Topics    Alcohol use: Yes     Comment: Approximately 2 to 3 months in between with dinner.    Drug use: Never        Family History   Problem Relation Age of Onset    Diabetes Mother     Cancer Father         Allergies   Allergen Reactions    Tramadol Nausea And Vomiting        Objective  Physical Exam  Constitutional:       Appearance: Normal appearance.   Cardiovascular:      Rate and Rhythm: Normal rate and regular rhythm.   Pulmonary:      Effort: Pulmonary effort is normal.      Breath sounds: Normal breath sounds.   Abdominal:      General: Abdomen is flat.      Palpations: Abdomen is soft.   Musculoskeletal:      Comments: Left knee tenderness, DROM, no warmth or erythema. Right arm PICC line in place    Skin:     General: Skin is warm and dry.   Neurological:      General: No focal deficit present.      Mental Status: He is alert and oriented to person, place, and time.      Assessment & Plan  Keo was seen today for follow-up from hospital.    Diagnoses and all orders for this visit:    Chronic infection of knee joint prosthesis, subsequent encounter  -     cephALEXin (KEFLEX) 500 MG capsule; Take 1 capsule by mouth 2 times daily  -     XR KNEE LEFT (3 VIEWS); Future  - S/p knee revision and poly exchange/placement of antibiotic beads on 10/08/24  - Completed 8 wks of IV antibiotics targeting GBS, Ceftriaxone   - CRP and ESR are both trending down on serial labs   - Pt reports ongoing pain and swelling interfering w his ADLs  - X-ray ordered, will notify ortho of going symptoms   - Started on suppressive antibiotics w Cephalexin, maintain PICC line pending discussion w ortho   - Reassess in 3 wks     Pain and swelling of knee, left  -     XR KNEE LEFT (3 VIEWS); Future    Right hip pain: H/o replacement, plans on scheduling apt w Dr Lockhart

## 2024-12-24 ENCOUNTER — HOSPITAL ENCOUNTER (OUTPATIENT)
Facility: HOSPITAL | Age: 69
Setting detail: RECURRING SERIES
Discharge: HOME | End: 2024-12-27
Attending: ORTHOPAEDIC SURGERY
Payer: MEDICARE

## 2024-12-24 PROCEDURE — 97110 THERAPEUTIC EXERCISES: CPT

## 2024-12-24 PROCEDURE — G0283 ELEC STIM OTHER THAN WOUND: HCPCS

## 2024-12-24 PROCEDURE — 97140 MANUAL THERAPY 1/> REGIONS: CPT

## 2024-12-24 NOTE — PROGRESS NOTES
PHYSICAL THERAPY - MEDICARE DAILY TREATMENT NOTE (updated 3/23)      Date: 2024          Patient Name:  Keo Brownlee Jr. :  1955   Medical   Diagnosis:  Orthopedic aftercare [Z47.89] Treatment Diagnosis:  M25.562  LEFT KNEE PAIN    Referral Source:  Obinna Jimenez MD Insurance:   Payor: [facility] MEDICARE / Plan: [facility] OUT OF STATE MANAGED MCR / Product Type: *No Product type* /                     Patient  verified yes     Visit #   Current  / Total 2 16-24   Time   In / Out 10:18 am 11:30 am   Total Treatment Time 75   Total Timed Codes 70   1:1 Treatment Time 70      MC BC Totals Reminder:  bill using total billable   min of TIMED therapeutic procedures and modalities.   8-22 min = 1 unit; 23-37 min = 2 units; 38-52 min = 3 units; 53-67 min = 4 units; 68-82 min = 5 units            SUBJECTIVE    Pain Level (0-10 scale): 3-4/10 L knee    Any medication changes, allergies to medications, adverse drug reactions, diagnosis change, or new procedure performed?: [x] No    [] Yes (see summary sheet for update)  Medications: Verified on Patient Summary List    Subjective functional status/changes:     Pt reports ongoing constant knee pain and stiffness.  Pt states that he saw the infectious control DrKingsley And he states that there may still be infection in the L knee and he may need another surgery - he will see the surgeon on .  Pt reports a fall yesterday - he got up without his cane and his L knee buckled.    OBJECTIVE      Therapeutic Procedures:  Tx Min Billable or 1:1 Min (if diff from Tx Min) Procedure, Rationale, Specifics   40  63658 Therapeutic Exercise (timed):  increase ROM, strength, coordination, balance, and proprioception to improve patient's ability to progress to PLOF and address remaining functional goals. (see flow sheet as applicable)     Details if applicable:  See HO   5  49040 Neuromuscular Re-Education (timed):  improve balance, coordination, kinesthetic sense, posture, core

## 2024-12-27 ENCOUNTER — HOSPITAL ENCOUNTER (OUTPATIENT)
Facility: HOSPITAL | Age: 69
Setting detail: RECURRING SERIES
Discharge: HOME | End: 2024-12-30
Attending: ORTHOPAEDIC SURGERY
Payer: MEDICARE

## 2024-12-27 PROCEDURE — 97110 THERAPEUTIC EXERCISES: CPT

## 2024-12-27 PROCEDURE — G0283 ELEC STIM OTHER THAN WOUND: HCPCS

## 2024-12-27 PROCEDURE — 97140 MANUAL THERAPY 1/> REGIONS: CPT

## 2024-12-27 NOTE — PROGRESS NOTES
PHYSICAL THERAPY - MEDICARE DAILY TREATMENT NOTE (updated 3/23)      Date: 2024          Patient Name:  Keo Brownlee Jr. :  1955   Medical   Diagnosis:  Orthopedic aftercare [Z47.89] Treatment Diagnosis:  M25.562  LEFT KNEE PAIN    Referral Source:  Obinna Jimenez MD Insurance:   Payor: [facility] MEDICARE / Plan: [facility] OUT OF STATE MANAGED MCR / Product Type: *No Product type* /                     Patient  verified yes     Visit #   Current  / Total 3 16-24   Time   In / Out 1:00pm 2:10 pm   Total Treatment Time 68   Total Timed Codes 53   1:1 Treatment Time 53       BC Totals Reminder:  bill using total billable   min of TIMED therapeutic procedures and modalities.   8-22 min = 1 unit; 23-37 min = 2 units; 38-52 min = 3 units; 53-67 min = 4 units; 68-82 min = 5 units            SUBJECTIVE    Pain Level (0-10 scale): 5/10 L knee 3-4/10 R hip    Any medication changes, allergies to medications, adverse drug reactions, diagnosis change, or new procedure performed?: [x] No    [] Yes (see summary sheet for update)  Medications: Verified on Patient Summary List    Subjective functional status/changes:     Pt reports that he saw Dr. Cruz and he states that he has clearance to continue to PT. He has been taken off his antibiotics - and he will have I+D from R hip and L knee on 2025 - to test infectious markers. Pt states that he was informed that he may need an AKA.      OBJECTIVE      Therapeutic Procedures:  Tx Min Billable or 1:1 Min (if diff from Tx Min) Procedure, Rationale, Specifics   40  29096 Therapeutic Exercise (timed):  increase ROM, strength, coordination, balance, and proprioception to improve patient's ability to progress to PLOF and address remaining functional goals. (see flow sheet as applicable)     Details if applicable:  See HO   3  26644 Neuromuscular Re-Education (timed):  improve balance, coordination, kinesthetic sense, posture, core stability and proprioception to improve

## 2025-01-06 ENCOUNTER — APPOINTMENT (OUTPATIENT)
Facility: HOSPITAL | Age: 70
End: 2025-01-06
Attending: ORTHOPAEDIC SURGERY
Payer: MEDICARE

## 2025-01-07 ENCOUNTER — OFFICE VISIT (OUTPATIENT)
Age: 70
End: 2025-01-07
Payer: MEDICARE

## 2025-01-07 VITALS
DIASTOLIC BLOOD PRESSURE: 72 MMHG | BODY MASS INDEX: 29.78 KG/M2 | TEMPERATURE: 98.5 F | RESPIRATION RATE: 16 BRPM | SYSTOLIC BLOOD PRESSURE: 116 MMHG | HEART RATE: 80 BPM | OXYGEN SATURATION: 97 % | HEIGHT: 70 IN | WEIGHT: 208 LBS

## 2025-01-07 DIAGNOSIS — Z96.659 CHRONIC INFECTION OF KNEE JOINT PROSTHESIS, SUBSEQUENT ENCOUNTER: Primary | ICD-10-CM

## 2025-01-07 DIAGNOSIS — G89.29 CHRONIC HIP PAIN, RIGHT: ICD-10-CM

## 2025-01-07 DIAGNOSIS — M25.562 PAIN AND SWELLING OF LEFT KNEE: ICD-10-CM

## 2025-01-07 DIAGNOSIS — T84.59XD CHRONIC INFECTION OF KNEE JOINT PROSTHESIS, SUBSEQUENT ENCOUNTER: Primary | ICD-10-CM

## 2025-01-07 DIAGNOSIS — M25.462 PAIN AND SWELLING OF LEFT KNEE: ICD-10-CM

## 2025-01-07 DIAGNOSIS — M25.551 CHRONIC HIP PAIN, RIGHT: ICD-10-CM

## 2025-01-07 DIAGNOSIS — M19.90 OSTEOARTHRITIS, UNSPECIFIED OSTEOARTHRITIS TYPE, UNSPECIFIED SITE: ICD-10-CM

## 2025-01-07 PROCEDURE — 99214 OFFICE O/P EST MOD 30 MIN: CPT | Performed by: INTERNAL MEDICINE

## 2025-01-07 PROCEDURE — 1159F MED LIST DOCD IN RCRD: CPT | Performed by: INTERNAL MEDICINE

## 2025-01-07 PROCEDURE — 1123F ACP DISCUSS/DSCN MKR DOCD: CPT | Performed by: INTERNAL MEDICINE

## 2025-01-07 PROCEDURE — 1125F AMNT PAIN NOTED PAIN PRSNT: CPT | Performed by: INTERNAL MEDICINE

## 2025-01-07 ASSESSMENT — ENCOUNTER SYMPTOMS
GASTROINTESTINAL NEGATIVE: 1
RESPIRATORY NEGATIVE: 1

## 2025-01-07 NOTE — PROGRESS NOTES
\"Have you been to the ER, urgent care clinic since your last visit?  Hospitalized since your last visit?\"    NO    “Have you seen or consulted any other health care providers outside our system since your last visit?”    YES - When: approximately 12/2024 ago.  Where and Why: PCP for follow up and Dr. Cruz for follow up.      “Have you had a colorectal cancer screening such as a colonoscopy/FIT/Cologuard?    YES - Type: Colonoscopy - Where: Dr. Romero     No colonoscopy on file  No cologuard on file  No FIT/FOBT on file   No flexible sigmoidoscopy on file     “Have you had a diabetic eye exam?”    YES - Where: Dr. Slick Storm     No diabetic eye exam on file     Chief Complaint   Patient presents with    Follow-up     Chronic left knee infection     /72 (Site: Left Upper Arm, Position: Sitting, Cuff Size: Large Adult)   Pulse 80   Temp 98.5 °F (36.9 °C) (Temporal)   Resp 16   Ht 1.778 m (5' 10\")   Wt 94.3 kg (208 lb)   SpO2 97%   BMI 29.84 kg/m²        
results     Chronic hip pain, right: Plans for right hip aspirate on 1/08 per pt     Pain and swelling of left knee: Symptomatic mgt     Osteoarthritis, unspecified osteoarthritis type, unspecified site  -Multiple joint pain. S/p right hip and L knee arthroplasty

## 2025-01-08 ENCOUNTER — APPOINTMENT (OUTPATIENT)
Facility: HOSPITAL | Age: 70
End: 2025-01-08
Attending: ORTHOPAEDIC SURGERY
Payer: MEDICARE

## 2025-01-09 ENCOUNTER — HOSPITAL ENCOUNTER (OUTPATIENT)
Facility: HOSPITAL | Age: 70
Setting detail: RECURRING SERIES
Discharge: HOME | End: 2025-01-12
Attending: ORTHOPAEDIC SURGERY
Payer: MEDICARE

## 2025-01-09 PROCEDURE — 97110 THERAPEUTIC EXERCISES: CPT

## 2025-01-09 PROCEDURE — 97112 NEUROMUSCULAR REEDUCATION: CPT

## 2025-01-09 NOTE — PROGRESS NOTES
PHYSICAL THERAPY - MEDICARE DAILY TREATMENT NOTE (updated 3/23)      Date: 2025          Patient Name:  Keo Brownlee Jr. :  1955   Medical   Diagnosis:  Orthopedic aftercare [Z47.89] Treatment Diagnosis:  M25.562  LEFT KNEE PAIN    Referral Source:  Obinna Jimenez MD Insurance:   Payor: [facility] MEDICARE / Plan: [facility] OUT OF STATE MANAGED MCR / Product Type: *No Product type* /                     Patient  verified yes     Visit #   Current  / Total 4 16-24   Time   In / Out 1:30 pm 2:68 pm   Total Treatment Time 68   Total Timed Codes 68   1:1 Treatment Time 68       BC Totals Reminder:  bill using total billable   min of TIMED therapeutic procedures and modalities.   8-22 min = 1 unit; 23-37 min = 2 units; 38-52 min = 3 units; 53-67 min = 4 units; 68-82 min = 5 units            SUBJECTIVE    Pain Level (0-10 scale): 4/10 L knee 0/10 R hip    Any medication changes, allergies to medications, adverse drug reactions, diagnosis change, or new procedure performed?: [x] No    [] Yes (see summary sheet for update)  Medications: Verified on Patient Summary List    Subjective functional status/changes:     Pt is one day post I&D of R hip and L knee to test infectious markers. He reports a fall after Stacie while getting up from a chair and attempting to walk without his spc - L knee gave way.      OBJECTIVE      Therapeutic Procedures:  Tx Min Billable or 1:1 Min (if diff from Tx Min) Procedure, Rationale, Specifics   38  35587 Therapeutic Exercise (timed):  increase ROM, strength, coordination, balance, and proprioception to improve patient's ability to progress to PLOF and address remaining functional goals. (see flow sheet as applicable)     Details if applicable:  See HO   25  36171 Neuromuscular Re-Education (timed):  improve balance, coordination, kinesthetic sense, posture, core stability and proprioception to improve patient's ability to develop conscious control of individual muscles and

## 2025-01-13 ENCOUNTER — HOSPITAL ENCOUNTER (OUTPATIENT)
Facility: HOSPITAL | Age: 70
Setting detail: RECURRING SERIES
Discharge: HOME | End: 2025-01-16
Attending: ORTHOPAEDIC SURGERY
Payer: MEDICARE

## 2025-01-13 PROCEDURE — 97110 THERAPEUTIC EXERCISES: CPT

## 2025-01-13 NOTE — PROGRESS NOTES
0HHA from a standard chair     Pt performs 6 MWT for >1000 feet safely without verbal or increased pain so they can ambulate community distances without hip pain or fear of falling, to get groceries, medications.       [] Met [] Not met [] Partially met Date:      Pt can go up and down steps with min support in a normal pattern without difficulty, fatigue, or pain > 1-2/10 for safe gait.      [] Met [] Not met [] Partially met Date:      Pt can march on min-trampoline or foam for 2 minutes to negotiate uneven terrain independently, like walking through the yard, without hip pain or instability.      [] Met [] Not met [] Partially met  Date:      Pt can SLS on involved side for 15 seconds for improved balance in stance phase to promote safe walking with no limp.       [] Met [] Not met [] Partially met Date:      Pt will have knee flexion ROM sufficient to be able to squat to retrieve item from ground without increase of pain.      [] Met [] Not met [] Partially met Date:      Pt will have decreased swelling by 1/2 cm to improve knee flexion for comfortable sitting and less pain and stiffness.      [] Met [] Not met [] Partially met Date:      Pt will have quad strength sufficient to perform SLR for 10-15 repetitions without a quads lag for safe stance phase of gait so patient can walk community distances without buckling or weakness.      [] Met [] Not met [] Partially met Date:       Frequency / Duration: Patient to be seen 1-2 times per week for 16-24 treatments.  Treatment Plan may include any combination of the followin Therapeutic Exercise, 12626 Neuromuscular Re-Education, 32836 Manual Therapy, 91580 Therapeutic Activity, 12836 Electrical Stim unattended /  (MCR), 43155 Electrical Stim attended, 09225 Gait Training, 51098 Ultrasound, and (Elective Self Pay) Needle Insertion w/o Injection (1 or 2 muscles), (3+ muscles)       PLAN  Yes  Continue plan of care  Re-Cert Due: NA  [x]  Upgrade activities

## 2025-01-15 ENCOUNTER — HOSPITAL ENCOUNTER (OUTPATIENT)
Facility: HOSPITAL | Age: 70
Setting detail: RECURRING SERIES
Discharge: HOME | End: 2025-01-18
Attending: ORTHOPAEDIC SURGERY
Payer: MEDICARE

## 2025-01-15 ENCOUNTER — OFFICE VISIT (OUTPATIENT)
Age: 70
End: 2025-01-15
Payer: MEDICARE

## 2025-01-15 VITALS
SYSTOLIC BLOOD PRESSURE: 130 MMHG | DIASTOLIC BLOOD PRESSURE: 80 MMHG | HEIGHT: 70 IN | WEIGHT: 214.8 LBS | TEMPERATURE: 98.2 F | HEART RATE: 90 BPM | RESPIRATION RATE: 16 BRPM | BODY MASS INDEX: 30.75 KG/M2 | OXYGEN SATURATION: 97 %

## 2025-01-15 DIAGNOSIS — E22.1 HYPERPROLACTINEMIA (HCC): Primary | ICD-10-CM

## 2025-01-15 DIAGNOSIS — E22.1 HYPERPROLACTINEMIA (HCC): ICD-10-CM

## 2025-01-15 PROCEDURE — 97110 THERAPEUTIC EXERCISES: CPT

## 2025-01-15 PROCEDURE — 99213 OFFICE O/P EST LOW 20 MIN: CPT | Performed by: STUDENT IN AN ORGANIZED HEALTH CARE EDUCATION/TRAINING PROGRAM

## 2025-01-15 PROCEDURE — 1125F AMNT PAIN NOTED PAIN PRSNT: CPT | Performed by: STUDENT IN AN ORGANIZED HEALTH CARE EDUCATION/TRAINING PROGRAM

## 2025-01-15 PROCEDURE — 1159F MED LIST DOCD IN RCRD: CPT | Performed by: STUDENT IN AN ORGANIZED HEALTH CARE EDUCATION/TRAINING PROGRAM

## 2025-01-15 PROCEDURE — 97112 NEUROMUSCULAR REEDUCATION: CPT

## 2025-01-15 PROCEDURE — 1123F ACP DISCUSS/DSCN MKR DOCD: CPT | Performed by: STUDENT IN AN ORGANIZED HEALTH CARE EDUCATION/TRAINING PROGRAM

## 2025-01-15 PROCEDURE — 97140 MANUAL THERAPY 1/> REGIONS: CPT

## 2025-01-15 NOTE — PROGRESS NOTES
\"Have you been to the ER, urgent care clinic since your last visit?  Hospitalized since your last visit?\"    NO    “Have you seen or consulted any other health care providers outside our system since your last visit?”    YES - When: approximately 1  weeks ago.  Where and Why: [facility]TH[facility].      “Have you had a colorectal cancer screening such as a colonoscopy/FIT/Cologuard?    YES - Type: Colonoscopy - Where: 2024 Dr. Romero   No colonoscopy on file  No cologuard on file  No FIT/FOBT on file   No flexible sigmoidoscopy on file     “Have you had a diabetic eye exam?”    NO     No diabetic eye exam on file     Chief Complaint   Patient presents with    New Patient     Hyperprolactinemia       /80 (Site: Right Upper Arm, Position: Sitting, Cuff Size: Medium Adult)   Pulse 90   Temp 98.2 °F (36.8 °C) (Temporal)   Resp 16   Ht 1.778 m (5' 10\")   Wt 97.4 kg (214 lb 12.8 oz)   SpO2 97%   BMI 30.82 kg/m²        
Connection - [facility]: Outside name: Trulicity 0.75 mg/0.5 mL sub-q pen    gabapentin (NEURONTIN) 300 MG capsule Take 1 tablet by mouth 3 times daily. 300 mg  TID    glipiZIDE (GLUCOTROL XL) 10 MG extended release tablet Take 1 tablet by mouth daily    metoprolol succinate (TOPROL XL) 100 MG extended release tablet Take 1 tablet by mouth daily    tamsulosin (FLOMAX) 0.4 MG capsule Take 1 capsule by mouth daily    tiZANidine (ZANAFLEX) 2 MG tablet Take 1 tablet by mouth nightly as needed    cephALEXin (KEFLEX) 500 MG capsule Take 1 capsule by mouth 2 times daily (Patient not taking: Reported on 1/7/2025)    insulin lispro (HUMALOG,ADMELOG) 100 UNIT/ML SOLN injection vial Inject 20 Units into the skin 3 times daily (with meals) (Patient not taking: Reported on 1/7/2025)    cabergoline (DOSTINEX) 0.5 MG tablet Take 0.5 tablets by mouth Twice a Week Last does was 10/2 per patient (Patient not taking: Reported on 1/15/2025)     No current facility-administered medications for this visit.       Social History     Socioeconomic History    Marital status:      Spouse name: Not on file    Number of children: Not on file    Years of education: Not on file    Highest education level: Not on file   Occupational History    Not on file   Tobacco Use    Smoking status: Never    Smokeless tobacco: Never   Vaping Use    Vaping status: Never Used   Substance and Sexual Activity    Alcohol use: Yes     Comment: Approximately 2 to 3 months in between with dinner.    Drug use: Never    Sexual activity: Not Currently     Partners: Female   Other Topics Concern    Not on file   Social History Narrative    Not on file     Social Determinants of Health     Financial Resource Strain: Not on file   Food Insecurity: No Food Insecurity (10/6/2024)    Hunger Vital Sign     Worried About Running Out of Food in the Last Year: Never true     Ran Out of Food in the Last Year: Never true   Transportation Needs: No Transportation Needs (10/6/2024)

## 2025-01-15 NOTE — PROGRESS NOTES
PHYSICAL THERAPY - MEDICARE DAILY TREATMENT NOTE (updated 3/23)      Date: 1/15/2025          Patient Name:  Keo Brownlee Jr. :  1955   Medical   Diagnosis:  Orthopedic aftercare [Z47.89] Treatment Diagnosis:  M25.562  LEFT KNEE PAIN    Referral Source:  Obinna Jimenez MD Insurance:   Payor: [facility] MEDICARE / Plan: [facility] OUT OF STATE MANAGED MCR / Product Type: *No Product type* /                     Patient  verified yes     Visit #   Current  / Total 6 16-24   Time   In / Out 2:50 pm 3:50 pm   Total Treatment Time 60   Total Timed Codes 60   1:1 Treatment Time 60       BC Totals Reminder:  bill using total billable   min of TIMED therapeutic procedures and modalities.   8-22 min = 1 unit; 23-37 min = 2 units; 38-52 min = 3 units; 53-67 min = 4 units; 68-82 min = 5 units            SUBJECTIVE    Pain Level (0-10 scale): 4/10 L knee worst 4/10   best 2/10   4/10 R hip      Any medication changes, allergies to medications, adverse drug reactions, diagnosis change, or new procedure performed?: [x] No    [] Yes (see summary sheet for update)  Medications: Verified on Patient Summary List    Subjective functional status/changes:     Pt is doing better today he reports a slight improvement with L knee rom/ less stiffness.        OBJECTIVE      Therapeutic Procedures:  Tx Min Billable or 1:1 Min (if diff from Tx Min) Procedure, Rationale, Specifics   35  55472 Therapeutic Exercise (timed):  increase ROM, strength, coordination, balance, and proprioception to improve patient's ability to progress to PLOF and address remaining functional goals. (see flow sheet as applicable)     Details if applicable:  Monthly assessment completed   10  58285 Neuromuscular Re-Education (timed):  improve balance, coordination, kinesthetic sense, posture, core stability and proprioception to improve patient's ability to develop conscious control of individual muscles and awareness of position of extremities in order to progress 
          MMT:    R L R L R L   Dorsiflexion (15)          5 4-   Plantarflexion (50)               Inversion (35)                Eversion (25)               Additional comments: SL HR RLE 30 reps  LLE 6reps (weakness and balance)  1/15 NT                      GAIT SPEED TEST     RESULTS:  6 meters (19.7 ft) / 6 sec = 1.0 m/s Community ambulator     Gait Speed = distance / time    Quick estimates:   15 sec = 0.40 m/s       14 sec = 0.43 m/s        13 sec = 0.46 m/s           12 sec = 0.50 m/s      11 sec = 0.55 m/s       10 sec = 0.60 m/s           9 sec = 0.66 m/s             8 sec = 0.75 m/s    7 sec = 0.86 m/s                6 sec = 1.0 m/s             5 sec = 1.20 m/s             4 sec = 1.5 m/s  Interpretation:  Less than 0.4 m/sec:         Household ambulator  0.4 to 0.8 m/sec:               Limited community ambulator  0.8 to 1.2 m/sec:               Community ambulator   1.2 m/sec and above:Able to safely cross streets       Six Minute Walk Test: Distance in feet: 716 feet        Asst Device: spc  Additional comments: Pt required VC to stop dragging B feet and to encourage heel toe pattern  Description: Measure of endurance using the distance a patient can walk in 6 minutes.    Interpretation:  Age-Matched Norms      Distance in Feet   Age in Years  MEN  WOMEN   60-64  1034-1897  9746-8970   65-69  3335-3222  6993-3599   70-74  6757-6631  8017-0211   75-79  1486-8170  4926-1614   80-84  9308-7230  4009-9608   85-89  9547-0562  0380-0871   90-94  915-1500  [phone]   1. American Thoracic Society (2002).  ATS Statement: Guidelines for the Six-Minute Walk Test.  American Journal of Respiratory Critical Care Medicine,166, 111-117.  2.  Elinor Olivo and C. Jessie Jones.  Senior Fitness Test Manual.  [employer], 2001. Print.         Objective/Functional Outcome Measure: Basic Mobility  AM-PAC: 49.87%  1/15 (42.23%)  AM-PAC score = an established functional score where 0% = no disability

## 2025-01-16 ASSESSMENT — ENCOUNTER SYMPTOMS
VOMITING: 0
NAUSEA: 0
RESPIRATORY NEGATIVE: 1
CONSTIPATION: 0
DIARRHEA: 0
GASTROINTESTINAL NEGATIVE: 1
TROUBLE SWALLOWING: 0
SHORTNESS OF BREATH: 0
ABDOMINAL PAIN: 0
COUGH: 0
SORE THROAT: 0
EYE REDNESS: 0
EYES NEGATIVE: 1

## 2025-01-20 ENCOUNTER — HOSPITAL ENCOUNTER (OUTPATIENT)
Facility: HOSPITAL | Age: 70
Setting detail: RECURRING SERIES
Discharge: HOME | End: 2025-01-23
Attending: ORTHOPAEDIC SURGERY
Payer: MEDICARE

## 2025-01-20 PROCEDURE — 97110 THERAPEUTIC EXERCISES: CPT

## 2025-01-20 PROCEDURE — 97112 NEUROMUSCULAR REEDUCATION: CPT

## 2025-01-20 NOTE — PROGRESS NOTES
deficits, analyze and address ROM deficits, analyze and address strength deficits, analyze and address soft tissue restrictions, analyze and cue for proper movement patterns, analyze and modify for postural abnormalities, and analyze and address imbalance/dizziness to address functional deficits and attain remaining goals.    Progress toward goals / Updated goals:  []  See Progress Note/Recertification    Short Term Goals: To be accomplished in 8-12 treatments.  Pt will be independent with HEP to promote progression of therapy services.      [] Met [] Not met [x] Partially met  Date:12/27 1/9 Pt reports doing \"some\" exercises     Pt will use ice/heat/massage to assist with inflammation and pain management as instructed and no pain > 3/10 on the VAS.      [] Met [x] Not met [] Partially met  Date:1/9     Pt will verbalize the reasoning for and use proper sleeping techniques for pain/contracture prevention.        [] Met [] Not met [] Partially me  Date:     Pt will verbalize and use proper positioning techniques as instructed such as elevating LE to decrease swelling.      [] Met [] Not met [] Partially met  Date:     Pt will demonstrate improved gait speed > 1.2 m/s be able to safe cross the street.      [] Met [] Not met [x] Partially met  Date: 1/15 1.0 m/s Community ambulator     Long Term Goals: To be accomplished in 16-24 treatments.  Pt will have improved AMPAC score by 10%.        [] Met [x] Not met [] Partially met Date: 1/15 (42.23%)     Pt can perform 5 repetitions in 20 seconds with 0 HHA for a STS test to aide with getting up after sitting for >/= 30 minutes without difficulty/use of hands, stiffness, or stumbling.         [] Met [x] Not met [] Partially met Date: 1/9 Pt is unable to perform STS with 0HHA from a standard chair     Pt performs 6 MWT for >1000 feet safely without verbal or increased pain so they can ambulate community distances without hip pain or fear of falling, to get groceries,

## 2025-01-22 ENCOUNTER — HOSPITAL ENCOUNTER (OUTPATIENT)
Facility: HOSPITAL | Age: 70
Setting detail: RECURRING SERIES
Discharge: HOME | End: 2025-01-25
Attending: ORTHOPAEDIC SURGERY
Payer: MEDICARE

## 2025-01-22 PROCEDURE — 97110 THERAPEUTIC EXERCISES: CPT

## 2025-01-22 PROCEDURE — 97112 NEUROMUSCULAR REEDUCATION: CPT

## 2025-01-22 NOTE — PROGRESS NOTES
PHYSICAL THERAPY - MEDICARE DAILY TREATMENT NOTE (updated 3/23)      Date: 2025          Patient Name:  Keo Brownlee Jr. :  1955   Medical   Diagnosis:  Orthopedic aftercare [Z47.89] Treatment Diagnosis:  M25.562  LEFT KNEE PAIN    Referral Source:  Obinna Jimenez MD Insurance:   Payor: [facility] MEDICARE / Plan: [facility] OUT OF STATE MANAGED MCR / Product Type: *No Product type* /                     Patient  verified yes     Visit #   Current  / Total 8 16-24   Time   In / Out 1:00 pm 1:53 pm   Total Treatment Time 53   Total Timed Codes 53   1:1 Treatment Time 53       BC Totals Reminder:  bill using total billable   min of TIMED therapeutic procedures and modalities.   8-22 min = 1 unit; 23-37 min = 2 units; 38-52 min = 3 units; 53-67 min = 4 units; 68-82 min = 5 units            SUBJECTIVE    Pain Level (0-10 scale): 1/10 L knee   4/10 R hip      Any medication changes, allergies to medications, adverse drug reactions, diagnosis change, or new procedure performed?: [x] No    [] Yes (see summary sheet for update)  Medications: Verified on Patient Summary List    Subjective functional status/changes:     Pt reports ongoing improvement in L knee, and minimal pain.        OBJECTIVE      Therapeutic Procedures:  Tx Min Billable or 1:1 Min (if diff from Tx Min) Procedure, Rationale, Specifics   40  29427 Therapeutic Exercise (timed):  increase ROM, strength, coordination, balance, and proprioception to improve patient's ability to progress to PLOF and address remaining functional goals. (see flow sheet as applicable)     Details if applicable:  See HO   13  21356 Neuromuscular Re-Education (timed):  improve balance, coordination, kinesthetic sense, posture, core stability and proprioception to improve patient's ability to develop conscious control of individual muscles and awareness of position of extremities in order to progress to PLOF and address remaining functional goals. (see flow sheet as

## 2025-01-30 ENCOUNTER — HOSPITAL ENCOUNTER (OUTPATIENT)
Facility: HOSPITAL | Age: 70
Setting detail: RECURRING SERIES
End: 2025-01-30
Attending: ORTHOPAEDIC SURGERY
Payer: MEDICARE

## 2025-01-30 PROCEDURE — 97140 MANUAL THERAPY 1/> REGIONS: CPT

## 2025-01-30 PROCEDURE — 97110 THERAPEUTIC EXERCISES: CPT

## 2025-01-30 PROCEDURE — 97112 NEUROMUSCULAR REEDUCATION: CPT

## 2025-01-30 NOTE — PROGRESS NOTES
PHYSICAL THERAPY - MEDICARE DAILY TREATMENT NOTE (updated 3/23)      Date: 2025          Patient Name:  Keo Brownlee Jr. :  1955   Medical   Diagnosis:  Orthopedic aftercare [Z47.89] Treatment Diagnosis:  M25.562  LEFT KNEE PAIN    Referral Source:  Obinna Jimenez MD Insurance:   Payor: [facility] MEDICARE / Plan: [facility] OUT OF STATE MANAGED MCR / Product Type: *No Product type* /                     Patient  verified yes     Visit #   Current  / Total 9 16-24   Time   In / Out 3:00 pm 3:53 pm   Total Treatment Time 53   Total Timed Codes 53   1:1 Treatment Time 53       BC Totals Reminder:  bill using total billable   min of TIMED therapeutic procedures and modalities.   8-22 min = 1 unit; 23-37 min = 2 units; 38-52 min = 3 units; 53-67 min = 4 units; 68-82 min = 5 units            SUBJECTIVE    Pain Level (0-10 scale): 0/10 L knee   1-2/10 R hip   worst 5-6/10    Any medication changes, allergies to medications, adverse drug reactions, diagnosis change, or new procedure performed?: [x] No    [] Yes (see summary sheet for update)  Medications: Verified on Patient Summary List    Subjective functional status/changes:     Pt is well. He reports overall improvement with both his L knee and R hip. No new falls.        OBJECTIVE      Therapeutic Procedures:  Tx Min Billable or 1:1 Min (if diff from Tx Min) Procedure, Rationale, Specifics   30  19279 Therapeutic Exercise (timed):  increase ROM, strength, coordination, balance, and proprioception to improve patient's ability to progress to PLOF and address remaining functional goals. (see flow sheet as applicable)     Details if applicable:  See HO   8  85027 Neuromuscular Re-Education (timed):  improve balance, coordination, kinesthetic sense, posture, core stability and proprioception to improve patient's ability to develop conscious control of individual muscles and awareness of position of extremities in order to progress to PLOF and address remaining

## 2025-01-31 ENCOUNTER — HOSPITAL ENCOUNTER (OUTPATIENT)
Facility: HOSPITAL | Age: 70
Setting detail: RECURRING SERIES
End: 2025-01-31
Attending: ORTHOPAEDIC SURGERY
Payer: MEDICARE

## 2025-01-31 PROCEDURE — 97112 NEUROMUSCULAR REEDUCATION: CPT

## 2025-01-31 PROCEDURE — 97140 MANUAL THERAPY 1/> REGIONS: CPT

## 2025-01-31 PROCEDURE — 97110 THERAPEUTIC EXERCISES: CPT

## 2025-01-31 NOTE — PROGRESS NOTES
PHYSICAL THERAPY - MEDICARE DAILY TREATMENT NOTE (updated 3/23)      Date: 2025          Patient Name:  Keo Brownlee Jr. :  1955   Medical   Diagnosis:  Orthopedic aftercare [Z47.89] Treatment Diagnosis:  M25.562  LEFT KNEE PAIN    Referral Source:  Obinna Jimenez MD Insurance:   Payor: [facility] MEDICARE / Plan: [facility] OUT OF STATE MANAGED MCR / Product Type: *No Product type* /                     Patient  verified yes     Visit #   Current  / Total 10 16-24   Time   In / Out 09:53 am 10:46 am   Total Treatment Time 53   Total Timed Codes 53   1:1 Treatment Time 53       BC Totals Reminder:  bill using total billable   min of TIMED therapeutic procedures and modalities.   8-22 min = 1 unit; 23-37 min = 2 units; 38-52 min = 3 units; 53-67 min = 4 units; 68-82 min = 5 units            SUBJECTIVE    Pain Level (0-10 scale): 0-1/10 L knee   1/10 R hip   worst 5-6/10    Any medication changes, allergies to medications, adverse drug reactions, diagnosis change, or new procedure performed?: [x] No    [] Yes (see summary sheet for update)  Medications: Verified on Patient Summary List    Subjective functional status/changes:     Pt is well. He states that he hasn't moved around much this morning - he has some stiffness in the L knee. Pt comes in with his spc but he does not use in the session - he is steady on his feet and he has        OBJECTIVE      Therapeutic Procedures:  Tx Min Billable or 1:1 Min (if diff from Tx Min) Procedure, Rationale, Specifics   30  10237 Therapeutic Exercise (timed):  increase ROM, strength, coordination, balance, and proprioception to improve patient's ability to progress to PLOF and address remaining functional goals. (see flow sheet as applicable)     Details if applicable:  See HO   15  01121 Neuromuscular Re-Education (timed):  improve balance, coordination, kinesthetic sense, posture, core stability and proprioception to improve patient's ability to develop conscious  Problem: Impaired respiratory status  Goal: Clear lung sounds  Description: Clear lung sounds  Outcome: Ongoing   Continue inhaled medications as ordered to improve breath sounds

## 2025-02-03 ENCOUNTER — HOSPITAL ENCOUNTER (OUTPATIENT)
Facility: HOSPITAL | Age: 70
Setting detail: RECURRING SERIES
Discharge: HOME | End: 2025-02-06
Attending: ORTHOPAEDIC SURGERY
Payer: MEDICARE

## 2025-02-03 PROCEDURE — 97112 NEUROMUSCULAR REEDUCATION: CPT

## 2025-02-03 PROCEDURE — 97110 THERAPEUTIC EXERCISES: CPT

## 2025-02-03 NOTE — PROGRESS NOTES
PHYSICAL THERAPY - MEDICARE DAILY TREATMENT NOTE (updated 3/23)      Date: 2/3/2025          Patient Name:  Keo Brownlee Jr. :  1955   Medical   Diagnosis:  Orthopedic aftercare [Z47.89] Treatment Diagnosis:  M25.562  LEFT KNEE PAIN    Referral Source:  Obinna Jimenez MD Insurance:   Payor: [facility] MEDICARE / Plan: [facility] OUT OF STATE MANAGED MCR / Product Type: *No Product type* /                     Patient  verified yes     Visit #   Current  / Total 11 16-24   Time   In / Out 1:00pm 1:53 pm   Total Treatment Time 53   Total Timed Codes 53   1:1 Treatment Time 53       BC Totals Reminder:  bill using total billable   min of TIMED therapeutic procedures and modalities.   8-22 min = 1 unit; 23-37 min = 2 units; 38-52 min = 3 units; 53-67 min = 4 units; 68-82 min = 5 units            SUBJECTIVE    Pain Level (0-10 scale): 0/10 L knee   1/10 R hip   worst 2/10    Any medication changes, allergies to medications, adverse drug reactions, diagnosis change, or new procedure performed?: [x] No    [] Yes (see summary sheet for update)  Medications: Verified on Patient Summary List    Subjective functional status/changes:     Pt is well. No new falls reported, he states that he is feeling stronger.       OBJECTIVE      Therapeutic Procedures:  Tx Min Billable or 1:1 Min (if diff from Tx Min) Procedure, Rationale, Specifics   25  21388 Therapeutic Exercise (timed):  increase ROM, strength, coordination, balance, and proprioception to improve patient's ability to progress to PLOF and address remaining functional goals. (see flow sheet as applicable)     Details if applicable:  See HO   25  74240 Neuromuscular Re-Education (timed):  improve balance, coordination, kinesthetic sense, posture, core stability and proprioception to improve patient's ability to develop conscious control of individual muscles and awareness of position of extremities in order to progress to PLOF and address remaining functional goals. (see

## 2025-02-05 ENCOUNTER — HOSPITAL ENCOUNTER (OUTPATIENT)
Facility: HOSPITAL | Age: 70
Setting detail: RECURRING SERIES
Discharge: HOME | End: 2025-02-08
Attending: ORTHOPAEDIC SURGERY
Payer: MEDICARE

## 2025-02-05 PROCEDURE — 97112 NEUROMUSCULAR REEDUCATION: CPT

## 2025-02-05 PROCEDURE — 97140 MANUAL THERAPY 1/> REGIONS: CPT

## 2025-02-05 PROCEDURE — 97110 THERAPEUTIC EXERCISES: CPT

## 2025-02-05 NOTE — PROGRESS NOTES
PHYSICAL THERAPY - MEDICARE DAILY TREATMENT NOTE (updated 3/23)      Date: 2025          Patient Name:  Keo Brownlee Jr. :  1955   Medical   Diagnosis:  Orthopedic aftercare [Z47.89] Treatment Diagnosis:  M25.562  LEFT KNEE PAIN    Referral Source:  Obinna Jimenez MD Insurance:   Payor: [facility] MEDICARE / Plan: [facility] OUT OF STATE MANAGED MCR / Product Type: *No Product type* /                     Patient  verified yes     Visit #   Current  / Total 12 16-24   Time   In / Out 1:00pm 2:00 pm   Total Treatment Time 53   Total Timed Codes 53   1:1 Treatment Time 53       BC Totals Reminder:  bill using total billable   min of TIMED therapeutic procedures and modalities.   8-22 min = 1 unit; 23-37 min = 2 units; 38-52 min = 3 units; 53-67 min = 4 units; 68-82 min = 5 units            SUBJECTIVE    Pain Level (0-10 scale): 2/10 L knee   3/10 R hip       Any medication changes, allergies to medications, adverse drug reactions, diagnosis change, or new procedure performed?: [x] No    [] Yes (see summary sheet for update)  Medications: Verified on Patient Summary List    Subjective functional status/changes:     Pt is well. He states that worked on his tractor yesterday (climbing 5 feet to get on and off repetitively) - he had to take a muscle relaxer afterwards due to increased pain. He further stated that he crouched down and due to pain in his L knee he had to \"roll over\" onto the grass as he could stand up.      OBJECTIVE      Therapeutic Procedures:  Tx Min Billable or 1:1 Min (if diff from Tx Min) Procedure, Rationale, Specifics   30  75655 Therapeutic Exercise (timed):  increase ROM, strength, coordination, balance, and proprioception to improve patient's ability to progress to PLOF and address remaining functional goals. (see flow sheet as applicable)     Details if applicable:  See HO   15  46345 Neuromuscular Re-Education (timed):  improve balance, coordination, kinesthetic sense, posture, core

## 2025-02-08 LAB
FSH SERPL-ACNC: 10.4 MIU/ML (ref 1.5–12.4)
IGF-I SERPL-MCNC: 96 NG/ML (ref 59–230)
LH SERPL-ACNC: 15.5 MIU/ML (ref 1.7–8.6)
MISCELLANEOUS LAB TEST RESULT: NORMAL
PROLACTIN SERPL-MCNC: 2.2 NG/ML (ref 3.6–25.2)
T4 FREE SERPL-MCNC: 1.17 NG/DL (ref 0.82–1.77)
TSH SERPL DL<=0.005 MIU/L-ACNC: 5.34 UIU/ML (ref 0.45–4.5)

## 2025-02-10 ENCOUNTER — HOSPITAL ENCOUNTER (OUTPATIENT)
Facility: HOSPITAL | Age: 70
Setting detail: RECURRING SERIES
Discharge: HOME | End: 2025-02-13
Attending: ORTHOPAEDIC SURGERY
Payer: MEDICARE

## 2025-02-10 LAB — CORTIS AM PEAK SERPL-MCNC: 15.5 UG/DL (ref 6.2–19.4)

## 2025-02-10 PROCEDURE — 97140 MANUAL THERAPY 1/> REGIONS: CPT

## 2025-02-10 PROCEDURE — 97110 THERAPEUTIC EXERCISES: CPT

## 2025-02-10 NOTE — PROGRESS NOTES
proper movement patterns, analyze and modify for postural abnormalities, and analyze and address imbalance/dizziness to address functional deficits and attain remaining goals.    Progress toward goals / Updated goals:  []  See Progress Note/Recertification    Short Term Goals: To be accomplished in 8-12 treatments.  Pt will be independent with HEP to promote progression of therapy services.      [] Met [] Not met [x] Partially met  Date:12/27 1/9 Pt reports doing \"some\" exercises  2/5 pt reports doing \"some\" exercises     Pt will use ice/heat/massage to assist with inflammation and pain management as instructed and no pain > 3/10 on the VAS.      [] Met [] Not met [x] Partially met  Date:1/9  2/5 Pt reports I/M pain >3/10 (happens less often)     Pt will verbalize the reasoning for and use proper sleeping techniques for pain/contracture prevention.        [] Met [] Not met [x] Partially me  Date:2/10     Pt will verbalize and use proper positioning techniques as instructed such as elevating LE to decrease swelling.      [] Met [] Not met [x] Partially met  Date:2/10     Pt will demonstrate improved gait speed > 1.2 m/s be able to safe cross the street.      [] Met [] Not met [x] Partially met  Date: 1/15 1.0 m/s Community ambulator     Long Term Goals: To be accomplished in 16-24 treatments.  Pt will have improved AMPAC score by 10%.        [] Met [x] Not met [] Partially met Date: 1/15 (42.23%)     Pt can perform 5 repetitions in 20 seconds with 0 HHA for a STS test to aide with getting up after sitting for >/= 30 minutes without difficulty/use of hands, stiffness, or stumbling.         [x] Met  Not met [] Partially met Date: 1/9 Pt is unable to perform STS with 0HHA from a standard chair  1/30 9 repetitions     Pt performs 6 MWT for >1000 feet safely without verbal or increased pain so they can ambulate community distances without hip pain or fear of falling, to get groceries, medications.       [] Met [x] Not

## 2025-02-12 LAB
SHBG SERPL-SCNC: 63 NMOL/L (ref 19.3–76.4)
TESTOST SERPL-MCNC: 506 NG/DL (ref 264–916)
TESTOSTERONE.FREE+WB MFR SERPL: 14.2 % (ref 9–46)
TESTOSTERONE.FREE+WB SERPL-MCNC: 71.9 NG/DL (ref 40–250)

## 2025-02-13 ENCOUNTER — HOSPITAL ENCOUNTER (OUTPATIENT)
Facility: HOSPITAL | Age: 70
Setting detail: RECURRING SERIES
Discharge: HOME | End: 2025-02-16
Attending: ORTHOPAEDIC SURGERY
Payer: MEDICARE

## 2025-02-13 PROCEDURE — 97110 THERAPEUTIC EXERCISES: CPT

## 2025-02-13 PROCEDURE — 97140 MANUAL THERAPY 1/> REGIONS: CPT

## 2025-02-13 NOTE — PROGRESS NOTES
PHYSICAL THERAPY - MEDICARE DAILY TREATMENT NOTE (updated 3/23)      Date: 2025          Patient Name:  Keo Brownlee Jr. :  1955   Medical   Diagnosis:  Orthopedic aftercare [Z47.89] Treatment Diagnosis:  M25.562  LEFT KNEE PAIN    Referral Source:  Obinna Jimenez MD Insurance:   Payor: [facility] MEDICARE / Plan: [facility] OUT OF STATE MANAGED MCR / Product Type: *No Product type* /                     Patient  verified yes     Visit #   Current  / Total 14 16-24   Time   In / Out 9:45 am 10:45 am   Total Treatment Time 60   Total Timed Codes 60   1:1 Treatment Time 60       BC Totals Reminder:  bill using total billable   min of TIMED therapeutic procedures and modalities.   8-22 min = 1 unit; 23-37 min = 2 units; 38-52 min = 3 units; 53-67 min = 4 units; 68-82 min = 5 units            SUBJECTIVE    Pain Level (0-10 scale): 0/10 L knee   2/10 R hip       Any medication changes, allergies to medications, adverse drug reactions, diagnosis change, or new procedure performed?: [x] No    [] Yes (see summary sheet for update)  Medications: Verified on Patient Summary List    Subjective functional status/changes:     Pt is well, he is no longer using his spc \"I feel confident I can walk without it\". No new falls reported.      OBJECTIVE      Therapeutic Procedures:  Tx Min Billable or 1:1 Min (if diff from Tx Min) Procedure, Rationale, Specifics   45  62888 Therapeutic Exercise (timed):  increase ROM, strength, coordination, balance, and proprioception to improve patient's ability to progress to PLOF and address remaining functional goals. (see flow sheet as applicable)     Details if applicable:  See HO   5  66592 Neuromuscular Re-Education (timed):  improve balance, coordination, kinesthetic sense, posture, core stability and proprioception to improve patient's ability to develop conscious control of individual muscles and awareness of position of extremities in order to progress to PLOF and address

## 2025-02-14 LAB
MACROPROLACTIN/PROLACTIN MFR SERPL: 5 %
MISCELLANEOUS LAB TEST RESULT: NORMAL
PROLACTIN SERPL-MCNC: 2.33 NG/ML
PROLACTIN.MONOMERIC SERPL-MCNC: 2.22 NG/ML

## 2025-02-17 ENCOUNTER — HOSPITAL ENCOUNTER (OUTPATIENT)
Facility: HOSPITAL | Age: 70
Setting detail: RECURRING SERIES
Discharge: HOME | End: 2025-02-20
Attending: ORTHOPAEDIC SURGERY
Payer: MEDICARE

## 2025-02-17 PROCEDURE — 97110 THERAPEUTIC EXERCISES: CPT

## 2025-02-17 NOTE — PROGRESS NOTES
[facility] [address], [address]  [place] [address]  Ph: [phone]     Fax: [phone]    PHYSICAL THERAPY PROGRESS NOTE  Patient Name:  Keo Brownlee Jr. :  1955   Treatment/Medical Diagnosis: Orthopedic aftercare [Z47.89]   Referral Source:  Obinna Jimenez MD     Date of Initial Visit:  2024 Attended Visits:  15 Missed Visits:  -     SUMMARY OF TREATMENT/ASSESSMENT:  Pt has attended 15 skilled PT sessions following L TKR 10/2024. Pt is still on antibiotic treatment and under the care of a infectious disease specialist. He continues to have ongoing R hip pain (partial replacement done in 2024) and weakness of his RLE that is affecting the rate of his progression with PT. Pt presents with improved L knee a/prom, improved L quads lag and he has improved gait mechanics - he is weaning off his spc and walking independently most of the time. Plan to continue POC.      Progress toward goals / Updated goals:  [x]  See Progress Note/Recertification    Short Term Goals: To be accomplished in 8-12 treatments.  Pt will be independent with HEP to promote progression of therapy services.      [] Met [] Not met [x] Partially met  Date: Pt reports doing \"some\" exercises   pt reports doing \"some\" exercises   He does some exercises everyday     Pt will use ice/heat/massage to assist with inflammation and pain management as instructed and no pain > 3/10 on the VAS.      [] Met [] Not met [x] Partially met  Date:  2/ Pt reports I/M pain >3/10 (happens less often)   Still has pain > 3/10 but less frequently     Pt will verbalize the reasoning for and use proper sleeping techniques for pain/contracture prevention.        [x] Met [] Not met [] Partially me  Date:2/10  2/17     Pt will verbalize and use proper positioning techniques as instructed such as elevating LE to decrease swelling.      [x] Met [] Not met [] Partially met  Date: 
spc    1419 Feet  no AD     Pt can go up and down steps with min support in a normal pattern without difficulty, fatigue, or pain > 1-2/10 for safe gait.      [x] Met [] Not met [] Partially met Date: 1/15 Reciprocal ascending and descending with 2HHA and pt reports P! Vas>4/10   reciprocal pattern no pain     Pt can march on min-trampoline or foam for 2 minutes to negotiate uneven terrain independently, like walking through the yard, without hip pain or instability.      [] Met [] Not met [] Partially met  Date:      Pt can SLS on involved side for 15 seconds for improved balance in stance phase to promote safe walking with no limp.       [x] Met [] Not met [] Partially met Date: 1/15 RLE 5 sec 0HHA   LLE 30 sec     Pt will have knee flexion ROM sufficient to be able to squat to retrieve item from ground without increase of pain.      [] Met [] Not met [x] Partially met Date: 1/15   1/30 3/4 squat with knees anterior to toes   Improving L knee flexion rom     improved      Pt will have decreased swelling by 1/2 cm to improve knee flexion for comfortable sitting and less pain and stiffness.      [] Met [x] Not met [] Partially met Date: 1/15 LLE 41 cm RLE 44.5cm   no change     Pt will have quad strength sufficient to perform SLR for 10-15 repetitions without a quads lag for safe stance phase of gait so patient can walk community distances without buckling or weakness.      [] Met [] Not met [x] Partially met Date:  1/15 pt continues to have quads lag   Improving     Frequency / Duration: Patient to be seen 1-2 times per week for 16-24 treatments.  Treatment Plan may include any combination of the followin Therapeutic Exercise, 38999 Neuromuscular Re-Education, 37242 Manual Therapy, 79565 Therapeutic Activity, 41426 Electrical Stim unattended /  ([facility]), 98342 Electrical Stim attended, 29578 Gait Training, 76466 Ultrasound, and (Elective Self Pay) Needle Insertion w/o Injection

## 2025-02-19 ENCOUNTER — HOSPITAL ENCOUNTER (OUTPATIENT)
Facility: HOSPITAL | Age: 70
Setting detail: RECURRING SERIES
Discharge: HOME | End: 2025-02-22
Attending: ORTHOPAEDIC SURGERY
Payer: MEDICARE

## 2025-02-19 PROCEDURE — 97110 THERAPEUTIC EXERCISES: CPT

## 2025-02-19 PROCEDURE — 97140 MANUAL THERAPY 1/> REGIONS: CPT

## 2025-02-19 NOTE — PROGRESS NOTES
PHYSICAL THERAPY - MEDICARE DAILY TREATMENT NOTE (updated 3/23)      Date: 2025          Patient Name:  Keo Brownlee Jr. :  1955   Medical   Diagnosis:  Orthopedic aftercare [Z47.89] Treatment Diagnosis:  M25.562  LEFT KNEE PAIN    Referral Source:  Obinna Jimenez MD Insurance:   Payor: [facility] MEDICARE / Plan: [facility] OUT OF STATE MANAGED MCR / Product Type: *No Product type* /                     Patient  verified yes     Visit #   Current  / Total 16 16-24   Time   In / Out 09:54 am 10:47 am   Total Treatment Time 53   Total Timed Codes 53   1:1 Treatment Time 53       BC Totals Reminder:  bill using total billable   min of TIMED therapeutic procedures and modalities.   8-22 min = 1 unit; 23-37 min = 2 units; 38-52 min = 3 units; 53-67 min = 4 units; 68-82 min = 5 units            SUBJECTIVE    Pain Level (0-10 scale): 0/10 L knee   2/10 R hip       Any medication changes, allergies to medications, adverse drug reactions, diagnosis change, or new procedure performed?: [x] No    [] Yes (see summary sheet for update)  Medications: Verified on Patient Summary List    Subjective functional status/changes:     Pt reports onset of LBP, he was working on his snow truck yesterday and strained his back. He reports ongoing R hip pain.      OBJECTIVE      Therapeutic Procedures:  Tx Min Billable or 1:1 Min (if diff from Tx Min) Procedure, Rationale, Specifics   45  28107 Therapeutic Exercise (timed):  increase ROM, strength, coordination, balance, and proprioception to improve patient's ability to progress to PLOF and address remaining functional goals. (see flow sheet as applicable)     Details if applicable:      35075 Neuromuscular Re-Education (timed):  improve balance, coordination, kinesthetic sense, posture, core stability and proprioception to improve patient's ability to develop conscious control of individual muscles and awareness of position of extremities in order to progress to PLOF and address

## 2025-02-24 ENCOUNTER — HOSPITAL ENCOUNTER (OUTPATIENT)
Facility: HOSPITAL | Age: 70
Setting detail: RECURRING SERIES
Discharge: HOME | End: 2025-02-27
Attending: ORTHOPAEDIC SURGERY
Payer: MEDICARE

## 2025-02-24 PROCEDURE — 97140 MANUAL THERAPY 1/> REGIONS: CPT

## 2025-02-24 PROCEDURE — 97110 THERAPEUTIC EXERCISES: CPT

## 2025-02-24 NOTE — PROGRESS NOTES
PHYSICAL THERAPY - MEDICARE DAILY TREATMENT NOTE (updated 3/23)      Date: 2025          Patient Name:  Keo Brownlee Jr. :  1955   Medical   Diagnosis:  Orthopedic aftercare [Z47.89] Treatment Diagnosis:  M25.562  LEFT KNEE PAIN    Referral Source:  Obinna Jimenez MD Insurance:   Payor: [facility] MEDICARE / Plan: [facility] OUT OF STATE MANAGED MCR / Product Type: *No Product type* /                     Patient  verified yes     Visit #   Current  / Total 17 16-24   Time   In / Out 12:55 pm 1:50 pm   Total Treatment Time 53   Total Timed Codes 53   1:1 Treatment Time 53       BC Totals Reminder:  bill using total billable   min of TIMED therapeutic procedures and modalities.   8-22 min = 1 unit; 23-37 min = 2 units; 38-52 min = 3 units; 53-67 min = 4 units; 68-82 min = 5 units            SUBJECTIVE    Pain Level (0-10 scale): 0/10 L knee   2/10 R hip       Any medication changes, allergies to medications, adverse drug reactions, diagnosis change, or new procedure performed?: [x] No    [] Yes (see summary sheet for update)  Medications: Verified on Patient Summary List    Subjective functional status/changes:     Pt is doing okay, he reports difficulty bending his knee this morning and presents with ongoing swelling in LLE. Pt states that he walked a lot in his back yard yesterday (chopping wood and walking over a 100 yards at a time). Pt states that his balance is improving and he can bend over to pick things up more easily.      OBJECTIVE      Therapeutic Procedures:  Tx Min Billable or 1:1 Min (if diff from Tx Min) Procedure, Rationale, Specifics   40  37935 Therapeutic Exercise (timed):  increase ROM, strength, coordination, balance, and proprioception to improve patient's ability to progress to PLOF and address remaining functional goals. (see flow sheet as applicable)     Details if applicable:    5  51911 Neuromuscular Re-Education (timed):  improve balance, coordination, kinesthetic sense, posture,

## 2025-02-26 ENCOUNTER — OFFICE VISIT (OUTPATIENT)
Age: 70
End: 2025-02-26
Payer: MEDICARE

## 2025-02-26 ENCOUNTER — HOSPITAL ENCOUNTER (OUTPATIENT)
Facility: HOSPITAL | Age: 70
Setting detail: RECURRING SERIES
Discharge: HOME | End: 2025-03-01
Attending: ORTHOPAEDIC SURGERY
Payer: MEDICARE

## 2025-02-26 VITALS
WEIGHT: 215.5 LBS | OXYGEN SATURATION: 98 % | TEMPERATURE: 98.6 F | SYSTOLIC BLOOD PRESSURE: 129 MMHG | RESPIRATION RATE: 16 BRPM | HEIGHT: 70 IN | HEART RATE: 86 BPM | BODY MASS INDEX: 30.85 KG/M2 | DIASTOLIC BLOOD PRESSURE: 83 MMHG

## 2025-02-26 DIAGNOSIS — E22.1 HYPERPROLACTINEMIA: Primary | ICD-10-CM

## 2025-02-26 PROCEDURE — 1126F AMNT PAIN NOTED NONE PRSNT: CPT | Performed by: STUDENT IN AN ORGANIZED HEALTH CARE EDUCATION/TRAINING PROGRAM

## 2025-02-26 PROCEDURE — 1123F ACP DISCUSS/DSCN MKR DOCD: CPT | Performed by: STUDENT IN AN ORGANIZED HEALTH CARE EDUCATION/TRAINING PROGRAM

## 2025-02-26 PROCEDURE — 99203 OFFICE O/P NEW LOW 30 MIN: CPT | Performed by: STUDENT IN AN ORGANIZED HEALTH CARE EDUCATION/TRAINING PROGRAM

## 2025-02-26 PROCEDURE — 1159F MED LIST DOCD IN RCRD: CPT | Performed by: STUDENT IN AN ORGANIZED HEALTH CARE EDUCATION/TRAINING PROGRAM

## 2025-02-26 PROCEDURE — 97112 NEUROMUSCULAR REEDUCATION: CPT

## 2025-02-26 PROCEDURE — 97110 THERAPEUTIC EXERCISES: CPT

## 2025-02-26 ASSESSMENT — ENCOUNTER SYMPTOMS
GASTROINTESTINAL NEGATIVE: 1
EYES NEGATIVE: 1
COUGH: 0
EYE REDNESS: 0
CONSTIPATION: 0
TROUBLE SWALLOWING: 0
SHORTNESS OF BREATH: 0
NAUSEA: 0
RESPIRATORY NEGATIVE: 1
SORE THROAT: 0
VOMITING: 0
DIARRHEA: 0
ABDOMINAL PAIN: 0

## 2025-02-26 NOTE — PROGRESS NOTES
[facility]                                      Patient Information Name : Keo Brownlee Jr. 69 y.o.   YOB: 1955         Referred by: Russ Sebastian MD         Chief Complaint   Patient presents with    Follow-up     Hyperprolactinemia       History of Present Illness: Keo Brownlee Jr. is a 69 y.o. male here for follow up visit of hyperprolactinemia.     Interval hx-   2-:   The patient is here for lab follow up. He reports having pain and swelling in the left knee joint since he had an infection after TKR followed by revision surgery. He is taking Clomiphene for low testosterone levels prescribed by his urologist.       Background hx-   He was seeing Dr Joe in 2022 for hyperprolactinemia and was started on cabergoline    The patient notes that he took the cabergoline 2 months ago and it was 0.5 mg twice weekly. He is also on Clomid 50 mg - 1/2 a tablet every day . He notes that he was vomiting and any kind of physical activity will lead him to throw up or pass out which lead to checking of his labs including prolactin.   Does not drink alcohol and does not smoke cigarettes. He has never been on anti- psychotics     He was diagnosed with obstructive sleep apnea and was using CPAP, but now he has lost weight and is no longer requiring any CPAP     There is no hx headache, nausea, vomiting , milky secretion from his breasts . He has a known history of diabetic kidney disease ( stage 3b) . He is not taking any anti psychotics or anti depressants.     MRI brain on 09/02/2021 showed no evidence of pituitary adenoma. As per the patient and his wife he has been taking Cabergoline up until 2 months ago when the prescriptions ran out. They are not sure who was prescribing the medication.     Wt Readings from Last 3 Encounters:   02/26/25 97.8 kg (215 lb 8 oz)   01/15/25 97.4 kg (214 lb 12.8 oz)   01/07/25 94.3 kg (208 lb)       BP Readings from Last 3

## 2025-02-26 NOTE — PROGRESS NOTES
\"Have you been to the ER, urgent care clinic since your last visit?  Hospitalized since your last visit?\"    NO    “Have you seen or consulted any other health care providers outside our system since your last visit?”    NO      “Have you had a colorectal cancer screening such as a colonoscopy/FIT/Cologuard?    Yes Dr. Romero    No colonoscopy on file  No cologuard on file  No FIT/FOBT on file   No flexible sigmoidoscopy on file     “Have you had a diabetic eye exam?”    YES - Where: 01/2024     No diabetic eye exam on file     Chief Complaint   Patient presents with    Follow-up     Hyperprolactinemia     BP (!) 149/120 (Site: Right Upper Arm, Position: Sitting, Cuff Size: Medium Adult)   Pulse 86   Temp 98.6 °F (37 °C) (Temporal)   Resp 16   Ht 1.778 m (5' 10\")   Wt 97.8 kg (215 lb 8 oz)   SpO2 98%   BMI 30.92 kg/m²     /83 (Site: Right Upper Arm, Position: Sitting, Cuff Size: Medium Adult)   Pulse 86   Temp 98.6 °F (37 °C) (Temporal)   Resp 16   Ht 1.778 m (5' 10\")   Wt 97.8 kg (215 lb 8 oz)   SpO2 98%   BMI 30.92 kg/m²

## 2025-02-26 NOTE — PROGRESS NOTES
PHYSICAL THERAPY - MEDICARE DAILY TREATMENT NOTE (updated 3/23)      Date: 2025          Patient Name:  Keo Brownlee Jr. :  1955   Medical   Diagnosis:  Orthopedic aftercare [Z47.89] Treatment Diagnosis:  M25.562  LEFT KNEE PAIN    Referral Source:  bOinna Jimenez MD Insurance:   Payor: [facility] MEDICARE / Plan: [facility] OUT OF STATE MANAGED MCR / Product Type: *No Product type* /                     Patient  verified yes     Visit #   Current  / Total 18 16-24   Time   In / Out 09:53 am 10:46 am   Total Treatment Time 53   Total Timed Codes 53   1:1 Treatment Time 53       BC Totals Reminder:  bill using total billable   min of TIMED therapeutic procedures and modalities.   8-22 min = 1 unit; 23-37 min = 2 units; 38-52 min = 3 units; 53-67 min = 4 units; 68-82 min = 5 units            SUBJECTIVE    Pain Level (0-10 scale): 0/10 L knee   2/10 R hip       Any medication changes, allergies to medications, adverse drug reactions, diagnosis change, or new procedure performed?: [x] No    [] Yes (see summary sheet for update)  Medications: Verified on Patient Summary List    Subjective functional status/changes:     Pt is doing well, no new complaints. He reports stiffness in his L knee but he feels the swelling has subsided since Monday.      OBJECTIVE      Therapeutic Procedures:  Tx Min Billable or 1:1 Min (if diff from Tx Min) Procedure, Rationale, Specifics   40  69952 Therapeutic Exercise (timed):  increase ROM, strength, coordination, balance, and proprioception to improve patient's ability to progress to PLOF and address remaining functional goals. (see flow sheet as applicable)     Details if applicable:    13  36341 Neuromuscular Re-Education (timed):  improve balance, coordination, kinesthetic sense, posture, core stability and proprioception to improve patient's ability to develop conscious control of individual muscles and awareness of position of extremities in order to progress to PLOF and

## 2025-03-04 ENCOUNTER — HOSPITAL ENCOUNTER (OUTPATIENT)
Facility: HOSPITAL | Age: 70
Setting detail: RECURRING SERIES
Discharge: HOME | End: 2025-03-07
Attending: ORTHOPAEDIC SURGERY
Payer: MEDICARE

## 2025-03-04 PROCEDURE — 97110 THERAPEUTIC EXERCISES: CPT

## 2025-03-04 PROCEDURE — 97112 NEUROMUSCULAR REEDUCATION: CPT

## 2025-03-04 NOTE — PROGRESS NOTES
PHYSICAL THERAPY - MEDICARE DAILY TREATMENT NOTE (updated 3/23)      Date: 3/4/2025          Patient Name:  Keo Brownlee Jr. :  1955   Medical   Diagnosis:  Orthopedic aftercare [Z47.89] Treatment Diagnosis:  M25.562  LEFT KNEE PAIN    Referral Source:  Obinna Jimenez MD Insurance:   Payor: [facility] MEDICARE / Plan: [facility] OUT OF STATE MANAGED MCR / Product Type: *No Product type* /                     Patient  verified yes     Visit #   Current  / Total 19 16-24   Time   In / Out 01:00 pm 2:00 pm   Total Treatment Time 55 min   Total Timed Codes 55 min   1:1 Treatment Time 55 min      [facility] Totals Reminder:  bill using total billable   min of TIMED therapeutic procedures and modalities.   8-22 min = 1 unit; 23-37 min = 2 units; 38-52 min = 3 units; 53-67 min = 4 units; 68-82 min = 5 units            SUBJECTIVE    Pain Level (0-10 scale): 0/10 L knee   2/10 R hip       Any medication changes, allergies to medications, adverse drug reactions, diagnosis change, or new procedure performed?: [x] No    [] Yes (see summary sheet for update)  Medications: Verified on Patient Summary List    Subjective functional status/changes:     Pt stated he worked on his truck for 6 hours yesterday and today his L knee is stiff and swollen.      OBJECTIVE      Therapeutic Procedures:  Tx Min Billable or 1:1 Min (if diff from Tx Min) Procedure, Rationale, Specifics   40  85050 Therapeutic Exercise (timed):  increase ROM, strength, coordination, balance, and proprioception to improve patient's ability to progress to PLOF and address remaining functional goals. (see flow sheet as applicable)     Details if applicable:    15  69841 Neuromuscular Re-Education (timed):  improve balance, coordination, kinesthetic sense, posture, core stability and proprioception to improve patient's ability to develop conscious control of individual muscles and awareness of position of extremities in order to progress to PLOF and address remaining

## 2025-03-05 ENCOUNTER — FOLLOWUP TELEPHONE ENCOUNTER (OUTPATIENT)
Age: 70
End: 2025-03-05

## 2025-03-05 NOTE — PROGRESS NOTES
Called patient to let him know about the thyroid labs:     Free T4- 1.17, TSH- 5.34    Plan-   This is subclinical hypothyroidism and is mild, no treatment required now but periodic monitoring is required - should be done every 3-4 months to begin with and if stable can be done every 6 months to 1 year     Left a voice message and advised to call back if further clarification is needed

## 2025-03-06 ENCOUNTER — HOSPITAL ENCOUNTER (OUTPATIENT)
Facility: HOSPITAL | Age: 70
Setting detail: RECURRING SERIES
Discharge: HOME | End: 2025-03-09
Attending: ORTHOPAEDIC SURGERY
Payer: MEDICARE

## 2025-03-06 PROCEDURE — 97110 THERAPEUTIC EXERCISES: CPT

## 2025-03-06 NOTE — PROGRESS NOTES
PHYSICAL THERAPY - MEDICARE DAILY TREATMENT NOTE (updated 3/23)      Date: 3/6/2025          Patient Name:  Keo Brownlee Jr. :  1955   Medical   Diagnosis:  Orthopedic aftercare [Z47.89] Treatment Diagnosis:  M25.562  LEFT KNEE PAIN    Referral Source:  Obinna Jimenez MD Insurance:   Payor: [facility] MEDICARE / Plan: [facility] OUT OF STATE MANAGED MCR / Product Type: *No Product type* /                     Patient  verified yes     Visit #   Current  / Total 20 16-24   Time   In / Out 11:30 pm 12:30 pm   Total Treatment Time 55 min   Total Timed Codes 55 min   1:1 Treatment Time 55 min      [facility] Totals Reminder:  bill using total billable   min of TIMED therapeutic procedures and modalities.   8-22 min = 1 unit; 23-37 min = 2 units; 38-52 min = 3 units; 53-67 min = 4 units; 68-82 min = 5 units            SUBJECTIVE    Pain Level (0-10 scale): 0/10 L knee   2/10 R hip       Any medication changes, allergies to medications, adverse drug reactions, diagnosis change, or new procedure performed?: [x] No    [] Yes (see summary sheet for update)  Medications: Verified on Patient Summary List    Subjective functional status/changes:     Pt stated he felt better today not as stiff as the other day. He did state he was able to get an earlier appointment with the doctor concerning his R THR for 3/21/25. He states his R hip is bothering him more each day. \"I may have to stop driving because I can not move my R foot fast enough to step on the brake.\"       OBJECTIVE      Therapeutic Procedures:  Tx Min Billable or 1:1 Min (if diff from Tx Min) Procedure, Rationale, Specifics   55  26886 Therapeutic Exercise (timed):  increase ROM, strength, coordination, balance, and proprioception to improve patient's ability to progress to PLOF and address remaining functional goals. (see flow sheet as applicable)     Details if applicable:      45150 Neuromuscular Re-Education (timed):  improve balance, coordination, kinesthetic sense,

## 2025-03-11 ENCOUNTER — HOSPITAL ENCOUNTER (OUTPATIENT)
Facility: HOSPITAL | Age: 70
Setting detail: RECURRING SERIES
Discharge: HOME | End: 2025-03-14
Attending: ORTHOPAEDIC SURGERY
Payer: MEDICARE

## 2025-03-11 PROCEDURE — 97110 THERAPEUTIC EXERCISES: CPT

## 2025-03-11 NOTE — PROGRESS NOTES
PHYSICAL THERAPY - MEDICARE DAILY TREATMENT NOTE (updated 3/23)      Date: 3/11/2025          Patient Name:  Keo Brownlee Jr. :  1955   Medical   Diagnosis:  Orthopedic aftercare [Z47.89] Treatment Diagnosis:  M25.562  LEFT KNEE PAIN    Referral Source:  Obinna Jimenez MD Insurance:   Payor: [facility] MEDICARE / Plan: [facility] OUT OF STATE MANAGED MCR / Product Type: *No Product type* /                     Patient  verified yes     Visit #   Current  / Total 21 16-24   Time   In / Out 11:19am 12:18pm   Total Treatment Time 54   Total Timed Codes 54   1:1 Treatment Time 54       BC Totals Reminder:  bill using total billable   min of TIMED therapeutic procedures and modalities.   8-22 min = 1 unit; 23-37 min = 2 units; 38-52 min = 3 units; 53-67 min = 4 units; 68-82 min = 5 units            SUBJECTIVE    Pain Level (0-10 scale): 4/10 L knee   4/10 R hip       Any medication changes, allergies to medications, adverse drug reactions, diagnosis change, or new procedure performed?: [x] No    [] Yes (see summary sheet for update)  Medications: Verified on Patient Summary List    Subjective functional status/changes:     Pt reports that he has been cleaning up his yard for 2 days and is \"really stiff and hurting more today, especially that hip\".        OBJECTIVE      Therapeutic Procedures:  Tx Min Billable or 1:1 Min (if diff from Tx Min) Procedure, Rationale, Specifics   54  85215 Therapeutic Exercise (timed):  increase ROM, strength, coordination, balance, and proprioception to improve patient's ability to progress to PLOF and address remaining functional goals. (see flow sheet as applicable)     Details if applicable:      31191 Neuromuscular Re-Education (timed):  improve balance, coordination, kinesthetic sense, posture, core stability and proprioception to improve patient's ability to develop conscious control of individual muscles and awareness of position of extremities in order to progress to PLOF and

## 2025-03-13 ENCOUNTER — HOSPITAL ENCOUNTER (OUTPATIENT)
Facility: HOSPITAL | Age: 70
Setting detail: RECURRING SERIES
Discharge: HOME | End: 2025-03-16
Attending: ORTHOPAEDIC SURGERY
Payer: MEDICARE

## 2025-03-13 PROCEDURE — 97110 THERAPEUTIC EXERCISES: CPT

## 2025-03-13 NOTE — PROGRESS NOTES
Re-Education, 53945 Manual Therapy, 57500 Therapeutic Activity, 86293 Electrical Stim unattended /  ([facility]), 48286 Electrical Stim attended, 33348 Gait Training, 65044 Ultrasound, and (Elective Self Pay) Needle Insertion w/o Injection (1 or 2 muscles), (3+ muscles)     PLAN  Yes  Continue plan of care  Re-Cert Due: NA  [x]  Upgrade activities as tolerated  []  Discharge due to:  []  Other:      Erum Menijvar, PT       3/13/2025       9:11 AM

## 2025-03-19 ENCOUNTER — HOSPITAL ENCOUNTER (OUTPATIENT)
Facility: HOSPITAL | Age: 70
Setting detail: RECURRING SERIES
Discharge: HOME | End: 2025-03-22
Attending: ORTHOPAEDIC SURGERY
Payer: MEDICARE

## 2025-03-19 PROCEDURE — 97110 THERAPEUTIC EXERCISES: CPT

## 2025-03-19 NOTE — PROGRESS NOTES
16-24 treatments.  Treatment Plan may include any combination of the followin Therapeutic Exercise, 65590 Neuromuscular Re-Education, 47324 Manual Therapy, 10800 Therapeutic Activity, 89728 Electrical Stim unattended /  ([facility]), 64968 Electrical Stim attended, 35605 Gait Training, 53786 Ultrasound, and (Elective Self Pay) Needle Insertion w/o Injection (1 or 2 muscles), (3+ muscles)     PLAN  Yes  Continue plan of care  Re-Cert Due: NA  [x]  Upgrade activities as tolerated  []  Discharge due to:  []  Other:      WILBERT HERNANDEZ, PTA       3/19/2025       8:48 AM

## 2025-03-21 ENCOUNTER — HOSPITAL ENCOUNTER (OUTPATIENT)
Facility: HOSPITAL | Age: 70
Setting detail: RECURRING SERIES
Discharge: HOME | End: 2025-03-24
Attending: ORTHOPAEDIC SURGERY
Payer: MEDICARE

## 2025-03-21 PROCEDURE — 97110 THERAPEUTIC EXERCISES: CPT

## 2025-03-21 NOTE — THERAPY DISCHARGE
Interpretation:  Age-Matched Norms                                               Distance in Feet             Age in Years  MEN  WOMEN   60-64  6948-3492  3969-2443   65-69  9821-5583  7680-6421   70-74  7555-7679  8982-6098   75-79  4615-9858  6168-9250   80-84  7214-1800  8361-3266   85-89  0807-5565  9928-6644   90-94  915-1500  825-1320   1. American Thor[employer] Society (2002).  ATS Statement: Guidelines for the Six-Minute Walk Test.  American Journal of Respiratory Critical Care Medicine,166, 111-117.  2.  Elinor Olivo and KAYLA Davis.  Senior Fitness Test Manual.  [employer], 2001. Print.           Objective/Functional Outcome Measure: Basic Mobility  AM-PAC: 49.87%  1/15 (42.23%)  2/17 (27.54%)  3/21 (32.79%)  AM-PAC score = an established functional score where 0% = no disability         RECOMMENDATIONS  Discontinue therapy. Progressing towards or have reached established goals.        Erum Menjivar, PT       3/21/2025       1:04 PM    If you have any questions/comments please contact us directly at [phone].   Thank you for allowing us to assist in the care of your patient.

## 2025-03-21 NOTE — PROGRESS NOTES
PHYSICAL THERAPY - MEDICARE DAILY TREATMENT NOTE (updated 3/23)      Date: 3/21/2025          Patient Name:  Keo Brownlee Jr. :  1955   Medical   Diagnosis:  Orthopedic aftercare [Z47.89] Treatment Diagnosis:  M25.562  LEFT KNEE PAIN    Referral Source:  Obinna Jimenez MD Insurance:   Payor: [facility] MEDICARE / Plan: [facility] OUT OF STATE MANAGED MCR / Product Type: *No Product type* /                     Patient  verified yes     Visit #   Current  / Total 24 16-24   Time   In / Out 09:00 am 09:45 am   Total Treatment Time 45   Total Timed Codes 45   1:1 Treatment Time 45       BC Totals Reminder:  bill using total billable   min of TIMED therapeutic procedures and modalities.   8-22 min = 1 unit; 23-37 min = 2 units; 38-52 min = 3 units; 53-67 min = 4 units; 68-82 min = 5 units            SUBJECTIVE    Pain Level (0-10 scale): 0/10 L knee   5/10 R hip       Any medication changes, allergies to medications, adverse drug reactions, diagnosis change, or new procedure performed?: [x] No    [] Yes (see summary sheet for update)  Medications: Verified on Patient Summary List    Subjective functional status/changes:   Pt reports increased swelling in L knee this morning - he states that he rested the past two days but his pain has not eased and his L knee feels more swollen.  Pt stated he saw MD and he will be receiving an injection 3/27 to the R hip . He also reports if shots do not help he will get a CT scan to his R hip. He may also need more surgery.       OBJECTIVE      Therapeutic Procedures:  Tx Min Billable or 1:1 Min (if diff from Tx Min) Procedure, Rationale, Specifics   45  97363 Therapeutic Exercise (timed):  increase ROM, strength, coordination, balance, and proprioception to improve patient's ability to progress to PLOF and address remaining functional goals. (see flow sheet as applicable)     Details if applicable:      65890 Neuromuscular Re-Education (timed):  improve balance, coordination,

## 2025-04-30 RX ORDER — CILOSTAZOL 50 MG/1
TABLET ORAL
Qty: 180 TABLET | Refills: 6 | OUTPATIENT
Start: 2025-04-30

## 2025-05-30 RX ORDER — CILOSTAZOL 50 MG/1
TABLET ORAL
Qty: 180 TABLET | Refills: 6 | OUTPATIENT
Start: 2025-05-30

## (undated) DEVICE — GLOVE ORANGE PI 7   MSG9070

## (undated) DEVICE — MINOR GENERAL PACK: Brand: MEDLINE INDUSTRIES, INC.

## (undated) DEVICE — SYRINGE IRRIG 60ML SFT PLIABLE BLB EZ TO GRP 1 HND USE W/

## (undated) DEVICE — MARKER SURG SKIN UTIL REGULAR/FINE 2 TIP W/ RUL AND 9 LBL

## (undated) DEVICE — SYRINGE MED 10ML LUERLOCK TIP W/O SFTY DISP

## (undated) DEVICE — PAD ABSORBENT 40X28 IN POLY BACKING BLU DRI-SAFE

## (undated) DEVICE — NEEDLE HYPO 22GA L1 1/2IN PIVOTING SHLD FOR LUERLOCK SYR

## (undated) DEVICE — VISUALIZATION SYSTEM: Brand: CLEARIFY

## (undated) DEVICE — INTENDED FOR TISSUE SEPARATION, AND OTHER PROCEDURES THAT REQUIRE A SHARP SURGICAL BLADE TO PUNCTURE OR CUT.: Brand: BARD-PARKER ® CARBON RIB-BACK BLADES

## (undated) DEVICE — PADDING CAST W6INXL4YD ST COT COHESIVE HND TEARABLE SPEC

## (undated) DEVICE — PREP PAD BNS: Brand: CONVERTORS

## (undated) DEVICE — SUTURE VICRYL + SZ 1 L36IN ABSRB UD L36MM CT-1 1/2 CIR VCP947H

## (undated) DEVICE — SEALER ENDOSCP L37CM NANO COAT BLNT TIP LAP DIV

## (undated) DEVICE — GAUZE,PACKING STRIP,IODOFORM,1/2"X5YD,ST: Brand: CURAD

## (undated) DEVICE — SUT MONOCRYL PLUS UD 4-0 --

## (undated) DEVICE — LAMINECTOMY ARM CRADLE FOAM POSITIONER: Brand: CARDINAL HEALTH

## (undated) DEVICE — TROCAR: Brand: KII FIOS FIRST ENTRY

## (undated) DEVICE — DERMABOND SKIN ADH 0.7ML -- DERMABOND ADVANCED 12/BX

## (undated) DEVICE — CORD ES L10FT MPLR LAP

## (undated) DEVICE — GLOVE SURG SZ 8 L12IN FNGR THK79MIL GRN LTX FREE

## (undated) DEVICE — LAPAROSCOPIC CHOLE PACK: Brand: MEDLINE INDUSTRIES, INC.

## (undated) DEVICE — CLEANSER WND CLN DEB SYS IRRISEPT

## (undated) DEVICE — GARMENT,MEDLINE,DVT,INT,CALF,MED, GEN2: Brand: MEDLINE

## (undated) DEVICE — SUTURE SZ 0 27IN 5/8 CIR UR-6  TAPER PT VIOLET ABSRB VICRYL J603H

## (undated) DEVICE — SPONGE LAP SFT 18X18 IN X RAY DETECTABLE

## (undated) DEVICE — SYRINGE, LUER LOCK, 10ML: Brand: MEDLINE

## (undated) DEVICE — DRAIN JACKSON-PRT 19FR W/TROC: Brand: CARDINAL HEALTH

## (undated) DEVICE — MAJOR ORTHO PROCEDURE PACK: Brand: MEDLINE INDUSTRIES, INC.

## (undated) DEVICE — STRYKER PERFORMANCE SERIES SAGITTAL BLADE: Brand: STRYKER PERFORMANCE SERIES

## (undated) DEVICE — PADDING CAST 6INW X 4DL CTTN RAYON BLEND HGHLY ABSRBNT AIR P

## (undated) DEVICE — BLADE,CARBON-STEEL,10,STRL,DISPOSABLE,TB: Brand: MEDLINE

## (undated) DEVICE — SOLUTION IRRIG 3000ML 0.9% SOD CHL USP UROMATIC PLAS CONT

## (undated) DEVICE — TISSUE RETRIEVAL SYSTEM: Brand: INZII RETRIEVAL SYSTEM

## (undated) DEVICE — DRAPE,REIN 53X77,STERILE: Brand: MEDLINE

## (undated) DEVICE — TROCAR: Brand: KII SLEEVE

## (undated) DEVICE — TABLE PROC MAYO 0.75X19-1/8X12-5/8 IN ADJ SS

## (undated) DEVICE — CEMENT MIXING SYSTEM WITH FEMORAL BREAKWAY NOZZLE: Brand: REVOLUTION

## (undated) DEVICE — GLOVE SURG SZ 7 L12IN FNGR THK79MIL GRN LTX FREE

## (undated) DEVICE — RESERVOIR,SUCTION,100CC,SILICONE: Brand: MEDLINE

## (undated) DEVICE — TURNOVER KIT A GEN SURG

## (undated) DEVICE — BANDAGE E W6INXL11YD CLP CLSR DBL LEN FLEX-MASTER

## (undated) DEVICE — SYRINGE MED 5ML STD CLR PLAS LUERLOCK TIP N CTRL DISP

## (undated) DEVICE — HYPODERMIC SAFETY NEEDLE: Brand: MONOJECT

## (undated) DEVICE — NEEDLE SPNL 18GA L3.5IN W/ QNCKE SHARPER BVL DURA CLICK

## (undated) DEVICE — SUTURE VICRYL SZ 0 L36IN ABSRB UD L36MM CT-1 1/2 CIR J946H

## (undated) DEVICE — SUT ETHLN 2-0 18IN FS BLK --

## (undated) DEVICE — APPLICATOR MEDICATED 26 CC SOLUTION HI LT ORNG CHLORAPREP

## (undated) DEVICE — HOOD: Brand: FLYTE, SURGICOOL

## (undated) DEVICE — SOLUTION IRRIG 1000ML 0.9% SOD CHL USP POUR PLAS BTL

## (undated) DEVICE — DRAPE,TOP,102X53,STERILE: Brand: MEDLINE

## (undated) DEVICE — ADHESIVE SKIN CLOSURE WND 8.661X1.5 IN 22 CM LIQUIBAND SECUR

## (undated) DEVICE — SCRUB DRY SURG EZ SCRUB BRUSH PREOPERATIVE GRN

## (undated) DEVICE — HANDPIECE SET WITH HIGH FLOW TIP AND SUCTION TUBE: Brand: INTERPULSE

## (undated) DEVICE — WET SKIN PREP TRAY: Brand: MEDLINE INDUSTRIES, INC.

## (undated) DEVICE — BASIC SINGLE BASIN-LF: Brand: MEDLINE INDUSTRIES, INC.

## (undated) DEVICE — SUTURE ABSORBABLE MONOFILAMENT 1 MO-4 36 CM 36 MM VIO PDS +

## (undated) DEVICE — SWAB CULT DBL W/O CHAR RAYON TIP AMIES GEL CLMN FOR COLL

## (undated) DEVICE — RELOAD STPL H1-2.5XL35MM VASC THN TISS WHT B FRM NAT ARTC

## (undated) DEVICE — SOUTHSIDE TURNOVER: Brand: MEDLINE INDUSTRIES, INC.

## (undated) DEVICE — SUTURE PDS II SZ 2-0 L27IN ABSRB VLT L36MM CT-1 1/2 CIR Z339H

## (undated) DEVICE — PADDING CAST 2INW X 4YDL COTTON RAYON ROLYAN LATEX FREE

## (undated) DEVICE — SUTURE MONOCRYL STRATAFIX SPRL SZ 3-0 L12IN ABSRB UD FS-1 L30X30CM SXMP2B410

## (undated) DEVICE — ARMBOARD FOAM POSITIONER: Brand: CARDINAL HEALTH

## (undated) DEVICE — SPONGE DRAIN NONWOVEN 4X4IN -- 2/PK

## (undated) DEVICE — NEEDLE SPNL 18GA L6IN PNK LNG LEN DISP

## (undated) DEVICE — GLOVE ORANGE PI 8   MSG9080

## (undated) DEVICE — PREP SKN CHLRAPRP APL 26ML STR --

## (undated) DEVICE — TOWEL SURG W17XL27IN STD BLU COT NONFENESTRATED PREWASHED

## (undated) DEVICE — 3M™ STERI-DRAPE™ INCISE DRAPE 1050 (60CM X 45CM): Brand: STERI-DRAPE™

## (undated) DEVICE — CONTAINER,SPECIMEN,PNEU TUBE,4OZ,OR STRL: Brand: MEDLINE

## (undated) DEVICE — COVER,TABLE,HVY DUTY,60"X90",STRL: Brand: MEDLINE

## (undated) DEVICE — DRAPE,LAPAROTOMY,PED,STERILE: Brand: MEDLINE

## (undated) DEVICE — TOWEL,OR,DSP,ST,BLUE,DLX,6/PK,12PK/CS: Brand: MEDLINE

## (undated) DEVICE — CONTAINER,SPECIMEN,3OZ,OR STRL: Brand: MEDLINE

## (undated) DEVICE — SOLUTION IRRIG 500ML 0.9% SOD CHL USP POUR PLAS BTL

## (undated) DEVICE — 3-0 COATED VICRYL PLUS UNDYED 1X27" SH --

## (undated) DEVICE — 3M™ IOBAN™ 2 ANTIMICROBIAL INCISE DRAPE 6651EZ: Brand: IOBAN™ 2

## (undated) DEVICE — PICO 7 10CM X 30CM: Brand: PICO™ 7

## (undated) DEVICE — COVER,TABLE,HEAVY DUTY,79"X110",STRL: Brand: MEDLINE

## (undated) DEVICE — STAPLER SKIN L320MM 35MM VASC TISS 12 FIRING B FRM PWR

## (undated) DEVICE — ZIMMER® STERILE DISPOSABLE TOURNIQUET CUFF WITH PLC, DUAL PORT, SINGLE BLADDER, 34 IN. (86 CM)

## (undated) DEVICE — ULNAR NERVE PROTECTOR FOAM POSITIONER: Brand: CARDINAL HEALTH

## (undated) DEVICE — SUTURE VICRYL + SZ 0 L27IN ABSRB UD CT-1 L36MM 1/2 CIR TAPR VCP260H

## (undated) DEVICE — GARMENT,MEDLINE,DVT,INT,CALF,FOAM,MED: Brand: MEDLINE

## (undated) DEVICE — PADDING CAST 6 IN SYNTH

## (undated) DEVICE — LAPAROSCOPIC SCISSORS: Brand: EPIX LAPAROSCOPIC SCISSORS

## (undated) DEVICE — GAUZE,SPONGE,4"X4",16PLY,STRL,LF,10/TRAY: Brand: MEDLINE

## (undated) DEVICE — SOLUTION IRRIG 500ML 0.9% SOD CHLO USP POUR PLAS BTL

## (undated) DEVICE — TUBING INSUFFLATOR HEAT HUMIDIFIED SMK EVAC SET PNEUMOCLEAR

## (undated) DEVICE — KIT EVAC 400CC DIA3/16IN H PAT 12.5IN 3 SPR RND SHP PVC DRN